# Patient Record
Sex: FEMALE | Race: WHITE | Employment: OTHER | URBAN - METROPOLITAN AREA
[De-identification: names, ages, dates, MRNs, and addresses within clinical notes are randomized per-mention and may not be internally consistent; named-entity substitution may affect disease eponyms.]

---

## 2017-02-13 ENCOUNTER — HOSPITAL ENCOUNTER (OUTPATIENT)
Dept: ENDOSCOPY | Age: 77
Discharge: HOME | End: 2017-02-13
Attending: INTERNAL MEDICINE | Admitting: INTERNAL MEDICINE

## 2017-02-13 VITALS
HEART RATE: 92 BPM | TEMPERATURE: 97.6 F | SYSTOLIC BLOOD PRESSURE: 137 MMHG | WEIGHT: 159 LBS | HEIGHT: 62 IN | RESPIRATION RATE: 18 BRPM | OXYGEN SATURATION: 94 % | DIASTOLIC BLOOD PRESSURE: 98 MMHG | BODY MASS INDEX: 29.26 KG/M2

## 2017-05-24 RX ORDER — LEVOTHYROXINE SODIUM 0.03 MG/1
TABLET ORAL
Qty: 90 TABLET | Refills: 3 | Status: SHIPPED | OUTPATIENT
Start: 2017-05-24 | End: 2018-06-06 | Stop reason: SDUPTHER

## 2017-06-05 ENCOUNTER — OFFICE VISIT (OUTPATIENT)
Dept: INTERNAL MEDICINE | Age: 77
End: 2017-06-05

## 2017-06-05 VITALS
HEIGHT: 62 IN | SYSTOLIC BLOOD PRESSURE: 124 MMHG | WEIGHT: 163.8 LBS | BODY MASS INDEX: 30.14 KG/M2 | DIASTOLIC BLOOD PRESSURE: 86 MMHG

## 2017-06-05 DIAGNOSIS — K57.30 DIVERTICULOSIS OF LARGE INTESTINE WITHOUT HEMORRHAGE: ICD-10-CM

## 2017-06-05 DIAGNOSIS — E78.2 MIXED HYPERLIPIDEMIA: ICD-10-CM

## 2017-06-05 DIAGNOSIS — M85.80 OSTEOPENIA: ICD-10-CM

## 2017-06-05 DIAGNOSIS — M89.9 DISORDER OF BONE AND CARTILAGE: ICD-10-CM

## 2017-06-05 DIAGNOSIS — M94.9 DISORDER OF BONE AND CARTILAGE: ICD-10-CM

## 2017-06-05 DIAGNOSIS — Z00.00 MEDICARE ANNUAL WELLNESS VISIT, SUBSEQUENT: ICD-10-CM

## 2017-06-05 DIAGNOSIS — E03.9 ACQUIRED HYPOTHYROIDISM: ICD-10-CM

## 2017-06-05 DIAGNOSIS — Z00.00 MEDICARE ANNUAL WELLNESS VISIT, SUBSEQUENT: Primary | ICD-10-CM

## 2017-06-05 LAB
BASOPHILS ABSOLUTE: 0 K/UL (ref 0–0.2)
BASOPHILS RELATIVE PERCENT: 0.6 %
EOSINOPHILS ABSOLUTE: 0.2 K/UL (ref 0–0.6)
EOSINOPHILS RELATIVE PERCENT: 3.6 %
HCT VFR BLD CALC: 44.4 % (ref 36–48)
HEMOGLOBIN: 14.2 G/DL (ref 12–16)
LYMPHOCYTES ABSOLUTE: 1.4 K/UL (ref 1–5.1)
LYMPHOCYTES RELATIVE PERCENT: 20.8 %
MCH RBC QN AUTO: 30.7 PG (ref 26–34)
MCHC RBC AUTO-ENTMCNC: 32.1 G/DL (ref 31–36)
MCV RBC AUTO: 95.7 FL (ref 80–100)
MONOCYTES ABSOLUTE: 0.7 K/UL (ref 0–1.3)
MONOCYTES RELATIVE PERCENT: 9.9 %
NEUTROPHILS ABSOLUTE: 4.3 K/UL (ref 1.7–7.7)
NEUTROPHILS RELATIVE PERCENT: 65.1 %
PDW BLD-RTO: 13.9 % (ref 12.4–15.4)
PLATELET # BLD: 236 K/UL (ref 135–450)
PMV BLD AUTO: 9 FL (ref 5–10.5)
RBC # BLD: 4.64 M/UL (ref 4–5.2)
WBC # BLD: 6.7 K/UL (ref 4–11)

## 2017-06-05 PROCEDURE — G8420 CALC BMI NORM PARAMETERS: HCPCS | Performed by: INTERNAL MEDICINE

## 2017-06-05 PROCEDURE — G0439 PPPS, SUBSEQ VISIT: HCPCS | Performed by: INTERNAL MEDICINE

## 2017-06-05 PROCEDURE — 1090F PRES/ABSN URINE INCON ASSESS: CPT | Performed by: INTERNAL MEDICINE

## 2017-06-05 PROCEDURE — G8427 DOCREV CUR MEDS BY ELIG CLIN: HCPCS | Performed by: INTERNAL MEDICINE

## 2017-06-05 PROCEDURE — 1123F ACP DISCUSS/DSCN MKR DOCD: CPT | Performed by: INTERNAL MEDICINE

## 2017-06-05 PROCEDURE — G8399 PT W/DXA RESULTS DOCUMENT: HCPCS | Performed by: INTERNAL MEDICINE

## 2017-06-05 PROCEDURE — 1036F TOBACCO NON-USER: CPT | Performed by: INTERNAL MEDICINE

## 2017-06-05 PROCEDURE — 4040F PNEUMOC VAC/ADMIN/RCVD: CPT | Performed by: INTERNAL MEDICINE

## 2017-06-05 ASSESSMENT — ENCOUNTER SYMPTOMS
ABDOMINAL PAIN: 0
CONSTIPATION: 0
SINUS PRESSURE: 0
COUGH: 0
SHORTNESS OF BREATH: 0

## 2017-06-06 LAB
A/G RATIO: 2.3 (ref 1.1–2.2)
ALBUMIN SERPL-MCNC: 4.2 G/DL (ref 3.4–5)
ALP BLD-CCNC: 93 U/L (ref 40–129)
ALT SERPL-CCNC: 24 U/L (ref 10–40)
ANION GAP SERPL CALCULATED.3IONS-SCNC: 15 MMOL/L (ref 3–16)
AST SERPL-CCNC: 22 U/L (ref 15–37)
BILIRUB SERPL-MCNC: 0.4 MG/DL (ref 0–1)
BUN BLDV-MCNC: 22 MG/DL (ref 7–20)
CALCIUM SERPL-MCNC: 8.9 MG/DL (ref 8.3–10.6)
CHLORIDE BLD-SCNC: 98 MMOL/L (ref 99–110)
CHOLESTEROL, TOTAL: 220 MG/DL (ref 0–199)
CO2: 24 MMOL/L (ref 21–32)
CREAT SERPL-MCNC: 0.8 MG/DL (ref 0.6–1.2)
GFR AFRICAN AMERICAN: >60
GFR NON-AFRICAN AMERICAN: >60
GLOBULIN: 1.8 G/DL
GLUCOSE BLD-MCNC: 104 MG/DL (ref 70–99)
HDLC SERPL-MCNC: 83 MG/DL (ref 40–60)
LDL CHOLESTEROL CALCULATED: 121 MG/DL
POTASSIUM SERPL-SCNC: 4.6 MMOL/L (ref 3.5–5.1)
SODIUM BLD-SCNC: 137 MMOL/L (ref 136–145)
T4 FREE: 1 NG/DL (ref 0.9–1.8)
TOTAL PROTEIN: 6 G/DL (ref 6.4–8.2)
TRIGL SERPL-MCNC: 80 MG/DL (ref 0–150)
TSH REFLEX: 4.55 UIU/ML (ref 0.27–4.2)
VITAMIN D 25-HYDROXY: 45.7 NG/ML
VLDLC SERPL CALC-MCNC: 16 MG/DL

## 2017-11-09 ENCOUNTER — TELEPHONE (OUTPATIENT)
Dept: INTERNAL MEDICINE | Age: 77
End: 2017-11-09

## 2017-11-17 ENCOUNTER — OFFICE VISIT (OUTPATIENT)
Dept: INTERNAL MEDICINE | Age: 77
End: 2017-11-17

## 2017-11-17 VITALS
WEIGHT: 162 LBS | SYSTOLIC BLOOD PRESSURE: 150 MMHG | BODY MASS INDEX: 29.81 KG/M2 | DIASTOLIC BLOOD PRESSURE: 90 MMHG | HEIGHT: 62 IN

## 2017-11-17 DIAGNOSIS — M85.88 OSTEOPENIA OF SPINE: ICD-10-CM

## 2017-11-17 DIAGNOSIS — E78.2 MIXED HYPERLIPIDEMIA: ICD-10-CM

## 2017-11-17 DIAGNOSIS — E03.9 ACQUIRED HYPOTHYROIDISM: ICD-10-CM

## 2017-11-17 DIAGNOSIS — M54.5 LOW BACK PAIN, UNSPECIFIED BACK PAIN LATERALITY, UNSPECIFIED CHRONICITY, WITH SCIATICA PRESENCE UNSPECIFIED: Primary | ICD-10-CM

## 2017-11-17 LAB
BILIRUBIN, POC: ABNORMAL
BLOOD URINE, POC: ABNORMAL
CLARITY, POC: CLEAR
COLOR, POC: YELLOW
GLUCOSE URINE, POC: ABNORMAL
KETONES, POC: ABNORMAL
LEUKOCYTE EST, POC: ABNORMAL
NITRITE, POC: ABNORMAL
PH, POC: 5
PROTEIN, POC: ABNORMAL
SPECIFIC GRAVITY, POC: 1015
UROBILINOGEN, POC: 0.2

## 2017-11-17 PROCEDURE — 1123F ACP DISCUSS/DSCN MKR DOCD: CPT | Performed by: INTERNAL MEDICINE

## 2017-11-17 PROCEDURE — G8427 DOCREV CUR MEDS BY ELIG CLIN: HCPCS | Performed by: INTERNAL MEDICINE

## 2017-11-17 PROCEDURE — 99214 OFFICE O/P EST MOD 30 MIN: CPT | Performed by: INTERNAL MEDICINE

## 2017-11-17 PROCEDURE — 1036F TOBACCO NON-USER: CPT | Performed by: INTERNAL MEDICINE

## 2017-11-17 PROCEDURE — 1090F PRES/ABSN URINE INCON ASSESS: CPT | Performed by: INTERNAL MEDICINE

## 2017-11-17 PROCEDURE — 4040F PNEUMOC VAC/ADMIN/RCVD: CPT | Performed by: INTERNAL MEDICINE

## 2017-11-17 PROCEDURE — 81002 URINALYSIS NONAUTO W/O SCOPE: CPT | Performed by: INTERNAL MEDICINE

## 2017-11-17 PROCEDURE — G8399 PT W/DXA RESULTS DOCUMENT: HCPCS | Performed by: INTERNAL MEDICINE

## 2017-11-17 PROCEDURE — G8484 FLU IMMUNIZE NO ADMIN: HCPCS | Performed by: INTERNAL MEDICINE

## 2017-11-17 PROCEDURE — G8419 CALC BMI OUT NRM PARAM NOF/U: HCPCS | Performed by: INTERNAL MEDICINE

## 2017-11-17 RX ORDER — SULFAMETHOXAZOLE AND TRIMETHOPRIM 800; 160 MG/1; MG/1
1 TABLET ORAL 2 TIMES DAILY
Qty: 20 TABLET | Refills: 0 | Status: SHIPPED | OUTPATIENT
Start: 2017-11-17 | End: 2017-11-27

## 2017-11-17 ASSESSMENT — PATIENT HEALTH QUESTIONNAIRE - PHQ9
SUM OF ALL RESPONSES TO PHQ QUESTIONS 1-9: 0
1. LITTLE INTEREST OR PLEASURE IN DOING THINGS: 0
SUM OF ALL RESPONSES TO PHQ9 QUESTIONS 1 & 2: 0
2. FEELING DOWN, DEPRESSED OR HOPELESS: 0

## 2017-11-17 NOTE — PROGRESS NOTES
Subjective:      Patient ID: Jeffory Cowden is a 69 y.o. female.     HPI    Review of Systems    Objective:   Physical Exam    Assessment:      ***      Plan:      ***

## 2017-11-20 LAB
ORGANISM: ABNORMAL
URINE CULTURE, ROUTINE: ABNORMAL
URINE CULTURE, ROUTINE: ABNORMAL

## 2017-12-18 ENCOUNTER — HOSPITAL ENCOUNTER (OUTPATIENT)
Dept: MAMMOGRAPHY | Age: 77
Discharge: HOME | End: 2017-12-18
Admitting: INTERNAL MEDICINE

## 2017-12-18 DIAGNOSIS — Z12.31 ENCOUNTER FOR SCREENING MAMMOGRAM FOR BREAST CANCER: ICD-10-CM

## 2018-06-06 ENCOUNTER — OFFICE VISIT (OUTPATIENT)
Dept: INTERNAL MEDICINE | Age: 78
End: 2018-06-06

## 2018-06-06 VITALS
SYSTOLIC BLOOD PRESSURE: 150 MMHG | BODY MASS INDEX: 29.81 KG/M2 | DIASTOLIC BLOOD PRESSURE: 78 MMHG | HEIGHT: 62 IN | WEIGHT: 162 LBS

## 2018-06-06 DIAGNOSIS — E03.9 ACQUIRED HYPOTHYROIDISM: ICD-10-CM

## 2018-06-06 DIAGNOSIS — M85.88 OSTEOPENIA OF SPINE: ICD-10-CM

## 2018-06-06 DIAGNOSIS — Z00.00 MEDICARE ANNUAL WELLNESS VISIT, SUBSEQUENT: ICD-10-CM

## 2018-06-06 DIAGNOSIS — K57.30 DIVERTICULOSIS OF LARGE INTESTINE WITHOUT HEMORRHAGE: ICD-10-CM

## 2018-06-06 DIAGNOSIS — Z00.00 MEDICARE ANNUAL WELLNESS VISIT, SUBSEQUENT: Primary | ICD-10-CM

## 2018-06-06 DIAGNOSIS — E78.2 MIXED HYPERLIPIDEMIA: ICD-10-CM

## 2018-06-06 LAB
A/G RATIO: 2 (ref 1.1–2.2)
ALBUMIN SERPL-MCNC: 4.4 G/DL (ref 3.4–5)
ALP BLD-CCNC: 109 U/L (ref 40–129)
ALT SERPL-CCNC: 33 U/L (ref 10–40)
ANION GAP SERPL CALCULATED.3IONS-SCNC: 16 MMOL/L (ref 3–16)
AST SERPL-CCNC: 28 U/L (ref 15–37)
BASOPHILS ABSOLUTE: 0.1 K/UL (ref 0–0.2)
BASOPHILS RELATIVE PERCENT: 0.7 %
BILIRUB SERPL-MCNC: <0.2 MG/DL (ref 0–1)
BUN BLDV-MCNC: 17 MG/DL (ref 7–20)
CALCIUM SERPL-MCNC: 9.4 MG/DL (ref 8.3–10.6)
CHLORIDE BLD-SCNC: 99 MMOL/L (ref 99–110)
CHOLESTEROL, TOTAL: 240 MG/DL (ref 0–199)
CO2: 26 MMOL/L (ref 21–32)
CREAT SERPL-MCNC: 0.8 MG/DL (ref 0.6–1.2)
EOSINOPHILS ABSOLUTE: 0.3 K/UL (ref 0–0.6)
EOSINOPHILS RELATIVE PERCENT: 4.7 %
GFR AFRICAN AMERICAN: >60
GFR NON-AFRICAN AMERICAN: >60
GLOBULIN: 2.2 G/DL
GLUCOSE BLD-MCNC: 121 MG/DL (ref 70–99)
HCT VFR BLD CALC: 47.1 % (ref 36–48)
HDLC SERPL-MCNC: 91 MG/DL (ref 40–60)
HEMOGLOBIN: 15.8 G/DL (ref 12–16)
LDL CHOLESTEROL CALCULATED: 133 MG/DL
LYMPHOCYTES ABSOLUTE: 1.6 K/UL (ref 1–5.1)
LYMPHOCYTES RELATIVE PERCENT: 21.8 %
MCH RBC QN AUTO: 31.5 PG (ref 26–34)
MCHC RBC AUTO-ENTMCNC: 33.6 G/DL (ref 31–36)
MCV RBC AUTO: 93.6 FL (ref 80–100)
MONOCYTES ABSOLUTE: 0.8 K/UL (ref 0–1.3)
MONOCYTES RELATIVE PERCENT: 11.2 %
NEUTROPHILS ABSOLUTE: 4.5 K/UL (ref 1.7–7.7)
NEUTROPHILS RELATIVE PERCENT: 61.6 %
PDW BLD-RTO: 13.5 % (ref 12.4–15.4)
PLATELET # BLD: 256 K/UL (ref 135–450)
PMV BLD AUTO: 8.9 FL (ref 5–10.5)
POTASSIUM SERPL-SCNC: 5 MMOL/L (ref 3.5–5.1)
RBC # BLD: 5.03 M/UL (ref 4–5.2)
SODIUM BLD-SCNC: 141 MMOL/L (ref 136–145)
T4 FREE: 1 NG/DL (ref 0.9–1.8)
TOTAL PROTEIN: 6.6 G/DL (ref 6.4–8.2)
TRIGL SERPL-MCNC: 78 MG/DL (ref 0–150)
TSH REFLEX: 5.2 UIU/ML (ref 0.27–4.2)
VLDLC SERPL CALC-MCNC: 16 MG/DL
WBC # BLD: 7.4 K/UL (ref 4–11)

## 2018-06-06 PROCEDURE — G0439 PPPS, SUBSEQ VISIT: HCPCS | Performed by: INTERNAL MEDICINE

## 2018-06-06 PROCEDURE — 4040F PNEUMOC VAC/ADMIN/RCVD: CPT | Performed by: INTERNAL MEDICINE

## 2018-06-06 RX ORDER — LEVOTHYROXINE SODIUM 0.03 MG/1
TABLET ORAL
Qty: 90 TABLET | Refills: 3 | Status: SHIPPED | OUTPATIENT
Start: 2018-06-06 | End: 2018-06-06 | Stop reason: CLARIF

## 2018-06-06 ASSESSMENT — ENCOUNTER SYMPTOMS
SINUS PRESSURE: 0
COUGH: 0
CONSTIPATION: 0
ABDOMINAL PAIN: 0
SHORTNESS OF BREATH: 0

## 2018-06-07 RX ORDER — LEVOTHYROXINE SODIUM 0.03 MG/1
TABLET ORAL
Qty: 90 TABLET | Refills: 0 | Status: SHIPPED | OUTPATIENT
Start: 2018-06-07 | End: 2018-09-02 | Stop reason: SDUPTHER

## 2018-09-04 RX ORDER — LEVOTHYROXINE SODIUM 0.03 MG/1
TABLET ORAL
Qty: 90 TABLET | Refills: 3 | Status: SHIPPED | OUTPATIENT
Start: 2018-09-04 | End: 2019-06-26 | Stop reason: SDUPTHER

## 2018-09-24 ENCOUNTER — OFFICE VISIT (OUTPATIENT)
Dept: INTERNAL MEDICINE CLINIC | Age: 78
End: 2018-09-24
Payer: MEDICARE

## 2018-09-24 VITALS
BODY MASS INDEX: 30.25 KG/M2 | SYSTOLIC BLOOD PRESSURE: 118 MMHG | DIASTOLIC BLOOD PRESSURE: 66 MMHG | HEIGHT: 62 IN | WEIGHT: 164.4 LBS

## 2018-09-24 DIAGNOSIS — N30.00 ACUTE CYSTITIS WITHOUT HEMATURIA: ICD-10-CM

## 2018-09-24 DIAGNOSIS — Z23 NEED FOR INFLUENZA VACCINATION: Primary | ICD-10-CM

## 2018-09-24 LAB
BILIRUBIN, POC: ABNORMAL
BLOOD URINE, POC: ABNORMAL
CLARITY, POC: ABNORMAL
COLOR, POC: ABNORMAL
GLUCOSE URINE, POC: ABNORMAL
KETONES, POC: ABNORMAL
LEUKOCYTE EST, POC: ABNORMAL
NITRITE, POC: ABNORMAL
PH, POC: 6
PROTEIN, POC: ABNORMAL
SPECIFIC GRAVITY, POC: 1015
UROBILINOGEN, POC: 0.2

## 2018-09-24 PROCEDURE — G0008 ADMIN INFLUENZA VIRUS VAC: HCPCS | Performed by: INTERNAL MEDICINE

## 2018-09-24 PROCEDURE — 4040F PNEUMOC VAC/ADMIN/RCVD: CPT | Performed by: INTERNAL MEDICINE

## 2018-09-24 PROCEDURE — 90662 IIV NO PRSV INCREASED AG IM: CPT | Performed by: INTERNAL MEDICINE

## 2018-09-24 PROCEDURE — 1123F ACP DISCUSS/DSCN MKR DOCD: CPT | Performed by: INTERNAL MEDICINE

## 2018-09-24 PROCEDURE — G8417 CALC BMI ABV UP PARAM F/U: HCPCS | Performed by: INTERNAL MEDICINE

## 2018-09-24 PROCEDURE — G8427 DOCREV CUR MEDS BY ELIG CLIN: HCPCS | Performed by: INTERNAL MEDICINE

## 2018-09-24 PROCEDURE — 99213 OFFICE O/P EST LOW 20 MIN: CPT | Performed by: INTERNAL MEDICINE

## 2018-09-24 PROCEDURE — 1036F TOBACCO NON-USER: CPT | Performed by: INTERNAL MEDICINE

## 2018-09-24 PROCEDURE — G8399 PT W/DXA RESULTS DOCUMENT: HCPCS | Performed by: INTERNAL MEDICINE

## 2018-09-24 PROCEDURE — 81002 URINALYSIS NONAUTO W/O SCOPE: CPT | Performed by: INTERNAL MEDICINE

## 2018-09-24 PROCEDURE — 1101F PT FALLS ASSESS-DOCD LE1/YR: CPT | Performed by: INTERNAL MEDICINE

## 2018-09-24 PROCEDURE — 1090F PRES/ABSN URINE INCON ASSESS: CPT | Performed by: INTERNAL MEDICINE

## 2018-09-24 RX ORDER — SULFAMETHOXAZOLE AND TRIMETHOPRIM 800; 160 MG/1; MG/1
1 TABLET ORAL 2 TIMES DAILY
Qty: 20 TABLET | Refills: 0 | Status: SHIPPED | OUTPATIENT
Start: 2018-09-24 | End: 2018-10-02 | Stop reason: SDUPTHER

## 2018-09-24 ASSESSMENT — PATIENT HEALTH QUESTIONNAIRE - PHQ9
SUM OF ALL RESPONSES TO PHQ QUESTIONS 1-9: 0
SUM OF ALL RESPONSES TO PHQ QUESTIONS 1-9: 0
SUM OF ALL RESPONSES TO PHQ9 QUESTIONS 1 & 2: 0
1. LITTLE INTEREST OR PLEASURE IN DOING THINGS: 0
2. FEELING DOWN, DEPRESSED OR HOPELESS: 0

## 2018-09-28 LAB
ORGANISM: ABNORMAL
URINE CULTURE, ROUTINE: ABNORMAL
URINE CULTURE, ROUTINE: ABNORMAL

## 2018-10-02 RX ORDER — SULFAMETHOXAZOLE AND TRIMETHOPRIM 800; 160 MG/1; MG/1
1 TABLET ORAL 2 TIMES DAILY
Qty: 20 TABLET | Refills: 0 | Status: SHIPPED | OUTPATIENT
Start: 2018-10-02 | End: 2018-10-12

## 2018-11-16 ENCOUNTER — TELEPHONE (OUTPATIENT)
Dept: INTERNAL MEDICINE CLINIC | Age: 78
End: 2018-11-16

## 2018-11-16 DIAGNOSIS — N30.00 ACUTE CYSTITIS WITHOUT HEMATURIA: Primary | ICD-10-CM

## 2018-11-16 RX ORDER — NITROFURANTOIN 25; 75 MG/1; MG/1
100 CAPSULE ORAL 2 TIMES DAILY
Qty: 14 CAPSULE | Refills: 0 | Status: SHIPPED | OUTPATIENT
Start: 2018-11-16 | End: 2018-11-23

## 2018-11-16 NOTE — TELEPHONE ENCOUNTER
Pt called in stating that the Bactrim that she was prescribed for her Kidney infection is giving her Hives and she cant take it.  Please cb at [phone]

## 2018-12-18 ENCOUNTER — HOSPITAL ENCOUNTER (OUTPATIENT)
Dept: MAMMOGRAPHY | Age: 78
Discharge: HOME | End: 2018-12-18
Payer: MEDICARE

## 2018-12-18 DIAGNOSIS — Z12.39 BREAST CANCER SCREENING: ICD-10-CM

## 2018-12-18 PROCEDURE — 77063 BREAST TOMOSYNTHESIS BI: CPT

## 2019-01-31 ENCOUNTER — TELEPHONE (OUTPATIENT)
Dept: INTERNAL MEDICINE CLINIC | Age: 79
End: 2019-01-31

## 2019-02-01 RX ORDER — METRONIDAZOLE 500 MG/1
500 TABLET ORAL 3 TIMES DAILY
Qty: 21 TABLET | Refills: 0 | Status: SHIPPED | OUTPATIENT
Start: 2019-02-01 | End: 2021-02-23 | Stop reason: SDUPTHER

## 2019-02-01 RX ORDER — CIPROFLOXACIN 500 MG/1
500 TABLET, FILM COATED ORAL 2 TIMES DAILY
Qty: 14 TABLET | Refills: 0 | Status: SHIPPED | OUTPATIENT
Start: 2019-02-01 | End: 2021-02-23 | Stop reason: SDUPTHER

## 2019-04-30 ENCOUNTER — OFFICE VISIT (OUTPATIENT)
Dept: INTERNAL MEDICINE CLINIC | Age: 79
End: 2019-04-30
Payer: MEDICARE

## 2019-04-30 VITALS
OXYGEN SATURATION: 98 % | BODY MASS INDEX: 30.73 KG/M2 | HEART RATE: 70 BPM | SYSTOLIC BLOOD PRESSURE: 132 MMHG | WEIGHT: 167 LBS | HEIGHT: 62 IN | DIASTOLIC BLOOD PRESSURE: 86 MMHG

## 2019-04-30 DIAGNOSIS — Z85.820 HX OF MELANOMA EXCISION: ICD-10-CM

## 2019-04-30 DIAGNOSIS — E03.9 ACQUIRED HYPOTHYROIDISM: ICD-10-CM

## 2019-04-30 DIAGNOSIS — E78.2 MIXED HYPERLIPIDEMIA: Primary | ICD-10-CM

## 2019-04-30 DIAGNOSIS — Z98.890 HX OF MELANOMA EXCISION: ICD-10-CM

## 2019-04-30 PROCEDURE — 1123F ACP DISCUSS/DSCN MKR DOCD: CPT | Performed by: INTERNAL MEDICINE

## 2019-04-30 PROCEDURE — G8399 PT W/DXA RESULTS DOCUMENT: HCPCS | Performed by: INTERNAL MEDICINE

## 2019-04-30 PROCEDURE — G8427 DOCREV CUR MEDS BY ELIG CLIN: HCPCS | Performed by: INTERNAL MEDICINE

## 2019-04-30 PROCEDURE — 99213 OFFICE O/P EST LOW 20 MIN: CPT | Performed by: INTERNAL MEDICINE

## 2019-04-30 PROCEDURE — 1036F TOBACCO NON-USER: CPT | Performed by: INTERNAL MEDICINE

## 2019-04-30 PROCEDURE — 1090F PRES/ABSN URINE INCON ASSESS: CPT | Performed by: INTERNAL MEDICINE

## 2019-04-30 PROCEDURE — 4040F PNEUMOC VAC/ADMIN/RCVD: CPT | Performed by: INTERNAL MEDICINE

## 2019-04-30 PROCEDURE — G8417 CALC BMI ABV UP PARAM F/U: HCPCS | Performed by: INTERNAL MEDICINE

## 2019-04-30 ASSESSMENT — PATIENT HEALTH QUESTIONNAIRE - PHQ9
SUM OF ALL RESPONSES TO PHQ QUESTIONS 1-9: 0
SUM OF ALL RESPONSES TO PHQ9 QUESTIONS 1 & 2: 0
SUM OF ALL RESPONSES TO PHQ QUESTIONS 1-9: 0
1. LITTLE INTEREST OR PLEASURE IN DOING THINGS: 0
2. FEELING DOWN, DEPRESSED OR HOPELESS: 0

## 2019-04-30 ASSESSMENT — ENCOUNTER SYMPTOMS
CHEST TIGHTNESS: 0
SHORTNESS OF BREATH: 0
NAUSEA: 0
VOMITING: 0
ABDOMINAL PAIN: 0

## 2019-04-30 NOTE — PROGRESS NOTES
Outpatient Note for established Patient - regular Visit    History Obtained From:  patient, electronic medical record  Is the patient new to me ? - No    HISTORY OF PRESENT ILLNESS:   The patient is a 67 y.o. female who is here today for :  Huber Sheth was seen today for established new doctor. Diagnoses and all orders for this visit:    Mixed hyperlipidemia  -     CBC Auto Differential; Future  -     Comprehensive Metabolic Panel; Future  -     Lipid Panel; Future  -     TSH with Reflex; Future    Acquired hypothyroidism  -     Lipid Panel; Future  -     TSH with Reflex; Future    Hx of melanoma excision      Lab Results   Component Value Date    CHOL 240 (H) 06/06/2018    CHOL 220 (H) 06/05/2017    CHOL 239 (H) 05/31/2016     Lab Results   Component Value Date    TRIG 78 06/06/2018    TRIG 80 06/05/2017    TRIG 87 05/31/2016     Lab Results   Component Value Date    HDL 91 (H) 06/06/2018    HDL 83 (H) 06/05/2017    HDL 92 (H) 05/31/2016     Lab Results   Component Value Date    LDLCALC 133 (H) 06/06/2018    LDLCALC 121 (H) 06/05/2017    LDLCALC 130 (H) 05/31/2016     Lab Results   Component Value Date    LABVLDL 16 06/06/2018    LABVLDL 16 06/05/2017    LABVLDL 17 05/31/2016     No results found for: CHOLHDLRATIO    Pt denies CP/SOB/palpitations/abdominal pain/N/V. She preferred not be on statins. Pt denies CP/SOB/palpitations/abdominal pain/N/V. She had back melanoma excision 2 months ago (by the dermatology group)  She is not followed every 2 months and uses protection outside. Her last TSH was 5.2    Preventive:  1) colon cancer screening completed? yes, by Dr Magda Grover - she was not required to repeat   3) Breast cancer screeningcompleted (or not needed) ?  12/2018- birads-2     Past Medical History:        Diagnosis Date    Alveolitis of jaw     Disorder of bone and cartilage, unspecified     Diverticulosis of colon (without mention of hemorrhage)     Myalgia and myositis, unspecified     Other and well-developed and well-nourished. No distress. HENT:   Head: Normocephalic and atraumatic. Mouth/Throat: Oropharynx is clear and moist. No oropharyngeal exudate. Eyes: Conjunctivae and EOM are normal. Right eye exhibits no discharge. Left eye exhibits no discharge. No scleral icterus. Neck: Normal range of motion. Neck supple. Cardiovascular: Normal rate and normal heart sounds. No murmur heard. Pulmonary/Chest: Effort normal and breath sounds normal. No respiratory distress. She has no wheezes. She has no rales. Abdominal: Soft. She exhibits no distension. There is no tenderness. There is no guarding. Musculoskeletal: She exhibits no edema or deformity. Lymphadenopathy:        Head (right side): No submental and no submandibular adenopathy present. Head (left side): No submental and no submandibular adenopathy present. She has no cervical adenopathy. Right cervical: No superficial cervical and no deep cervical adenopathy present. Left cervical: No superficial cervical and no deep cervical adenopathy present. Neurological: She is alert and oriented to person, place, and time. She has normal reflexes. She exhibits normal muscle tone. GCS eye subscore is 4. GCS verbal subscore is 5. GCS motor subscore is 6. Skin: No rash noted. She is not diaphoretic. Psychiatric: She has a normal mood and affect. Her behavior is normal. Thought content normal.       Assessment/Plan:   Michael Low was seen today for established new doctor.     Diagnoses and all orders for this visit:    Mixed hyperlipidemia  LDL elevated  Pt is not interested in statin  Keep monitoring     Acquired hypothyroidism  Last TSH was 5.2   She is on Synthroid     Hx of melanoma excision  Keep derm f/u    - Patient was encouraged to callthe office (and ask to see me) or be seen by other provider for any worsening or lack    of improvement in his symptoms.       - Pt was asked toschedule an appointment in 3 months, and to let me know of any scheduling difficulties. Additional patients instructions (if given): There are no Patient Instructions on file for this visit.     Marshall Walker M.D.   5/8/2019, 2:42 AM    Addnedum: I will ask pt to repeat labs (message sent to the MA)

## 2019-05-09 ENCOUNTER — TELEPHONE (OUTPATIENT)
Dept: INTERNAL MEDICINE CLINIC | Age: 79
End: 2019-05-09

## 2019-05-09 NOTE — TELEPHONE ENCOUNTER
Pt spouse came in to the office stating that I called her yesterday and left a message stating that Dallas Mulligan needed to have pre op testing, not to stop at the office for lab order that it will be in the system. I did not place a call to her or her  yesterday. I informed her of that and that Dr. Jose Lind would not be ordering any testing as he has not seen her  yet. I called Dr. Larissa Henderson (ordered biopsy scheduled for tomorrow) and spoke with Shirin Jeter MA. She stated that she told Mrs Jennings in the office yesterday that any testing needed prior to surgery will be done at the hospital the day of the procedure. I reminded Mrs Jennings of this and she agreed to that happening yesterday. I apologized to her and apologized for the inconvenience. She turned and walked away and said \"me too you piece of shit\" and walked out the door. There were several patients in the lobby that witnessed this as well as the  staff. I spoke with Dr. Jose Lind about this and he would like to have the patient dismissed. This message has been sent to [employer], practice manager.

## 2019-06-26 ENCOUNTER — OFFICE VISIT (OUTPATIENT)
Dept: FAMILY MEDICINE CLINIC | Age: 79
End: 2019-06-26
Payer: MEDICARE

## 2019-06-26 VITALS
HEART RATE: 76 BPM | OXYGEN SATURATION: 93 % | TEMPERATURE: 96.7 F | SYSTOLIC BLOOD PRESSURE: 136 MMHG | HEIGHT: 62 IN | WEIGHT: 162 LBS | BODY MASS INDEX: 29.81 KG/M2 | DIASTOLIC BLOOD PRESSURE: 68 MMHG | RESPIRATION RATE: 18 BRPM

## 2019-06-26 DIAGNOSIS — E78.2 MIXED HYPERLIPIDEMIA: ICD-10-CM

## 2019-06-26 DIAGNOSIS — E03.9 ACQUIRED HYPOTHYROIDISM: Primary | ICD-10-CM

## 2019-06-26 DIAGNOSIS — E55.9 VITAMIN D DEFICIENCY: ICD-10-CM

## 2019-06-26 DIAGNOSIS — M85.88 OSTEOPENIA OF SPINE: ICD-10-CM

## 2019-06-26 DIAGNOSIS — Z12.39 BREAST CANCER SCREENING: ICD-10-CM

## 2019-06-26 PROCEDURE — G8427 DOCREV CUR MEDS BY ELIG CLIN: HCPCS | Performed by: FAMILY MEDICINE

## 2019-06-26 PROCEDURE — G8417 CALC BMI ABV UP PARAM F/U: HCPCS | Performed by: FAMILY MEDICINE

## 2019-06-26 PROCEDURE — 4040F PNEUMOC VAC/ADMIN/RCVD: CPT | Performed by: FAMILY MEDICINE

## 2019-06-26 PROCEDURE — 99214 OFFICE O/P EST MOD 30 MIN: CPT | Performed by: FAMILY MEDICINE

## 2019-06-26 PROCEDURE — 1090F PRES/ABSN URINE INCON ASSESS: CPT | Performed by: FAMILY MEDICINE

## 2019-06-26 PROCEDURE — G8399 PT W/DXA RESULTS DOCUMENT: HCPCS | Performed by: FAMILY MEDICINE

## 2019-06-26 PROCEDURE — 1123F ACP DISCUSS/DSCN MKR DOCD: CPT | Performed by: FAMILY MEDICINE

## 2019-06-26 PROCEDURE — 1036F TOBACCO NON-USER: CPT | Performed by: FAMILY MEDICINE

## 2019-06-26 RX ORDER — LEVOTHYROXINE SODIUM 0.03 MG/1
25 TABLET ORAL DAILY
Qty: 90 TABLET | Refills: 3 | Status: SHIPPED | OUTPATIENT
Start: 2019-06-26 | End: 2020-08-21

## 2019-06-26 NOTE — PROGRESS NOTES
[facility] [address] [facility]  Progress Note  Chintan Watson DO          Stephen Jennings  1940    06/26/19    Chief Complaint:   Stephen Jennings is a 67 y.o. female who is here to establish and discuss hypothyroidism and screening    HPI:   The patient has been in otherwise good general health in the past.  Calls for antibiotics cipro/flagyl when she has a diverticulitis attack. 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 Takes levothyroxine 25 mcg daily for hypothyroidism. Takes Vitamin D 2000 international units. Does not want to take any more ostoeporosis prescriptions as had a problem with left jaw. ROS negative for headache, visionchanges, chest pain, shortness of breath, abdominal pain, urinary sx, bowel changes. Past medical, surgical, and social history reviewed. and allergies reviewed. Allergies   Allergen Reactions    Bactrim [Sulfamethoxazole-Trimethoprim] Hives     hives    Seasonal Other (See Comments)     Runny nose, sneezing     Prior to Visit Medications    Medication Sig Taking? Authorizing Provider   levothyroxine (SYNTHROID) 25 MCG tablet Take 1 tablet by mouth Daily Yes Adalberto Kolb DO   Cholecalciferol (VITAMIN D) 2000 UNITS CAPS capsule Take 1 capsule by mouth daily.  Yes Xavier Neely MD          Vitals:    06/26/19 0745 06/26/19 0758 06/26/19 0845   BP: (!) 165/95 (!) 132/92 136/68   Site: Left Upper Arm Right Upper Arm    Position: Sitting Sitting    Cuff Size: Medium Adult Medium Adult    Pulse: 76     Resp: 18     Temp: 96.7 °F (35.9 °C)     TempSrc: Oral     SpO2: 93%     Weight: 162 lb (73.5 kg)     Height: 5' 1.6\" (1.565 m)        Wt Readings from Last 3 Encounters:   06/26/19 162 lb (73.5 kg)   04/30/19 167 lb (75.8 kg)   09/24/18 164 lb 6.4 oz (74.6 kg)     BP Readings from Last 3 Encounters:   06/26/19 136/68   04/30/19 132/86   09/24/18 118/66       Patient Active Problem List   Diagnosis    Tinnitus    Disorder of bone and cartilage    Diverticulosis of large intestine    Mixed hyperlipidemia    Alveolitis of jaw    Hypothyroidism    Osteopenia    Hx of melanoma excision       Immunization History   Administered Date(s) Administered    Influenza Virus Vaccine 10/15/2012    Influenza, High Dose (Fluzone 65 yrs and older) 10/07/2013, 10/09/2014, 10/07/2015, 10/28/2016, 10/09/2017, 09/24/2018    Pneumococcal Conjugate 13-valent ([id number]) 05/31/2016    Pneumococcal Polysaccharide ([id number]) 01/27/2012    Tdap (Boostrix, Adacel) 05/22/2014    Zoster Live (Zostavax) 02/01/2012       Past Medical History:   Diagnosis Date    Alveolitis of jaw     Disorder of bone and cartilage, unspecified     Diverticulosis of colon (without mention of hemorrhage)     Myalgia and myositis, unspecified     Other and unspecified hyperlipidemia     Screening mammogram 11/5/2008    Normal    Unspecified gastritis and gastroduodenitis without mention of hemorrhage     Unspecified hypothyroidism     Unspecified tinnitus      Past Surgical History:   Procedure Laterality Date    BLADDER SUSPENSION  1990s    CHOLECYSTECTOMY  1990s    COLONOSCOPY  1/16/2007    Normal    COLONOSCOPY  02/27/2012    Dr. Shine Chatman    Right Repair    HYSTERECTOMY  1975    WISDOM TOOTH EXTRACTION       Family History   Problem Relation Age of Onset    Cancer Brother      Social History     Socioeconomic History    Marital status:      Spouse name: Not on file    Number of children: Not on file    Years of education: Not on file    Highest education level: Not on file   Occupational History    Not on file   Social Needs    Financial resource strain: Not on file    Food insecurity:     Worry: Not on file     Inability: Not on file    Transportation needs:     Medical: Not on file     Non-medical: Not on file   Tobacco Use    Smoking status: Former Smoker     Packs/day: 0.00     Years: 10.00     Pack years: 0.00     Last attempt to quit: 2005     Years since quittin.0    Smokeless tobacco: Never Used   Substance and Sexual Activity    Alcohol use: Yes     Comment: rare    Drug use: No    Sexual activity: Yes   Lifestyle    Physical activity:     Days per week: Not on file     Minutes per session: Not on file    Stress: Not on file   Relationships    Social connections:     Talks on phone: Not on file     Gets together: Not on file     Attends Confucianism service: Not on file     Active member of club or organization: Not on file     Attends meetings of clubs or organizations: Not on file     Relationship status: Not on file    Intimate partner violence:     Fear of current or ex partner: Not on file     Emotionally abused: Not on file     Physically abused: Not on file     Forced sexual activity: Not on file   Other Topics Concern    Not on file   Social History Narrative    Not on file       O: /68   Pulse 76   Temp 96.7 °F (35.9 °C) (Oral)   Resp 18   Ht 5' 1.6\" (1.565 m)   Wt 162 lb (73.5 kg)   SpO2 93%   Breastfeeding? No   BMI 30.02 kg/m²   Physical Exam  GEN: No acute distress,cooperative, well nourished, alert. HEENT: PEERLA, EOMI , normocephalic/atraumatic, nares and oropharynx clear. Mucus membranes normal, Tympanic membranes clear bilaterally. Neck: soft, supple, no thyromegaly,mass, no Lymphadenopathy  CV: Regular rate and rhythm, no murmur, rubs, gallops. No edema. Resp: Clear to auscultation bilaterally good air entry bilaterally  No crackles, wheeze. Breathing comfortably. Psych:normal affect. Neuro: AOx3      ASSESSMENT   Diagnosis Orders   1. Acquired hypothyroidism  levothyroxine (SYNTHROID) 25 MCG tablet    TSH with Reflex   2. Breast cancer screening  PATTIE DIGITAL SCREEN W OR WO CAD BILATERAL   3. Mixed hyperlipidemia  LIPID PANEL   4. Osteopenia of spine  COMPREHENSIVE METABOLIC PANEL    CBC   5.  Vitamin D deficiency  VITAMIN D 25 HYDROXY     #1: The current medical regimen is effective;  continue present plan and medications. The current medical regimen is effective;  continue present plan and medications. #4: discussed strategies to decrease chance of osteoporosis. #5: has hx of skin cancer. Continue Vitamin D. PLAN          If applicable, see additional patient information and instructions under \"Patient Instructions. \"    No follow-ups on file. Patient Instructions   1) Kindly remind your dermatology clinic team to forward reports to your PCP. 2) This clinic carries the flu vaccination starting October through February. Call [phone] to verify the vaccine is in stock; or go to a retail pharmacy. 3) Continue with mammogram at same location at end of each year. 4) To help with bone health; continue the walking and other fitness. Keep up with Vitamin D 6487-1187 international units. Let your PCP know if you wish to proceed with a DEXA scan at anytime. 5) Plan a Medicare Wellness Visit in 2019. 6) Thyroid medication is renewed. 7) Complete your fasting labwork this summer. You may be contacted by mail or e-mail to participate in a patient satisfaction survey regarding your office visit today. We value your opinion and depend on your feedback to make improvements and provide you with the best possible experience while receiving high quality medical treatment. Your time in completing this survey is greatly appreciated    Patient Education        Osteoporosis: Care Instructions  Your Care Instructions    Osteoporosis causes bones to become thin and weak. It is much more common in women than in men. Osteoporosis may be very advanced before you know you have it. Sometimes the first sign is a broken bone in the hip, spine, or wrist or sudden pain in your middle or lower back. Follow-up care is a key part of your treatment and safety. Be sure to make and go to all appointments, and call your doctor if you are having problems.  It's also a good idea to know your test programs and medicines. These can increase your chances of quitting for good. · Get regular bone-building exercise. Weight-bearing and resistance exercises keep bones healthy by working the muscles and bones against gravity. Start out at an exercise level that feels right for you. Add a little at a time until you can do the following:  ? Do 30 minutes of weight-bearing exercise on most days of the week. Walking, jogging, stair climbing, and dancing are good choices. ? Do resistance exercises with weights or elastic bands 2 to 3 days a week. · Reduce your risk of falls:  ? Wear supportive shoes with low heels and nonslip soles. ? Use a cane or walker, if you need it. Use shower chairs and bath benches. Put in handrails on stairways, around your shower or tub area, and near the toilet. ? Keep stairs, porches, and walkways well lit. Use night-lights. ? Remove throw rugs and other objects that are in the way. ? Avoid icy, wet, or slippery surfaces. ? Keep a cordless phone and a flashlight with new batteries by your bed. When should you call for help? Watch closely for changes in your health, and be sure to contact your doctor if you have any problems. Where can you learn more? Go to https://[employer].Vertishear. org and sign in to your [employer] account. Enter K100 in the [facility] box to learn more about \"Osteoporosis: Care Instructions. \"     If you do not have an account, please click on the \"Sign Up Now\" link. Current as of: November 7, 2018  Content Version: 12.0  © 4061-3081 Healthwise, Incorporated. Care instructions adapted under license by [facility] ([facility]). If you have questions about a medical condition or this instruction, always ask your healthcare professional. [address] any warranty or liability for your use of this information. Please note a portion of this chart was generated using dragon dictation software.  Although every effort was made to ensure the accuracy of this automated transcription,some errors in transcription may have occurred.

## 2019-06-26 NOTE — PATIENT INSTRUCTIONS
1) Kindly remind your dermatology clinic team to forward reports to your PCP. 2) This clinic carries the flu vaccination starting October through February. Call [phone] to verify the vaccine is in stock; or go to a retail pharmacy. 3) Continue with mammogram at same location at end of each year. 4) To help with bone health; continue the walking and other fitness. Keep up with Vitamin D 4481-1759 international units. Let your PCP know if you wish to proceed with a DEXA scan at anytime. 5) Plan a Medicare Wellness Visit in 2019. 6) Thyroid medication is renewed. 7) Complete your fasting labwork this summer. You may be contacted by mail or e-mail to participate in a patient satisfaction survey regarding your office visit today. We value your opinion and depend on your feedback to make improvements and provide you with the best possible experience while receiving high quality medical treatment. Your time in completing this survey is greatly appreciated    Patient Education        Osteoporosis: Care Instructions  Your Care Instructions    Osteoporosis causes bones to become thin and weak. It is much more common in women than in men. Osteoporosis may be very advanced before you know you have it. Sometimes the first sign is a broken bone in the hip, spine, or wrist or sudden pain in your middle or lower back. Follow-up care is a key part of your treatment and safety. Be sure to make and go to all appointments, and call your doctor if you are having problems. It's also a good idea to know your test results and keep a list of the medicines you take. How can you care for yourself at home? · Your doctor may prescribe a bisphosphonate, such as risedronate (Actonel) or alendronate (Fosamax), for osteoporosis. If you are taking one of these medicines by mouth:  ? Take your medicine with a full glass of water when you first get up in the morning.   ? Do not lie down, eat, drink a beverage, or take any other medicine for at least 30 minutes after taking the drug. This helps prevent stomach problems. ? Do not take your medicine late in the day if you forgot to take it in the morning. Skip it, and take the usual dose the next morning. ? If you have side effects, tell your doctor. He or she may prescribe another medicine. · Get enough calcium and vitamin D. The [place] of [employer] recommends adults younger than age 46 need 1,000 mg of calcium and 600 IU of vitamin D each day. Women ages 46 to 79 need 1,200 mg of calcium and 600 IU of vitamin D each day. Men ages 46 to 79 need 1,000 mg of calcium and 600 IU of vitamin D each day. Adults 71 and older need 1,200 mg of calcium and 800 IU of vitamin D each day. ? Eat foods rich in calcium, like yogurt, cheese, milk, and dark green vegetables. This is a good way to get the calcium you need. You can get vitamin D from eggs, fatty fish, cereal, and milk. ? Talk to your doctor about taking a calcium plus vitamin D supplement. Be careful, though. Adults ages 23 to 48 should not get more than 2,500 mg of calcium and 4,000 IU of vitamin D each day, whether it is from supplements and/or food. Adults ages 46 and older should not get more than 2,000 mg of calcium and 4,000 IU of vitamin D each day from supplements and/or food. · Limit alcohol to 2 drinks a day for men and 1 drink a day for women. Too much alcohol can cause health problems. · Do not smoke. Smoking puts you at a much higher risk for osteoporosis. If you need help quitting, talk to your doctor about stop-smoking programs and medicines. These can increase your chances of quitting for good. · Get regular bone-building exercise. Weight-bearing and resistance exercises keep bones healthy by working the muscles and bones against gravity. Start out at an exercise level that feels right for you. Add a little at a time until you can do the following:  ? Do 30 minutes of weight-bearing exercise on most days of the week. Walking, jogging, stair climbing, and dancing are good choices. ? Do resistance exercises with weights or elastic bands 2 to 3 days a week. · Reduce your risk of falls:  ? Wear supportive shoes with low heels and nonslip soles. ? Use a cane or walker, if you need it. Use shower chairs and bath benches. Put in handrails on stairways, around your shower or tub area, and near the toilet. ? Keep stairs, porches, and walkways well lit. Use night-lights. ? Remove throw rugs and other objects that are in the way. ? Avoid icy, wet, or slippery surfaces. ? Keep a cordless phone and a flashlight with new batteries by your bed. When should you call for help? Watch closely for changes in your health, and be sure to contact your doctor if you have any problems. Where can you learn more? Go to https://[employer]s.iPipeline. org and sign in to your [employer] account. Enter K100 in the [employer] box to learn more about \"Osteoporosis: Care Instructions. \"     If you do not have an account, please click on the \"Sign Up Now\" link. Current as of: November 7, 2018  Content Version: 12.0  © 5637-7610 Healthwise, Incorporated. Care instructions adapted under license by [facility] ([facility]). If you have questions about a medical condition or this instruction, always ask your healthcare professional. [place] [address] any warranty or liability for your use of this information.

## 2019-06-27 DIAGNOSIS — E78.2 MIXED HYPERLIPIDEMIA: ICD-10-CM

## 2019-06-27 DIAGNOSIS — E03.9 ACQUIRED HYPOTHYROIDISM: ICD-10-CM

## 2019-06-27 DIAGNOSIS — M85.88 OSTEOPENIA OF SPINE: ICD-10-CM

## 2019-06-27 DIAGNOSIS — E55.9 VITAMIN D DEFICIENCY: ICD-10-CM

## 2019-06-27 LAB
A/G RATIO: 2.3 (ref 1.1–2.2)
ALBUMIN SERPL-MCNC: 4.5 G/DL (ref 3.4–5)
ALP BLD-CCNC: 90 U/L (ref 40–129)
ALT SERPL-CCNC: 32 U/L (ref 10–40)
ANION GAP SERPL CALCULATED.3IONS-SCNC: 14 MMOL/L (ref 3–16)
AST SERPL-CCNC: 27 U/L (ref 15–37)
BILIRUB SERPL-MCNC: 0.6 MG/DL (ref 0–1)
BUN BLDV-MCNC: 17 MG/DL (ref 7–20)
CALCIUM SERPL-MCNC: 9.4 MG/DL (ref 8.3–10.6)
CHLORIDE BLD-SCNC: 101 MMOL/L (ref 99–110)
CHOLESTEROL, TOTAL: 214 MG/DL (ref 0–199)
CO2: 23 MMOL/L (ref 21–32)
CREAT SERPL-MCNC: 0.9 MG/DL (ref 0.6–1.2)
GFR AFRICAN AMERICAN: >60
GFR NON-AFRICAN AMERICAN: >60
GLOBULIN: 2 G/DL
GLUCOSE BLD-MCNC: 110 MG/DL (ref 70–99)
HCT VFR BLD CALC: 46 % (ref 36–48)
HDLC SERPL-MCNC: 85 MG/DL (ref 40–60)
HEMOGLOBIN: 15.2 G/DL (ref 12–16)
LDL CHOLESTEROL CALCULATED: 112 MG/DL
MCH RBC QN AUTO: 31.4 PG (ref 26–34)
MCHC RBC AUTO-ENTMCNC: 33 G/DL (ref 31–36)
MCV RBC AUTO: 95.3 FL (ref 80–100)
PDW BLD-RTO: 13.8 % (ref 12.4–15.4)
PLATELET # BLD: 253 K/UL (ref 135–450)
PMV BLD AUTO: 8.8 FL (ref 5–10.5)
POTASSIUM SERPL-SCNC: 4.7 MMOL/L (ref 3.5–5.1)
RBC # BLD: 4.83 M/UL (ref 4–5.2)
SODIUM BLD-SCNC: 138 MMOL/L (ref 136–145)
TOTAL PROTEIN: 6.5 G/DL (ref 6.4–8.2)
TRIGL SERPL-MCNC: 83 MG/DL (ref 0–150)
TSH REFLEX: 3.7 UIU/ML (ref 0.27–4.2)
VLDLC SERPL CALC-MCNC: 17 MG/DL
WBC # BLD: 6 K/UL (ref 4–11)

## 2019-06-28 LAB — VITAMIN D 25-HYDROXY: 54.2 NG/ML

## 2019-12-19 ENCOUNTER — HOSPITAL ENCOUNTER (OUTPATIENT)
Dept: MAMMOGRAPHY | Age: 79
Discharge: HOME | End: 2019-12-19
Payer: MEDICARE

## 2019-12-19 DIAGNOSIS — Z12.39 BREAST CANCER SCREENING: ICD-10-CM

## 2019-12-19 PROCEDURE — 77063 BREAST TOMOSYNTHESIS BI: CPT

## 2020-01-29 ENCOUNTER — OFFICE VISIT (OUTPATIENT)
Dept: FAMILY MEDICINE CLINIC | Age: 80
End: 2020-01-29
Payer: MEDICARE

## 2020-01-29 VITALS
OXYGEN SATURATION: 96 % | SYSTOLIC BLOOD PRESSURE: 136 MMHG | TEMPERATURE: 95.1 F | WEIGHT: 166.5 LBS | BODY MASS INDEX: 30.85 KG/M2 | HEART RATE: 70 BPM | RESPIRATION RATE: 16 BRPM | DIASTOLIC BLOOD PRESSURE: 84 MMHG

## 2020-01-29 LAB
BILIRUBIN URINE: NEGATIVE
BLOOD, URINE: NEGATIVE
CLARITY: CLEAR
COLOR: YELLOW
GLUCOSE URINE: NEGATIVE MG/DL
KETONES, URINE: NEGATIVE MG/DL
LEUKOCYTE ESTERASE, URINE: NEGATIVE
MICROSCOPIC EXAMINATION: NORMAL
NITRITE, URINE: NEGATIVE
PH UA: 6.5 (ref 5–8)
PROTEIN UA: NEGATIVE MG/DL
SPECIFIC GRAVITY UA: 1.01 (ref 1–1.03)
URINE TYPE: NORMAL
UROBILINOGEN, URINE: 0.2 E.U./DL

## 2020-01-29 PROCEDURE — 4040F PNEUMOC VAC/ADMIN/RCVD: CPT | Performed by: FAMILY MEDICINE

## 2020-01-29 PROCEDURE — 99213 OFFICE O/P EST LOW 20 MIN: CPT | Performed by: FAMILY MEDICINE

## 2020-01-29 PROCEDURE — G8484 FLU IMMUNIZE NO ADMIN: HCPCS | Performed by: FAMILY MEDICINE

## 2020-01-29 PROCEDURE — G8427 DOCREV CUR MEDS BY ELIG CLIN: HCPCS | Performed by: FAMILY MEDICINE

## 2020-01-29 PROCEDURE — 1090F PRES/ABSN URINE INCON ASSESS: CPT | Performed by: FAMILY MEDICINE

## 2020-01-29 PROCEDURE — G8417 CALC BMI ABV UP PARAM F/U: HCPCS | Performed by: FAMILY MEDICINE

## 2020-01-29 PROCEDURE — 1123F ACP DISCUSS/DSCN MKR DOCD: CPT | Performed by: FAMILY MEDICINE

## 2020-01-29 PROCEDURE — 1036F TOBACCO NON-USER: CPT | Performed by: FAMILY MEDICINE

## 2020-01-29 PROCEDURE — G8399 PT W/DXA RESULTS DOCUMENT: HCPCS | Performed by: FAMILY MEDICINE

## 2020-01-29 RX ORDER — CIPROFLOXACIN 250 MG/1
250 TABLET, FILM COATED ORAL 2 TIMES DAILY
Qty: 6 TABLET | Refills: 0 | Status: SHIPPED | OUTPATIENT
Start: 2020-01-29 | End: 2020-02-01

## 2020-01-29 NOTE — PROGRESS NOTES
since quittin.6    Smokeless tobacco: Never Used   Substance and Sexual Activity    Alcohol use: Yes     Comment: rare    Drug use: No    Sexual activity: Yes   Lifestyle    Physical activity:     Days per week: Not on file     Minutes per session: Not on file    Stress: Not on file   Relationships    Social connections:     Talks on phone: Not on file     Gets together: Not on file     Attends Jainism service: Not on file     Active member of club or organization: Not on file     Attends meetings of clubs or organizations: Not on file     Relationship status: Not on file    Intimate partner violence:     Fear of current or ex partner: Not on file     Emotionally abused: Not on file     Physically abused: Not on file     Forced sexual activity: Not on file   Other Topics Concern    Not on file   Social History Narrative    Not on file       O: /84   Pulse 70   Temp 95.1 °F (35.1 °C) (Oral)   Resp 16   Wt 166 lb 8 oz (75.5 kg)   SpO2 96%   Breastfeeding No   BMI 30.85 kg/m²   Physical Exam  GEN: No acute distress,cooperative, well nourished, alert. HEENT: PEERLA, EOMI , normocephalic/atraumatic, nares and oropharynx clear. Mucus membranes normal, Tympanic membranes clear bilaterally. Neck: soft, supple, no thyromegaly,mass, no Lymphadenopathy  CV: Regular rate and rhythm, no murmur, rubs, gallops. No edema. Resp: Clear to auscultation bilaterally good air entry bilaterally  No crackles, wheeze. Breathing comfortably. Psych:normal affect. Neuro: AOx3  Abd: Mild suprapubic tenderness. No CVA tender        ASSESSMENT   Diagnosis Orders   1. Pelvic pressure in female     2. Dysuria  ciprofloxacin (CIPRO) 250 MG tablet    URINALYSIS    URINE CULTURE     Treat empirically.   She is agreeable to collect a urine specimen at home and drop off today or tomorrow at the latest.        PLAN          If applicable, see additional patient information and instructions under \"Patient

## 2020-01-31 LAB — URINE CULTURE, ROUTINE: NORMAL

## 2020-08-21 RX ORDER — LEVOTHYROXINE SODIUM 0.03 MG/1
TABLET ORAL
Qty: 90 TABLET | Refills: 3 | Status: SHIPPED | OUTPATIENT
Start: 2020-08-21 | End: 2021-08-12

## 2020-08-21 NOTE — TELEPHONE ENCOUNTER
Requested Prescriptions     Pending Prescriptions Disp Refills    levothyroxine (SYNTHROID) 25 MCG tablet [Pharmacy Med Name: LEVOTHYROXINE 25 MCG TABLET] 90 tablet 3     Sig: TAKE 1 TABLET BY MOUTH EVERY DAY     GKY:5/51/4081  EQK:ENOC

## 2020-08-31 ENCOUNTER — TELEPHONE (OUTPATIENT)
Dept: FAMILY MEDICINE CLINIC | Age: 80
End: 2020-08-31

## 2020-08-31 DIAGNOSIS — E03.9 ACQUIRED HYPOTHYROIDISM: Primary | ICD-10-CM

## 2020-08-31 DIAGNOSIS — E78.2 MIXED HYPERLIPIDEMIA: ICD-10-CM

## 2020-08-31 DIAGNOSIS — E55.9 VITAMIN D DEFICIENCY: ICD-10-CM

## 2020-09-01 NOTE — TELEPHONE ENCOUNTER
Labs are ordered. Let her know. Diagnosis Orders   1. Acquired hypothyroidism  TSH with Reflex    CBC    COMPREHENSIVE METABOLIC PANEL   2. Vitamin D deficiency  VITAMIN D 25 HYDROXY   3.  Mixed hyperlipidemia  LIPID PANEL

## 2020-09-10 DIAGNOSIS — E78.2 MIXED HYPERLIPIDEMIA: ICD-10-CM

## 2020-09-10 DIAGNOSIS — E03.9 ACQUIRED HYPOTHYROIDISM: ICD-10-CM

## 2020-09-10 DIAGNOSIS — E55.9 VITAMIN D DEFICIENCY: ICD-10-CM

## 2020-09-10 LAB
A/G RATIO: 2.3 (ref 1.1–2.2)
ALBUMIN SERPL-MCNC: 4.4 G/DL (ref 3.4–5)
ALP BLD-CCNC: 98 U/L (ref 40–129)
ALT SERPL-CCNC: 27 U/L (ref 10–40)
ANION GAP SERPL CALCULATED.3IONS-SCNC: 13 MMOL/L (ref 3–16)
AST SERPL-CCNC: 24 U/L (ref 15–37)
BILIRUB SERPL-MCNC: 0.6 MG/DL (ref 0–1)
BUN BLDV-MCNC: 17 MG/DL (ref 7–20)
CALCIUM SERPL-MCNC: 10 MG/DL (ref 8.3–10.6)
CHLORIDE BLD-SCNC: 102 MMOL/L (ref 99–110)
CHOLESTEROL, TOTAL: 217 MG/DL (ref 0–199)
CO2: 22 MMOL/L (ref 21–32)
CREAT SERPL-MCNC: 0.9 MG/DL (ref 0.6–1.2)
GFR AFRICAN AMERICAN: >60
GFR NON-AFRICAN AMERICAN: >60
GLOBULIN: 1.9 G/DL
GLUCOSE BLD-MCNC: 102 MG/DL (ref 70–99)
HCT VFR BLD CALC: 47.3 % (ref 36–48)
HDLC SERPL-MCNC: 78 MG/DL (ref 40–60)
HEMOGLOBIN: 15.6 G/DL (ref 12–16)
LDL CHOLESTEROL CALCULATED: 121 MG/DL
MCH RBC QN AUTO: 31.2 PG (ref 26–34)
MCHC RBC AUTO-ENTMCNC: 33.1 G/DL (ref 31–36)
MCV RBC AUTO: 94.4 FL (ref 80–100)
PDW BLD-RTO: 13.4 % (ref 12.4–15.4)
PLATELET # BLD: 264 K/UL (ref 135–450)
PMV BLD AUTO: 9 FL (ref 5–10.5)
POTASSIUM SERPL-SCNC: 4.5 MMOL/L (ref 3.5–5.1)
RBC # BLD: 5.01 M/UL (ref 4–5.2)
SODIUM BLD-SCNC: 137 MMOL/L (ref 136–145)
T4 FREE: 1.2 NG/DL (ref 0.9–1.8)
TOTAL PROTEIN: 6.3 G/DL (ref 6.4–8.2)
TRIGL SERPL-MCNC: 90 MG/DL (ref 0–150)
TSH REFLEX: 4.8 UIU/ML (ref 0.27–4.2)
VITAMIN D 25-HYDROXY: 80.9 NG/ML
VLDLC SERPL CALC-MCNC: 18 MG/DL
WBC # BLD: 8.8 K/UL (ref 4–11)

## 2020-09-12 ENCOUNTER — PATIENT MESSAGE (OUTPATIENT)
Dept: FAMILY MEDICINE CLINIC | Age: 80
End: 2020-09-12

## 2020-09-14 NOTE — TELEPHONE ENCOUNTER
Spoke to patient and she said that she has some pain in her right ear. She states she had taken her  to see Dr. Da Silva Faster and while she was there she spoke to him regarding her ear pain.

## 2020-09-14 NOTE — TELEPHONE ENCOUNTER
From: Luke Jennings  To: Dwaine Cheney DO  Sent: 9/12/2020 9:47 AM EDT  Subject: Non-Urgent Medical Question    I need a referral to see Dr. Barbara White ENT about my ear problem.    Thank you, Deon Cazares   1940

## 2020-09-18 NOTE — PROGRESS NOTES
to profound sensorineural hearing loss. Speech Recognition Threshold: 15 dB HL  Word Recognition: Excellent (96%), based on NU-6 25-word list at 50 dBHL, masked, using recorded speech stimuli. This finding is consistent with hearing sensitivity. Tympanometry: Borderline-negative peak pressure with normal compliance, Type C tympanogram, consistent with ETD/history of otitis media. LEFT EAR:  Hearing Sensitivity: Within normal limits through 2000 Hz precipitously sloping to profound sensorineural hearing loss. Speech Recognition Threshold: 15 dB HL  Word Recognition: Excellent (92%), based on NU-6 25-word list at 50 dBHL, masked, using recorded speech stimuli. This finding is consistent with hearing sensitivity. Tympanometry: Normal peak pressure and compliance, Type A tympanogram, consistent with normal middle ear function. Reliability: Good  Transducer: Phones    See scanned audiogram dated 9/21/2020 for results. PATIENT EDUCATION:       The following items were discussed with the patient:    - Good Communication Strategies   - Hearing Loss and Hearing Aids    - Tinnitus Management Strategies    Educational information was shared in the After Visit Summary. RECOMMENDATIONS:                                                                                                                                                                                                                                                                      The following items are recommended based on patient report and results from today's appointment:   - Continue medical follow-up with Reynaldo Cat MD, PhD.   - Paul Leather hearing as medically indicated and/or sooner if a change in hearing is noted. - If desired, schedule a Hearing Aid Evaluation (HAE) appointment to discuss hearing aid options.   Excellent word recognition for soft conversational speech; she may consider devices if hearing and/or tinnitus is bothersome to her.   - Utilize \"Good Communication Strategies\" as discussed to assist in speech understanding with communication partners. - Maintain a sound enriched environment to assist in the management of tinnitus symptoms.            [address] [place]  Audiologist      Chart CC'd to: Rikki Payton MD, PhD      Degree of   Hearing Sensitivity dB Range   Within Normal Limits (WNL) 0 - 20   Mild 20 - 40   Moderate 40 - 55   Moderately-Severe 55 - 70   Severe 70 - 90   Profound 90 +

## 2020-09-21 ENCOUNTER — PROCEDURE VISIT (OUTPATIENT)
Dept: AUDIOLOGY | Age: 80
End: 2020-09-21
Payer: MEDICARE

## 2020-09-21 ENCOUNTER — OFFICE VISIT (OUTPATIENT)
Dept: ENT CLINIC | Age: 80
End: 2020-09-21
Payer: MEDICARE

## 2020-09-21 VITALS — TEMPERATURE: 97.3 F | BODY MASS INDEX: 31.28 KG/M2 | WEIGHT: 170 LBS | HEIGHT: 62 IN

## 2020-09-21 PROBLEM — H93.13 TINNITUS, BILATERAL: Status: ACTIVE | Noted: 2020-09-21

## 2020-09-21 PROBLEM — H90.3 SENSORINEURAL HEARING LOSS, BILATERAL: Status: ACTIVE | Noted: 2020-09-21

## 2020-09-21 PROCEDURE — 92557 COMPREHENSIVE HEARING TEST: CPT | Performed by: AUDIOLOGIST

## 2020-09-21 PROCEDURE — 92567 TYMPANOMETRY: CPT | Performed by: AUDIOLOGIST

## 2020-09-21 PROCEDURE — 69210 REMOVE IMPACTED EAR WAX UNI: CPT | Performed by: OTOLARYNGOLOGY

## 2020-09-21 PROCEDURE — 1123F ACP DISCUSS/DSCN MKR DOCD: CPT | Performed by: OTOLARYNGOLOGY

## 2020-09-21 PROCEDURE — 99203 OFFICE O/P NEW LOW 30 MIN: CPT | Performed by: OTOLARYNGOLOGY

## 2020-09-21 PROCEDURE — 4040F PNEUMOC VAC/ADMIN/RCVD: CPT | Performed by: OTOLARYNGOLOGY

## 2020-09-21 PROCEDURE — 1036F TOBACCO NON-USER: CPT | Performed by: OTOLARYNGOLOGY

## 2020-09-21 PROCEDURE — 1090F PRES/ABSN URINE INCON ASSESS: CPT | Performed by: OTOLARYNGOLOGY

## 2020-09-21 PROCEDURE — G8417 CALC BMI ABV UP PARAM F/U: HCPCS | Performed by: OTOLARYNGOLOGY

## 2020-09-21 PROCEDURE — G8399 PT W/DXA RESULTS DOCUMENT: HCPCS | Performed by: OTOLARYNGOLOGY

## 2020-09-21 PROCEDURE — G8427 DOCREV CUR MEDS BY ELIG CLIN: HCPCS | Performed by: OTOLARYNGOLOGY

## 2020-09-21 ASSESSMENT — ENCOUNTER SYMPTOMS
VOMITING: 0
SINUS PAIN: 0
EYE DISCHARGE: 0
SINUS PRESSURE: 0
RHINORRHEA: 0
CHOKING: 0
WHEEZING: 0
VOICE CHANGE: 0
COUGH: 0
FACIAL SWELLING: 0
STRIDOR: 0
APNEA: 0
COLOR CHANGE: 0
EYE PAIN: 0
TROUBLE SWALLOWING: 0
BACK PAIN: 0
SHORTNESS OF BREATH: 0
SORE THROAT: 0
CONSTIPATION: 0
BLOOD IN STOOL: 0
NAUSEA: 0
DIARRHEA: 0
CHEST TIGHTNESS: 0

## 2020-09-21 NOTE — PATIENT INSTRUCTIONS
Good Communication Strategies    Communication can be challenging for anyone, but can be especially difficult for those with some degree of hearing loss. While we may not be able to control every factor that may lead to difficulty with communication, there are Good Communication Strategies that we can all use in our day-to-day lives. Communication takes both parties working together for it to be successful. Tips as a Listener:   1. Control your environment. It is important to limit the amount of background noise in the room when possible. You should also consider having a good light source in the room to best see the other person. 2. Ask for clarification. Instead of saying \"What?\", you can use parts of what you heard to make a new question. For example, if you heard the word \"Thursday\" but not the rest of the week, you may ask \"What was that about Thursday? \" or \"What did you want to do Thursday? \". This shows the person talking that you are listening and will help them better explain what they are saying. 3. Be an advocate for yourself. If you are hearing but not understanding, tell the other person \"I can hear you, but I need you to slow down when you speak. \"  Or if someone is facing the other direction, say \"I cannot hear you when you are not looking at me when we talk. \"       Tips as a Talker:   - Sit or stand 3 to 6 feet away to maximize audibility         -- It is unrealistic to believe someone else will fully hear your message if you are speaking from across the room or in a different room in the house   - Stay at eye level to help with visual cues   - Make sure you have the persons attention before speaking   - Use facial expressions and gestures to accentuate your message   - Raise your voice slightly (do not scream)   - Speak slowly and distinctly   - Use short, simple sentences   - Rephrase your words if the person is having a hard time understanding you    - To avoid distortion, dont speak If it goes on all the time, you may have tinnitus. Tinnitus is usually caused by long-term exposure to loud noise. This damages the nerves in the inner ear. It can occur with all types of hearing loss. It may be a symptom of almost any ear problem. Tinnitus may be caused by a buildup of earwax. Or, it may be caused by ear infections or certain medicines (especially antibiotics or large amounts of aspirin). You can also hear noises in your ears because of an injury to the ears, drinking too much alcohol or caffeine, or a medical condition. Other conditions may also contribute to tinnitus, including: head and neck trauma, temporomandibular joint disorder (TMJ), sinus pressure and barometric trauma, traumatic brain injury, metabolic disorders, autoimmune disorders, stress, and high blood pressure. You may need tests to evaluate your hearing and to find causes of long-lasting tinnitus. Your doctor may suggest one or more treatments to help you cope with the tinnitus. You can also do things at home to help reduce symptoms. Follow-up care is a key part of your treatment and safety. Be sure to make and go to all appointments, and call your doctor if you are having problems. It's also a good idea to know your test results and keep a list of the medicines you take. How can you care for yourself at home? · Limit or cut out alcohol, caffeine, and sodium. They can make your symptoms worse. · Do not smoke or use other tobacco products. Nicotine reduces blood flow to the ear and makes tinnitus worse. If you need help quitting, talk to your doctor about stop-smoking programs and medicines. These can increase your chances of quitting for good. · Talk to your doctor about whether to stop taking aspirin and similar products such as ibuprofen or naproxen. · Get exercise often. It can help improve blood flow to the ear. Ways to manage/cope with tinnitus  Some tinnitus may last a long time.  To manage your tinnitus, try to:  · Avoid noises that you think caused your tinnitus. If you can't avoid loud noises, wear earplugs or earmuffs. · Ignore the sound by paying attention to other things. Keeping your brain busy with other tasks or background noise can help your brain not focus on the tinnitus. · Try to not give the tinnitus an emotional reaction. Do your best to ignore the sound and not let it bother you. Relax using biofeedback, meditation, or yoga. Feeling stressed and being tired can make tinnitus worse. · Play music or white noise to help you sleep. Background noise may cover up the noise that you hear in your ears. You can buy a tabletop machine or a device that sits under your pillow to play soothing sounds, like ocean waves. · Smart phones have free apps, such as Whist, Relax Melodies, ReSound Relief, and White Noise Lite. These apps have different types of sounds/noise, some of which you can blend together to find sounds that are most soothing to you. · Hearing aid technology, especially when there is some hearing loss, may help reduce tinnitus symptoms by giving your brain better access to the sounds it is missing. There are some hearing aids with built-in noise generator programs, which may help when amplification alone is not enough. Additional resources may be found through the [employer] at www.jackie.org    When should you call for help? Call 911 anytime you think you may need emergency care. For example, call if:    · You have symptoms of a stroke. These may include:  ? Sudden numbness, tingling, weakness, or loss of movement in your face, arm, or leg, especially on only one side of your body. ? Sudden vision changes. ? Sudden trouble speaking. ? Sudden confusion or trouble understanding simple statements. ? Sudden problems with walking or balance. ? A sudden, severe headache that is different from past headaches.     Call your doctor now or seek immediate medical care if:    · You develop other symptoms. These may include hearing loss (or worse hearing loss), balance problems, dizziness, nausea, or vomiting. Watch closely for changes in your health, and be sure to contact your doctor if:    · Your tinnitus moves from both ears to one ear. · Your hearing loss gets worse within 1 day after an ear injury. · Your tinnitus or hearing loss does not get better within 1 week after an ear injury. · Your tinnitus bothers you enough that you want to take medicines to help you cope with it. If you notice changes in your tinnitus and/or your hearing, it is recommended that you have your hearing tested by your audiologist and to follow-up with your physician that manages your hearing loss (such as your ENT or Primary Care doctor). Hearing Loss: Care Instructions  Your Care Instructions      Hearing loss is a sudden or slow decrease in how well you hear. It can range from mild to profound. Permanent hearing loss can occur with aging, and it can happen when you are exposed long-term to loud noise. Examples include listening to loud music, riding motorcycles, or being around other loud machines. Hearing loss can affect your work and home life. It can make you feel lonely or depressed. You may feel that you have lost your independence. But hearing aids and other devices can help you hear better and feel connected to others. Follow-up care is a key part of your treatment and safety. Be sure to make and go to all appointments, and call your doctor if you are having problems. It's also a good idea to know your test results and keep a list of the medicines you take. How can you care for yourself at home? · Avoid loud noises whenever possible. This helps keep your hearing from getting worse. Always wear hearing protection around loud noises. · If appropriate, wear hearing aid(s) as directed.   It is recommended that hearing aids are worn during all waking hours to keep getting worse. · You have new symptoms, such as dizziness or nausea.

## 2020-09-21 NOTE — PROGRESS NOTES
Subjective:      Patient ID: Reyes Mcgill is a 79 y.o. female. HPI  Hearing Loss HPI  CC: otalgia    General: Sylwia Johnson is a(n) 79 y.o. female who presents with tinnitus, feeling of water in ear and otalgia. Bilateral tinnitus. Water feels trapped after shower. Some tinntius for several years. Occasional imbalance. Light headed but no vertigo.    How long: 3 months  Side:right  Prior audiogram:No  Previous episodes: no  Tinnitus:Yes  Otorrhea:No  Vertigo:No  Prior ear surgery: No  Ear trauma: No  Ear problems as child: No  Denies other medical problems including: birth anoxia, kidney disease, ototoxic medication exposure, poor vision or progressive vision loss, syncope, thyroid problems  Family Hx of hearing loss:No  Denies family history of: thyroid disease, renal disease, syncope, autoimmune disease, hearing loss at a young age    Patient Active Problem List   Diagnosis    Tinnitus    Disorder of bone and cartilage    Diverticulosis of large intestine    Mixed hyperlipidemia    Alveolitis of jaw    Hypothyroidism    Osteopenia    Hx of melanoma excision    Sensorineural hearing loss, bilateral    Tinnitus, bilateral     Past Surgical History:   Procedure Laterality Date    BLADDER SUSPENSION  1990s    CHOLECYSTECTOMY  1990s    COLONOSCOPY  1/16/2007    Normal    COLONOSCOPY  02/27/2012    Dr. Colmenares Blade    Right Repair    HYSTERECTOMY  1975    WISDOM TOOTH EXTRACTION       Family History   Problem Relation Age of Onset    Cancer Brother      Social History     Socioeconomic History    Marital status:      Spouse name: Not on file    Number of children: Not on file    Years of education: Not on file    Highest education level: Not on file   Occupational History    Not on file   Social Needs    Financial resource strain: Not on file    Food insecurity     Worry: Not on file     Inability: Not on file    Transportation needs     Medical: Not on file Non-medical: Not on file   Tobacco Use    Smoking status: Former Smoker     Packs/day: 0.00     Years: 10.00     Pack years: 0.00     Last attempt to quit: 6/1/2005     Years since quitting: 15.3    Smokeless tobacco: Never Used   Substance and Sexual Activity    Alcohol use: Yes     Comment: rare    Drug use: No    Sexual activity: Yes   Lifestyle    Physical activity     Days per week: Not on file     Minutes per session: Not on file    Stress: Not on file   Relationships    Social connections     Talks on phone: Not on file     Gets together: Not on file     Attends Hindu service: Not on file     Active member of club or organization: Not on file     Attends meetings of clubs or organizations: Not on file     Relationship status: Not on file    Intimate partner violence     Fear of current or ex partner: Not on file     Emotionally abused: Not on file     Physically abused: Not on file     Forced sexual activity: Not on file   Other Topics Concern    Not on file   Social History Narrative    Not on file       DRUG/FOOD ALLERGIES: Bactrim [sulfamethoxazole-trimethoprim] and Seasonal    CURRENT MEDICATIONS  Prior to Admission medications    Medication Sig Start Date End Date Taking? Authorizing Provider   levothyroxine (SYNTHROID) 25 MCG tablet TAKE 1 TABLET BY MOUTH EVERY DAY 8/21/20  Yes Wash Begin Bingcang, DO   Cholecalciferol (VITAMIN D) 2000 UNITS CAPS capsule Take 1 capsule by mouth daily. 5/28/14  Yes Terry Blanchard MD       Review of Systems   Constitutional: Negative for activity change, appetite change, chills, fatigue and fever. HENT: Negative for congestion, dental problem, drooling, ear discharge, ear pain, facial swelling, hearing loss, mouth sores, nosebleeds, postnasal drip, rhinorrhea, sinus pressure, sinus pain, sneezing, sore throat, tinnitus, trouble swallowing and voice change. Eyes: Negative for pain, discharge and visual disturbance.    Respiratory: Negative for apnea, cough, choking, chest tightness, shortness of breath, wheezing and stridor. Cardiovascular: Negative for palpitations. Gastrointestinal: Negative for blood in stool, constipation, diarrhea, nausea and vomiting. Endocrine: Negative for cold intolerance, heat intolerance, polydipsia, polyphagia and polyuria. Musculoskeletal: Negative for back pain, gait problem, neck pain and neck stiffness. Skin: Negative for color change, pallor, rash and wound. Allergic/Immunologic: Negative for environmental allergies, food allergies and immunocompromised state. Neurological: Negative for dizziness, facial asymmetry, speech difficulty, light-headedness, numbness and headaches. Hematological: Negative for adenopathy. Does not bruise/bleed easily. Psychiatric/Behavioral: Negative for agitation, confusion, self-injury and sleep disturbance. The patient is not nervous/anxious. Objective:   Physical Exam  Constitutional:       Appearance: She is well-developed. She is not ill-appearing. HENT:      Head: Normocephalic and atraumatic. Not macrocephalic and not microcephalic. No raccoon eyes, Zhong's sign, abrasion, contusion, right periorbital erythema, left periorbital erythema or laceration. Hair is normal.      Jaw: No trismus. Salivary Glands: Right salivary gland is not diffusely enlarged. Left salivary gland is not diffusely enlarged. Right Ear: Hearing, tympanic membrane and external ear normal. No decreased hearing noted. No drainage, swelling or tenderness. No middle ear effusion. No mastoid tenderness. Tympanic membrane is not perforated, retracted or bulging. Tympanic membrane has normal mobility. Left Ear: Hearing, tympanic membrane and external ear normal. No decreased hearing noted. No drainage, swelling or tenderness. No middle ear effusion. No mastoid tenderness. Tympanic membrane is not perforated, retracted or bulging. Tympanic membrane has normal mobility.       Ears: Harmon exam findings: does not lateralize. Right Rinne: AC > BC. Left Rinne: AC > BC. Nose: No nasal deformity, septal deviation, laceration, mucosal edema or rhinorrhea. Right Nostril: No epistaxis. Left Nostril: No epistaxis. Right Turbinates: Not enlarged. Left Turbinates: Not enlarged. Right Sinus: No maxillary sinus tenderness or frontal sinus tenderness. Left Sinus: No maxillary sinus tenderness or frontal sinus tenderness. Mouth/Throat:      Lips: No lesions. Mouth: Mucous membranes are not pale, not dry and not cyanotic. No lacerations or oral lesions. Dentition: Normal dentition. No dental caries or dental abscesses. Tongue: No lesions. Palate: No mass. Pharynx: Uvula midline. No oropharyngeal exudate, posterior oropharyngeal erythema or uvula swelling. Tonsils: No tonsillar abscesses. Eyes:      General: Lids are normal. Lids are everted, no foreign bodies appreciated. Right eye: No discharge. Left eye: No discharge. Extraocular Movements:      Right eye: Normal extraocular motion and no nystagmus. Left eye: Normal extraocular motion and no nystagmus. Conjunctiva/sclera:      Right eye: No chemosis or exudate. Left eye: No chemosis or exudate. Neck:      Musculoskeletal: Neck supple. Thyroid: No thyroid mass or thyromegaly. Vascular: Normal carotid pulses. Trachea: Trachea normal. No tracheal deviation. Cardiovascular:      Rate and Rhythm: Normal rate and regular rhythm. Pulmonary:      Effort: No tachypnea, bradypnea or respiratory distress. Breath sounds: No stridor. Musculoskeletal:      Right shoulder: She exhibits normal range of motion. Left shoulder: She exhibits normal range of motion. Lymphadenopathy:      Head:      Right side of head: No submental, submandibular, tonsillar, preauricular, posterior auricular or occipital adenopathy.       Left side of head: No submental, submandibular, tonsillar, preauricular, posterior auricular or occipital adenopathy. Cervical:      Right cervical: No superficial, deep or posterior cervical adenopathy. Left cervical: No superficial, deep or posterior cervical adenopathy. Skin:     Findings: No bruising, erythema, laceration or lesion. Neurological:      Mental Status: She is alert and oriented to person, place, and time. Psychiatric:         Speech: Speech normal.         Behavior: Behavior normal.       Audio: Bilateral symmetric SNHL, Normal tympd and exccellent discrimination    Cerumen  Pre operative diagnosis: Cerumen impaction on the right  Post operative diagnosis: Same  Procedure: on the right cerumenectomy    After consent an operating microscope was utilized to visualize the external auditory canals bilaterally. Cerumen was removed with curettes and xiong suctions on the the right side. The tympanic membrane is intact. No fluid visualized in the middle ear. No complications. I attest I performed the entire procedure myself. Assessment:       Diagnosis Orders   1. Tinnitus of both ears     2. Sensorineural hearing loss (SNHL) of both ears     3. Right ear pain     4. Impacted cerumen of right ear             Plan:      Mineral oil drops BID for 7 days. 3-4 drops, right ear. Repeat audiogram in 2 years.            Jeff Alfaro MD

## 2020-09-21 NOTE — Clinical Note
Dr. Maame Bolton,    Please see note from this patient's audiogram from today. Please let me know if there is anything further you need.       Karon Land [id number] [place]  Audiologist

## 2020-09-29 ENCOUNTER — OFFICE VISIT (OUTPATIENT)
Dept: FAMILY MEDICINE CLINIC | Age: 80
End: 2020-09-29
Payer: MEDICARE

## 2020-09-29 VITALS
HEART RATE: 54 BPM | RESPIRATION RATE: 16 BRPM | BODY MASS INDEX: 30.12 KG/M2 | SYSTOLIC BLOOD PRESSURE: 132 MMHG | TEMPERATURE: 96 F | WEIGHT: 170 LBS | HEIGHT: 63 IN | DIASTOLIC BLOOD PRESSURE: 80 MMHG | OXYGEN SATURATION: 95 %

## 2020-09-29 PROCEDURE — 4040F PNEUMOC VAC/ADMIN/RCVD: CPT | Performed by: FAMILY MEDICINE

## 2020-09-29 PROCEDURE — 1123F ACP DISCUSS/DSCN MKR DOCD: CPT | Performed by: FAMILY MEDICINE

## 2020-09-29 PROCEDURE — G0439 PPPS, SUBSEQ VISIT: HCPCS | Performed by: FAMILY MEDICINE

## 2020-09-29 RX ORDER — ALBUTEROL SULFATE 90 UG/1
2 AEROSOL, METERED RESPIRATORY (INHALATION) EVERY 6 HOURS PRN
Qty: 1 INHALER | Refills: 3 | Status: SHIPPED | OUTPATIENT
Start: 2020-09-29 | End: 2021-01-13

## 2020-09-29 ASSESSMENT — LIFESTYLE VARIABLES
HOW OFTEN DO YOU HAVE A DRINK CONTAINING ALCOHOL: 3
HOW OFTEN DURING THE LAST YEAR HAVE YOU NEEDED AN ALCOHOLIC DRINK FIRST THING IN THE MORNING TO GET YOURSELF GOING AFTER A NIGHT OF HEAVY DRINKING: 0
HOW MANY STANDARD DRINKS CONTAINING ALCOHOL DO YOU HAVE ON A TYPICAL DAY: 0
HOW OFTEN DURING THE LAST YEAR HAVE YOU FOUND THAT YOU WERE NOT ABLE TO STOP DRINKING ONCE YOU HAD STARTED: 0
HOW OFTEN DO YOU HAVE SIX OR MORE DRINKS ON ONE OCCASION: 0
HOW OFTEN DURING THE LAST YEAR HAVE YOU BEEN UNABLE TO REMEMBER WHAT HAPPENED THE NIGHT BEFORE BECAUSE YOU HAD BEEN DRINKING: 0
HAVE YOU OR SOMEONE ELSE BEEN INJURED AS A RESULT OF YOUR DRINKING: 0
AUDIT TOTAL SCORE: 3
HOW OFTEN DURING THE LAST YEAR HAVE YOU FAILED TO DO WHAT WAS NORMALLY EXPECTED FROM YOU BECAUSE OF DRINKING: 0
HOW OFTEN DURING THE LAST YEAR HAVE YOU HAD A FEELING OF GUILT OR REMORSE AFTER DRINKING: 0
AUDIT-C TOTAL SCORE: 3
HAS A RELATIVE, FRIEND, DOCTOR, OR ANOTHER HEALTH PROFESSIONAL EXPRESSED CONCERN ABOUT YOUR DRINKING OR SUGGESTED YOU CUT DOWN: 0

## 2020-09-29 ASSESSMENT — PATIENT HEALTH QUESTIONNAIRE - PHQ9
1. LITTLE INTEREST OR PLEASURE IN DOING THINGS: 0
2. FEELING DOWN, DEPRESSED OR HOPELESS: 0
SUM OF ALL RESPONSES TO PHQ QUESTIONS 1-9: 0
SUM OF ALL RESPONSES TO PHQ QUESTIONS 1-9: 0
SUM OF ALL RESPONSES TO PHQ9 QUESTIONS 1 & 2: 0

## 2020-09-29 NOTE — PROGRESS NOTES
Medicare Annual Wellness Visit  Name: Annamae Severance NLMILA Date: 2020   MRN: [id number] Sex: Female   Age: 79 y.o. Ethnicity: Non-/Non    : 1940 Race: Dea Jennings is here for Medicare AWV    Screenings for behavioral, psychosocial and functional/safety risks, and cognitive dysfunction are all negative except as indicated below. These results, as well as other patient data from the [address] form, are documented in Flowsheets linked to this Encounter. She has had a moment where she felt short of breath with some increased chest pressure which resolved that this was earlier this month. Conducted her lab work and all within normal limits with exception of mildly elevated lipids. She is benefited from an inhaler in the past due to allergen related bronchospasm. She requests inhaler. Allergies   Allergen Reactions    Bactrim [Sulfamethoxazole-Trimethoprim] Hives     hives    Seasonal Other (See Comments)     Runny nose, sneezing         Prior to Visit Medications    Medication Sig Taking? Authorizing Provider   albuterol sulfate HFA (PROAIR HFA) 108 (90 Base) MCG/ACT inhaler Inhale 2 puffs into the lungs every 6 hours as needed for Wheezing Yes Ronni Gowers, DO   levothyroxine (SYNTHROID) 25 MCG tablet TAKE 1 TABLET BY MOUTH EVERY DAY Yes Evelio Kolb, DO   Cholecalciferol (VITAMIN D) 2000 UNITS CAPS capsule Take 1 capsule by mouth daily.  Yes Madisyn Cameron MD         Past Medical History:   Diagnosis Date    Allergic rhinitis     Alveolitis of jaw     Cancer ([facility] [address]     Dental disease     Disorder of bone and cartilage, unspecified     Diverticulosis of colon (without mention of hemorrhage)     Dizziness     GERD (gastroesophageal reflux disease)     Hearing loss     Myalgia and myositis, unspecified     Nosebleed     Other and unspecified hyperlipidemia     Recurrent upper respiratory infection (URI)     Screening mammogram 11/5/2008    Normal    Sinusitis     Tinnitus     Unspecified gastritis and gastroduodenitis without mention of hemorrhage     Unspecified hypothyroidism     Unspecified tinnitus        Past Surgical History:   Procedure Laterality Date    BLADDER SUSPENSION  1990s    CHOLECYSTECTOMY  1990s    COLONOSCOPY  1/16/2007    Normal    COLONOSCOPY  02/27/2012    Dr. Go Stapleton    Right Repair    HYSTERECTOMY  1975    WISDOM TOOTH EXTRACTION           Family History   Problem Relation Age of Onset    Cancer Brother        CareTeam (Including outside providers/suppliers regularly involved in providing care):   Patient Care Team:  Chela Martinez DO as PCP - General (Family Medicine)  Chela Martinez DO as PCP - [facility] Empaneled Provider    Wt Readings from Last 3 Encounters:   09/29/20 170 lb (77.1 kg)   09/21/20 170 lb (77.1 kg)   01/29/20 166 lb 8 oz (75.5 kg)     Vitals:    09/29/20 0818 09/29/20 0843   BP: (!) 138/94 132/80   Pulse: 54    Resp: 16    Temp: 96 °F (35.6 °C)    TempSrc: Tympanic    SpO2: 93% 95%   Weight: 170 lb (77.1 kg)    Height: 5' 2.5\" (1.588 m)      Body mass index is 30.6 kg/m². Based upon direct observation of the patient, evaluation of cognition reveals recent and remote memory intact.     General Appearance: alert and oriented to person, place and time, well developed and well- nourished, in no acute distress  Skin: warm and dry, no rash or erythema  Head: normocephalic and atraumatic  Eyes: pupils equal, round, and reactive to light, extraocular eye movements intact, conjunctivae normal  ENT: tympanic membrane, external ear and ear canal normal bilaterally, nose without deformity, nasal mucosa and turbinates normal without polyps  Neck: supple and non-tender without mass, no thyromegaly or thyroid nodules, no cervical lymphadenopathy  Pulmonary/Chest: clear to auscultation bilaterally- no wheezes, rales or rhonchi, normal air movement, no respiratory distress  Cardiovascular: normal rate, regular rhythm, normal S1 and S2, no murmurs, rubs, clicks, or gallops, distal pulses intact, no carotid bruits  Abdomen: soft, non-tender, non-distended, normal bowel sounds, no masses or organomegaly  Extremities: no cyanosis, clubbing or edema  Musculoskeletal: normal range of motion, no joint swelling, deformity or tenderness  Neurologic: reflexes normal and symmetric, no cranial nerve deficit, gait, coordination and speech normal    Patient's complete Health Risk Assessment and screening values have been reviewed and are found in Flowsheets. The following problems were reviewed today and where indicated follow up appointments were made and/or referrals ordered. Positive Risk Factor Screenings with Interventions:     Health Habits/Nutrition:  Health Habits/Nutrition  Do you exercise for at least 20 minutes 2-3 times per week?: Yes  Have you lost any weight without trying in the past 3 months?: No  Do you eat fewer than 2 meals per day?: No  Have you seen a dentist within the past year?: Yes  Body mass index is 30.6 kg/m². Health Habits/Nutrition Interventions:  · Inadequate physical activity:  patient agrees to increase physical activity as follows: 2  Encouraged to walk more.   Personalized Preventive Plan   Current Health Maintenance Status  Immunization History   Administered Date(s) Administered    Influenza Virus Vaccine 10/15/2012    Influenza, High Dose (Fluzone 65 yrs and older) 10/07/2013, 10/09/2014, 10/07/2015, 10/28/2016, 10/09/2017, 09/24/2018, 09/01/2020    Pneumococcal Conjugate 13-valent ([id number]) 05/31/2016    Pneumococcal Polysaccharide ([id number]) 01/27/2012    Tdap (Boostrix, Adacel) 05/22/2014    Zoster Live (Zostavax) 02/01/2012        Health Maintenance   Topic Date Due    Shingles Vaccine (2 of 3) 03/28/2012    Annual Wellness Visit (AWV)  02/14/2020    TSH testing  09/10/2021    DTaP/Tdap/Td vaccine (2 - Td) 05/22/2024  DEXA (modify frequency per FRAX score)  Completed    Flu vaccine  Completed    Pneumococcal 65+ years Vaccine  Completed    Hepatitis A vaccine  Aged Out    Hepatitis B vaccine  Aged Out    Hib vaccine  Aged Out    Meningococcal (ACWY) vaccine  Aged Out     Recommendations for [employer] Due: see orders and patient instructions/AVS.  . Recommended screening schedule for the next 5-10 years is provided to the patient in written form: see Patient Quoc Reardon was seen today for medicare aw. Diagnoses and all orders for this visit:    Routine general medical examination at a health care facility    Shortness of breath  -     albuterol sulfate HFA (PROAIR HFA) 108 (90 Base) MCG/ACT inhaler; Inhale 2 puffs into the lungs every 6 hours as needed for Wheezing           Diagnosis Orders   1. Routine general medical examination at a health care facility     2. Shortness of breath  albuterol sulfate HFA (PROAIR HFA) 108 (90 Base) MCG/ACT inhaler     Call office if #2 not resolved this month.

## 2020-11-15 ENCOUNTER — PATIENT MESSAGE (OUTPATIENT)
Dept: FAMILY MEDICINE CLINIC | Age: 80
End: 2020-11-15

## 2020-11-16 NOTE — TELEPHONE ENCOUNTER
Patient is asking for a chest x-ray    Phoned patient to check on symptoms. She states she is still SOB using her inhaler. She states she has no fever. She has no pain. She states she is able to take a deep breath but it makes her cough.      Please advise

## 2020-11-16 NOTE — TELEPHONE ENCOUNTER
From: Alfred Jennings  To: Edvin Kauffman DO  Sent: 11/15/2020  9:56 AM EST  Subject: Visit Follow-Up Question    I need Dr. Jori Rapp to order a chest xray as a follow up to my last appointment. I am still having trouble breathing and now have developed a deep cough.  The in haler only works at the moment I use it   Maria Alejandra Peterson  [phone]

## 2020-12-02 ENCOUNTER — HOSPITAL ENCOUNTER (OUTPATIENT)
Dept: GENERAL RADIOLOGY | Age: 80
Discharge: HOME | End: 2020-12-02
Payer: MEDICARE

## 2020-12-02 PROCEDURE — 71046 X-RAY EXAM CHEST 2 VIEWS: CPT

## 2020-12-03 ENCOUNTER — PATIENT MESSAGE (OUTPATIENT)
Dept: FAMILY MEDICINE CLINIC | Age: 80
End: 2020-12-03

## 2020-12-03 NOTE — TELEPHONE ENCOUNTER
From: Erika Jennings  To: Satinder Mahmood DO  Sent: 12/3/2020 10:56 AM EST  Subject: Visit Follow-Up Question    I'm still short of breath and have a cough. I know the xray was ok, just wondering about the shortness of breath. The inhaler works short term. Thoughts?

## 2020-12-05 ENCOUNTER — PATIENT MESSAGE (OUTPATIENT)
Dept: FAMILY MEDICINE CLINIC | Age: 80
End: 2020-12-05

## 2020-12-08 NOTE — TELEPHONE ENCOUNTER
With prescriptions I am referring patient use either appointments or E-visit. Please direct her how to use E-visit. Dear Amado Chun,   Thank you for contacting me. I invite you to take advantage of the [employer] Evisit platform. Compared to a simple [employer] message, the Evisit collects and documents home monitoring readings, current symptoms and possible medication side effects such that it can take the place of an office visit in many cases. This additional information will help me to better address your condition or concern. The maximum cost is similar to the office visit copay for most patients, and many insurers, including Medicare, now cover these visits completely. Also, it can be done at your convenience and does not require any type of scheduling. Below are the steps for beginning and completing an Evisit:       Log into your [employer] account.  Under the appointment option, choose \"Begin an Evisit\". If you are on the david, it will have a stethoscope icon you can tap on.  From the menu of Evisits, choose the reason for the visit that best fits your concern or condition. For example, you could select \"Sinus/cold/cough\", \"Urinary symptoms\", \"ADHD follow up\", etc.    Please answer all prompted questions and add comments where permitted.  After submitting the Evisit, I will follow up with you on [employer] with a message that will contain a plan for you and follow up instructions.  If you try to submit an Evisit and get a message that you must contact your primary care provider, please contact our office. We can assist you in getting the appropriate care for your condition or concern.     Sincerely,  Elisha Newell, DO

## 2020-12-21 ENCOUNTER — HOSPITAL ENCOUNTER (OUTPATIENT)
Dept: MAMMOGRAPHY | Age: 80
Discharge: HOME | End: 2020-12-21
Payer: MEDICARE

## 2020-12-21 PROCEDURE — 77063 BREAST TOMOSYNTHESIS BI: CPT

## 2021-01-02 ENCOUNTER — APPOINTMENT (OUTPATIENT)
Dept: GENERAL RADIOLOGY | Age: 81
DRG: 310 | End: 2021-01-02
Payer: MEDICARE

## 2021-01-02 ENCOUNTER — HOSPITAL ENCOUNTER (INPATIENT)
Age: 81
LOS: 3 days | Discharge: HOME | DRG: 310 | End: 2021-01-05
Attending: STUDENT IN AN ORGANIZED HEALTH CARE EDUCATION/TRAINING PROGRAM
Payer: MEDICARE

## 2021-01-02 DIAGNOSIS — I48.91 ATRIAL FIBRILLATION WITH RVR (HCC): ICD-10-CM

## 2021-01-02 DIAGNOSIS — E87.70 HYPERVOLEMIA, UNSPECIFIED HYPERVOLEMIA TYPE: ICD-10-CM

## 2021-01-02 DIAGNOSIS — R06.02 SHORTNESS OF BREATH: Primary | ICD-10-CM

## 2021-01-02 LAB
ANION GAP SERPL CALCULATED.3IONS-SCNC: 15 MMOL/L (ref 3–16)
APTT: 32.8 SEC (ref 24.2–36.2)
BASOPHILS ABSOLUTE: 0.2 K/UL (ref 0–0.2)
BASOPHILS RELATIVE PERCENT: 2.5 %
BUN BLDV-MCNC: 14 MG/DL (ref 7–20)
CALCIUM SERPL-MCNC: 9.9 MG/DL (ref 8.3–10.6)
CHLORIDE BLD-SCNC: 101 MMOL/L (ref 99–110)
CO2: 22 MMOL/L (ref 21–32)
CREAT SERPL-MCNC: 1.1 MG/DL (ref 0.6–1.2)
D DIMER: 293 NG/ML DDU (ref 0–229)
EKG ATRIAL RATE: 147 BPM
EKG DIAGNOSIS: NORMAL
EKG Q-T INTERVAL: 312 MS
EKG QRS DURATION: 74 MS
EKG QTC CALCULATION (BAZETT): 479 MS
EKG R AXIS: 25 DEGREES
EKG T AXIS: 3 DEGREES
EKG VENTRICULAR RATE: 142 BPM
EOSINOPHILS ABSOLUTE: 0.2 K/UL (ref 0–0.6)
EOSINOPHILS RELATIVE PERCENT: 2.3 %
GFR AFRICAN AMERICAN: 58
GFR NON-AFRICAN AMERICAN: 48
GLUCOSE BLD-MCNC: 112 MG/DL (ref 70–99)
HCT VFR BLD CALC: 47.7 % (ref 36–48)
HEMOGLOBIN: 15.6 G/DL (ref 12–16)
INR BLD: 1.06 (ref 0.86–1.14)
LYMPHOCYTES ABSOLUTE: 0.9 K/UL (ref 1–5.1)
LYMPHOCYTES RELATIVE PERCENT: 11.1 %
MAGNESIUM: 2.1 MG/DL (ref 1.8–2.4)
MCH RBC QN AUTO: 31.2 PG (ref 26–34)
MCHC RBC AUTO-ENTMCNC: 32.7 G/DL (ref 31–36)
MCV RBC AUTO: 95.6 FL (ref 80–100)
MONOCYTES ABSOLUTE: 0.9 K/UL (ref 0–1.3)
MONOCYTES RELATIVE PERCENT: 11.3 %
NEUTROPHILS ABSOLUTE: 6.1 K/UL (ref 1.7–7.7)
NEUTROPHILS RELATIVE PERCENT: 72.8 %
PDW BLD-RTO: 13.6 % (ref 12.4–15.4)
PLATELET # BLD: 234 K/UL (ref 135–450)
PMV BLD AUTO: 9.2 FL (ref 5–10.5)
POTASSIUM SERPL-SCNC: 4.4 MMOL/L (ref 3.5–5.1)
PRO-BNP: 3348 PG/ML (ref 0–449)
PROTHROMBIN TIME: 12.3 SEC (ref 10–13.2)
RBC # BLD: 4.99 M/UL (ref 4–5.2)
SODIUM BLD-SCNC: 138 MMOL/L (ref 136–145)
T4 FREE: 1.5 NG/DL (ref 0.9–1.8)
TROPONIN: <0.01 NG/ML
TSH REFLEX: 5.06 UIU/ML (ref 0.27–4.2)
WBC # BLD: 8.3 K/UL (ref 4–11)

## 2021-01-02 PROCEDURE — 2500000003 HC RX 250 WO HCPCS: Performed by: STUDENT IN AN ORGANIZED HEALTH CARE EDUCATION/TRAINING PROGRAM

## 2021-01-02 PROCEDURE — 96374 THER/PROPH/DIAG INJ IV PUSH: CPT

## 2021-01-02 PROCEDURE — 84484 ASSAY OF TROPONIN QUANT: CPT

## 2021-01-02 PROCEDURE — 6360000002 HC RX W HCPCS: Performed by: STUDENT IN AN ORGANIZED HEALTH CARE EDUCATION/TRAINING PROGRAM

## 2021-01-02 PROCEDURE — 36415 COLL VENOUS BLD VENIPUNCTURE: CPT

## 2021-01-02 PROCEDURE — 84443 ASSAY THYROID STIM HORMONE: CPT

## 2021-01-02 PROCEDURE — 2580000003 HC RX 258: Performed by: STUDENT IN AN ORGANIZED HEALTH CARE EDUCATION/TRAINING PROGRAM

## 2021-01-02 PROCEDURE — 85730 THROMBOPLASTIN TIME PARTIAL: CPT

## 2021-01-02 PROCEDURE — 93005 ELECTROCARDIOGRAM TRACING: CPT | Performed by: STUDENT IN AN ORGANIZED HEALTH CARE EDUCATION/TRAINING PROGRAM

## 2021-01-02 PROCEDURE — 99283 EMERGENCY DEPT VISIT LOW MDM: CPT

## 2021-01-02 PROCEDURE — 2500000003 HC RX 250 WO HCPCS

## 2021-01-02 PROCEDURE — 83880 ASSAY OF NATRIURETIC PEPTIDE: CPT

## 2021-01-02 PROCEDURE — 85379 FIBRIN DEGRADATION QUANT: CPT

## 2021-01-02 PROCEDURE — 85025 COMPLETE CBC W/AUTO DIFF WBC: CPT

## 2021-01-02 PROCEDURE — 71045 X-RAY EXAM CHEST 1 VIEW: CPT

## 2021-01-02 PROCEDURE — 83735 ASSAY OF MAGNESIUM: CPT

## 2021-01-02 PROCEDURE — 2060000000 HC ICU INTERMEDIATE R&B

## 2021-01-02 PROCEDURE — 84439 ASSAY OF FREE THYROXINE: CPT

## 2021-01-02 PROCEDURE — U0003 INFECTIOUS AGENT DETECTION BY NUCLEIC ACID (DNA OR RNA); SEVERE ACUTE RESPIRATORY SYNDROME CORONAVIRUS 2 (SARS-COV-2) (CORONAVIRUS DISEASE [COVID-19]), AMPLIFIED PROBE TECHNIQUE, MAKING USE OF HIGH THROUGHPUT TECHNOLOGIES AS DESCRIBED BY CMS-2020-01-R: HCPCS

## 2021-01-02 PROCEDURE — 85610 PROTHROMBIN TIME: CPT

## 2021-01-02 PROCEDURE — 80048 BASIC METABOLIC PNL TOTAL CA: CPT

## 2021-01-02 RX ORDER — LEVOTHYROXINE SODIUM 0.03 MG/1
25 TABLET ORAL DAILY
Status: DISCONTINUED | OUTPATIENT
Start: 2021-01-03 | End: 2021-01-03

## 2021-01-02 RX ORDER — DILTIAZEM HYDROCHLORIDE 5 MG/ML
10 INJECTION INTRAVENOUS ONCE
Status: COMPLETED | OUTPATIENT
Start: 2021-01-02 | End: 2021-01-02

## 2021-01-02 RX ORDER — HEPARIN SODIUM 10000 [USP'U]/100ML
13 INJECTION, SOLUTION INTRAVENOUS CONTINUOUS
Status: DISCONTINUED | OUTPATIENT
Start: 2021-01-02 | End: 2021-01-04

## 2021-01-02 RX ORDER — PROMETHAZINE HYDROCHLORIDE 25 MG/1
12.5 TABLET ORAL EVERY 6 HOURS PRN
Status: DISCONTINUED | OUTPATIENT
Start: 2021-01-02 | End: 2021-01-05 | Stop reason: HOSPADM

## 2021-01-02 RX ORDER — HEPARIN SODIUM 1000 [USP'U]/ML
80 INJECTION, SOLUTION INTRAVENOUS; SUBCUTANEOUS ONCE
Status: COMPLETED | OUTPATIENT
Start: 2021-01-02 | End: 2021-01-02

## 2021-01-02 RX ORDER — HEPARIN SODIUM 1000 [USP'U]/ML
80 INJECTION, SOLUTION INTRAVENOUS; SUBCUTANEOUS PRN
Status: DISCONTINUED | OUTPATIENT
Start: 2021-01-02 | End: 2021-01-04

## 2021-01-02 RX ORDER — ACETAMINOPHEN 325 MG/1
650 TABLET ORAL EVERY 6 HOURS PRN
Status: DISCONTINUED | OUTPATIENT
Start: 2021-01-02 | End: 2021-01-05 | Stop reason: HOSPADM

## 2021-01-02 RX ORDER — LEVALBUTEROL TARTRATE 45 UG/1
1 AEROSOL, METERED ORAL EVERY 6 HOURS PRN
Status: DISCONTINUED | OUTPATIENT
Start: 2021-01-02 | End: 2021-01-02 | Stop reason: RX

## 2021-01-02 RX ORDER — DILTIAZEM HYDROCHLORIDE 5 MG/ML
INJECTION INTRAVENOUS
Status: COMPLETED
Start: 2021-01-02 | End: 2021-01-02

## 2021-01-02 RX ORDER — SODIUM CHLORIDE 0.9 % (FLUSH) 0.9 %
10 SYRINGE (ML) INJECTION PRN
Status: DISCONTINUED | OUTPATIENT
Start: 2021-01-02 | End: 2021-01-05 | Stop reason: HOSPADM

## 2021-01-02 RX ORDER — FUROSEMIDE 10 MG/ML
20 INJECTION INTRAMUSCULAR; INTRAVENOUS ONCE
Status: COMPLETED | OUTPATIENT
Start: 2021-01-02 | End: 2021-01-02

## 2021-01-02 RX ORDER — ACETAMINOPHEN 650 MG/1
650 SUPPOSITORY RECTAL EVERY 6 HOURS PRN
Status: DISCONTINUED | OUTPATIENT
Start: 2021-01-02 | End: 2021-01-05 | Stop reason: HOSPADM

## 2021-01-02 RX ORDER — SODIUM CHLORIDE 0.9 % (FLUSH) 0.9 %
10 SYRINGE (ML) INJECTION EVERY 12 HOURS SCHEDULED
Status: DISCONTINUED | OUTPATIENT
Start: 2021-01-02 | End: 2021-01-05 | Stop reason: HOSPADM

## 2021-01-02 RX ORDER — HEPARIN SODIUM 1000 [USP'U]/ML
40 INJECTION, SOLUTION INTRAVENOUS; SUBCUTANEOUS PRN
Status: DISCONTINUED | OUTPATIENT
Start: 2021-01-02 | End: 2021-01-04

## 2021-01-02 RX ORDER — LEVALBUTEROL 1.25 MG/.5ML
1.25 SOLUTION, CONCENTRATE RESPIRATORY (INHALATION) EVERY 6 HOURS PRN
Status: DISCONTINUED | OUTPATIENT
Start: 2021-01-02 | End: 2021-01-05 | Stop reason: HOSPADM

## 2021-01-02 RX ORDER — VITAMIN B COMPLEX
2000 TABLET ORAL DAILY
Status: DISCONTINUED | OUTPATIENT
Start: 2021-01-03 | End: 2021-01-05 | Stop reason: HOSPADM

## 2021-01-02 RX ORDER — ALBUTEROL SULFATE 90 UG/1
2 AEROSOL, METERED RESPIRATORY (INHALATION) EVERY 6 HOURS PRN
Status: CANCELLED | OUTPATIENT
Start: 2021-01-02

## 2021-01-02 RX ORDER — ONDANSETRON 2 MG/ML
4 INJECTION INTRAMUSCULAR; INTRAVENOUS EVERY 6 HOURS PRN
Status: DISCONTINUED | OUTPATIENT
Start: 2021-01-02 | End: 2021-01-05 | Stop reason: HOSPADM

## 2021-01-02 RX ORDER — POLYETHYLENE GLYCOL 3350 17 G/17G
17 POWDER, FOR SOLUTION ORAL DAILY PRN
Status: DISCONTINUED | OUTPATIENT
Start: 2021-01-02 | End: 2021-01-05 | Stop reason: HOSPADM

## 2021-01-02 RX ADMIN — DILTIAZEM HYDROCHLORIDE 5 MG/HR: 5 INJECTION INTRAVENOUS at 13:00

## 2021-01-02 RX ADMIN — HEPARIN SODIUM 18 UNITS/KG/HR: 10000 INJECTION, SOLUTION INTRAVENOUS at 18:56

## 2021-01-02 RX ADMIN — DILTIAZEM HYDROCHLORIDE 10 MG: 5 INJECTION INTRAVENOUS at 13:00

## 2021-01-02 RX ADMIN — HEPARIN SODIUM 5980 UNITS: 1000 INJECTION INTRAVENOUS; SUBCUTANEOUS at 18:56

## 2021-01-02 RX ADMIN — FUROSEMIDE 20 MG: 10 INJECTION, SOLUTION INTRAMUSCULAR; INTRAVENOUS at 14:51

## 2021-01-02 ASSESSMENT — ENCOUNTER SYMPTOMS
CHEST TIGHTNESS: 0
EYE REDNESS: 0
SHORTNESS OF BREATH: 1
DIARRHEA: 0
CHOKING: 0
ABDOMINAL PAIN: 0
COUGH: 0
NAUSEA: 0
BACK PAIN: 0
CONSTIPATION: 0
VOICE CHANGE: 0
VOMITING: 0
SORE THROAT: 0
TROUBLE SWALLOWING: 0
WHEEZING: 0
ABDOMINAL DISTENTION: 1
EYE PAIN: 0

## 2021-01-02 NOTE — H&P
Internal Medicine  PGY 1  History & Physical      CC   Chief Complaint   Patient presents with    Shortness of Breath         History Obtained From:  patient    HISTORY OF PRESENT ILLNESS:   Ms. Emry Schlatter is a 79 y.o. woman with PMHx of diverticula, GERD, hysterectomy, CCY, who presents to [facility] ED for progressive SOA which began about 3 months ago. She used to walk four miles a day but now says she gets exhausted just by walking to the fridge. No cough, no swelling, although she says that she cannot breathe well while laying flat. Also reports decrease in UOP; dribbling stream. Recently, she went to her PCP and was prescribed an albuterol inhaler as asthma was suspected. She used inhaler 3 times a day; it didn't help. Asked about snoring; she says  says maybe she snores a little, but no pauses in breathing or choking; she wears a mouth guard at night. Patient does say that she takes tumeric, which is associated with GI effects of which she reports none. She denies recent sick contacts; she and  been only leaving house to go to grocery and wears mask. Denies chest pain, fevers, swelling, night sweats. Since being placed on diltiazem gtt in ED, she says she feels she is breathing easier and feels that her urine stream improved.     Past Medical History:        Diagnosis Date    Allergic rhinitis     Alveolitis of jaw     Cancer ([facility] [address]     Dental disease     Disorder of bone and cartilage, unspecified     Diverticulosis of colon (without mention of hemorrhage)     Dizziness     GERD (gastroesophageal reflux disease)     Hearing loss     Myalgia and myositis, unspecified     Nosebleed     Other and unspecified hyperlipidemia     Recurrent upper respiratory infection (URI)     Screening mammogram 11/5/2008    Normal    Sinusitis     Tinnitus     Unspecified gastritis and gastroduodenitis without mention of hemorrhage     Unspecified hypothyroidism     Unspecified tinnitus ·     Past Surgical History:        Procedure Laterality Date    BLADDER SUSPENSION  1990s    CHOLECYSTECTOMY  1990s    COLONOSCOPY  1/16/2007    Normal    COLONOSCOPY  02/27/2012    Dr. Adames Loose    Right Repair   [address] EXTRACTION     ·     Medications Priorto Admission:    · Not in a hospital admission. Allergies:  Bactrim [sulfamethoxazole-trimethoprim] and Seasonal    Social History:   · TOBACCO:   reports that she quit smoking about 15 years ago. She smoked 0.00 packs per day for 10.00 years. She has never used smokeless tobacco.  · ETOH:   reports current alcohol use. 1 glass of wine per week  · DRUGS : denies ever used  · Patient [facility] at [facility]  ·   Family History:       Problem Relation Age of Onset    Cancer Brother    ·     Review of Systems   Constitutional: Negative for chills and fever. HENT: Negative for trouble swallowing. Eyes: Negative for visual disturbance. Respiratory: Positive for shortness of breath. Negative for cough, choking and wheezing. Cardiovascular: Positive for palpitations. Negative for chest pain. Gastrointestinal: Negative for diarrhea, nausea and vomiting. Endocrine: Negative for polyuria. Genitourinary: Positive for decreased urine volume. Skin: Negative for rash and wound. Allergic/Immunologic: Negative for immunocompromised state. Neurological: Negative for tremors, speech difficulty, light-headedness and headaches. Psychiatric/Behavioral: Negative for confusion. ROS: A 10 point review of systems was conducted, significant findings as noted in HPI. Physical Exam  Constitutional:       Comments: No acute distress, laying in bed   HENT:      Head: Normocephalic and atraumatic.    Cardiovascular:      Comments: Rapid rate, irregular rhythm  Pulmonary:      Comments: Lungs are clear to auscultation; normal work of breathing  Abdominal:      Comments: Abdomen soft, nontender, nondistended; I could not appreciate any masses on palpation   Musculoskeletal:      Right lower leg: No edema. Left lower leg: No edema. Skin:     General: Skin is warm and dry. Physical exam:       Vitals:    01/02/21 1607   BP:    Pulse: 125   Resp: 18   SpO2: 94%       DATA:    Labs:  CBC:   Recent Labs     01/02/21  1232   WBC 8.3   HGB 15.6   HCT 47.7          BMP:   Recent Labs     01/02/21  1232      K 4.4      CO2 22   BUN 14   CREATININE 1.1   GLUCOSE 112*     LFT's: No results for input(s): AST, ALT, ALB, BILITOT, ALKPHOS in the last 72 hours. Troponin:   Recent Labs     01/02/21  1232   TROPONINI <0.01     BNP:No results for input(s): BNP in the last 72 hours. ABGs: No results for input(s): PHART, GBN8YRF, PO2ART in the last 72 hours. INR: No results for input(s): INR in the last 72 hours. U/A:No results for input(s): NITRITE, COLORU, PHUR, LABCAST, WBCUA, RBCUA, MUCUS, TRICHOMONAS, YEAST, BACTERIA, CLARITYU, SPECGRAV, LEUKOCYTESUR, UROBILINOGEN, BILIRUBINUR, BLOODU, GLUCOSEU, AMORPHOUS in the last 72 hours. Invalid input(s): KETONESU    XR CHEST PORTABLE   Final Result      Stable cardiac enlargement                          ASSESSMENT AND PLAN:  Ms. Darrius Chino is a 79 y.o. woman with PMHx of diverticula, GERD who presents with progressive SOA for 3 months. She is currently rate-controlled on floor. Shortness of breath 2/2 to new-onset atrial fibrillation with rapid ventricular response  -CXR showing cardiomegaly; maybe dilated cardiomyopathy causing afib, or vice versa. Could be infiltrative disease; Calcium noted to be normal.  Free T4 normal.  Albuterol may have exacerbated. Doesn't sound like she has NAKIA, however may benefit from sleep study OP. EKG noted to be without ischemic change and trop negative. ProBNP elevated to 3348 and D-dimer at 293.  -diltiazem 5 gtt- once rate-controlled will need beta blocker.   -heparin gtt  -ECHO  -cardiology consulted; appreciate recs    Hypothyroidism- continue home levothyroxine     UA noted to be negative for infection.     Will discuss with attending physician Dr. Jonny Arnold MD    Code Status:Full code  FEN: general diet  PPX:  Heparin gtt  DISPO: Dwaine Hardy MD  1/2/2021,  4:28 PM

## 2021-01-02 NOTE — ED PROVIDER NOTES
[address] [facility] [address] ENCOUNTER          ATTENDING PHYSICIAN NOTE       Date of evaluation: 1/2/2021    Chief Complaint     Shortness of Breath    History of Present Illness     Jenn Blanchard is a 79 y.o. female who presents with shortness of breath. Patient has a history of diverticula, recurrent URIs, hysterectomy cholecystectomy, states that she has had gradual onset, where the worsening shortness of breath for several months, was evaluated approximately 3 months ago for same complaint by her primary care physician, at that time it was noted that she had cardiomegaly on chest x-ray but she states that otherwise laboratory work was negative and she was prescribed an inhaler approximately 2 weeks ago for newly diagnosed asthma, states that since being prescribed inhaler she has used it multiple times daily, has gone through approximately 200 puffs in 2 weeks without improvement prompting ED presentation today. Patient also states that she has had weight gain over the last several months but denies swelling in her extremities, states that majority of swelling has been in her abdomen, denies chest pain but does complain of mild, intermittent palpitations over the last several weeks, denies cough, fever, vomiting, diarrhea, or urinary complaints. Patient states that even taking milk out of the refrigerator makes her short of breath. On arrival patient is saturating in the low to mid 90s on room air and is mildly hypertensive but heart rate is within normal limits. Review of Systems     Review of Systems   Constitutional: Positive for fatigue and unexpected weight change (increased). Negative for appetite change and fever. HENT: Negative for congestion, sore throat, trouble swallowing and voice change. Eyes: Negative for pain, redness and visual disturbance. Respiratory: Positive for shortness of breath. Negative for cough and chest tightness.     Cardiovascular: Positive for palpitations. Negative for chest pain and leg swelling. Gastrointestinal: Positive for abdominal distention (intermitant). Negative for abdominal pain, constipation, diarrhea, nausea and vomiting. Genitourinary: Negative for dysuria and hematuria. Musculoskeletal: Negative for back pain, neck pain and neck stiffness. Skin: Negative for rash and wound. Neurological: Negative for tremors, syncope, speech difficulty, light-headedness, numbness and headaches. Hematological: Does not bruise/bleed easily. Psychiatric/Behavioral: Negative for confusion. Past Medical, Surgical, Family, and Social History     She has a past medical history of Allergic rhinitis, Alveolitis of jaw, Cancer ([facility] [address] Dental disease, Disorder of bone and cartilage, unspecified, Diverticulosis of colon (without mention of hemorrhage), Dizziness, GERD (gastroesophageal reflux disease), Hearing loss, Myalgia and myositis, unspecified, Nosebleed, Other and unspecified hyperlipidemia, Recurrent upper respiratory infection (URI), Screening mammogram, Sinusitis, Tinnitus, Unspecified gastritis and gastroduodenitis without mention of hemorrhage, Unspecified hypothyroidism, and Unspecified tinnitus. She has a past surgical history that includes Cholecystectomy (1990s); hernia repair (1990s); Hysterectomy (1975); bladder suspension (1990s); Colonoscopy (1/16/2007); Colonoscopy (02/27/2012); and [place] tooth extraction. Her family history includes Cancer in her brother. She reports that she quit smoking about 15 years ago. She smoked 0.00 packs per day for 10.00 years. She has never used smokeless tobacco. She reports current alcohol use. She reports that she does not use drugs.     Medications     Previous Medications    ALBUTEROL SULFATE HFA (PROAIR HFA) 108 (90 BASE) MCG/ACT INHALER    Inhale 2 puffs into the lungs every 6 hours as needed for Wheezing    CHOLECALCIFEROL (VITAMIN D) 2000 UNITS CAPS CAPSULE    Take 1 capsule by mouth daily.    LEVOTHYROXINE (SYNTHROID) 25 MCG TABLET    TAKE 1 TABLET BY MOUTH EVERY DAY       Allergies     She is allergic to bactrim [sulfamethoxazole-trimethoprim] and seasonal.    Physical Exam     INITIAL VITALS: BP: (!) 158/131,  , Pulse: 80, Resp: 20, SpO2: 93 %   Physical Exam  Constitutional:       General: She is not in acute distress. Appearance: She is well-developed. She is obese. She is not ill-appearing, toxic-appearing or diaphoretic. HENT:      Head: Normocephalic and atraumatic. Neck:      Musculoskeletal: Normal range of motion and neck supple. Cardiovascular:      Rate and Rhythm: Tachycardia present. Rhythm irregular. Pulmonary:      Effort: Pulmonary effort is normal. No tachypnea or respiratory distress. Chest:      Chest wall: No mass, tenderness or edema. There is no dullness to percussion. Abdominal:      Palpations: Abdomen is soft. Tenderness: There is no abdominal tenderness. There is no guarding or rebound. Musculoskeletal: Normal range of motion. Right lower leg: She exhibits no tenderness. No edema. Left lower leg: She exhibits no tenderness. No edema. Skin:     General: Skin is warm and dry. Capillary Refill: Capillary refill takes less than 2 seconds. Neurological:      General: No focal deficit present. Mental Status: She is alert and oriented to person, place, and time.    Psychiatric:         Mood and Affect: Mood normal.       Diagnostic Results     EKG   EKG Interpretation    Interpreted by emergency department physician    Rhythm: atrial fibrillation - rapid  Rate: 140-150  Axis: normal  Ectopy: none  Conduction: normal  ST Segments: no acute change  T Waves: no acute change    Clinical Impression: atrial fibrillation (new onset)    [facility]      RADIOLOGY:  XR CHEST PORTABLE   Final Result      Stable cardiac enlargement                      LABS:   Results for orders placed or performed during the hospital encounter of 01/02/21 CBC Auto Differential   Result Value Ref Range    WBC 8.3 4.0 - 11.0 K/uL    RBC 4.99 4.00 - 5.20 M/uL    Hemoglobin 15.6 12.0 - 16.0 g/dL    Hematocrit 47.7 36.0 - 48.0 %    MCV 95.6 80.0 - 100.0 fL    MCH 31.2 26.0 - 34.0 pg    MCHC 32.7 31.0 - 36.0 g/dL    RDW 13.6 12.4 - 15.4 %    Platelets 663 272 - 998 K/uL    MPV 9.2 5.0 - 10.5 fL    Neutrophils % 72.8 %    Lymphocytes % 11.1 %    Monocytes % 11.3 %    Eosinophils % 2.3 %    Basophils % 2.5 %    Neutrophils Absolute 6.1 1.7 - 7.7 K/uL    Lymphocytes Absolute 0.9 (L) 1.0 - 5.1 K/uL    Monocytes Absolute 0.9 0.0 - 1.3 K/uL    Eosinophils Absolute 0.2 0.0 - 0.6 K/uL    Basophils Absolute 0.2 0.0 - 0.2 K/uL   Troponin   Result Value Ref Range    Troponin <0.01 <0.01 ng/mL   Brain Natriuretic Peptide   Result Value Ref Range    Pro-BNP 3,348 (H) 0 - 449 pg/mL   Basic Metabolic Panel   Result Value Ref Range    Sodium 138 136 - 145 mmol/L    Potassium 4.4 3.5 - 5.1 mmol/L    Chloride 101 99 - 110 mmol/L    CO2 22 21 - 32 mmol/L    Anion Gap 15 3 - 16    Glucose 112 (H) 70 - 99 mg/dL    BUN 14 7 - 20 mg/dL    CREATININE 1.1 0.6 - 1.2 mg/dL    GFR Non- 48 (A) >60    GFR  58 (A) >60    Calcium 9.9 8.3 - 10.6 mg/dL   Magnesium   Result Value Ref Range    Magnesium 2.10 1.80 - 2.40 mg/dL   D-dimer, quantitative (Lab)   Result Value Ref Range    D-Dimer, Quant 293 (H) 0 - 229 ng/mL DDU   EKG 12 Lead   Result Value Ref Range    Ventricular Rate 142 BPM    Atrial Rate 147 BPM    QRS Duration 74 ms    Q-T Interval 312 ms    QTc Calculation (Bazett) 479 ms    R Axis 25 degrees    T Axis 3 degrees    Diagnosis       EKG performed in ER and to be interpreted by ER physician. Confirmed by MD, ER ([id number]),  Dyllan Thurston ([id number]) on 1/2/2021 12:08:37 PM     RECENT VITALS:  BP: (!) 158/131, , Pulse: 80, Resp: 20, SpO2: 93 %     Procedures     ED Course     Nursing Notes, Past Medical Hx, Past Surgical Hx, Social Hx,Allergies, and Family Hx were reviewed. patient was given the following medications:  Orders Placed This Encounter   Medications    FOLLOWED BY Linked Order Group     dilTIAZem injection 10 mg     dilTIAZem 125 mg in dextrose 5 % 125 mL infusion    dilTIAZem 25 MG/5ML injection     Natalia Souzaah: cabinet override    furosemide (LASIX) injection 20 mg       CONSULTS:  IP CONSULT TO HOSPITALIST    MEDICAL DECISIONMAKING / ASSESSMENT / Perez Nathan Jennings is a 79 y.o. female who presents with shortness of breath and palpitations for several months that has been worsening. Arrival patient in atrial fibrillation with RVR but is hemodynamically stable, she is also in the low 90s on room air and reports subjective shortness of breath that is worsened with any type of ambulation or exertion. We will plan to obtain ambulatory pulse oximetry and provide bolus/infusion of Cardizem for her new onset A. fib with RVR while awaiting cardiac labs and chest x-ray. Given patient reports weeks of sedentary lifestyle secondary to shortness of breath we will also add a D-dimer to assess for pulmonary embolism. Patient's laboratory work notable for BNP of 3348, remainder of laboratory work was reassuring including negative troponin, D-dimer which was less than patient's age-adjusted cut off and chest x-ray that redemonstrated cardiomegaly without focal consolidation or effusion to suggest infection requiring antibiotics or atypical/Covid infection. Covid swab was obtained however and is pending. Diltiazem bolus and infusion was initiated with improvement of heart rates, we will also plan to provide Lasix at this time, attempted to ambulate patient however she became significantly short of breath subjectively, at this time we will plan on admission to hospital for continued evaluation/monitoring, I have personally spoken with the accepting medical provider. Clinical Impression     1. Shortness of breath    2.  [facility]    3. Hypervolemia, unspecified hypervolemia type        Disposition     PATIENT REFERRED TO:  No follow-up provider specified.     DISCHARGE MEDICATIONS:  New Prescriptions    No medications on file       DISPOSITION Decision To Admit 01/02/2021 02:29:12 PM       Corina Shea MD  01/02/21 1446

## 2021-01-02 NOTE — PROGRESS NOTES
4 Eyes Admission Assessment     I agree as the admission nurse that 2 RN's have performed a thorough Head to Toe Skin Assessment on the patient. ALL assessment sites listed below have been assessed on admission. Areas assessed by both nurses:   [x]   Head, Face, and Ears   [x]   Shoulders, Back, and Chest  [x]   Arms, Elbows, and Hands   [x]   Coccyx, Sacrum, and Ischium  [x]   Legs, Feet, and Heels        Does the Patient have Skin Breakdown?   No         Kun Prevention initiated:  No   Wound Care Orders initiated:  No      [facility] nurse consulted for Pressure Injury (Stage 3,4, Unstageable, DTI, NWPT, and Complex wounds) or Kun score 18 or lower:  No      Nurse 1 eSignature: Electronically signed by Andrey Gonzalez RN on 1/2/21 at 5:17 PM EST    **SHARE this note so that the co-signing nurse is able to place an eSignature**    Nurse 2 eSignature: Electronically signed by Linwood Prather RN on 1/2/21 at 7:14 PM EST

## 2021-01-02 NOTE — PROGRESS NOTES
RESPIRATORY THERAPY ASSESSMENT    Name:  Janice Moncada  Medical Record Number:  [id number]  Age: 79 y.o. Gender: female  : 1940  Today's Date:  2021  Room:  [facility]/5078-91    Assessment     Is the patient being admitted for a COPD or Asthma exacerbation? No   (If yes the patient will be seen every 4 hours for the first 24 hours and then reassessed)    Patient Admission Diagnosis      Allergies  Allergies   Allergen Reactions    Bactrim [Sulfamethoxazole-Trimethoprim] Hives     hives    Seasonal Other (See Comments)     Runny nose, sneezing           Pulmonary History:Asthma  Home Oxygen Therapy:  room air  Home Respiratory Therapy:Albuterol   Current Respiratory Therapy:  Xopenex HHN PRN          Respiratory Severity Index(RSI)   Patients with orders for inhalation medications, oxygen, or any therapeutic treatment modality will be placed on Respiratory Protocol. They will be assessed with the first treatment and at least every 72 hours thereafter. The following severity scale will be used to determine frequency of treatment intervention.     Smoking History: Smoking History Less than 1ppd or less than 15 pack year = 1    Social History  Social History     Tobacco Use    Smoking status: Former Smoker     Packs/day: 0.00     Years: 10.00     Pack years: 0.00     Quit date: 2005     Years since quitting: 15.6    Smokeless tobacco: Never Used   Substance Use Topics    Alcohol use: Yes     Comment: rare    Drug use: No       Recent Surgical History: None = 0  Past Surgical History  Past Surgical History:   Procedure Laterality Date    BLADDER SUSPENSION      CHOLECYSTECTOMY      COLONOSCOPY  2007    Normal    COLONOSCOPY  2012    Dr. Bharath Marcelo    Right Repair   [facility] [address]    WISDOM TOOTH EXTRACTION         Level of Consciousness: Alert, Oriented, and Cooperative = 0    Level of Activity: Walking unassisted = 0    Respiratory Pattern: Regular Pattern; RR 8-20 = 0    Breath Sounds: Clear = 0    Sputum   ,  ,    Cough: Strong, spontaneous, non-productive = 0    Vital Signs   /81   Pulse 116   Temp 97.8 °F (36.6 °C) (Oral)   Resp 18   Ht 5' 2\" (1.575 m)   Wt 165 lb (74.8 kg)   SpO2 92%   BMI 30.18 kg/m²   SPO2 (COPD values may differ): Greater than or equal to 92% on room air = 0    Peak Flow (asthma only): not applicable = 0    RSI: 0-4 = See once and convert to home regimen or discontinue        Plan       Goals: medication delivery    Patient/caregiver was educated on the proper method of use for Respiratory Care Devices:  Yes      Level of patient/caregiver understanding able to:   ? Verbalize understanding   ? Demonstrate understanding       ? Teach back        ? Needs reinforcement       ? No available caregiver               ? Other:     Response to education:  Good     Is patient being placed on Home Treatment Regimen? No     Does the patient have everything they need prior to discharge? NA     Comments: Pt refused tx     Plan of Care: Xopenex HHN PRN    Electronically signed by Angelita Bourne RCP on 1/2/2021 at 5:55 PM    Respiratory Protocol Guidelines     1. Assessment and treatment by Respiratory Therapy will be initiated for medication and therapeutic interventions upon initiation of aerosolized medication. 2. Physician will be contacted for respiratory rate (RR) greater than 35 breaths per minute. Therapy will be held for heart rate (HR) greater than 140 beats per minute, pending direction from physician. 3. Bronchodilators will be administered via Metered Dose Inhaler (MDI) with spacer when the following criteria are met:  a. Alert and cooperative     b. HR < 140 bpm  c. RR < 30 bpm                d. Can demonstrate a 2-3 second inspiratory hold  4. Bronchodilators will be administered via Hand Held Nebulizer [facility]) to patients when ANY of the following criteria are met  a.  Incognizant or uncooperative b. Patients treated with HHN at Home        c. Unable to demonstrate proper use of MDI with spacer     d. RR > 30 bpm   5. Bronchodilators will be delivered via Metered Dose Inhaler (MDI), HHN, Aerogen to intubated patients on mechanical ventilation. 6. Inhalation medication orders will be delivered and/or substituted as outlined below. Aerosolized Medications Ordering and Administration Guidelines:    1. All Medications will be ordered by a physician, and their frequency and/or modality will be adjusted as defined by the patients Respiratory Severity Index (RSI) score. 2. If the patient does not have documented COPD, consider discontinuing anticholinergics when RSI is less than 9.  3. If the bronchospasm worsens (increased RSI), then the bronchodilator frequency can be increased to a maximum of every 4 hours. If greater than every 4 hours is required, the physician will be contacted. 4. If the bronchospasm improves, the frequency of the bronchodilator can be decreased, based on the patient's RSI, but not less than home treatment regimen frequency. 5. Bronchodilator(s) will be discontinued if patient has a RSI less than 9 and has received no scheduled or as needed treatment for 72  Hrs. Patients Ordered on a Mucolytic Agent:    1. Must always be administered with a bronchodilator. 2. Discontinue if patient experiences worsened bronchospasm, or secretions have lessened to the point that the patient is able to clear them with a cough. Anti-inflammatory and Combination Medications:    1. If the patient lacks prior history of lung disease, is not using inhaled anti-inflammatory medication at home, and lacks wheezing by examination or by history for at least 24 hours, contact physician for possible discontinuation.

## 2021-01-03 PROBLEM — R06.02 SOB (SHORTNESS OF BREATH): Status: ACTIVE | Noted: 2021-01-03

## 2021-01-03 LAB
ANION GAP SERPL CALCULATED.3IONS-SCNC: 14 MMOL/L (ref 3–16)
ANTI-XA UNFRAC HEPARIN: 0.39 IU/ML (ref 0.3–0.7)
ANTI-XA UNFRAC HEPARIN: 0.49 IU/ML (ref 0.3–0.7)
ANTI-XA UNFRAC HEPARIN: 0.74 IU/ML (ref 0.3–0.7)
ANTI-XA UNFRAC HEPARIN: 1.19 IU/ML (ref 0.3–0.7)
BASOPHILS ABSOLUTE: 0 K/UL (ref 0–0.2)
BASOPHILS RELATIVE PERCENT: 0.6 %
BUN BLDV-MCNC: 16 MG/DL (ref 7–20)
CALCIUM SERPL-MCNC: 9.1 MG/DL (ref 8.3–10.6)
CHLORIDE BLD-SCNC: 99 MMOL/L (ref 99–110)
CO2: 22 MMOL/L (ref 21–32)
CREAT SERPL-MCNC: 1 MG/DL (ref 0.6–1.2)
EOSINOPHILS ABSOLUTE: 0.2 K/UL (ref 0–0.6)
EOSINOPHILS RELATIVE PERCENT: 3.2 %
GFR AFRICAN AMERICAN: >60
GFR NON-AFRICAN AMERICAN: 53
GLUCOSE BLD-MCNC: 117 MG/DL (ref 70–99)
HCT VFR BLD CALC: 45.8 % (ref 36–48)
HEMOGLOBIN: 15 G/DL (ref 12–16)
LYMPHOCYTES ABSOLUTE: 1.3 K/UL (ref 1–5.1)
LYMPHOCYTES RELATIVE PERCENT: 16.7 %
MCH RBC QN AUTO: 31 PG (ref 26–34)
MCHC RBC AUTO-ENTMCNC: 32.8 G/DL (ref 31–36)
MCV RBC AUTO: 94.4 FL (ref 80–100)
MONOCYTES ABSOLUTE: 0.9 K/UL (ref 0–1.3)
MONOCYTES RELATIVE PERCENT: 12.1 %
NEUTROPHILS ABSOLUTE: 5.1 K/UL (ref 1.7–7.7)
NEUTROPHILS RELATIVE PERCENT: 67.4 %
PDW BLD-RTO: 13.8 % (ref 12.4–15.4)
PLATELET # BLD: 207 K/UL (ref 135–450)
PMV BLD AUTO: 9.7 FL (ref 5–10.5)
POTASSIUM REFLEX MAGNESIUM: 4.2 MMOL/L (ref 3.5–5.1)
RBC # BLD: 4.85 M/UL (ref 4–5.2)
SARS-COV-2: NOT DETECTED
SODIUM BLD-SCNC: 135 MMOL/L (ref 136–145)
WBC # BLD: 7.6 K/UL (ref 4–11)

## 2021-01-03 PROCEDURE — 2060000000 HC ICU INTERMEDIATE R&B

## 2021-01-03 PROCEDURE — 80048 BASIC METABOLIC PNL TOTAL CA: CPT

## 2021-01-03 PROCEDURE — 2500000003 HC RX 250 WO HCPCS: Performed by: STUDENT IN AN ORGANIZED HEALTH CARE EDUCATION/TRAINING PROGRAM

## 2021-01-03 PROCEDURE — 36415 COLL VENOUS BLD VENIPUNCTURE: CPT

## 2021-01-03 PROCEDURE — 2580000003 HC RX 258: Performed by: COUNSELOR

## 2021-01-03 PROCEDURE — 85025 COMPLETE CBC W/AUTO DIFF WBC: CPT

## 2021-01-03 PROCEDURE — 99223 1ST HOSP IP/OBS HIGH 75: CPT | Performed by: INTERNAL MEDICINE

## 2021-01-03 PROCEDURE — 6370000000 HC RX 637 (ALT 250 FOR IP): Performed by: COUNSELOR

## 2021-01-03 PROCEDURE — 6360000002 HC RX W HCPCS: Performed by: INTERNAL MEDICINE

## 2021-01-03 PROCEDURE — 2580000003 HC RX 258: Performed by: STUDENT IN AN ORGANIZED HEALTH CARE EDUCATION/TRAINING PROGRAM

## 2021-01-03 PROCEDURE — 2500000003 HC RX 250 WO HCPCS: Performed by: COUNSELOR

## 2021-01-03 PROCEDURE — 85520 HEPARIN ASSAY: CPT

## 2021-01-03 RX ORDER — LEVOTHYROXINE SODIUM 0.07 MG/1
37 TABLET ORAL DAILY
Status: DISCONTINUED | OUTPATIENT
Start: 2021-01-04 | End: 2021-01-04

## 2021-01-03 RX ADMIN — HEPARIN SODIUM 13 UNITS/KG/HR: 10000 INJECTION, SOLUTION INTRAVENOUS at 19:05

## 2021-01-03 RX ADMIN — LEVOTHYROXINE SODIUM 25 MCG: 0.03 TABLET ORAL at 07:13

## 2021-01-03 RX ADMIN — DILTIAZEM HYDROCHLORIDE 15 MG/HR: 5 INJECTION INTRAVENOUS at 19:08

## 2021-01-03 RX ADMIN — Medication 2000 UNITS: at 08:34

## 2021-01-03 RX ADMIN — DILTIAZEM HYDROCHLORIDE 2.5 MG/HR: 5 INJECTION INTRAVENOUS at 00:03

## 2021-01-03 ASSESSMENT — PAIN SCALES - GENERAL
PAINLEVEL_OUTOF10: 0
PAINLEVEL_OUTOF10: 0

## 2021-01-03 NOTE — PROGRESS NOTES
Progress Note  PGY-1    CC: SOB  Patient's PCP: Nancy Kolb, DO    Interval History     No acute events over night. Patient was seen at bed side. She denied SOB, nausea, palpitations, vomiting. MEDICATIONS:     No current facility-administered medications on file prior to encounter. Current Outpatient Medications on File Prior to Encounter   Medication Sig Dispense Refill    albuterol sulfate HFA (PROAIR HFA) 108 (90 Base) MCG/ACT inhaler Inhale 2 puffs into the lungs every 6 hours as needed for Wheezing 1 Inhaler 3    levothyroxine (SYNTHROID) 25 MCG tablet TAKE 1 TABLET BY MOUTH EVERY DAY 90 tablet 3    Cholecalciferol (VITAMIN D) 2000 UNITS CAPS capsule Take 1 capsule by mouth daily. Scheduled Meds:   [START ON 1/4/2021] levothyroxine  37.5 mcg Oral Daily    Vitamin D  2,000 Units Oral Daily    sodium chloride flush  10 mL Intravenous 2 times per day      Continuous Infusions:   dilTIAZem 2.5 mg/hr (01/03/21 0915)    heparin (PORCINE) Infusion 13 Units/kg/hr (01/03/21 0915)     PRN Meds:perflutren lipid microspheres, sodium chloride flush, promethazine **OR** ondansetron, polyethylene glycol, acetaminophen **OR** acetaminophen, heparin (porcine), heparin (porcine), levalbuterol    Allergies: Allergies   Allergen Reactions    Bactrim [Sulfamethoxazole-Trimethoprim] Hives     hives    Seasonal Other (See Comments)     Runny nose, sneezing       PHYSICAL EXAM:       Vitals: /86   Pulse 94   Temp 98.3 °F (36.8 °C) (Oral)   Resp 20   Ht 5' 2\" (1.575 m)   Wt 165 lb (74.8 kg)   SpO2 92%   BMI 30.18 kg/m²     I/O:      Intake/Output Summary (Last 24 hours) at 1/3/2021 1129  Last data filed at 1/3/2021 0915  Gross per 24 hour   Intake 840.9 ml   Output 2200 ml   Net -1359.1 ml     I/O this shift: In: 486.4 [P.O.:240; I.V.:246.4]  Out: -   I/O last 3 completed shifts: In: 354.5 [P.O.:120;  I.V.:234.5]  Out: 2200 [Urine:2200]    Physical Examination:   ? General appearance: alert and cooperative. ? Skin: Skin color, texture, turgor normal.   ? HEENT: Head: Normocephalic. ? Neck: no adenopathy, no tenderness/mass/nodules. ? Lungs: clear to auscultation bilaterally. ? Heart: no murmur, click, rub or gallop. ? Abdomen: soft, non-tender; bowel sounds normal.  ? Extremities: no cyanosis or edema. ? Neurologic: CN II-XII grossly intact. Mental status: Alert, oriented, thought content appropriate. DATA:       Labs:  CBC:   Recent Labs     01/02/21  1232 01/03/21  0805   WBC 8.3 7.6   HGB 15.6 15.0   HCT 47.7 45.8    207       BMP:   Recent Labs     01/02/21  1232 01/03/21  0805    135*   K 4.4 4.2    99   CO2 22 22   BUN 14 16   CREATININE 1.1 1.0   GLUCOSE 112* 117*     LFT's: No results for input(s): AST, ALT, ALB, BILITOT, ALKPHOS in the last 72 hours. Troponin:   Recent Labs     01/02/21  1232   TROPONINI <0.01     BNP: No results for input(s): BNP in the last 72 hours. ABGs: No results for input(s): PHART, AZU3TTQ, PO2ART in the last 72 hours. INR:   Recent Labs     01/02/21  1818   INR 1.06     Lipids: No results for input(s): CHOL, HDL in the last 72 hours. Invalid input(s): LDLCALCU    U/A:No results for input(s): NITRITE, COLORU, PHUR, LABCAST, WBCUA, RBCUA, MUCUS, TRICHOMONAS, YEAST, BACTERIA, CLARITYU, SPECGRAV, LEUKOCYTESUR, UROBILINOGEN, BILIRUBINUR, BLOODU, GLUCOSEU, AMORPHOUS in the last 72 hours. Invalid input(s): An Reynoso    Rad:  XR CHEST PORTABLE   Final Result      Stable cardiac enlargement                        ASSESSMENT AND PLAN:   Ms. Lázaro Tadeo a 79 y. o. woman with PMHx of diverticula, GERD who presents with progressive SOA for 3 months.       Shortness of breath 2/2 to new-onset atrial fibrillation with rapid ventricular response  Might be related to TSH 5.06. CXR showing cardiomegaly; maybe dilated cardiomyopathy causing afib, or vice versa. EKG noted to be without ischemic change and trop negative.  ProBNP elevated to 3348 and D-dimer at 293.  - Continue Diltiazem 5 gtt- once rate-controlled will need beta blocker  - [id number] score is 4. Continue Heparin gtt.  Will probably need lifelong AC therapy  - Echo  - Cardiology consulted; appreciate recs     Hypothyroidism  At home on 25 mcg Levothyroxine.  - Increases the dose to 37.5 Levothyroxine since TSH was 5.06  - Pt should be on brand Levothyroxine rather than generic due to bioavailability   - Should measure TSH outpatinet in 6 weeks      Code Status:Full code  FEN: General diet  PPX: Heparin gtt  DISPO: IP     This patient has been staffed and discussed with Jan Liang MD.   -----------------------------  Zita Okeefe MD, PGY-1  Internal Medicine

## 2021-01-03 NOTE — PLAN OF CARE
Problem: Cardiac Output - Decreased:  Intervention: Assess cardiogenic shock signs and symptoms  Note: Patient's diltiazem drip titrated to 15 mg/hr to maintain HR<100 bpm. Patient has had one therapeutic Anti-XA on heparin drip at 13 units/kg/hr. Next Anti-XA due tonight at 2215. Synthroid dose increased. Echo ordered.

## 2021-01-03 NOTE — CONSULTS
9/29/20  Yes Jeremy Kolb DO   levothyroxine (SYNTHROID) 25 MCG tablet TAKE 1 TABLET BY MOUTH EVERY DAY 8/21/20  Yes Jeremy Kolb DO   Cholecalciferol (VITAMIN D) 2000 UNITS CAPS capsule Take 1 capsule by mouth daily. 5/28/14  Yes Jhonny Tsai MD        Allergies:  Bactrim [sulfamethoxazole-trimethoprim] and Seasonal           Vitals:    01/03/21 0323   BP: (!) 143/101   Pulse: 108   Resp: 22   Temp: 97.8 °F (36.6 °C)   SpO2: 92%    Weight: 165 lb (74.8 kg)           Labs  CBC:   Lab Results   Component Value Date    WBC 8.3 01/02/2021    RBC 4.99 01/02/2021    HGB 15.6 01/02/2021    HCT 47.7 01/02/2021    MCV 95.6 01/02/2021    RDW 13.6 01/02/2021     01/02/2021     CMP:    Lab Results   Component Value Date     01/02/2021    K 4.4 01/02/2021     01/02/2021    CO2 22 01/02/2021    BUN 14 01/02/2021    CREATININE 1.1 01/02/2021    GFRAA 58 01/02/2021    GFRAA >60 04/15/2013    AGRATIO 2.3 09/10/2020    LABGLOM 48 01/02/2021    GLUCOSE 112 01/02/2021    GLUCOSE 104 06/30/2011    PROT 6.3 09/10/2020    PROT 6.3 11/13/2012    CALCIUM 9.9 01/02/2021    BILITOT 0.6 09/10/2020    ALKPHOS 98 09/10/2020    AST 24 09/10/2020    ALT 27 09/10/2020     PT/INR:  No results found for: PTINR  Lab Results   Component Value Date    TROPONINI <0.01 01/02/2021       EKG:  I have reviewed EKG with the following interpretation:  Impression:  Afib with RVR    Due to the current efforts to prevent transmission of COVID-19 and also the need to preserve PPE for other caregivers, a face-to-face encounter with the patient was not performed. That being said, all relevant records and diagnostic tests were reviewed, including laboratory results and imaging. Please reference any relevant documentation elsewhere. Care will be coordinated with the primary service.       Assessment  Patient Active Problem List   Diagnosis    Tinnitus    Disorder of bone and cartilage    Diverticulosis of large intestine    Mixed hyperlipidemia    Alveolitis of jaw    Hypothyroidism    Osteopenia    Hx of melanoma excision    Sensorineural hearing loss, bilateral    Tinnitus, bilateral    Afib (HCC)    Atrial fibrillation with rapid ventricular response ([address]         Plan:    COVID pending. Chart/data reviewed. Afib with RVR. Rate control with Dilt. Agree with AC with heparin gtt. Echo when COVID issues settled. Monitor.

## 2021-01-04 LAB
ANION GAP SERPL CALCULATED.3IONS-SCNC: 11 MMOL/L (ref 3–16)
ANTI-XA UNFRAC HEPARIN: 0.65 IU/ML (ref 0.3–0.7)
BASOPHILS ABSOLUTE: 0 K/UL (ref 0–0.2)
BASOPHILS RELATIVE PERCENT: 0.5 %
BUN BLDV-MCNC: 18 MG/DL (ref 7–20)
CALCIUM SERPL-MCNC: 9.5 MG/DL (ref 8.3–10.6)
CHLORIDE BLD-SCNC: 103 MMOL/L (ref 99–110)
CO2: 23 MMOL/L (ref 21–32)
CREAT SERPL-MCNC: 0.9 MG/DL (ref 0.6–1.2)
EOSINOPHILS ABSOLUTE: 0.3 K/UL (ref 0–0.6)
EOSINOPHILS RELATIVE PERCENT: 3.6 %
GFR AFRICAN AMERICAN: >60
GFR NON-AFRICAN AMERICAN: >60
GLUCOSE BLD-MCNC: 113 MG/DL (ref 70–99)
HCT VFR BLD CALC: 44.1 % (ref 36–48)
HEMOGLOBIN: 14.7 G/DL (ref 12–16)
LYMPHOCYTES ABSOLUTE: 1.7 K/UL (ref 1–5.1)
LYMPHOCYTES RELATIVE PERCENT: 20.1 %
MCH RBC QN AUTO: 31.3 PG (ref 26–34)
MCHC RBC AUTO-ENTMCNC: 33.4 G/DL (ref 31–36)
MCV RBC AUTO: 93.9 FL (ref 80–100)
MONOCYTES ABSOLUTE: 1 K/UL (ref 0–1.3)
MONOCYTES RELATIVE PERCENT: 12 %
NEUTROPHILS ABSOLUTE: 5.4 K/UL (ref 1.7–7.7)
NEUTROPHILS RELATIVE PERCENT: 63.8 %
PDW BLD-RTO: 13.5 % (ref 12.4–15.4)
PLATELET # BLD: 222 K/UL (ref 135–450)
PMV BLD AUTO: 8.8 FL (ref 5–10.5)
POTASSIUM REFLEX MAGNESIUM: 4.1 MMOL/L (ref 3.5–5.1)
RBC # BLD: 4.7 M/UL (ref 4–5.2)
SODIUM BLD-SCNC: 137 MMOL/L (ref 136–145)
WBC # BLD: 8.5 K/UL (ref 4–11)

## 2021-01-04 PROCEDURE — 2580000003 HC RX 258: Performed by: COUNSELOR

## 2021-01-04 PROCEDURE — 6370000000 HC RX 637 (ALT 250 FOR IP): Performed by: INTERNAL MEDICINE

## 2021-01-04 PROCEDURE — 6370000000 HC RX 637 (ALT 250 FOR IP): Performed by: COUNSELOR

## 2021-01-04 PROCEDURE — 36415 COLL VENOUS BLD VENIPUNCTURE: CPT

## 2021-01-04 PROCEDURE — 85520 HEPARIN ASSAY: CPT

## 2021-01-04 PROCEDURE — 2060000000 HC ICU INTERMEDIATE R&B

## 2021-01-04 PROCEDURE — 85025 COMPLETE CBC W/AUTO DIFF WBC: CPT

## 2021-01-04 PROCEDURE — 6370000000 HC RX 637 (ALT 250 FOR IP): Performed by: STUDENT IN AN ORGANIZED HEALTH CARE EDUCATION/TRAINING PROGRAM

## 2021-01-04 PROCEDURE — 99233 SBSQ HOSP IP/OBS HIGH 50: CPT | Performed by: INTERNAL MEDICINE

## 2021-01-04 PROCEDURE — 80048 BASIC METABOLIC PNL TOTAL CA: CPT

## 2021-01-04 PROCEDURE — 93306 TTE W/DOPPLER COMPLETE: CPT

## 2021-01-04 RX ORDER — FUROSEMIDE 20 MG/1
20 TABLET ORAL DAILY
Status: DISCONTINUED | OUTPATIENT
Start: 2021-01-05 | End: 2021-01-05 | Stop reason: HOSPADM

## 2021-01-04 RX ORDER — LEVOTHYROXINE SODIUM 0.05 MG/1
25 TABLET ORAL DAILY
Status: DISCONTINUED | OUTPATIENT
Start: 2021-01-05 | End: 2021-01-05 | Stop reason: HOSPADM

## 2021-01-04 RX ADMIN — Medication 10 ML: at 08:32

## 2021-01-04 RX ADMIN — APIXABAN 5 MG: 5 TABLET, FILM COATED ORAL at 20:04

## 2021-01-04 RX ADMIN — METOPROLOL TARTRATE 25 MG: 25 TABLET, FILM COATED ORAL at 20:05

## 2021-01-04 RX ADMIN — Medication 2000 UNITS: at 08:31

## 2021-01-04 RX ADMIN — Medication 10 ML: at 20:05

## 2021-01-04 RX ADMIN — METOPROLOL TARTRATE 12.5 MG: 25 TABLET, FILM COATED ORAL at 09:57

## 2021-01-04 RX ADMIN — LEVOTHYROXINE SODIUM 37.5 MCG: 0.07 TABLET ORAL at 06:40

## 2021-01-04 RX ADMIN — APIXABAN 5 MG: 5 TABLET, FILM COATED ORAL at 15:24

## 2021-01-04 ASSESSMENT — PAIN SCALES - GENERAL
PAINLEVEL_OUTOF10: 0
PAINLEVEL_OUTOF10: 0

## 2021-01-04 NOTE — CARE COORDINATION
the prescription on their way out of the hospital at discharge, or pharmacy can deliver to the bedside if staff is available. (payment due at time of pick-up or delivery - cash, check, or card accepted)     Able to afford home medications/ co-pay costs: Yes    ADLS  Support Systems:      PT AM-PAC:   /24  OT AM-PAC:   /24    [place]: two story home, but lives on the first floor  Steps: yes    Plans to RETURN to current housing: Yes  Barriers to RETURNING to current housing: medical clearance    Home Care Information  Currently ACTIVE with [address]: Yes  [address]: Not Applicable    Currently ACTIVE with [place] [facility]: No  Passport/ Waiver: No  Passport/ Waiver Services: Not Applicable     Durable Medical Equipment  DME Provider: none  Equipment: none    Home Oxygen and [address] prior to admission: No  [address]: Not Applicable  Other Respiratory Equipment: none    Informed of need to bring portable home O2 tank on day of DISCHARGE for nursing to connect prior to leaving: No  Verbalized agreement/Understanding: No  Person to bring portable tank at discharge: none    Dialysis  Active with HD/PD prior to admission: No  Nephrologist: none    HD Center:  Not Applicable    DISCHARGE PLAN:  Disposition: Home- No Services Needed    Transportation PLAN for discharge: family     Factors facilitating achievement of predicted outcomes: Family support, Motivated and Cooperative    Barriers to discharge: Medical complications    Additional Case Management Notes:  Patient is from home with her . He has dementia and she informs that her daughter and son have been taking turns staying with him until she gets home from the hospital. She requested information on private duty nursing to sit with the patient while she is at appointments. SW to follow-up tomorrow with resources.     The Plan for Transition of Care is related to the following treatment goals of Afib ([address] [I48.91]  Atrial fibrillation with rapid ventricular response ([address] [I48.91]    The Patient and/or patient representative Dayana Persons and her family were provided with a choice of provider and agrees with the discharge plan Yes    Freedom of choice list was provided with basic dialogue that supports the patient's individualized plan of care/goals and shares the quality data associated with the providers.  Yes    Care Transition patient: No    Sherrie Monroy   Case Management Department  Ph: [phone]   Fax: [phone]

## 2021-01-04 NOTE — PROGRESS NOTES
recorded. I/O last 3 completed shifts: In: 748.2 [P.O.:360; I.V.:388.2]  Out: 4170 [Urine:1750]    Physical Examination:   ? General appearance: alert and cooperative. ? Skin: Skin color, texture, turgor normal.   ? HEENT: Head: Normocephalic. ? Neck: no adenopathy, no tenderness/mass/nodules. ? Lungs: clear to auscultation bilaterally. ? Heart: irregular rate and rhythm. no murmur, click, rub or gallop. ? Abdomen: soft, non-tender; bowel sounds normal.  ? Extremities: no cyanosis or edema. ? Neurologic: CN II-XII grossly intact. Mental status: Alert, oriented, thought content appropriate. DATA:       Labs:  CBC:   Recent Labs     01/02/21  1232 01/03/21  0805 01/04/21  0443   WBC 8.3 7.6 8.5   HGB 15.6 15.0 14.7   HCT 47.7 45.8 44.1    207 222       BMP:   Recent Labs     01/02/21  1232 01/03/21  0805 01/04/21  0443    135* 137   K 4.4 4.2 4.1    99 103   CO2 22 22 23   BUN 14 16 18   CREATININE 1.1 1.0 0.9   GLUCOSE 112* 117* 113*     LFT's: No results for input(s): AST, ALT, ALB, BILITOT, ALKPHOS in the last 72 hours. Troponin:   Recent Labs     01/02/21  1232   TROPONINI <0.01     BNP: No results for input(s): BNP in the last 72 hours. ABGs: No results for input(s): PHART, TCO6TYY, PO2ART in the last 72 hours. INR:   Recent Labs     01/02/21  1818   INR 1.06     Lipids: No results for input(s): CHOL, HDL in the last 72 hours. Invalid input(s): LDLCALCU    U/A:No results for input(s): NITRITE, COLORU, PHUR, LABCAST, WBCUA, RBCUA, MUCUS, TRICHOMONAS, YEAST, BACTERIA, CLARITYU, SPECGRAV, LEUKOCYTESUR, UROBILINOGEN, BILIRUBINUR, BLOODU, GLUCOSEU, AMORPHOUS in the last 72 hours. Invalid input(s): P[place]co Edwards    Rad:  XR CHEST PORTABLE   Final Result      Stable cardiac enlargement                        ASSESSMENT AND PLAN:   Ms. Versa Eisenmenger a 79 y. o. woman with PMHx of diverticula, GERD who presents with progressive SOA for 3 months.       Dyspnea 2/2 to new-onset A.  Fib with

## 2021-01-04 NOTE — PLAN OF CARE
Problem: Cardiac Output - Decreased:  Goal: Hemodynamic stability will improve  Description: Hemodynamic stability will improve  Outcome: Ongoing     Problem: Falls - Risk of:  Goal: Will remain free from falls  Description: Will remain free from falls  Outcome: Met This Shift  Goal: Absence of physical injury  Description: Absence of physical injury  Outcome: Met This Shift

## 2021-01-04 NOTE — PROGRESS NOTES
Called report to [facility]. Patient will be transferred with belongings and Infusions. Covid Negative.

## 2021-01-04 NOTE — PROGRESS NOTES
[facility] Daily Progress Note      Admit Date:  1/2/2021    Subjective:  Ms. Butch Rausch was seen and examined. COVID neg. F/u afib/sob. Feeling much better. No further sob. No chest pain. Objective:   /84   Pulse 52   Temp 97.7 °F (36.5 °C) (Skin)   Resp 19   Ht 5' 2\" (1.575 m)   Wt 166 lb (75.3 kg)   SpO2 94%   BMI 30.36 kg/m²       Intake/Output Summary (Last 24 hours) at 1/4/2021 1148  Last data filed at 1/4/2021 1116  Gross per 24 hour   Intake 1008.49 ml   Output 2250 ml   Net -1241.51 ml       TELEMETRY: Atrial fibrillation     Physical Exam:  General:  Awake, alert, NAD  Skin:  Warm and dry  Neck:  JVP difficult  Chest:  Clear to auscultation, respiration normal  Cardiovascular:  Irreg/irreg S1S2  Abdomen:  Soft nontender  Extremities:  no edema    Medications:    metoprolol tartrate  12.5 mg Oral BID    levothyroxine  37.5 mcg Oral Daily    Vitamin D  2,000 Units Oral Daily    sodium chloride flush  10 mL Intravenous 2 times per day      dilTIAZem 5 mg/hr (01/04/21 0820)    heparin (PORCINE) Infusion 13 Units/kg/hr (01/03/21 1905)     perflutren lipid microspheres, sodium chloride flush, promethazine **OR** ondansetron, polyethylene glycol, acetaminophen **OR** acetaminophen, heparin (porcine), heparin (porcine), levalbuterol    Lab Data:  CBC:   Recent Labs     01/02/21  1232 01/03/21  0805 01/04/21  0443   WBC 8.3 7.6 8.5   HGB 15.6 15.0 14.7   HCT 47.7 45.8 44.1   MCV 95.6 94.4 93.9    207 222     BMP:   Recent Labs     01/02/21  1232 01/03/21  0805 01/04/21  0443    135* 137   K 4.4 4.2 4.1    99 103   CO2 22 22 23   BUN 14 16 18   CREATININE 1.1 1.0 0.9     LIVER PROFILE: No results for input(s): AST, ALT, LIPASE, BILIDIR, BILITOT, ALKPHOS in the last 72 hours. Invalid input(s):   AMYLASE,  ALB  PT/INR:   Recent Labs     01/02/21  1818   PROTIME 12.3   INR 1.06     APTT:   Recent Labs     01/02/21  1818   APTT 32.8     BNP:  No results for

## 2021-01-05 VITALS
HEIGHT: 62 IN | RESPIRATION RATE: 19 BRPM | HEART RATE: 91 BPM | TEMPERATURE: 97.9 F | DIASTOLIC BLOOD PRESSURE: 94 MMHG | BODY MASS INDEX: 30.25 KG/M2 | WEIGHT: 164.4 LBS | OXYGEN SATURATION: 94 % | SYSTOLIC BLOOD PRESSURE: 113 MMHG

## 2021-01-05 LAB
ANION GAP SERPL CALCULATED.3IONS-SCNC: 12 MMOL/L (ref 3–16)
BASOPHILS ABSOLUTE: 0.1 K/UL (ref 0–0.2)
BASOPHILS RELATIVE PERCENT: 0.8 %
BUN BLDV-MCNC: 19 MG/DL (ref 7–20)
CALCIUM SERPL-MCNC: 9.3 MG/DL (ref 8.3–10.6)
CHLORIDE BLD-SCNC: 105 MMOL/L (ref 99–110)
CO2: 22 MMOL/L (ref 21–32)
CREAT SERPL-MCNC: 1.1 MG/DL (ref 0.6–1.2)
EOSINOPHILS ABSOLUTE: 0.3 K/UL (ref 0–0.6)
EOSINOPHILS RELATIVE PERCENT: 3.3 %
GFR AFRICAN AMERICAN: 58
GFR NON-AFRICAN AMERICAN: 48
GLUCOSE BLD-MCNC: 94 MG/DL (ref 70–99)
HCT VFR BLD CALC: 46.7 % (ref 36–48)
HEMOGLOBIN: 15.4 G/DL (ref 12–16)
LYMPHOCYTES ABSOLUTE: 1.6 K/UL (ref 1–5.1)
LYMPHOCYTES RELATIVE PERCENT: 17.3 %
MAGNESIUM: 2.1 MG/DL (ref 1.8–2.4)
MCH RBC QN AUTO: 31.3 PG (ref 26–34)
MCHC RBC AUTO-ENTMCNC: 33 G/DL (ref 31–36)
MCV RBC AUTO: 94.8 FL (ref 80–100)
MONOCYTES ABSOLUTE: 1 K/UL (ref 0–1.3)
MONOCYTES RELATIVE PERCENT: 10.7 %
NEUTROPHILS ABSOLUTE: 6.2 K/UL (ref 1.7–7.7)
NEUTROPHILS RELATIVE PERCENT: 67.9 %
PDW BLD-RTO: 13.9 % (ref 12.4–15.4)
PLATELET # BLD: 237 K/UL (ref 135–450)
PMV BLD AUTO: 8.8 FL (ref 5–10.5)
POTASSIUM REFLEX MAGNESIUM: 4.5 MMOL/L (ref 3.5–5.1)
RBC # BLD: 4.92 M/UL (ref 4–5.2)
SODIUM BLD-SCNC: 139 MMOL/L (ref 136–145)
WBC # BLD: 9.2 K/UL (ref 4–11)

## 2021-01-05 PROCEDURE — 6370000000 HC RX 637 (ALT 250 FOR IP): Performed by: INTERNAL MEDICINE

## 2021-01-05 PROCEDURE — 83735 ASSAY OF MAGNESIUM: CPT

## 2021-01-05 PROCEDURE — 6370000000 HC RX 637 (ALT 250 FOR IP): Performed by: COUNSELOR

## 2021-01-05 PROCEDURE — 6370000000 HC RX 637 (ALT 250 FOR IP): Performed by: STUDENT IN AN ORGANIZED HEALTH CARE EDUCATION/TRAINING PROGRAM

## 2021-01-05 PROCEDURE — 36415 COLL VENOUS BLD VENIPUNCTURE: CPT

## 2021-01-05 PROCEDURE — 85025 COMPLETE CBC W/AUTO DIFF WBC: CPT

## 2021-01-05 PROCEDURE — 80048 BASIC METABOLIC PNL TOTAL CA: CPT

## 2021-01-05 PROCEDURE — 99233 SBSQ HOSP IP/OBS HIGH 50: CPT | Performed by: INTERNAL MEDICINE

## 2021-01-05 RX ORDER — METOPROLOL TARTRATE 50 MG/1
50 TABLET, FILM COATED ORAL 2 TIMES DAILY
Status: DISCONTINUED | OUTPATIENT
Start: 2021-01-05 | End: 2021-01-05

## 2021-01-05 RX ORDER — FUROSEMIDE 20 MG/1
20 TABLET ORAL DAILY
Qty: 60 TABLET | Refills: 3 | Status: SHIPPED | OUTPATIENT
Start: 2021-01-06 | End: 2021-01-05

## 2021-01-05 RX ORDER — METOPROLOL TARTRATE 75 MG/1
75 TABLET, FILM COATED ORAL 2 TIMES DAILY
Qty: 60 TABLET | Refills: 3 | Status: SHIPPED | OUTPATIENT
Start: 2021-01-05 | End: 2021-04-08 | Stop reason: SDUPTHER

## 2021-01-05 RX ORDER — FUROSEMIDE 20 MG/1
20 TABLET ORAL DAILY
Qty: 60 TABLET | Refills: 3 | Status: ON HOLD | OUTPATIENT
Start: 2021-01-06 | End: 2021-04-22 | Stop reason: HOSPADM

## 2021-01-05 RX ORDER — METOPROLOL TARTRATE 75 MG/1
75 TABLET, FILM COATED ORAL 2 TIMES DAILY
Qty: 60 TABLET | Refills: 3 | Status: SHIPPED | OUTPATIENT
Start: 2021-01-05 | End: 2021-01-05

## 2021-01-05 RX ADMIN — FUROSEMIDE 20 MG: 20 TABLET ORAL at 08:43

## 2021-01-05 RX ADMIN — METOPROLOL TARTRATE 50 MG: 50 TABLET, FILM COATED ORAL at 08:43

## 2021-01-05 RX ADMIN — APIXABAN 5 MG: 5 TABLET, FILM COATED ORAL at 08:43

## 2021-01-05 RX ADMIN — Medication 2000 UNITS: at 08:43

## 2021-01-05 RX ADMIN — METOPROLOL TARTRATE 25 MG: 25 TABLET, FILM COATED ORAL at 10:16

## 2021-01-05 RX ADMIN — LEVOTHYROXINE SODIUM 25 MCG: 50 TABLET ORAL at 05:45

## 2021-01-05 ASSESSMENT — PAIN DESCRIPTION - ORIENTATION
ORIENTATION: RIGHT
ORIENTATION: RIGHT

## 2021-01-05 ASSESSMENT — PAIN DESCRIPTION - DESCRIPTORS: DESCRIPTORS: SORE

## 2021-01-05 ASSESSMENT — PAIN - FUNCTIONAL ASSESSMENT
PAIN_FUNCTIONAL_ASSESSMENT: ACTIVITIES ARE NOT PREVENTED
PAIN_FUNCTIONAL_ASSESSMENT: ACTIVITIES ARE NOT PREVENTED

## 2021-01-05 ASSESSMENT — PAIN DESCRIPTION - ONSET: ONSET: PROGRESSIVE

## 2021-01-05 ASSESSMENT — PAIN DESCRIPTION - FREQUENCY: FREQUENCY: CONTINUOUS

## 2021-01-05 ASSESSMENT — PAIN SCALES - GENERAL: PAINLEVEL_OUTOF10: 8

## 2021-01-05 ASSESSMENT — PAIN DESCRIPTION - PAIN TYPE: TYPE: ACUTE PAIN

## 2021-01-05 ASSESSMENT — PAIN DESCRIPTION - PROGRESSION: CLINICAL_PROGRESSION: NOT CHANGED

## 2021-01-05 NOTE — DISCHARGE SUMMARY
INTERNAL MEDICINE DEPARTMENT AT [address] [place] [address]  DISCHARGE SUMMARY    Patient ID: Valdo Jennings                                             Discharge Date: 1/5/2021   Patient's PCP: Chema Granados, DO                                          Discharge Physician: Camilo Tatum MD  Admit Date: 1/2/2021   Admitting Physician: No admitting provider for patient encounter. PROBLEMS DURING HOSPITALIZATION:  Present on Admission:   Afib ([address]   Atrial fibrillation with rapid ventricular response (HCC)   SOB (shortness of breath)      HOSPITAL COURSE    Ms. Jennings is a 79 y. o. woman with PMHx of diverticula, [place] Lao presents with progressive SOB for 3 months. 2 weeks prior, her PCP diagnosed her with asthma and started her on albuterol, which did not help. On arrival to ER patient was noted to be in A. fib with RVR. She was started on cardizem drip and heparin gtt. She was started on lasix. Echo was performed, cardiology was consulted. Echo showed normal EF, normal LV function, dilated LA, and indeterminate diastolic function. Patient is OK for discharge today. Physical Exam:  BP (!) 113/94   Pulse 91   Temp 97.9 °F (36.6 °C) (Temporal)   Resp 19   Ht 5' 2\" (1.575 m)   Wt 164 lb 6.4 oz (74.6 kg)   SpO2 94%   BMI 30.07 kg/m²      Physical Examination:   · General appearance: alert and cooperative. · Skin: Skin color, texture, turgor normal.   · HEENT: Head: Normocephalic. · Neck: no adenopathy, no tenderness/mass/nodules. · Lungs: clear to auscultation bilaterally. · Heart: irregular rate and rhythm. no murmur, click, rub or gallop. · Abdomen: soft, non-tender; bowel sounds normal.  · Extremities: no cyanosis or edema. · Neurologic: CN II-XII grossly intact. Mental status: Alert, oriented, thought content appropriate.       Consults: cardiology  Significant Diagnostic Studies:  chest x-ray, echo, EKG  Treatments: dilt gtt, heparin, eliquis, lasix  Disposition: home  Discharged

## 2021-01-05 NOTE — PLAN OF CARE
Problem: Cardiac Output - Decreased:  Goal: Hemodynamic stability will improve  Description: Hemodynamic stability will improve  Outcome: Ongoing  Note: Pt continues to demonstrate a-fib on cardiac monitor. Increased heart rate managed with metoprolol. Blood pressures are near pt's baseline. Oxygen saturation in appropriate range. Pt shows no other signs of decreased cardiac output. Will continue to monitor. Problem: Falls - Risk of:  Goal: Will remain free from falls  Description: Will remain free from falls  Outcome: Ongoing  Note: Pt is a High fall risk. See Jose Miranda Fall Score and ABCDS Injury Risk assessments. Explained fall risk precautions to pt and family and rationale behind their use to keep the patient safe. Pt bed is in low position, side rails up, call light and belongings are in reach. Fall wristband applied and present on pts wrist.  Bed alarm on. Pt encouraged to call for assistance. Will continue with hourly rounds for PO intake, pain needs, toileting and repositioning as needed. Problem: Pain:  Goal: Pain level will decrease  Description: Pain level will decrease  Outcome: Ongoing  Note: Pt c/o pain in rt medial elbow near antecubital peripheral line access. Peripheral removed and cold applied. Pt satisfied with management of pain. Will continue to monitor.

## 2021-01-05 NOTE — CARE COORDINATION
Case Management Assessment            Discharge Note                    Date / Time of Note: 1/5/2021 2:46 PM                  Discharge Note Completed by: Roxanne Stoner    Patient Name: Randall Jennings   YOB: 1940  Diagnosis: Afib [facility]) [I48.91]  Atrial fibrillation with rapid ventricular response [facility]) [I48.91]   Date / Time: 1/2/2021 11:16 AM    Current PCP: Kaylah Pastrana DO  Clinic patient: No    Hospitalization in the last 30 days: Yes    Advance Directives:  Code Status: Full Code  [place] DNR form completed and on chart: No    Financial:  Payor: MEDICARE / Plan: MEDICARE PART A AND B / Product Type: *No Product type* /      Pharmacy:    [facility] [address] [phone]  [place] [address]  Phone: [phone] Fax: [phone]      Assistance purchasing medications?:    Assistance provided by Case Management: None at this time    Does patient want to participate in local refill/ meds to beds program?:      Meds To Beds General Rules:  1. Can ONLY be done Monday- Friday between 8:30am-5pm  2. Prescription(s) must be in pharmacy by 3pm to be filled same day  3. Copy of patient's insurance/ prescription drug card and patient face sheet must be sent along with the prescription(s)  4. Cost of Rx cannot be added to hospital bill. If financial assistance is needed, please contact unit  or ;  or  CANNOT provide pharmacy voucher for patients co-pays  5.  Patients can then  the prescription on their way out of the hospital at discharge, or pharmacy can deliver to the bedside if staff is available. (payment due at time of pick-up or delivery - cash, check, or card accepted)     Able to afford home medications/ co-pay costs: Yes    ADLS:  Current PT AM-PAC Score:   /24  Current OT AM-PAC Score:   /24      DISCHARGE Disposition: Home- No Services treatment goals of Afib [facility]) [I48.91]  Atrial fibrillation with rapid ventricular response ([facility] [I48.91]    The Patient and/or patient representative Jerome Aguilera and her family were provided with a choice of provider and agrees with the discharge plan YesYes  Freedom of choice list was provided with basic dialogue that supports the patient's individualized plan of care/goals and shares the quality data associated with the providers.  Yes    Care Transitions patient: No    Shea Freitas, [address]  Case Management Department  Ph: [phone]  Fax: [phone]

## 2021-01-05 NOTE — PROGRESS NOTES
Diagnosis Date Noted    SOB (shortness of breath) 01/03/2021    Afib ([facility] 01/02/2021    Atrial fibrillation with rapid ventricular response ([facility] 01/02/2021    Sensorineural hearing loss, bilateral 09/21/2020    Tinnitus, bilateral 09/21/2020    Hx of melanoma excision 04/30/2019    Osteopenia 05/28/2015    Tinnitus     Disorder of bone and cartilage     Diverticulosis of large intestine     Mixed hyperlipidemia     Alveolitis of jaw     Hypothyroidism        Plan:  1. COVID negative. HR reasonable on dilt gtt. Titrate up metoprolol for HR control. Watch HR. On AC with Eliquis. Echo shows normal LV function. Normal Lv function. LA dilated.        Core Measures:  · Discharge instructions:   · LVEF documented:   · ACEI for LV dysfunction:   · Smoking Cessation:    Amanda Velasquez MD 1/5/2021 11:29 AM

## 2021-01-06 ENCOUNTER — PATIENT MESSAGE (OUTPATIENT)
Dept: FAMILY MEDICINE CLINIC | Age: 81
End: 2021-01-06

## 2021-01-06 NOTE — TELEPHONE ENCOUNTER
From: Francisca Jennings  Sent: 1/6/2021 10:41 AM EST  To: [facility] [address] Staff  Subject: RE:checking in    I have a foow up with Dr. Susan Radford in a month and they want me to see you in a week.

## 2021-01-13 ENCOUNTER — OFFICE VISIT (OUTPATIENT)
Dept: FAMILY MEDICINE CLINIC | Age: 81
End: 2021-01-13
Payer: MEDICARE

## 2021-01-13 ENCOUNTER — TELEPHONE (OUTPATIENT)
Dept: FAMILY MEDICINE CLINIC | Age: 81
End: 2021-01-13

## 2021-01-13 VITALS
RESPIRATION RATE: 16 BRPM | OXYGEN SATURATION: 92 % | WEIGHT: 164.6 LBS | HEART RATE: 118 BPM | TEMPERATURE: 95.7 F | SYSTOLIC BLOOD PRESSURE: 132 MMHG | DIASTOLIC BLOOD PRESSURE: 82 MMHG | BODY MASS INDEX: 30.11 KG/M2

## 2021-01-13 DIAGNOSIS — I48.91 ATRIAL FIBRILLATION, UNSPECIFIED TYPE (HCC): ICD-10-CM

## 2021-01-13 DIAGNOSIS — E03.9 ACQUIRED HYPOTHYROIDISM: Primary | ICD-10-CM

## 2021-01-13 PROCEDURE — 4040F PNEUMOC VAC/ADMIN/RCVD: CPT | Performed by: FAMILY MEDICINE

## 2021-01-13 PROCEDURE — 99213 OFFICE O/P EST LOW 20 MIN: CPT | Performed by: FAMILY MEDICINE

## 2021-01-13 PROCEDURE — 1111F DSCHRG MED/CURRENT MED MERGE: CPT | Performed by: FAMILY MEDICINE

## 2021-01-13 PROCEDURE — G8417 CALC BMI ABV UP PARAM F/U: HCPCS | Performed by: FAMILY MEDICINE

## 2021-01-13 PROCEDURE — G8482 FLU IMMUNIZE ORDER/ADMIN: HCPCS | Performed by: FAMILY MEDICINE

## 2021-01-13 PROCEDURE — 1090F PRES/ABSN URINE INCON ASSESS: CPT | Performed by: FAMILY MEDICINE

## 2021-01-13 PROCEDURE — 1036F TOBACCO NON-USER: CPT | Performed by: FAMILY MEDICINE

## 2021-01-13 PROCEDURE — G8427 DOCREV CUR MEDS BY ELIG CLIN: HCPCS | Performed by: FAMILY MEDICINE

## 2021-01-13 PROCEDURE — 1123F ACP DISCUSS/DSCN MKR DOCD: CPT | Performed by: FAMILY MEDICINE

## 2021-01-13 PROCEDURE — G8399 PT W/DXA RESULTS DOCUMENT: HCPCS | Performed by: FAMILY MEDICINE

## 2021-01-13 ASSESSMENT — PATIENT HEALTH QUESTIONNAIRE - PHQ9
SUM OF ALL RESPONSES TO PHQ QUESTIONS 1-9: 0
1. LITTLE INTEREST OR PLEASURE IN DOING THINGS: 0
SUM OF ALL RESPONSES TO PHQ9 QUESTIONS 1 & 2: 0
SUM OF ALL RESPONSES TO PHQ QUESTIONS 1-9: 0
SUM OF ALL RESPONSES TO PHQ QUESTIONS 1-9: 0

## 2021-01-13 NOTE — PROGRESS NOTES
Post-Discharge Transitional Care Management Services or Hospital Follow Up      Nayla Jennings   YOB: 1940    Date of Office Visit:  1/13/2021  Date of Hospital Admission: 1/2/21  Date of Hospital Discharge: 1/5/21  Risk of hospital readmission (high >=14%. Medium >=10%) :Readmission Risk Score: 10      Care management risk score Rising risk (score 2-5) and Complex Care (Scores >=6): 0     Non face to face  following discharge, date last encounter closed (first attempt may have been earlier): *No documented post hospital discharge outreach found in the last 14 days    Call initiated 2 business days of discharge: *No response recorded in the last 14 days    Patient Active Problem List   Diagnosis    Tinnitus    Disorder of bone and cartilage    Diverticulosis of large intestine    Mixed hyperlipidemia    Alveolitis of jaw    Hypothyroidism    Osteopenia    Hx of melanoma excision    Sensorineural hearing loss, bilateral    Tinnitus, bilateral    Afib ([address]    Atrial fibrillation with rapid ventricular response (HCC)    SOB (shortness of breath)       Allergies   Allergen Reactions    Bactrim [Sulfamethoxazole-Trimethoprim] Hives     hives    Seasonal Other (See Comments)     Runny nose, sneezing       Medications listed as ordered at the time of discharge from [facility]   Nayla Jennings   Home Medication Instructions MAGDA:    Printed on:01/16/21 8852   Medication Information                      apixaban (ELIQUIS) 5 MG TABS tablet  Take 1 tablet by mouth 2 times daily             Cholecalciferol (VITAMIN D) 2000 UNITS CAPS capsule  Take 1 capsule by mouth daily. furosemide (LASIX) 20 MG tablet  Take 1 tablet by mouth daily             Handicap Placard MISC  by Does not apply route Dx of shortness of breath and A-fib. Effective Jan 13, 2021 until Jan 13, 2026.              levothyroxine (SYNTHROID) 25 MCG tablet  TAKE 1 TABLET BY MOUTH EVERY DAY Metoprolol Tartrate 75 MG TABS  Take 75 mg by mouth 2 times daily                   Medications marked \"taking\" at this time  Outpatient Medications Marked as Taking for the 1/13/21 encounter (Office Visit) with Apolonio Holstein DO [place]   Medication Sig Dispense Refill    Handicap Placard MISC by Does not apply route Dx of shortness of breath and A-fib. Effective Jan 13, 2021 until Jan 13, 2026. 1 each 0    apixaban (ELIQUIS) 5 MG TABS tablet Take 1 tablet by mouth 2 times daily 60 tablet 3    Metoprolol Tartrate 75 MG TABS Take 75 mg by mouth 2 times daily 60 tablet 3    furosemide (LASIX) 20 MG tablet Take 1 tablet by mouth daily 60 tablet 3    levothyroxine (SYNTHROID) 25 MCG tablet TAKE 1 TABLET BY MOUTH EVERY DAY 90 tablet 3    Cholecalciferol (VITAMIN D) 2000 UNITS CAPS capsule Take 1 capsule by mouth daily. Medications patient taking as of now reconciled against medications ordered at time of hospital discharge: Yes    Chief Complaint   Patient presents with    Follow-Up from Hospital     seen at [facility] due to SOB/ AFib       History of Present illness - Follow up of Hospital diagnosis(es): No longer feels symptomatic after hospitalization for uncontrolled A. fib. Inpatient course: Discharge summary reviewed- see chart. Interval history/Current status: Taking the Eliquis as directed. Through the insurance plan though the medication is expensive and more than $400 a month. A comprehensive review of systems was negative except for what was noted in the HPI. Vitals:    01/13/21 0940   BP: 132/82   Pulse: 118   Resp: 16   Temp: 95.7 °F (35.4 °C)   TempSrc: Tympanic   SpO2: 92%   Weight: 164 lb 9.6 oz (74.7 kg)     Body mass index is 30.11 kg/m².    Wt Readings from Last 3 Encounters:   01/13/21 164 lb 9.6 oz (74.7 kg)   01/05/21 164 lb 6.4 oz (74.6 kg)   09/29/20 170 lb (77.1 kg)     BP Readings from Last 3 Encounters:   01/13/21 132/82   01/05/21 (!) 113/94   09/29/20 132/80 NEXT TIME MAMMOGRAM IS DUE ANYTIME AFTER DEC 21, OF 2021, ASK IF YOU CAN QUALIFY FOR THE 3D MAMMOGRAM.    SCHEDULE TO SEE  [facility] STALLINGS IN January. THIS CLINIC IS CALLING TO OBTAIN ELIQUIS SAMPLES. CALL THIS CLINIC ON Friday ON STATUS OF FREE SAMPLES.

## 2021-01-13 NOTE — TELEPHONE ENCOUNTER
Eliquis is expensive approx $470. Wayne Roberts Please contact we are [facility] rep to see if we can get samples for the patient.   Ever Shi will be running out of the medication soon

## 2021-01-13 NOTE — PATIENT INSTRUCTIONS
NEXT TIME MAMMOGRAM IS DUE ANYTIME AFTER DEC 21, OF 2021, ASK IF YOU CAN QUALIFY FOR THE 3D MAMMOGRAM.    SCHEDULE TO SEE  [facility] STALLINGS IN January. THIS CLINIC IS CALLING TO OBTAIN ELIQUIS SAMPLES. CALL THIS CLINIC ON Friday ON STATUS OF FREE SAMPLES.

## 2021-01-29 ENCOUNTER — TELEPHONE (OUTPATIENT)
Dept: FAMILY MEDICINE CLINIC | Age: 81
End: 2021-01-29

## 2021-01-29 NOTE — TELEPHONE ENCOUNTER
THIS IS A SCAM - \"Pain Management\"    Denette Fill did not request any ankle orthosis, hip orthosis, or back brace. DO NOT SIGN OR FAX ANY ORDERS per Denette Fill.

## 2021-02-09 ENCOUNTER — OFFICE VISIT (OUTPATIENT)
Dept: CARDIOLOGY CLINIC | Age: 81
End: 2021-02-09
Payer: MEDICARE

## 2021-02-09 VITALS
HEART RATE: 68 BPM | WEIGHT: 166 LBS | DIASTOLIC BLOOD PRESSURE: 90 MMHG | BODY MASS INDEX: 30.36 KG/M2 | TEMPERATURE: 97.3 F | SYSTOLIC BLOOD PRESSURE: 114 MMHG

## 2021-02-09 DIAGNOSIS — R06.02 SOB (SHORTNESS OF BREATH): ICD-10-CM

## 2021-02-09 DIAGNOSIS — I48.91 NEW ONSET A-FIB (HCC): Primary | ICD-10-CM

## 2021-02-09 DIAGNOSIS — I10 ESSENTIAL HYPERTENSION: ICD-10-CM

## 2021-02-09 PROCEDURE — 99213 OFFICE O/P EST LOW 20 MIN: CPT | Performed by: INTERNAL MEDICINE

## 2021-02-09 PROCEDURE — G8482 FLU IMMUNIZE ORDER/ADMIN: HCPCS | Performed by: INTERNAL MEDICINE

## 2021-02-09 PROCEDURE — G8399 PT W/DXA RESULTS DOCUMENT: HCPCS | Performed by: INTERNAL MEDICINE

## 2021-02-09 PROCEDURE — 1090F PRES/ABSN URINE INCON ASSESS: CPT | Performed by: INTERNAL MEDICINE

## 2021-02-09 PROCEDURE — 1123F ACP DISCUSS/DSCN MKR DOCD: CPT | Performed by: INTERNAL MEDICINE

## 2021-02-09 PROCEDURE — 1036F TOBACCO NON-USER: CPT | Performed by: INTERNAL MEDICINE

## 2021-02-09 PROCEDURE — 4040F PNEUMOC VAC/ADMIN/RCVD: CPT | Performed by: INTERNAL MEDICINE

## 2021-02-09 PROCEDURE — G8427 DOCREV CUR MEDS BY ELIG CLIN: HCPCS | Performed by: INTERNAL MEDICINE

## 2021-02-09 PROCEDURE — G8417 CALC BMI ABV UP PARAM F/U: HCPCS | Performed by: INTERNAL MEDICINE

## 2021-02-09 ASSESSMENT — ENCOUNTER SYMPTOMS
COUGH: 0
CHEST TIGHTNESS: 0
CHOKING: 0
SHORTNESS OF BREATH: 0

## 2021-02-09 NOTE — PROGRESS NOTES
Subjective:      Patient ID: Deana Flanagan is a 79 y.o. female. HPI  Follow up afib/sob/HTN. Feeling better. Still DUFF. Still limits. No chest pain. No pnd or orthopnea. No tachy/syncope/Palp. No edema. BP good.        Past Medical History:   Diagnosis Date    Allergic rhinitis     Alveolitis of jaw     Cancer ([address]     Dental disease     Diverticulosis of colon (without mention of hemorrhage)     Dizziness     GERD (gastroesophageal reflux disease)     Hearing loss     Myalgia and myositis, unspecified     Other and unspecified hyperlipidemia     Recurrent upper respiratory infection (URI)     Screening mammogram 11/05/2008    Normal    Tinnitus     Unspecified gastritis and gastroduodenitis without mention of hemorrhage     Unspecified hypothyroidism      Past Surgical History:   Procedure Laterality Date    BLADDER SUSPENSION  1990s    CHOLECYSTECTOMY  1990s    COLONOSCOPY  1/16/2007    Normal    COLONOSCOPY  02/27/2012    Dr. Roxanne Mosquera    Right Repair    HYSTERECTOMY  1975    WISDOM TOOTH EXTRACTION       Social History     Socioeconomic History    Marital status:      Spouse name: Not on file    Number of children: Not on file    Years of education: Not on file    Highest education level: Not on file   Occupational History    Not on file   Social Needs    Financial resource strain: Not on file    Food insecurity     Worry: Not on file     Inability: Not on file    Transportation needs     Medical: Not on file     Non-medical: Not on file   Tobacco Use    Smoking status: Former Smoker     Packs/day: 0.00     Years: 10.00     Pack years: 0.00     Quit date: 6/1/2005     Years since quitting: 15.7    Smokeless tobacco: Never Used   Substance and Sexual Activity    Alcohol use: Yes     Comment: rare    Drug use: No    Sexual activity: Yes   Lifestyle    Physical activity     Days per week: Not on file     Minutes per session: Not on file  Stress: Not on file   Relationships    Social connections     Talks on phone: Not on file     Gets together: Not on file     Attends Mandaen service: Not on file     Active member of club or organization: Not on file     Attends meetings of clubs or organizations: Not on file     Relationship status: Not on file    Intimate partner violence     Fear of current or ex partner: Not on file     Emotionally abused: Not on file     Physically abused: Not on file     Forced sexual activity: Not on file   Other Topics Concern    Not on file   Social History Narrative    Not on file     FH reviewed    Vitals:    02/09/21 0913   BP: (!) 114/90   Pulse: 68   Temp: 97.3 °F (36.3 °C)     Wt 166    Review of Systems   Constitutional: Negative for activity change, appetite change and fatigue. Respiratory: Negative for cough, choking, chest tightness and shortness of breath. Cardiovascular: Negative for chest pain, palpitations and leg swelling. Denies PND or orthopnea. No tachycardia or syncope. Neurological: Negative for dizziness, syncope and light-headedness. Psychiatric/Behavioral: Negative for agitation, behavioral problems and confusion. All other systems reviewed and are negative. Objective:   Physical Exam  Constitutional:       General: She is not in acute distress. Appearance: Normal appearance. She is well-developed. HENT:      Head: Normocephalic and atraumatic. Right Ear: External ear normal.      Left Ear: External ear normal.   Neck:      Musculoskeletal: Normal range of motion. Vascular: No JVD. Cardiovascular:      Rate and Rhythm: Normal rate. Rhythm irregularly irregular. Heart sounds: Normal heart sounds. No murmur. No gallop. Pulmonary:      Effort: Pulmonary effort is normal. No respiratory distress. Breath sounds: Normal breath sounds. No wheezing or rales. Abdominal:      General: Bowel sounds are normal.      Palpations: Abdomen is soft. Tenderness: There is no abdominal tenderness. Musculoskeletal: Normal range of motion. Skin:     General: Skin is warm and dry. Neurological:      General: No focal deficit present. Mental Status: She is alert and oriented to person, place, and time. Psychiatric:         Mood and Affect: Mood normal.         Behavior: Behavior normal.         Assessment:       Diagnosis Orders   1. New onset a-fib ([address]     2. SOB (shortness of breath)     3. Essential hypertension             Plan:      Feels better. Still DUFF. Limits activities. On AC.  no bleeding issues. Increase lopressor to 100 mg bid (from 75 mg bid.)  Remains afib by exam.   Reviewed previous records and testing including echo 1/21. Also hosp stay. Follow up 3-4 wks. EP if still afib at that time.          Emilio Field MD

## 2021-02-22 ENCOUNTER — PATIENT MESSAGE (OUTPATIENT)
Dept: FAMILY MEDICINE CLINIC | Age: 81
End: 2021-02-22

## 2021-02-22 ENCOUNTER — TELEPHONE (OUTPATIENT)
Dept: FAMILY MEDICINE CLINIC | Age: 81
End: 2021-02-22

## 2021-02-22 DIAGNOSIS — K57.92 DIVERTICULITIS: Primary | ICD-10-CM

## 2021-02-22 NOTE — TELEPHONE ENCOUNTER
Rachel called stating she received a call from our office to confirm a request.  She said the caller did not leave their name. She states she has not requested any braces, testing supplies, or topical medication. This is all a scam.  Please do not sign any orders. Thanks.

## 2021-02-23 RX ORDER — METRONIDAZOLE 500 MG/1
500 TABLET ORAL 3 TIMES DAILY
Qty: 21 TABLET | Refills: 0 | Status: SHIPPED | OUTPATIENT
Start: 2021-02-23 | End: 2021-03-02

## 2021-02-23 RX ORDER — CIPROFLOXACIN 500 MG/1
500 TABLET, FILM COATED ORAL 2 TIMES DAILY
Qty: 14 TABLET | Refills: 0 | Status: SHIPPED | OUTPATIENT
Start: 2021-02-23 | End: 2021-06-15 | Stop reason: SDUPTHER

## 2021-03-04 ENCOUNTER — OFFICE VISIT (OUTPATIENT)
Dept: CARDIOLOGY CLINIC | Age: 81
End: 2021-03-04
Payer: MEDICARE

## 2021-03-04 VITALS
BODY MASS INDEX: 30.18 KG/M2 | SYSTOLIC BLOOD PRESSURE: 110 MMHG | WEIGHT: 165 LBS | HEART RATE: 72 BPM | DIASTOLIC BLOOD PRESSURE: 70 MMHG | TEMPERATURE: 97.1 F

## 2021-03-04 DIAGNOSIS — I48.91 NEW ONSET A-FIB (HCC): Primary | ICD-10-CM

## 2021-03-04 DIAGNOSIS — R06.02 SOB (SHORTNESS OF BREATH): ICD-10-CM

## 2021-03-04 DIAGNOSIS — I10 ESSENTIAL HYPERTENSION: ICD-10-CM

## 2021-03-04 PROCEDURE — 4040F PNEUMOC VAC/ADMIN/RCVD: CPT | Performed by: INTERNAL MEDICINE

## 2021-03-04 PROCEDURE — 1123F ACP DISCUSS/DSCN MKR DOCD: CPT | Performed by: INTERNAL MEDICINE

## 2021-03-04 PROCEDURE — G8482 FLU IMMUNIZE ORDER/ADMIN: HCPCS | Performed by: INTERNAL MEDICINE

## 2021-03-04 PROCEDURE — 1036F TOBACCO NON-USER: CPT | Performed by: INTERNAL MEDICINE

## 2021-03-04 PROCEDURE — G8417 CALC BMI ABV UP PARAM F/U: HCPCS | Performed by: INTERNAL MEDICINE

## 2021-03-04 PROCEDURE — 99214 OFFICE O/P EST MOD 30 MIN: CPT | Performed by: INTERNAL MEDICINE

## 2021-03-04 PROCEDURE — G8427 DOCREV CUR MEDS BY ELIG CLIN: HCPCS | Performed by: INTERNAL MEDICINE

## 2021-03-04 PROCEDURE — 1090F PRES/ABSN URINE INCON ASSESS: CPT | Performed by: INTERNAL MEDICINE

## 2021-03-04 PROCEDURE — G8399 PT W/DXA RESULTS DOCUMENT: HCPCS | Performed by: INTERNAL MEDICINE

## 2021-03-04 ASSESSMENT — ENCOUNTER SYMPTOMS
CHEST TIGHTNESS: 0
SHORTNESS OF BREATH: 0
COUGH: 0
CHOKING: 0

## 2021-03-04 NOTE — PROGRESS NOTES
Subjective:      Patient ID: Dinesh Jeter is a [de-identified] y.o. female. HPI  Follow up afib/sob/HTN. Feeling much better. Not limited by sob any more. No chest pain. No pnd or orthopnea. No tachy/syncope/Palp. No edema. BP good. No syncope.        Past Medical History:   Diagnosis Date    Allergic rhinitis     Alveolitis of jaw     Cancer ([address]     Dental disease     Diverticulosis of colon (without mention of hemorrhage)     Dizziness     GERD (gastroesophageal reflux disease)     Hearing loss     Myalgia and myositis, unspecified     Other and unspecified hyperlipidemia     Recurrent upper respiratory infection (URI)     Screening mammogram 11/05/2008    Normal    Tinnitus     Unspecified gastritis and gastroduodenitis without mention of hemorrhage     Unspecified hypothyroidism      Past Surgical History:   Procedure Laterality Date    BLADDER SUSPENSION  1990s    CHOLECYSTECTOMY  1990s    COLONOSCOPY  1/16/2007    Normal    COLONOSCOPY  02/27/2012    Dr. Kyleigh Sadler    Right Repair    HYSTERECTOMY  1975    WISDOM TOOTH EXTRACTION       Social History     Socioeconomic History    Marital status:      Spouse name: Not on file    Number of children: Not on file    Years of education: Not on file    Highest education level: Not on file   Occupational History    Not on file   Social Needs    Financial resource strain: Not on file    Food insecurity     Worry: Not on file     Inability: Not on file    Transportation needs     Medical: Not on file     Non-medical: Not on file   Tobacco Use    Smoking status: Former Smoker     Packs/day: 0.00     Years: 10.00     Pack years: 0.00     Quit date: 6/1/2005     Years since quitting: 15.7    Smokeless tobacco: Never Used   Substance and Sexual Activity    Alcohol use: Yes     Comment: rare    Drug use: No    Sexual activity: Yes   Lifestyle    Physical activity     Days per week: Not on file     Minutes per session: Not on file    Stress: Not on file   Relationships    Social connections     Talks on phone: Not on file     Gets together: Not on file     Attends Synagogue service: Not on file     Active member of club or organization: Not on file     Attends meetings of clubs or organizations: Not on file     Relationship status: Not on file    Intimate partner violence     Fear of current or ex partner: Not on file     Emotionally abused: Not on file     Physically abused: Not on file     Forced sexual activity: Not on file   Other Topics Concern    Not on file   Social History Narrative    Not on file     FH reviewed    Vitals:    03/04/21 0958   BP: 110/70   Pulse: 72   Temp: 97.1 °F (36.2 °C)       Wt 165    Review of Systems   Constitutional: Negative for activity change, appetite change and fatigue. Respiratory: Negative for cough, choking, chest tightness and shortness of breath. Cardiovascular: Negative for chest pain, palpitations and leg swelling. Denies PND or orthopnea. No tachycardia or syncope. Neurological: Negative for dizziness, syncope and light-headedness. Psychiatric/Behavioral: Negative for agitation, behavioral problems and confusion. All other systems reviewed and are negative. Objective:   Physical Exam  Constitutional:       General: She is not in acute distress. Appearance: Normal appearance. She is well-developed. HENT:      Head: Normocephalic and atraumatic. Right Ear: External ear normal.      Left Ear: External ear normal.   Neck:      Musculoskeletal: Normal range of motion. Vascular: No JVD. Cardiovascular:      Rate and Rhythm: Normal rate. Rhythm irregularly irregular. Heart sounds: Normal heart sounds. No murmur. No gallop. Pulmonary:      Effort: Pulmonary effort is normal. No respiratory distress. Breath sounds: Normal breath sounds. No wheezing or rales.    Abdominal:      General: Bowel sounds are normal.      Palpations: Abdomen is soft. Tenderness: There is no abdominal tenderness. Musculoskeletal: Normal range of motion. Skin:     General: Skin is warm and dry. Neurological:      General: No focal deficit present. Mental Status: She is alert and oriented to person, place, and time. Psychiatric:         Mood and Affect: Mood normal.         Behavior: Behavior normal.         Assessment:       Diagnosis Orders   1. New onset a-fib ([address]     2. SOB (shortness of breath)     3. Essential hypertension             Plan:      Feels much better. No longer limited by sob. On AC.  no bleeding issues. Continue  lopressor to 100 mg bid. Remains afib by exam.   Reviewed previous records and testing including echo 1/21. Also hosp stay. Follow up 6-8 wks. EP if still afib at that time.          Maritza Zarate MD

## 2021-03-11 ENCOUNTER — OFFICE VISIT (OUTPATIENT)
Dept: FAMILY MEDICINE CLINIC | Age: 81
End: 2021-03-11
Payer: MEDICARE

## 2021-03-11 VITALS
WEIGHT: 166.5 LBS | OXYGEN SATURATION: 92 % | BODY MASS INDEX: 30.45 KG/M2 | DIASTOLIC BLOOD PRESSURE: 70 MMHG | RESPIRATION RATE: 16 BRPM | HEART RATE: 86 BPM | TEMPERATURE: 97.8 F | SYSTOLIC BLOOD PRESSURE: 112 MMHG

## 2021-03-11 DIAGNOSIS — I48.91 ATRIAL FIBRILLATION, UNSPECIFIED TYPE (HCC): ICD-10-CM

## 2021-03-11 DIAGNOSIS — R39.9 ABNORMAL RENAL FINDING: ICD-10-CM

## 2021-03-11 DIAGNOSIS — F41.9 ANXIETY: Primary | ICD-10-CM

## 2021-03-11 PROCEDURE — G8399 PT W/DXA RESULTS DOCUMENT: HCPCS | Performed by: FAMILY MEDICINE

## 2021-03-11 PROCEDURE — 99213 OFFICE O/P EST LOW 20 MIN: CPT | Performed by: FAMILY MEDICINE

## 2021-03-11 PROCEDURE — 1123F ACP DISCUSS/DSCN MKR DOCD: CPT | Performed by: FAMILY MEDICINE

## 2021-03-11 PROCEDURE — G8482 FLU IMMUNIZE ORDER/ADMIN: HCPCS | Performed by: FAMILY MEDICINE

## 2021-03-11 PROCEDURE — G8427 DOCREV CUR MEDS BY ELIG CLIN: HCPCS | Performed by: FAMILY MEDICINE

## 2021-03-11 PROCEDURE — G8417 CALC BMI ABV UP PARAM F/U: HCPCS | Performed by: FAMILY MEDICINE

## 2021-03-11 PROCEDURE — 1036F TOBACCO NON-USER: CPT | Performed by: FAMILY MEDICINE

## 2021-03-11 PROCEDURE — 4040F PNEUMOC VAC/ADMIN/RCVD: CPT | Performed by: FAMILY MEDICINE

## 2021-03-11 PROCEDURE — 1090F PRES/ABSN URINE INCON ASSESS: CPT | Performed by: FAMILY MEDICINE

## 2021-03-11 RX ORDER — DIAZEPAM 2 MG/1
2-4 TABLET ORAL EVERY 8 HOURS PRN
Qty: 24 TABLET | Refills: 0 | Status: SHIPPED | OUTPATIENT
Start: 2021-03-11 | End: 2021-03-15

## 2021-03-11 SDOH — ECONOMIC STABILITY: FOOD INSECURITY: WITHIN THE PAST 12 MONTHS, THE FOOD YOU BOUGHT JUST DIDN'T LAST AND YOU DIDN'T HAVE MONEY TO GET MORE.: NEVER TRUE

## 2021-03-11 SDOH — ECONOMIC STABILITY: TRANSPORTATION INSECURITY
IN THE PAST 12 MONTHS, HAS THE LACK OF TRANSPORTATION KEPT YOU FROM MEDICAL APPOINTMENTS OR FROM GETTING MEDICATIONS?: NO

## 2021-03-11 NOTE — PROGRESS NOTES
[facility]  Progress Note  Ashwin Mandel DO          Nayla Jennings  1940    03/11/21    Chief Complaint:   Nayla Jennings is a [de-identified] y.o. female who is here for blood pressure, stress and follow-up blood work from January hospitalization        HPI:   My patient is the sole caregiver for her  who relies on her as he has lost his voice due to required radiation treatment of neck cancer. She is the one responsible for coordinating repairs and maintaining the home including mowing the lawn. The stress ways on her and she has difficulty with insomnia at times. Caffeine intake is approximately 1 cup a day. Hospitalization in January showing GFR below 60 on her final day of hospitalization. Both patient and I think this was likely due to the Lasix given that day. Has close follow-up with her cardiology team including consultation is new patient for electrophysiologist coming up in less than 1 month. Currently is taking the beta-blocker for his A. fib. When she feels that the A. fib might be acting up she feels some shortness of breath and that there is a band like fullness just underneath her rib cage. She wonders if anxiety is also contributing to this. ROS negative for headache, visionchanges, chest pain, shortness of breath, abdominal pain, urinary sx, bowel changes. Past medical, surgical, and social history reviewed. and allergies reviewed. Allergies   Allergen Reactions    Bactrim [Sulfamethoxazole-Trimethoprim] Hives     hives    Seasonal Other (See Comments)     Runny nose, sneezing     Prior to Visit Medications    Medication Sig Taking? Authorizing Provider   diazePAM (VALIUM) 2 MG tablet Take 1-2 tablets by mouth every 8 hours as needed for Anxiety for up to 4 days. Yes Nancy Kolb,    Handicap Placard MISC by Does not apply route Dx of shortness of breath and A-fib. Effective Jan 13, 2021 until Jan 13, 2026.  Yes Salvatore Reese, DO apixaban (ELIQUIS) 5 MG TABS tablet Take 1 tablet by mouth 2 times daily Yes Homero Real MD   Metoprolol Tartrate 75 MG TABS Take 75 mg by mouth 2 times daily Yes Joey Palmer MD   furosemide (LASIX) 20 MG tablet Take 1 tablet by mouth daily Yes Joey Palmer MD   levothyroxine (SYNTHROID) 25 MCG tablet TAKE 1 TABLET BY MOUTH EVERY DAY Yes Getachew Kolb DO   Cholecalciferol (VITAMIN D) 2000 UNITS CAPS capsule Take 1 capsule by mouth daily.  Yes Altaf Snyder MD          Vitals:    03/11/21 1002 03/11/21 1106   BP: (!) 119/91 112/70   Pulse: 86    Resp: 16    Temp: 97.8 °F (36.6 °C)    TempSrc: Tympanic    SpO2: 92%    Weight: 166 lb 8 oz (75.5 kg)       Wt Readings from Last 3 Encounters:   03/11/21 166 lb 8 oz (75.5 kg)   03/04/21 165 lb (74.8 kg)   02/09/21 166 lb (75.3 kg)     BP Readings from Last 3 Encounters:   03/11/21 112/70   03/04/21 110/70   02/09/21 (!) 114/90       Patient Active Problem List   Diagnosis    Tinnitus    Disorder of bone and cartilage    Diverticulosis of large intestine    Mixed hyperlipidemia    Alveolitis of jaw    Hypothyroidism    Osteopenia    Hx of melanoma excision    Sensorineural hearing loss, bilateral    Tinnitus, bilateral    Afib (HCC)    Atrial fibrillation with rapid ventricular response (HCC)    SOB (shortness of breath)       Immunization History   Administered Date(s) Administered    Influenza Virus Vaccine 10/15/2012    Influenza, High Dose (Fluzone 65 yrs and older) 10/07/2013, 10/09/2014, 10/07/2015, 10/28/2016, 10/09/2017, 09/24/2018, 10/29/2019, 09/01/2020    Influenza, High-dose, Quadv, 65 yrs +, IM (Fluzone) 09/14/2020    Pneumococcal Conjugate 13-valent ([id number]) 05/31/2016    Pneumococcal Polysaccharide ([id number]) 01/27/2012    Tdap (Boostrix, Adacel) 05/22/2014    Zoster Live (Zostavax) 02/01/2012       Past Medical History:   Diagnosis Date    Allergic rhinitis     Alveolitis of jaw     Cancer ([facility] [address]    [address] [facility] disease     Diverticulosis of colon (without mention of hemorrhage)     Dizziness     GERD (gastroesophageal reflux disease)     Hearing loss     Myalgia and myositis, unspecified     Other and unspecified hyperlipidemia     Recurrent upper respiratory infection (URI)     Screening mammogram 11/05/2008    Normal    Tinnitus     Unspecified gastritis and gastroduodenitis without mention of hemorrhage     Unspecified hypothyroidism      Past Surgical History:   Procedure Laterality Date    BLADDER SUSPENSION  1990s    CHOLECYSTECTOMY  1990s    COLONOSCOPY  1/16/2007    Normal    COLONOSCOPY  02/27/2012    Dr. Curtis Ser    Right Repair    HYSTERECTOMY  1975    WISDOM TOOTH EXTRACTION       Family History   Problem Relation Age of Onset    Cancer Brother      Social History     Socioeconomic History    Marital status:      Spouse name: Not on file    Number of children: Not on file    Years of education: Not on file    Highest education level: Not on file   Occupational History    Not on file   Social Needs    Financial resource strain: Not hard at all   [employer] insecurity     Worry: Never true     Inability: Never true   [employer] needs     Medical: No     Non-medical: No   Tobacco Use    Smoking status: Former Smoker     Packs/day: 0.00     Years: 10.00     Pack years: 0.00     Quit date: 6/1/2005     Years since quitting: 15.7    Smokeless tobacco: Never Used   Substance and Sexual Activity    Alcohol use: Yes     Comment: rare    Drug use: No    Sexual activity: Yes   Lifestyle    Physical activity     Days per week: Not on file     Minutes per session: Not on file    Stress: Not on file   Relationships    Social connections     Talks on phone: Not on file     Gets together: Not on file     Attends Yazdanism service: Not on file     Active member of club or organization: Not on file     Attends meetings of clubs or organizations: Not on file Relationship status: Not on file    Intimate partner violence     Fear of current or ex partner: Not on file     Emotionally abused: Not on file     Physically abused: Not on file     Forced sexual activity: Not on file   Other Topics Concern    Not on file   Social History Narrative    Not on file       O: /70   Pulse 86   Temp 97.8 °F (36.6 °C) (Tympanic)   Resp 16   Wt 166 lb 8 oz (75.5 kg)   SpO2 92%   Breastfeeding No   BMI 30.45 kg/m²   Physical Exam  GEN: No acute distress,cooperative, well nourished, alert. HEENT: PEERLA, EOMI , normocephalic/atraumatic, external nose appears normal.  External ear is normal.    Neck: soft, supple, no appreciable thyromegaly,mass  CV: No upper extremity edema. Resp:  Breathing comfortably. Psych:normal affect. Neuro: AOx3  Other Pertinent Physical Exam findings:   Heart: Irregularly irregular S1 and S2 and not tachycardic today. Lungs: Clear to auscultation bilaterally. Abd: No tenderness to palpation. ASSESSMENT   Diagnosis Orders   1. Anxiety  diazePAM (VALIUM) 2 MG tablet   2. Abnormal renal finding  CBC    COMPREHENSIVE METABOLIC PANEL   3. Atrial fibrillation, unspecified type ([facility] [address]         We talked about contributors to A. fib including excess caffeine, excess alcohol, excess stress, and poor quality sleep. It seems it is mainly the stress as her 's caregiver along with poor quality sleep that could be contributing to the A. fib    We will see if anxiety and insomnia could be improved with low-dose diazepam.  She tells me that sleep is poor approximately once to twice a week. She will try the medication mainly for bedtime use and I recommended follow-up later in April or early May.     PLAN          Time spent on encounter (to include any same day medical record review): 20 minutes  Established E/M: 10-19 (04921), 20-29 (48084), 30-39 (32627), 40-54 (90977)   New E/M: 15-29 (25881), 30-44 (94493), 45-59 (01539), 60-74 (07894) Telephone E/M: 5-10 ([id number]), 11-20 (71357), 21-30 (93515)    If applicable, see additional patient information and instructions under \"Patient Instructions. \"    Return in about 6 weeks (around 4/22/2021). Patient Instructions   Prioritize the Covid vaccination. Plan the shingles vaccine either later this year or next year. Your pharmacy can let you know your exact co-pay cost for the shingles vaccine. Getting a shingles vaccine called Shingrix at the doctor's office could actually be more expensive sometimes approaching $200 a shot. Since that you are on Eliquis to minimize or avoid bruising with a blood draw hold off your morning Eliquis, go to the lab then take your Eliquis later in the morning. Please note a portion of this chart was generated using dragon dictation software. Although every effort was made to ensure the accuracy of this automated transcription,some errors in transcription may have occurred.

## 2021-03-24 ENCOUNTER — TELEPHONE (OUTPATIENT)
Dept: FAMILY MEDICINE CLINIC | Age: 81
End: 2021-03-24

## 2021-03-24 NOTE — TELEPHONE ENCOUNTER
Received a HIPAA Compliant form to be filled out by Dr. Toan Jimenez. Wanting to verify the pt is under our care.     Scanned into chart and plkaced on Dr. David Elliott for approval.

## 2021-03-25 ENCOUNTER — TELEPHONE (OUTPATIENT)
Dept: FAMILY MEDICINE CLINIC | Age: 81
End: 2021-03-25

## 2021-03-25 NOTE — TELEPHONE ENCOUNTER
HIPAA Compliant Physician Authorization form received on 3/25. Form was scanned and placed in Laura's basket for 's signature.

## 2021-04-05 NOTE — PROGRESS NOTES
[facility]   Cardiac Electrophysiology Consultation   Date: 4/8/2021  Reason for Consultation: AF  Consult Requesting Physician: No att. providers found     Chief Complaint:   Chief Complaint   Patient presents with    New Patient     New onset Afib       HPI: Calixto Rivera is a [de-identified] y.o. female referred by Dr. Lorraine Garibay for AF. PMH significant for GERD and hypothyroidism. In 1/2021, she presented to the ED with 3 mo h/o progressively worsening SOB, noted to be in AF RVR, rate controlled with diltiazem gtt. She was started on Eliquis, Lopressor, and Lasix. Echo showed preserved LVEF with LAE. At f/u OV with Dr. Lorraine Garibay, her HR was irregular upon exam with controlled rate. Interval History: Today, she is here for management of AF, presenting in AF, rate 100. Her main complaint is SOB. She used to walk 4 miles everyday, but now she can't even walk to the end of her driveway without becoming SOB. She states the SOB started this past summer when she came out of quarantine and started wearing a mask (during the pandemic). However, at home, when she is not wearing the mask, she is still SOB with activities. She also complains of significant fatigue and decline in functional capacity. She is typically very active with walking and yard work, but she has been limited due to the SOB and fatigue. Atrial Fibrillation:    BMI    :   Body mass index is 30.22 kg/m².     Duration   :   1/2021    Symptoms   :   Shortness of breath, easy fatigability, dyspnea on exertion, decline in his functional capacity    Previous DCCV :   none    Previous AAD           :    none    Beta blocker  :   Lopressor    ACE / ARB  :   none    OAC   :   Eliquis    LVEF    :   55-60%    Left atrial size :   4.4 cm    NAKIA   :   Not tested    Past Medical History:   Diagnosis Date    Allergic rhinitis     Alveolitis of jaw     [address] ([facility] [address]     Dental disease     Diverticulosis of colon (without mention of hemorrhage)     Dizziness     GERD (gastroesophageal reflux disease)     Hearing loss     Myalgia and myositis, unspecified     Other and unspecified hyperlipidemia     Recurrent upper respiratory infection (URI)     Screening mammogram 11/05/2008    Normal    Tinnitus     Unspecified gastritis and gastroduodenitis without mention of hemorrhage     Unspecified hypothyroidism         Past Surgical History:   Procedure Laterality Date    BLADDER SUSPENSION  1990s    CHOLECYSTECTOMY  1990s    COLONOSCOPY  1/16/2007    Normal    COLONOSCOPY  02/27/2012    Dr. Janki Pedro    Right Repair    HYSTERECTOMY  1975    WISDOM TOOTH EXTRACTION         Allergies: Allergies   Allergen Reactions    Bactrim [Sulfamethoxazole-Trimethoprim] Hives     hives    Seasonal Other (See Comments)     Runny nose, sneezing       Medication:   Prior to Admission medications    Medication Sig Start Date End Date Taking? Authorizing Provider   [facility]icap [facility] Does not apply route Dx of shortness of breath and A-fib. Effective Jan 13, 2021 until Jan 13, 2026. 1/13/21  Yes Rodger Kolb, DO   apixaban (ELIQUIS) 5 MG TABS tablet Take 1 tablet by mouth 2 times daily 1/5/21  Yes Omar Nesbitt MD   Metoprolol Tartrate 75 MG TABS Take 75 mg by mouth 2 times daily 1/5/21  Yes Omar Nesbitt MD   furosemide (LASIX) 20 MG tablet Take 1 tablet by mouth daily 1/6/21  Yes Omar Nesbitt MD   levothyroxine (SYNTHROID) 25 MCG tablet TAKE 1 TABLET BY MOUTH EVERY DAY 8/21/20  Yes Rodger Kolb, DO   Cholecalciferol (VITAMIN D) 2000 UNITS CAPS capsule Take 1 capsule by mouth daily. 5/28/14  Yes Sierra Alexander MD       Social History:   reports that she quit smoking about 15 years ago. She smoked 0.00 packs per day for 10.00 years. She has never used smokeless tobacco. She reports current alcohol use. She reports that she does not use drugs. Family History:  family history includes Cancer in her brother. Reviewed. Denies family history of sudden cardiac death, arrhythmia, premature CAD    Review of System:    · General ROS: negative for - chills, fever   · Psychological ROS: negative for - anxiety or depression  · Ophthalmic ROS: negative for - eye pain or loss of vision  · ENT ROS: negative for - epistaxis, headaches, nasal discharge, sore throat   · Allergy and Immunology ROS: negative for - hives, nasal congestion   · Hematological and Lymphatic ROS: negative for - bleeding problems, blood clots, bruising or jaundice  · Endocrine ROS: negative for - skin changes, temperature intolerance or unexpected weight changes  · Respiratory ROS: negative for - cough, hemoptysis, pleuritic pain, SOB, sputum changes or wheezing  · Cardiovascular ROS: Per HPI. · Gastrointestinal ROS: negative for - abdominal pain, blood in stools, diarrhea, hematemesis, melena, nausea/vomiting or swallowing difficulty/pain  · Genito-Urinary ROS: negative for - dysuria or incontinence  · Musculoskeletal ROS: negative for - joint swelling or muscle pain  · Neurological ROS: negative for - confusion, dizziness, gait disturbance, headaches, numbness/tingling, seizures, speech problems, tremors, visual changes or weakness  · Dermatological ROS: negative for - rash    Physical Examination:  Vitals:    04/08/21 1531   BP: 125/85   Pulse: 100       · Constitutional: Oriented. No distress. · Head: Normocephalic and atraumatic. · Mouth/Throat: Oropharynx is clear and moist.   · Eyes: Conjunctivae normal. EOM are normal.   · Neck: Normal range of motion. Neck supple. No rigidity. No JVD present. · Cardiovascular: Normal rate, regular rhythm, S1&S2 and intact distal pulses. · Pulmonary/Chest: Bilateral respiratory sounds. No wheezes. No rhonchi. · Abdominal: Soft. Bowel sounds present. No distension, No tenderness. · Musculoskeletal: No tenderness. No edema    · Lymphadenopathy: Has no cervical adenopathy.    · Neurological: Alert and oriented. Cranial nerve appears intact, No Gross deficit   · Skin: Skin is warm and dry. No rash noted. · Psychiatric: Has a normal mood, affect and behavior     Labs:  Reviewed. ECG: reviewed, . No pathologic Q waves, ventricular pre-excitation, or QT prolongation. Studies:   1. Event monitor:  na    2. Echo 1/4/21:   Summary   Left ventricular cavity size is normal. There is mild concentric left   ventricular hypertrophy (more prominent basal septal hypertrophy). Overall   left ventricular systolic function appears normal with an estimated ejection   fraction of 55-60%. No regional wall motion abnormalities are noted. Diastolic function is indeterminate due to abnormal heart rhythm. Moderate mitral regurgitation is present. The left atrium is severely dilated. Mild aortic regurgitation is present. Moderate tricuspid regurgitation. 3. Stress Test:  na    4. Cath: na    The MCOT, echocardiogram, stress test, and coronary angiography/PCI were reviewed by myself and used for my plan of care. Procedures:  1. Lexiscan, then DCCV    Assessment/Plan:  1. New onset AF RVR, on Eliquis  2. HTN, stable on Lopressor  3. Hypothyroidism, on levothyroxine    In AF today with complaints of SOB, easy fatigability, and decline in functional capacity. Preserved LVEF with LAE    Discussed in detail about the risk factors, pathophysiology, and treatment options including life stye modifications for atrial fibrillation. 1. Keep your blood pressure under control. 2. Keep your blood sugar under control. 3. Eat heart healthy diet  4. Minimize alcohol intake  5. Stop smoking  6. Be active, keep your body weight optimal  7. Exercise on a regular basis  8. Manage your stress   9. If you snore, get evaluated for sleep apnea  10.  Be aware of the symptoms of stroke    Treatment options including cardioversion, anti-arrhythmic medication therapy, rate control strategy, oral anticoagulation, and atrial fibrillation ablation were discussed with patient. Risks, benefits and alternative of each treatment options were explained. All questions answered. Will start with Lexiscan to rule out ischemia  If stress test is negative, will start flecainide 50 mg BID  After 7-10 days of flecainide, will plan for DCCV    Continue metoprolol 100 mg BID, lasix, and Eliquis 5 mg BID    Follow up 1 month after DCCV with me    Thank you for allowing me to participate in the care of Luiz Xavier. All questions and concerns were addressed to the patient/family. Alternatives to my treatment were discussed. Ryan Wilson RN, am scribing for and in the presence of Dr. Ellery Nissen. 04/08/21 3:43 PM  Flavia Souza RN    I, Emi Mccrary MD, personally performed the services prescribed in this documentation as scribed by Ms. Flavia Souza RN in my presence and it is both accurate and complete.        Emi Mccrary MD  Cardiac Electrophysiology  [facility]

## 2021-04-08 ENCOUNTER — OFFICE VISIT (OUTPATIENT)
Dept: CARDIOLOGY CLINIC | Age: 81
End: 2021-04-08
Payer: MEDICARE

## 2021-04-08 VITALS
SYSTOLIC BLOOD PRESSURE: 125 MMHG | BODY MASS INDEX: 30.22 KG/M2 | WEIGHT: 165.2 LBS | HEART RATE: 100 BPM | DIASTOLIC BLOOD PRESSURE: 85 MMHG

## 2021-04-08 DIAGNOSIS — R94.31 ECG ABNORMALITY: ICD-10-CM

## 2021-04-08 DIAGNOSIS — I10 ESSENTIAL HYPERTENSION: ICD-10-CM

## 2021-04-08 DIAGNOSIS — I48.91 NEW ONSET A-FIB (HCC): Primary | ICD-10-CM

## 2021-04-08 DIAGNOSIS — E03.9 ACQUIRED HYPOTHYROIDISM: ICD-10-CM

## 2021-04-08 PROCEDURE — 1036F TOBACCO NON-USER: CPT | Performed by: INTERNAL MEDICINE

## 2021-04-08 PROCEDURE — 93000 ELECTROCARDIOGRAM COMPLETE: CPT | Performed by: INTERNAL MEDICINE

## 2021-04-08 PROCEDURE — 4040F PNEUMOC VAC/ADMIN/RCVD: CPT | Performed by: INTERNAL MEDICINE

## 2021-04-08 PROCEDURE — 1090F PRES/ABSN URINE INCON ASSESS: CPT | Performed by: INTERNAL MEDICINE

## 2021-04-08 PROCEDURE — 1123F ACP DISCUSS/DSCN MKR DOCD: CPT | Performed by: INTERNAL MEDICINE

## 2021-04-08 PROCEDURE — G8399 PT W/DXA RESULTS DOCUMENT: HCPCS | Performed by: INTERNAL MEDICINE

## 2021-04-08 PROCEDURE — G8417 CALC BMI ABV UP PARAM F/U: HCPCS | Performed by: INTERNAL MEDICINE

## 2021-04-08 PROCEDURE — 99204 OFFICE O/P NEW MOD 45 MIN: CPT | Performed by: INTERNAL MEDICINE

## 2021-04-08 PROCEDURE — G8427 DOCREV CUR MEDS BY ELIG CLIN: HCPCS | Performed by: INTERNAL MEDICINE

## 2021-04-08 RX ORDER — METOPROLOL TARTRATE 75 MG/1
75 TABLET, FILM COATED ORAL 2 TIMES DAILY
Qty: 60 TABLET | Refills: 3 | Status: SHIPPED | OUTPATIENT
Start: 2021-04-08 | End: 2021-04-08

## 2021-04-08 RX ORDER — METOPROLOL TARTRATE 100 MG/1
100 TABLET ORAL 2 TIMES DAILY
Qty: 60 TABLET | Refills: 5 | Status: ON HOLD | OUTPATIENT
Start: 2021-04-08 | End: 2021-04-22 | Stop reason: HOSPADM

## 2021-04-08 NOTE — PATIENT INSTRUCTIONS
External Cardioversion    Date of Procedure:  TBD    Time of Arrival:  D    Cardiologist performing procedure:  Dr. Makayla Vieira    · Arrive at [facility] through the main entrance. Check in at the Outpatient Diagnostic desk on the 1st floor. · Do not eat or drink anything after midnight the night before the procedure. · You may brush your teeth and rinse the morning of the procedure. · Take ALL of your routine medications the morning of the procedure, especially your blood thinner. However, if you are taking diabetic medications, please HOLD on the day of the procedure (including insulin). If you take Lantus insulin, take HALF of your usual dose the night before. · Do NOT stop taking Eliquis (blood thinners). It is very important to not miss any doses of Eliquis leading up to the cardioversion. Please call our office if you accidentally miss a dose of your blood thinner. · Do not put on any lotion, powder, or deodorant the morning of the procedure. · Please bring a list of your medications to the hospital with you. · You must have someone available to drive you home. It is recommended that you do not drive for 24 hours after the procedure. You will also need someone with you at home the night of the procedure. · If you are unable to make this appointment, please call [facility] at [phone].

## 2021-04-14 ENCOUNTER — HOSPITAL ENCOUNTER (OUTPATIENT)
Dept: NON INVASIVE DIAGNOSTICS | Age: 81
Discharge: HOME | DRG: 242 | End: 2021-04-14
Payer: MEDICARE

## 2021-04-14 DIAGNOSIS — R94.31 ECG ABNORMALITY: ICD-10-CM

## 2021-04-14 LAB
LV EF: 73 %
LVEF MODALITY: NORMAL

## 2021-04-14 PROCEDURE — 6360000002 HC RX W HCPCS: Performed by: INTERNAL MEDICINE

## 2021-04-14 PROCEDURE — 78452 HT MUSCLE IMAGE SPECT MULT: CPT

## 2021-04-14 PROCEDURE — 2580000003 HC RX 258: Performed by: INTERNAL MEDICINE

## 2021-04-14 PROCEDURE — 3430000000 HC RX DIAGNOSTIC RADIOPHARMACEUTICAL: Performed by: INTERNAL MEDICINE

## 2021-04-14 PROCEDURE — 93017 CV STRESS TEST TRACING ONLY: CPT

## 2021-04-14 PROCEDURE — A9502 TC99M TETROFOSMIN: HCPCS | Performed by: INTERNAL MEDICINE

## 2021-04-14 RX ORDER — SODIUM CHLORIDE 0.9 % (FLUSH) 0.9 %
10 SYRINGE (ML) INJECTION PRN
Status: DISCONTINUED | OUTPATIENT
Start: 2021-04-14 | End: 2021-04-15 | Stop reason: HOSPADM

## 2021-04-14 RX ADMIN — Medication 10 ML: at 14:31

## 2021-04-14 RX ADMIN — REGADENOSON 0.4 MG: 0.08 INJECTION, SOLUTION INTRAVENOUS at 14:28

## 2021-04-14 RX ADMIN — Medication 10 ML: at 13:27

## 2021-04-14 RX ADMIN — TETROFOSMIN 34.6 MILLICURIE: 1.38 INJECTION, POWDER, LYOPHILIZED, FOR SOLUTION INTRAVENOUS at 14:31

## 2021-04-14 RX ADMIN — TETROFOSMIN 10.5 MILLICURIE: 1.38 INJECTION, POWDER, LYOPHILIZED, FOR SOLUTION INTRAVENOUS at 13:27

## 2021-04-15 ENCOUNTER — TELEPHONE (OUTPATIENT)
Dept: CARDIOLOGY CLINIC | Age: 81
End: 2021-04-15

## 2021-04-15 DIAGNOSIS — I48.91 NEW ONSET A-FIB (HCC): Primary | ICD-10-CM

## 2021-04-15 RX ORDER — FLECAINIDE ACETATE 50 MG/1
100 TABLET ORAL 2 TIMES DAILY
Qty: 60 TABLET | Refills: 5 | Status: ON HOLD | OUTPATIENT
Start: 2021-04-15 | End: 2021-04-22 | Stop reason: HOSPADM

## 2021-04-15 NOTE — TELEPHONE ENCOUNTER
Reviewed results of stress test with pt. Per UL, start flecainide 50 mg BID. Will have pt return to the office on 4/30 for an ECG. If she remains in AF, will plan for DCCV that same day at [facility] at 1100. Pt verbalized understanding of the plan. External Cardioversion    Date of Procedure:  April 30, 2021    Time of Arrival:  9:00 am to the office first    Cardiologist performing procedure:  Dr. Destiney Tomas    · [facility]rive at [email or web address] through the main entrance. Check in at the Outpatient Diagnostic desk on the 1st floor. · Do not eat or drink anything after midnight the night before the procedure. · You may brush your teeth and rinse the morning of the procedure. · Take ALL of your routine medications the morning of the procedure, especially your blood thinner. However, if you are taking diabetic medications, please HOLD on the day of the procedure (including insulin). If you take Lantus insulin, take HALF of your usual dose the night before. · Do NOT stop taking Eliquis (blood thinners). It is very important to not miss any doses of Eliquis leading up to the cardioversion. Please call our office if you accidentally miss a dose of your blood thinner. · Do not put on any lotion, powder, or deodorant the morning of the procedure. · Please bring a list of your medications to the hospital with you. · You must have someone available to drive you home. It is recommended that you do not drive for 24 hours after the procedure. You will also need someone with you at home the night of the procedure. · If you are unable to make this appointment, please call [facility] at [phone].

## 2021-04-15 NOTE — LETTER
[facility]  EP Procedure Sheet  4/15/21    Vivi Jennings  1940    Physician: Dr. Celestina Holstein    EP Procedures   Pacemaker implant  EP Study    ICD implant  Atrial flutter ablation     Biv implant  Atrial fibrillation ablation    Generator Change  SVT ablation    Lead revision  VT ablation    Lead extraction +/- upgrade  AVN ablation    Loop implant x Cardioversion     Other:   JONEL     Equipment   [employer]   FABRIZIO Mapping System    [place]  [address] [address]  Cryo[employer]blation    [employer]ron[employer]  Laser Lead Extraction    Special Equipment       EP Procedures Scheduling Request  Time Requested  1100   Specific Day 4/30/21   Anesthesia    CT surgery backup    Location [facility]     Pre-Procedure Labs / Imaging   PT/INR  Type & cross    CBC  Units PRBC    BMP/Mg  Units FFP    Venogram  CXR    Echo  Pulmonary CTA for Pulmonary vein mapping     Patient Instructions

## 2021-04-16 ENCOUNTER — TELEPHONE (OUTPATIENT)
Dept: FAMILY MEDICINE CLINIC | Age: 81
End: 2021-04-16

## 2021-04-16 NOTE — TELEPHONE ENCOUNTER
----- Message from Samantha Arguello sent at 4/16/2021 12:51 PM EDT -----  Subject: Message to Provider    QUESTIONS  Information for Provider? She would like to know if office received fax to   confirm that the pt is active. Ashley call at [phone]  ---------------------------------------------------------------------------  --------------  CALL BACK INFO  What is the best way for the office to contact you? OK to leave message on   voicemail  Preferred Call Back Phone Number? [phone]  ---------------------------------------------------------------------------  --------------  SCRIPT ANSWERS  Relationship to Patient? Third Party  Representative Name?  [id number]

## 2021-04-16 NOTE — TELEPHONE ENCOUNTER
Pt did not request any forms to be faxed to us by [facility] pharmacy to confirm if she is an active pt. Disregard any forms.

## 2021-04-17 ENCOUNTER — APPOINTMENT (OUTPATIENT)
Dept: GENERAL RADIOLOGY | Age: 81
DRG: 242 | End: 2021-04-17
Payer: MEDICARE

## 2021-04-17 ENCOUNTER — HOSPITAL ENCOUNTER (INPATIENT)
Age: 81
LOS: 5 days | Discharge: HOME | DRG: 242 | End: 2021-04-22
Attending: STUDENT IN AN ORGANIZED HEALTH CARE EDUCATION/TRAINING PROGRAM | Admitting: INTERNAL MEDICINE
Payer: MEDICARE

## 2021-04-17 DIAGNOSIS — R42 LIGHTHEADEDNESS: ICD-10-CM

## 2021-04-17 DIAGNOSIS — R00.1 BRADYCARDIA: ICD-10-CM

## 2021-04-17 DIAGNOSIS — N17.9 AKI (ACUTE KIDNEY INJURY) (HCC): ICD-10-CM

## 2021-04-17 DIAGNOSIS — I95.9 HYPOTENSION, UNSPECIFIED HYPOTENSION TYPE: Primary | ICD-10-CM

## 2021-04-17 DIAGNOSIS — E87.70 HYPERVOLEMIA, UNSPECIFIED HYPERVOLEMIA TYPE: ICD-10-CM

## 2021-04-17 LAB
A/G RATIO: 1.5 (ref 1.1–2.2)
ALBUMIN SERPL-MCNC: 4.1 G/DL (ref 3.4–5)
ALP BLD-CCNC: 123 U/L (ref 40–129)
ALT SERPL-CCNC: 42 U/L (ref 10–40)
ANION GAP SERPL CALCULATED.3IONS-SCNC: 16 MMOL/L (ref 3–16)
APTT: 33.8 SEC (ref 24.2–36.2)
AST SERPL-CCNC: 47 U/L (ref 15–37)
BACTERIA: ABNORMAL /HPF
BASE EXCESS ARTERIAL: -4 (ref -3–3)
BASE EXCESS ARTERIAL: -4 (ref -3–3)
BASE EXCESS ARTERIAL: -4.3 MMOL/L (ref -3–3)
BASE EXCESS VENOUS: -1.8 MMOL/L (ref -2–3)
BASOPHILS ABSOLUTE: 0.1 K/UL (ref 0–0.2)
BASOPHILS RELATIVE PERCENT: 0.6 %
BILIRUB SERPL-MCNC: 0.7 MG/DL (ref 0–1)
BILIRUBIN URINE: NEGATIVE
BLOOD, URINE: ABNORMAL
BUN BLDV-MCNC: 24 MG/DL (ref 7–20)
CALCIUM IONIZED: 1.15 MMOL/L (ref 1.12–1.32)
CALCIUM SERPL-MCNC: 9.9 MG/DL (ref 8.3–10.6)
CARBOXYHEMOGLOBIN ARTERIAL: 0.6 % (ref 0–1.5)
CARBOXYHEMOGLOBIN: 1 % (ref 0–1.5)
CHLORIDE BLD-SCNC: 101 MMOL/L (ref 99–110)
CLARITY: ABNORMAL
CO2: 19 MMOL/L (ref 21–32)
COLOR: YELLOW
COMMENT UA: ABNORMAL
CREAT SERPL-MCNC: 1.3 MG/DL (ref 0.6–1.2)
EOSINOPHILS ABSOLUTE: 0.3 K/UL (ref 0–0.6)
EOSINOPHILS RELATIVE PERCENT: 2.7 %
EPITHELIAL CELLS, UA: ABNORMAL /HPF (ref 0–5)
GFR AFRICAN AMERICAN: 48
GFR NON-AFRICAN AMERICAN: 39
GLOBULIN: 2.8 G/DL
GLUCOSE BLD-MCNC: 180 MG/DL (ref 70–99)
GLUCOSE BLD-MCNC: 199 MG/DL (ref 70–99)
GLUCOSE BLD-MCNC: 222 MG/DL (ref 70–99)
GLUCOSE URINE: 100 MG/DL
HCO3 ARTERIAL: 22 MMOL/L (ref 21–29)
HCO3 ARTERIAL: 22.4 MMOL/L (ref 21–29)
HCO3 ARTERIAL: 22.6 MMOL/L (ref 21–29)
HCO3 VENOUS: 22.3 MMOL/L (ref 24–28)
HCT VFR BLD CALC: 48.4 % (ref 36–48)
HEMOGLOBIN, ART, EXTENDED: 16.4 G/DL
HEMOGLOBIN, VEN, REDUCED: 26.2 %
HEMOGLOBIN: 16 G/DL (ref 12–16)
INR BLD: 1.55 (ref 0.86–1.14)
KETONES, URINE: NEGATIVE MG/DL
LACTATE: 1.54 MMOL/L (ref 0.4–2)
LACTATE: 2.02 MMOL/L (ref 0.4–2)
LACTIC ACID: 4.4 MMOL/L (ref 0.4–2)
LEUKOCYTE ESTERASE, URINE: NEGATIVE
LIPASE: 27 U/L (ref 13–60)
LYMPHOCYTES ABSOLUTE: 2.6 K/UL (ref 1–5.1)
LYMPHOCYTES RELATIVE PERCENT: 23.5 %
MAGNESIUM: 2.3 MG/DL (ref 1.8–2.4)
MCH RBC QN AUTO: 32.1 PG (ref 26–34)
MCHC RBC AUTO-ENTMCNC: 33.2 G/DL (ref 31–36)
MCV RBC AUTO: 96.9 FL (ref 80–100)
METHEMOGLOBIN ARTERIAL: 0.1 % (ref 0–1.4)
METHEMOGLOBIN VENOUS: 0.2 % (ref 0–1.5)
MICROSCOPIC EXAMINATION: YES
MONOCYTES ABSOLUTE: 1 K/UL (ref 0–1.3)
MONOCYTES RELATIVE PERCENT: 9.4 %
NEUTROPHILS ABSOLUTE: 7.1 K/UL (ref 1.7–7.7)
NEUTROPHILS RELATIVE PERCENT: 63.8 %
NITRITE, URINE: NEGATIVE
O2 SAT, ARTERIAL: 88 % (ref 93–100)
O2 SAT, ARTERIAL: 97 % (ref 93–100)
O2 SAT, ARTERIAL: 98 % (ref 93–100)
O2 SAT, VEN: 74 %
PCO2 ARTERIAL: 43.6 MMHG (ref 35–45)
PCO2 ARTERIAL: 45.8 MM HG (ref 35–45)
PCO2 ARTERIAL: 46 MM HG (ref 35–45)
PCO2, VEN: 35.6 MMHG (ref 41–51)
PDW BLD-RTO: 15.3 % (ref 12.4–15.4)
PERFORMED ON: ABNORMAL
PERFORMED ON: ABNORMAL
PH ARTERIAL: 7.29 (ref 7.35–7.45)
PH ARTERIAL: 7.3 (ref 7.35–7.45)
PH ARTERIAL: 7.31 (ref 7.35–7.45)
PH UA: 6.5 (ref 5–8)
PH VENOUS: 7.41 (ref 7.35–7.45)
PLATELET # BLD: 260 K/UL (ref 135–450)
PMV BLD AUTO: 8.8 FL (ref 5–10.5)
PO2 ARTERIAL: 116.8 MM HG (ref 75–108)
PO2 ARTERIAL: 61.2 MM HG (ref 75–108)
PO2 ARTERIAL: 95.8 MMHG (ref 75–108)
PO2, VEN: 40.1 MMHG (ref 25–40)
POC POTASSIUM: 4.2 MMOL/L (ref 3.5–5.1)
POC SAMPLE TYPE: ABNORMAL
POC SAMPLE TYPE: ABNORMAL
POC SODIUM: 136 MMOL/L (ref 136–145)
POTASSIUM REFLEX MAGNESIUM: 4.8 MMOL/L (ref 3.5–5.1)
PRO-BNP: 3555 PG/ML (ref 0–449)
PROCALCITONIN: 0.1 NG/ML (ref 0–0.15)
PROTEIN UA: 100 MG/DL
PROTHROMBIN TIME: 18.1 SEC (ref 10–13.2)
RBC # BLD: 4.99 M/UL (ref 4–5.2)
RBC UA: ABNORMAL /HPF (ref 0–4)
SODIUM BLD-SCNC: 136 MMOL/L (ref 136–145)
SPECIFIC GRAVITY UA: 1.02 (ref 1–1.03)
T4 TOTAL: 8.2 UG/DL (ref 4.5–10.9)
TCO2 ARTERIAL: 23 MMOL/L
TCO2 ARTERIAL: 24 MMOL/L
TCO2 ARTERIAL: 24 MMOL/L
TCO2 CALC VENOUS: 23 MMOL/L
TOTAL PROTEIN: 6.9 G/DL (ref 6.4–8.2)
TROPONIN: <0.01 NG/ML
TROPONIN: <0.01 NG/ML
TSH SERPL DL<=0.05 MIU/L-ACNC: 14.44 UIU/ML (ref 0.27–4.2)
URINE REFLEX TO CULTURE: ABNORMAL
URINE TYPE: ABNORMAL
UROBILINOGEN, URINE: 0.2 E.U./DL
WBC # BLD: 11.1 K/UL (ref 4–11)
WBC UA: ABNORMAL /HPF (ref 0–5)

## 2021-04-17 PROCEDURE — 82330 ASSAY OF CALCIUM: CPT

## 2021-04-17 PROCEDURE — 36415 COLL VENOUS BLD VENIPUNCTURE: CPT

## 2021-04-17 PROCEDURE — 94761 N-INVAS EAR/PLS OXIMETRY MLT: CPT

## 2021-04-17 PROCEDURE — 2000000000 HC ICU R&B

## 2021-04-17 PROCEDURE — 93005 ELECTROCARDIOGRAM TRACING: CPT | Performed by: INTERNAL MEDICINE

## 2021-04-17 PROCEDURE — 83735 ASSAY OF MAGNESIUM: CPT

## 2021-04-17 PROCEDURE — 96374 THER/PROPH/DIAG INJ IV PUSH: CPT

## 2021-04-17 PROCEDURE — 81001 URINALYSIS AUTO W/SCOPE: CPT

## 2021-04-17 PROCEDURE — 94660 CPAP INITIATION&MGMT: CPT

## 2021-04-17 PROCEDURE — 84145 PROCALCITONIN (PCT): CPT

## 2021-04-17 PROCEDURE — 84484 ASSAY OF TROPONIN QUANT: CPT

## 2021-04-17 PROCEDURE — 96375 TX/PRO/DX INJ NEW DRUG ADDON: CPT

## 2021-04-17 PROCEDURE — 6360000002 HC RX W HCPCS: Performed by: STUDENT IN AN ORGANIZED HEALTH CARE EDUCATION/TRAINING PROGRAM

## 2021-04-17 PROCEDURE — 6360000002 HC RX W HCPCS

## 2021-04-17 PROCEDURE — 87040 BLOOD CULTURE FOR BACTERIA: CPT

## 2021-04-17 PROCEDURE — 99285 EMERGENCY DEPT VISIT HI MDM: CPT

## 2021-04-17 PROCEDURE — 84436 ASSAY OF TOTAL THYROXINE: CPT

## 2021-04-17 PROCEDURE — 93005 ELECTROCARDIOGRAM TRACING: CPT | Performed by: STUDENT IN AN ORGANIZED HEALTH CARE EDUCATION/TRAINING PROGRAM

## 2021-04-17 PROCEDURE — 85610 PROTHROMBIN TIME: CPT

## 2021-04-17 PROCEDURE — 83605 ASSAY OF LACTIC ACID: CPT

## 2021-04-17 PROCEDURE — 80053 COMPREHEN METABOLIC PANEL: CPT

## 2021-04-17 PROCEDURE — 2580000003 HC RX 258: Performed by: STUDENT IN AN ORGANIZED HEALTH CARE EDUCATION/TRAINING PROGRAM

## 2021-04-17 PROCEDURE — 82803 BLOOD GASES ANY COMBINATION: CPT

## 2021-04-17 PROCEDURE — 85025 COMPLETE CBC W/AUTO DIFF WBC: CPT

## 2021-04-17 PROCEDURE — 82947 ASSAY GLUCOSE BLOOD QUANT: CPT

## 2021-04-17 PROCEDURE — 84295 ASSAY OF SERUM SODIUM: CPT

## 2021-04-17 PROCEDURE — 2700000000 HC OXYGEN THERAPY PER DAY

## 2021-04-17 PROCEDURE — 85730 THROMBOPLASTIN TIME PARTIAL: CPT

## 2021-04-17 PROCEDURE — 84132 ASSAY OF SERUM POTASSIUM: CPT

## 2021-04-17 PROCEDURE — 84443 ASSAY THYROID STIM HORMONE: CPT

## 2021-04-17 PROCEDURE — 96376 TX/PRO/DX INJ SAME DRUG ADON: CPT

## 2021-04-17 PROCEDURE — 87150 DNA/RNA AMPLIFIED PROBE: CPT

## 2021-04-17 PROCEDURE — 71045 X-RAY EXAM CHEST 1 VIEW: CPT

## 2021-04-17 PROCEDURE — 83690 ASSAY OF LIPASE: CPT

## 2021-04-17 PROCEDURE — 83880 ASSAY OF NATRIURETIC PEPTIDE: CPT

## 2021-04-17 PROCEDURE — 02HV33Z INSERTION OF INFUSION DEVICE INTO SUPERIOR VENA CAVA, PERCUTANEOUS APPROACH: ICD-10-PCS | Performed by: STUDENT IN AN ORGANIZED HEALTH CARE EDUCATION/TRAINING PROGRAM

## 2021-04-17 RX ORDER — ONDANSETRON 2 MG/ML
4 INJECTION INTRAMUSCULAR; INTRAVENOUS EVERY 6 HOURS PRN
Status: DISCONTINUED | OUTPATIENT
Start: 2021-04-17 | End: 2021-04-22 | Stop reason: HOSPADM

## 2021-04-17 RX ORDER — POLYETHYLENE GLYCOL 3350 17 G/17G
17 POWDER, FOR SOLUTION ORAL DAILY PRN
Status: DISCONTINUED | OUTPATIENT
Start: 2021-04-17 | End: 2021-04-22 | Stop reason: HOSPADM

## 2021-04-17 RX ORDER — HEPARIN SODIUM 1000 [USP'U]/ML
4000 INJECTION, SOLUTION INTRAVENOUS; SUBCUTANEOUS PRN
Status: DISCONTINUED | OUTPATIENT
Start: 2021-04-17 | End: 2021-04-21

## 2021-04-17 RX ORDER — ATROPINE SULFATE 0.1 MG/ML
1 INJECTION INTRAVENOUS ONCE
Status: COMPLETED | OUTPATIENT
Start: 2021-04-17 | End: 2021-04-17

## 2021-04-17 RX ORDER — ONDANSETRON 2 MG/ML
INJECTION INTRAMUSCULAR; INTRAVENOUS
Status: COMPLETED
Start: 2021-04-17 | End: 2021-04-17

## 2021-04-17 RX ORDER — ACETAMINOPHEN 650 MG/1
650 SUPPOSITORY RECTAL EVERY 6 HOURS PRN
Status: DISCONTINUED | OUTPATIENT
Start: 2021-04-17 | End: 2021-04-22 | Stop reason: HOSPADM

## 2021-04-17 RX ORDER — SODIUM CHLORIDE 0.9 % (FLUSH) 0.9 %
5-40 SYRINGE (ML) INJECTION EVERY 12 HOURS SCHEDULED
Status: DISCONTINUED | OUTPATIENT
Start: 2021-04-17 | End: 2021-04-22 | Stop reason: HOSPADM

## 2021-04-17 RX ORDER — SODIUM CHLORIDE 9 MG/ML
25 INJECTION, SOLUTION INTRAVENOUS PRN
Status: DISCONTINUED | OUTPATIENT
Start: 2021-04-17 | End: 2021-04-21

## 2021-04-17 RX ORDER — FUROSEMIDE 10 MG/ML
40 INJECTION INTRAMUSCULAR; INTRAVENOUS ONCE
Status: COMPLETED | OUTPATIENT
Start: 2021-04-17 | End: 2021-04-17

## 2021-04-17 RX ORDER — HEPARIN SODIUM 1000 [USP'U]/ML
2000 INJECTION, SOLUTION INTRAVENOUS; SUBCUTANEOUS PRN
Status: DISCONTINUED | OUTPATIENT
Start: 2021-04-17 | End: 2021-04-21

## 2021-04-17 RX ORDER — HEPARIN SODIUM 10000 [USP'U]/100ML
12 INJECTION, SOLUTION INTRAVENOUS CONTINUOUS
Status: DISCONTINUED | OUTPATIENT
Start: 2021-04-17 | End: 2021-04-21

## 2021-04-17 RX ORDER — DOPAMINE HYDROCHLORIDE 160 MG/100ML
2-20 INJECTION, SOLUTION INTRAVENOUS CONTINUOUS
Status: DISCONTINUED | OUTPATIENT
Start: 2021-04-17 | End: 2021-04-19

## 2021-04-17 RX ORDER — PROMETHAZINE HYDROCHLORIDE 25 MG/1
12.5 TABLET ORAL EVERY 6 HOURS PRN
Status: DISCONTINUED | OUTPATIENT
Start: 2021-04-17 | End: 2021-04-22 | Stop reason: HOSPADM

## 2021-04-17 RX ORDER — ACETAMINOPHEN 325 MG/1
650 TABLET ORAL EVERY 6 HOURS PRN
Status: DISCONTINUED | OUTPATIENT
Start: 2021-04-17 | End: 2021-04-22 | Stop reason: HOSPADM

## 2021-04-17 RX ORDER — SODIUM CHLORIDE 0.9 % (FLUSH) 0.9 %
5-40 SYRINGE (ML) INJECTION PRN
Status: DISCONTINUED | OUTPATIENT
Start: 2021-04-17 | End: 2021-04-22 | Stop reason: HOSPADM

## 2021-04-17 RX ORDER — FUROSEMIDE 10 MG/ML
20 INJECTION INTRAMUSCULAR; INTRAVENOUS ONCE
Status: DISCONTINUED | OUTPATIENT
Start: 2021-04-17 | End: 2021-04-17

## 2021-04-17 RX ORDER — DOPAMINE HYDROCHLORIDE 160 MG/100ML
INJECTION, SOLUTION INTRAVENOUS
Status: DISCONTINUED
Start: 2021-04-17 | End: 2021-04-17

## 2021-04-17 RX ORDER — 0.9 % SODIUM CHLORIDE 0.9 %
1000 INTRAVENOUS SOLUTION INTRAVENOUS ONCE
Status: COMPLETED | OUTPATIENT
Start: 2021-04-17 | End: 2021-04-17

## 2021-04-17 RX ORDER — LEVOTHYROXINE SODIUM 0.03 MG/1
25 TABLET ORAL
Status: DISCONTINUED | OUTPATIENT
Start: 2021-04-18 | End: 2021-04-22 | Stop reason: HOSPADM

## 2021-04-17 RX ADMIN — ONDANSETRON 8 MG: 2 INJECTION INTRAMUSCULAR; INTRAVENOUS at 16:53

## 2021-04-17 RX ADMIN — FUROSEMIDE 40 MG: 10 INJECTION, SOLUTION INTRAMUSCULAR; INTRAVENOUS at 18:59

## 2021-04-17 RX ADMIN — ATROPINE SULFATE 1 MG: 0.1 INJECTION, SOLUTION INTRAVENOUS at 15:30

## 2021-04-17 RX ADMIN — DOPAMINE HYDROCHLORIDE IN DEXTROSE 25 MCG/KG/MIN: 1.6 INJECTION, SOLUTION INTRAVENOUS at 19:41

## 2021-04-17 RX ADMIN — Medication 10 ML: at 20:42

## 2021-04-17 RX ADMIN — HEPARIN SODIUM 12 UNITS/KG/HR: 10000 INJECTION, SOLUTION INTRAVENOUS at 19:42

## 2021-04-17 RX ADMIN — SODIUM CHLORIDE 1000 ML: 0.9 INJECTION, SOLUTION INTRAVENOUS at 15:37

## 2021-04-17 RX ADMIN — DOPAMINE HYDROCHLORIDE IN DEXTROSE 10 MCG/KG/MIN: 1.6 INJECTION, SOLUTION INTRAVENOUS at 15:55

## 2021-04-17 RX ADMIN — ATROPINE SULFATE 1 MG: 0.1 INJECTION, SOLUTION INTRAVENOUS at 15:39

## 2021-04-17 ASSESSMENT — PAIN SCALES - GENERAL: PAINLEVEL_OUTOF10: 0

## 2021-04-17 NOTE — ED PROVIDER NOTES
ED Attending Attestation Note     Date of evaluation: 4/17/2021    This patient was seen by the resident. I have seen and examined the patient, agree with the workup, evaluation, management and diagnosis. The care plan has been discussed. I have reviewed the ECG and concur with the resident's interpretation. My assessment reveals 21-year-old female with a history of recently diagnosed atrial fibrillation currently on Eliquis who presents with bradycardia and hypotension. Patient states that this started acutely at approximately 2 PM and has continued without improvement or worsening prompting ED presentation. On arrival patient found to be hypotensive into the 75L systolic with a heart rate in the 40s to 50s, initially treated with atropine with transient improvement, transition to dopamine infusion after three doses of push dose epinephrine given significant hypotension and bradycardia. Laboratory work remarkable for elevated BNP but otherwise without obvious cause of bradycardia, EKG without concern for ischemia or infarction, and chest x-ray with pulmonary edema but no other acute abnormality. Discussed patient with intensivist who recommended continuation of current care and will evaluate patient at bedside, also discussed patient with ICU resident team and hospitalist.    Critical Care:  Due to the immediate potential for life-threatening deterioration due to hypotension and bradycardia, I spent 60 minutes providing critical care. This time excludes time spent performing procedures but includes time spent on direct patient care, history retrieval, review of the chart, and discussions with patient, family, and consultant(s). Arterial Line    Date/Time: 4/17/2021 6:43 PM  Performed by:  Zahra Hernandes MD  Authorized by: Tisha Rollins DO     Consent:     Consent obtained:  Verbal and emergent situation    Consent given by:  Patient    Risks discussed:  Pain and bleeding  Indications:     Indications: hemodynamic monitoring    Pre-procedure details:     Skin preparation:  2% Chlorhexidine    Preparation: Patient was prepped and draped in sterile fashion    Anesthesia (see MAR for exact dosages): Anesthesia method:  Local infiltration    Local anesthetic:  Lidocaine 2% w/o epi  Procedure details:     Location:  R radial    Chandler's test performed: yes      Chandler's test abnormal: yes      Needle gauge:  20 G    Placement technique:  Seldinger and ultrasound guided    Number of attempts:  1    Transducer: waveform confirmed    Post-procedure details:     Post-procedure:  Secured with tape, sterile dressing applied and sutured    CMS:  Unchanged    Patient tolerance of procedure:   Tolerated well, no immediate complications           Contreras Short MD  04/17/21 4911

## 2021-04-17 NOTE — ED NOTES
Bed: A09-09  Expected date:   Expected time:   Means of arrival:   Comments:  CHANTE Huntley  04/17/21 1507

## 2021-04-17 NOTE — H&P
Internal Medicine PGY- 1 Resident History & Physical ICU      PCP: Venkat Orlando DO    Date of Admission: 4/17/2021    Chief Complaint: Lightheadedness    HPI:     73-year-old female with PMH of newly diagnosed A. fib, hypothyroidism, and GERD who presents with lightheadedness. Patient states suddenly around 2 PM today, she began feeling lightheaded with associated abdominal pain. Patient also with an episode of emesis and foul-smelling stools today. She says that she got her second dose of the Covid vaccine last Friday. Of note, patiently was recently diagnosed with atrial fibrillation about a week ago with Dr. Silvana Olivarez. She also states that she had a normal stress test on 4/14/2021. Patient denies taking any beta-blockers, however on chart review it appears she was prescribed metoprolol. Pt also endorses decreased PO intake for the past few days. ED course: On presentation, patient was bradycardic into the 40s and hypotensive into the 60s. She was hypoxic requiring 15 L nonrebreather. She was given 2 doses of 1 mg of atropine before transitioning to a dopamine drip. EKG showed first-degree block without ST changes. Patient thought to have an ALEJA with creatinine 1.3, given 1 L bolus, resultant CXR showed pulmonary edema, and fluids were stopped. Left femoral CVC and right radial arterial line were placed in ED. Patient was admitted to ICU for dopamine infusion for symptomatic bradycardia.     Past Medical History:          Diagnosis Date    Allergic rhinitis     Alveolitis of jaw     Cancer ([facility] [address]     Dental disease     Diverticulosis of colon (without mention of hemorrhage)     Dizziness     GERD (gastroesophageal reflux disease)     Hearing loss     Myalgia and myositis, unspecified     Other and unspecified hyperlipidemia     Recurrent upper respiratory infection (URI)     Screening mammogram 11/05/2008    Normal    Tinnitus     Unspecified gastritis and gastroduodenitis without mention of hemorrhage     Unspecified hypothyroidism        Past Surgical History:          Procedure Laterality Date    BLADDER SUSPENSION  1990s    CHOLECYSTECTOMY  1990s    COLONOSCOPY  1/16/2007    Normal    COLONOSCOPY  02/27/2012    Dr. Lianna Gross    Right Repair    HYSTERECTOMY  1975    WISDOM TOOTH EXTRACTION         Medications Prior to Admission:      Prior to Admission medications    Medication Sig Start Date End Date Taking? Authorizing Provider   flecainide (TAMBOCOR) 50 MG tablet Take 2 tablets by mouth 2 times daily 4/15/21   Pranav Sanders MD   metoprolol tartrate (LOPRESSOR) 100 MG tablet Take 1 tablet by mouth 2 times daily 4/8/21   Pranav Sanders MD   Handicap Placard MISC by Does not apply route Dx of shortness of breath and A-fib. Effective Jan 13, 2021 until Jan 13, 2026. 1/13/21   Aylin Kolb DO   apixaban (ELIQUIS) 5 MG TABS tablet Take 1 tablet by mouth 2 times daily 1/5/21   Homero Harris MD   furosemide (LASIX) 20 MG tablet Take 1 tablet by mouth daily 1/6/21   Juan Pablo Wood MD   levothyroxine (SYNTHROID) 25 MCG tablet TAKE 1 TABLET BY MOUTH EVERY DAY 8/21/20   Aylin Kolb DO   Cholecalciferol (VITAMIN D) 2000 UNITS CAPS capsule Take 1 capsule by mouth daily. 5/28/14   Napoleon Stephen MD       Allergies: Bactrim [sulfamethoxazole-trimethoprim] and Seasonal    Social History:      TOBACCO:   reports that she quit smoking about 15 years ago. She smoked 0.00 packs per day for 10.00 years. She has never used smokeless tobacco.  ETOH:  reports current alcohol use.       Family History:         Problem Relation Age of Onset    Cancer Brother          PHYSICAL EXAM PERFORMED:    BP (!) 86/52   Pulse 69   Resp 26   Wt 164 lb (74.4 kg)   SpO2 96%   BMI 30.00 kg/m²     Physical Exam  Const: Well nourished, well developed, in moderate distress, not diaphoretic, appears stated age  Eyes: PERRL, EOMI, no conjunctival injection, no discharge  HENT: Normocephalic, atraumatic, oropharynx not erythematous, no postnasal drip  Neck: Supple, no JVD, no thyromegaly, trachea midline  CV: Regular rate, regular rhythm, heart sounds normal, no murmur, no gallop, no rub, capillary refill <2s, 2+ pulses in bilateral distal upper and lower extremities  RESP: Clear to auscultation bilaterally, normal breath sounds, Unlabored respiratory effort, no wheezes, no rhonchi, no stridor, no chest wall tenderness   GI: soft, non-tender, non-distended, no masses, no rebound, bowel sounds normal  MSK/Extremities: No gross deformities appreciated, no edema noted, no tenderness to palpation  Skin: Warm, dry. No rashes, no erythema  Neuro: Alert, CNs II-XII grossly intact. Sensation and motor function of extremities grossly intact. No abnormal tone. Psych: Appropriate mood and affect. Labs:     Recent Labs     04/17/21  1525   WBC 11.1*   HGB 16.0   HCT 48.4*        Recent Labs     04/17/21  1525      K 4.8      CO2 19*   BUN 24*   CREATININE 1.3*   CALCIUM 9.9     Recent Labs     04/17/21  1525   AST 47*   ALT 42*   BILITOT 0.7   ALKPHOS 123     No results for input(s): INR in the last 72 hours. Recent Labs     04/17/21  1525   TROPONINI <0.01       Urinalysis:      Lab Results   Component Value Date    NITRU Negative 01/29/2020    BLOODU Negative 01/29/2020    SPECGRAV 1.009 01/29/2020    GLUCOSEU Negative 01/29/2020       Radiology:     CXR: I have reviewed the CXR with the following interpretation:     XR CHEST PORTABLE   Final Result      Cardiomegaly and mild pulmonary edema. ASSESSMENT & PLAN:    Symptomatic bradycardia with hypotension  -In ED, bradycardic to 40s and hypotensive to 60s with lightheadedness. S/p atropine 1 mg x2  -EKG in ED showed first-degree heart block  -Normal lexiscan on 4/14/2021  -On metoprolol and flecainide at home for newly diagnosed A. Fib.  Plan per cards note was DCCV after 7-10 days of flecainide  -pt of Dr. Urban Mg and Dr. Anali Garcia  -Echo 1/2021 showed LVEF 55-60% with severe left atrial dilation  Plan  -dopamine infusion  -Low-dose heparin drip without bolus for atrial fibrillation  -Gentle with fluids given elevated proBNP and pulmonary edema on CXR  -Trend lactate and troponin  -TSH and free T4 pending  -Echo Limited pending  -daily Mg and BMP. Keep K>4 and Mg>2  -Hold home flecainide and metoprolol  -Infectious work-up as below  -Cardiology consulted, appreciate recs    Possible sepsis  -Does not meet SIRS criteria  -qSOFA 2 with RR 24, SBP 60s on admission. WBC 11.1  -CXR showed cardiomegaly and mild pulmonary edema  -Episode of emesis and foul-smelling stool in ED  Plan  -Blood cultures and urine culture pending  -GI bacterial pathogens by PCR pending  -Lipase pending  -Trend lactate. Procal pending. If both elevated, low threshold for starting broad-spectrum ABX    Hypoxia  -On 15 L NRB in ED  -CXR in ED with cardiomegaly and mild pulmonary edema  -2nd dose of COVID last Friday. No cough, endorses SOB. -this could likely be explained by new onset HF exacerbation  Plan  -Wean as tolerated  -ABG 7.30/46/61  -lasix as below. May need scheduled lasix or lasix gtt    ALEJA  -likely prerenal 2/2 decreased PO intake  -consider cardiorenal  -Baseline 0.9-1.1. Cr 1.3 on admission  Plan  -continue to monitor Cr  -gentle with fluids given pulmonary edema on imaging and elevated pro-BNP    Elevated proBNP  -No history of heart failure  -CXR in ED with cardiomegaly and mild pulmonary edema  -home lasix 20 mg PO daily  -Echo 1/2021 showed LVEF 55-60% with severe left atrial dilation  -this could likely be explained by new onset HF exacerbation  Plan  -Gentle with fluids  -Echo Limited pending  -40 mg IV lasix now.  May need scheduled lasix or lasix gtt    Newly diagnosed atrial fibrillation  -Hold home Eliquis  -Low-dose heparin drip without bolus  -Cards consulted, appreciated

## 2021-04-17 NOTE — ED PROVIDER NOTES
[facility] [address] [facility]          EM RESIDENT NOTE       Date of evaluation: 4/17/2021    Chief Complaint     Shortness of Breath (x months) and Dizziness (x 2 hours; on new meds for a -fib)      History of Present Illness     Dana Escamilla is a [de-identified] y.o. female who presents presents to the emergency department for shortness of breath and new onset dizziness. 2 hours prior to presentation, patient reported that she felt dizzy and unwell and was brought to the emergency department for evaluation. Patient brought in by EMS, altered and ill-appearing. Review of Systems     Review of Systems   Unable to perform ROS: Acuity of condition       Past Medical, Surgical, Family, and Social History     She has a past medical history of Allergic rhinitis, Alveolitis of jaw, Cancer ([facility] [address] Dental disease, Diverticulosis of colon (without mention of hemorrhage), Dizziness, GERD (gastroesophageal reflux disease), Hearing loss, Myalgia and myositis, unspecified, Other and unspecified hyperlipidemia, Recurrent upper respiratory infection (URI), Screening mammogram, Tinnitus, Unspecified gastritis and gastroduodenitis without mention of hemorrhage, and Unspecified hypothyroidism. She has a past surgical history that includes Cholecystectomy (1990s); hernia repair (1990s); Hysterectomy (1975); bladder suspension (1990s); Colonoscopy (1/16/2007); Colonoscopy (02/27/2012); and Boyd tooth extraction. Her family history includes Cancer in her brother. She reports that she quit smoking about 15 years ago. She smoked 0.00 packs per day for 10.00 years. She has never used smokeless tobacco. She reports current alcohol use. She reports that she does not use drugs.     Medications     Current Discharge Medication List      CONTINUE these medications which have NOT CHANGED    Details   flecainide (TAMBOCOR) 50 MG tablet Take 2 tablets by mouth 2 times daily  Qty: 60 tablet, Refills: 5      metoprolol tartrate (LOPRESSOR) 100 MG tablet Take 1 tablet by mouth 2 times daily  Qty: 60 tablet, Refills: 5      Handicap Placard MISC by Does not apply route Dx of shortness of breath and A-fib. Effective Jan 13, 2021 until Jan 13, 2026. Qty: 1 each, Refills: 0    Associated Diagnoses: Atrial fibrillation, unspecified type ([address] Acquired hypothyroidism      apixaban (ELIQUIS) 5 MG TABS tablet Take 1 tablet by mouth 2 times daily  Qty: 60 tablet, Refills: 3      furosemide (LASIX) 20 MG tablet Take 1 tablet by mouth daily  Qty: 60 tablet, Refills: 3      levothyroxine (SYNTHROID) 25 MCG tablet TAKE 1 TABLET BY MOUTH EVERY DAY  Qty: 90 tablet, Refills: 3    Associated Diagnoses: Acquired hypothyroidism      Cholecalciferol (VITAMIN D) 2000 UNITS CAPS capsule Take 1 capsule by mouth daily. Allergies     She is allergic to bactrim [sulfamethoxazole-trimethoprim] and seasonal.    Physical Exam     INITIAL VITALS: BP: (!) 87/53, Temp: 97.4 °F (36.3 °C), Pulse: 55, Resp: 24, SpO2: 95 %   Physical Exam  Vitals signs and nursing note reviewed. Exam conducted with a chaperone present. Constitutional:       General: She is in acute distress. Appearance: Normal appearance. She is ill-appearing, toxic-appearing and diaphoretic. HENT:      Head: Normocephalic and atraumatic. Nose: Nose normal.      Mouth/Throat:      Mouth: Mucous membranes are moist.      Pharynx: Oropharynx is clear. Eyes:      Extraocular Movements: Extraocular movements intact. Conjunctiva/sclera: Conjunctivae normal.      Pupils: Pupils are equal, round, and reactive to light. Neck:      Musculoskeletal: Normal range of motion and neck supple. No muscular tenderness. Cardiovascular:      Rate and Rhythm: Regular rhythm. Bradycardia present. Pulses: Normal pulses. Heart sounds: Normal heart sounds. No murmur. No friction rub. No gallop. Pulmonary:      Effort: Tachypnea and accessory muscle usage present.  No respiratory distress. Breath sounds: Normal breath sounds. No decreased breath sounds, wheezing, rhonchi or rales. Abdominal:      General: Abdomen is flat. Bowel sounds are normal.      Palpations: Abdomen is soft. Tenderness: There is no abdominal tenderness. There is no guarding or rebound. Musculoskeletal: Normal range of motion. General: No swelling, tenderness, deformity or signs of injury. Right lower leg: No edema. Left lower leg: No edema. Skin:     General: Skin is warm. Capillary Refill: Capillary refill takes less than 2 seconds. Findings: No rash. Neurological:      General: No focal deficit present. Mental Status: Mental status is at baseline. She is lethargic, disoriented and confused. Cranial Nerves: Cranial nerves are intact. No cranial nerve deficit. Sensory: Sensation is intact. No sensory deficit. Motor: No weakness. Comments: All 4 extremities with motor function intact. Psychiatric:         Mood and Affect: Mood normal.         Behavior: Behavior normal.         Thought Content: Thought content normal.         DiagnosticResults     EKG   Interpreted in conjunction with emergencydepartment physician Teena Hinton DO  Rhythm: sinus bradycardia with 1st degree AV block  Rate: 50-60  Axis: normal  Ectopy: none  Conduction: normal  ST Segments: no acute change  T Waves:no acute change  Q Waves: none  Clinical Impression: sinus bradycardia with 1st degree AV block  Comparison:  Not available. RADIOLOGY:  XR CHEST PORTABLE   Final Result      Cardiomegaly and mild pulmonary edema.              LABS:   Results for orders placed or performed during the hospital encounter of 04/17/21   CBC auto differential   Result Value Ref Range    WBC 11.1 (H) 4.0 - 11.0 K/uL    RBC 4.99 4.00 - 5.20 M/uL    Hemoglobin 16.0 12.0 - 16.0 g/dL    Hematocrit 48.4 (H) 36.0 - 48.0 %    MCV 96.9 80.0 - 100.0 fL    MCH 32.1 26.0 - 34.0 pg    MCHC 33.2 31.0 - 36.0 g/dL    RDW 15.3 12.4 - 15.4 %    Platelets 065 011 - 063 K/uL    MPV 8.8 5.0 - 10.5 fL    Neutrophils % 63.8 %    Lymphocytes % 23.5 %    Monocytes % 9.4 %    Eosinophils % 2.7 %    Basophils % 0.6 %    Neutrophils Absolute 7.1 1.7 - 7.7 K/uL    Lymphocytes Absolute 2.6 1.0 - 5.1 K/uL    Monocytes Absolute 1.0 0.0 - 1.3 K/uL    Eosinophils Absolute 0.3 0.0 - 0.6 K/uL    Basophils Absolute 0.1 0.0 - 0.2 K/uL   Comprehensive Metabolic Panel w/ Reflex to MG   Result Value Ref Range    Sodium 136 136 - 145 mmol/L    Potassium reflex Magnesium 4.8 3.5 - 5.1 mmol/L    Chloride 101 99 - 110 mmol/L    CO2 19 (L) 21 - 32 mmol/L    Anion Gap 16 3 - 16    Glucose 222 (H) 70 - 99 mg/dL    BUN 24 (H) 7 - 20 mg/dL    CREATININE 1.3 (H) 0.6 - 1.2 mg/dL    GFR Non-African American 39 (A) >60    GFR  48 (A) >60    Calcium 9.9 8.3 - 10.6 mg/dL    Total Protein 6.9 6.4 - 8.2 g/dL    Albumin 4.1 3.4 - 5.0 g/dL    Albumin/Globulin Ratio 1.5 1.1 - 2.2    Total Bilirubin 0.7 0.0 - 1.0 mg/dL    Alkaline Phosphatase 123 40 - 129 U/L    ALT 42 (H) 10 - 40 U/L    AST 47 (H) 15 - 37 U/L    Globulin 2.8 g/dL   Lactate, plasma   Result Value Ref Range    Lactic Acid 4.4 (HH) 0.4 - 2.0 mmol/L   Blood gas, venous (Lab)   Result Value Ref Range    pH, Jim 7.406 7.350 - 7.450    pCO2, Jim 35.6 (L) 41.0 - 51.0 mmHg    pO2, Jim 40.1 (H) 25 - 40 mmHg    HCO3, Venous 22.3 (L) 24.0 - 28.0 mmol/L    Base Excess, Jim -1.8 -2.0 - 3.0 mmol/L    O2 Sat, Jim 74 Not established %    Carboxyhemoglobin 1.0 0.0 - 1.5 %    MetHgb, Jim 0.2 0.0 - 1.5 %    TC02 (Calc), Jim 23 mmol/L    Hemoglobin, Jim, Reduced 26.20 %   Troponin   Result Value Ref Range    Troponin <0.01 <0.01 ng/mL   Brain Natriuretic Peptide   Result Value Ref Range    Pro-BNP 3,555 (H) 0 - 449 pg/mL   Magnesium   Result Value Ref Range    Magnesium 2.30 1.80 - 2.40 mg/dL   POCT Arterial   Result Value Ref Range    POC Sodium 136 136 - 145 mmol/L    POC Potassium 4.2 3.5 - 5.1 mmol/L    POC Glucose 199 (H) 70 - 99 mg/dl    Calcium, Ion 1.15 1.12 - 1.32 mmol/L    pH, Arterial 7.295 (L) 7.350 - 7.450    pCO2, Arterial 46.0 (H) 35.0 - 45.0 mm Hg    pO2, Arterial 61.2 (L) 75.0 - 108.0 mm Hg    HCO3, Arterial 22.4 21.0 - 29.0 mmol/L    Base Excess, Arterial -4 (L) -3 - 3    O2 Sat, Arterial 88 (L) 93 - 100 %    TCO2, Arterial 24 Not Established mmol/L    Lactate 1.54 0.40 - 2.00 mmol/L    Sample Type ART     Performed on SEE BELOW        RECENT VITALS:  BP: (!) 61/50, Temp: 97.4 °F (36.3 °C), Pulse: 74,Resp: 24, SpO2: 93 %     Procedures     Central line placement. See accompanying note. A line     ED Course     Nursing Notes, Past Medical Hx, Past Surgical Hx, Social Hx, Allergies, and Family Hx were reviewed.     The patient was given the followingmedications:  Orders Placed This Encounter   Medications    0.9 % sodium chloride bolus    atropine injection 1 mg    atropine injection 1 mg    DOPamine (INTROPIN) 400 mg in dextrose 5 % 250 mL infusion    DISCONTD: DOPamine (INTROPIN) 1.6-5 MG/ML-% infusion     Pippa Sabillon: cabinet override    ondansetron (ZOFRAN) 4 MG/2ML injection     Fatou Johnson: cabinet override    sodium chloride flush 0.9 % injection 5-40 mL    sodium chloride flush 0.9 % injection 5-40 mL    0.9 % sodium chloride infusion    OR Linked Order Group     promethazine (PHENERGAN) tablet 12.5 mg     ondansetron (ZOFRAN) injection 4 mg    polyethylene glycol (GLYCOLAX) packet 17 g    OR Linked Order Group     acetaminophen (TYLENOL) tablet 650 mg     acetaminophen (TYLENOL) suppository 650 mg    perflutren lipid microspheres (DEFINITY) injection 1.65 mg    heparin 25,000 units in dextrose 5% 250 mL (premix) infusion    furosemide (LASIX) injection 20 mg    heparin (porcine) injection 4,000 Units    heparin (porcine) injection 2,000 Units       CONSULTS:  IP CONSULT TO HOSPITALIST  IP CONSULT TO CARDIOLOGY  IP CONSULT TO [place] [address] DECISION MAKING / ASSESSMENT / Cheri Nadine Jennings is a [de-identified] y.o. female with a history listed above. Patient was brought to the emergency department with bradycardia and hypotension. On arrival to the emergency department, the patient was ill-appearing and diaphoretic. She was then brought to the trauma to room for further treatment. EKG was sinus bradycardia. Pads were placed on the patient. Patient was also placed on a nonrebreather due to hypoxia. She continued to be hypotensive and bradycardic. Chest x-ray with some pulmonary edema. Patient was given fluids in the emergency department and placed on dopamine with some improvement in her bradycardia but she continued to be hypotensive. Central line was done in the left thumb with an art line attempted in the left femoral but unsuccessful and then on second attempt, 1 was placed in the left arm. Patient became increasingly altered while undergoing these procedures. Had one episode of emesis and given Zofran. Patient was also given 1 dose of atropine. Patient had new diagnosed atrial fibrillation and a recent stress test from 4/14/2021 which showed no signs of ischemia or wall motion abnormalities. Labs with white blood cell count of 11.1 and creatinine of 1.3 with a BUN of 24. VBG with pH of 7.4. Troponin was normal.  TSH pending. Holding off on IV antibiotics at this time as patient does not have a nidus for infection. ICU was consulted for admission given her hypotension and bradycardia requiring pressors. They excepted the patient to their service for further management. This patient was also evaluated by the attending physician. All care plans werediscussed and agreed upon. Clinical Impression     1. Hypotension, unspecified hypotension type    2. Bradycardia    3. ALEJA (acute kidney injury) ([address]    4. Lightheadedness    5.  Hypervolemia, unspecified hypervolemia type        Disposition     PATIENT REFERRED TO:  No follow-up provider specified.     DISCHARGE MEDICATIONS:  Current Discharge Medication List          DISPOSITION Admitted 04/17/2021 04:55:12 PM       Anne-Marie Perez MD  04/17/21 [id number]

## 2021-04-17 NOTE — PROCEDURES
Patient Name: Rae Moore   Medical Record Number: [id number]  Date: 4/17/2021   Time: 6:53 PM   Room[facility]Bed: [facility]8027-73  Central Line Placement Procedure Note  Indication: vascular access, hypovolemia and centrally administered medications    Consent: Unable to be obtained due to the emergent nature of this procedure. Procedure: The patient was positioned appropriately and the skin over the right femoral vein was prepped with chlorhexidine. Local anesthesia was obtained by infiltration using 1% Lidocaine without epinephrine. A large bore needle was used to identify the vein. A guide wire was then inserted into the vein through the needle. A triple lumen catheter was then inserted into the vessel over the guide wire using the Seldinger technique. All ports showed good, free flowing blood return and were flushed with saline solution. The catheter was then securely fastened to the skin with sutures and with an adhesive dressing and covered with a sterile dressing. A post procedure X-ray was not indicated. Estimated blood loss 2 ml    The patient tolerated the procedure well.     Complications: None    Electronically Signed by:     Kathleen Baker MD  PGY 1  Emergency Medicine

## 2021-04-18 PROBLEM — I95.9 HYPOTENSION: Status: ACTIVE | Noted: 2021-04-18

## 2021-04-18 PROBLEM — J96.01 ACUTE RESPIRATORY FAILURE WITH HYPOXIA AND HYPERCAPNIA (HCC): Status: ACTIVE | Noted: 2021-04-18

## 2021-04-18 PROBLEM — J96.02 ACUTE RESPIRATORY FAILURE WITH HYPOXIA AND HYPERCAPNIA (HCC): Status: ACTIVE | Noted: 2021-04-18

## 2021-04-18 LAB
ANION GAP SERPL CALCULATED.3IONS-SCNC: 12 MMOL/L (ref 3–16)
APTT: 78.5 SEC (ref 24.2–36.2)
APTT: 81.2 SEC (ref 24.2–36.2)
BASE EXCESS ARTERIAL: -0.4 MMOL/L (ref -3–3)
BASOPHILS ABSOLUTE: 0 K/UL (ref 0–0.2)
BASOPHILS RELATIVE PERCENT: 0 %
BUN BLDV-MCNC: 26 MG/DL (ref 7–20)
CALCIUM SERPL-MCNC: 8.9 MG/DL (ref 8.3–10.6)
CARBOXYHEMOGLOBIN ARTERIAL: 0.6 % (ref 0–1.5)
CHLORIDE BLD-SCNC: 104 MMOL/L (ref 99–110)
CO2: 24 MMOL/L (ref 21–32)
CREAT SERPL-MCNC: 1.4 MG/DL (ref 0.6–1.2)
EKG ATRIAL RATE: 54 BPM
EKG DIAGNOSIS: NORMAL
EKG P AXIS: 76 DEGREES
EKG P-R INTERVAL: 224 MS
EKG Q-T INTERVAL: 506 MS
EKG QRS DURATION: 88 MS
EKG QTC CALCULATION (BAZETT): 479 MS
EKG R AXIS: 72 DEGREES
EKG T AXIS: 41 DEGREES
EKG VENTRICULAR RATE: 54 BPM
EOSINOPHILS ABSOLUTE: 0 K/UL (ref 0–0.6)
EOSINOPHILS RELATIVE PERCENT: 0 %
GFR AFRICAN AMERICAN: 44
GFR NON-AFRICAN AMERICAN: 36
GLUCOSE BLD-MCNC: 154 MG/DL (ref 70–99)
HCO3 ARTERIAL: 25 MMOL/L (ref 21–29)
HCT VFR BLD CALC: 42.1 % (ref 36–48)
HEMOGLOBIN, ART, EXTENDED: 14.7 G/DL
HEMOGLOBIN: 14.2 G/DL (ref 12–16)
LACTIC ACID: 1.4 MMOL/L (ref 0.4–2)
LACTIC ACID: 1.6 MMOL/L (ref 0.4–2)
LACTIC ACID: 1.6 MMOL/L (ref 0.4–2)
LYMPHOCYTES ABSOLUTE: 1 K/UL (ref 1–5.1)
LYMPHOCYTES RELATIVE PERCENT: 6 %
MAGNESIUM: 1.8 MG/DL (ref 1.8–2.4)
MCH RBC QN AUTO: 32.1 PG (ref 26–34)
MCHC RBC AUTO-ENTMCNC: 33.6 G/DL (ref 31–36)
MCV RBC AUTO: 95.5 FL (ref 80–100)
METAMYELOCYTES RELATIVE PERCENT: 5 %
METHEMOGLOBIN ARTERIAL: 0.4 % (ref 0–1.4)
MONOCYTES ABSOLUTE: 0.7 K/UL (ref 0–1.3)
MONOCYTES RELATIVE PERCENT: 4 %
NEUTROPHILS ABSOLUTE: 15.1 K/UL (ref 1.7–7.7)
NEUTROPHILS RELATIVE PERCENT: 85 %
O2 SAT, ARTERIAL: 99 % (ref 93–100)
PCO2 ARTERIAL: 41.8 MMHG (ref 35–45)
PDW BLD-RTO: 14.7 % (ref 12.4–15.4)
PH ARTERIAL: 7.38 (ref 7.35–7.45)
PLATELET # BLD: 256 K/UL (ref 135–450)
PMV BLD AUTO: 8 FL (ref 5–10.5)
PO2 ARTERIAL: 129 MMHG (ref 75–108)
POTASSIUM REFLEX MAGNESIUM: 4.6 MMOL/L (ref 3.5–5.1)
RBC # BLD: 4.41 M/UL (ref 4–5.2)
REPORT: NORMAL
SODIUM BLD-SCNC: 140 MMOL/L (ref 136–145)
TCO2 ARTERIAL: 26 MMOL/L
WBC # BLD: 16.8 K/UL (ref 4–11)

## 2021-04-18 PROCEDURE — 6360000002 HC RX W HCPCS: Performed by: STUDENT IN AN ORGANIZED HEALTH CARE EDUCATION/TRAINING PROGRAM

## 2021-04-18 PROCEDURE — 37799 UNLISTED PX VASCULAR SURGERY: CPT

## 2021-04-18 PROCEDURE — 99223 1ST HOSP IP/OBS HIGH 75: CPT | Performed by: INTERNAL MEDICINE

## 2021-04-18 PROCEDURE — 6370000000 HC RX 637 (ALT 250 FOR IP): Performed by: STUDENT IN AN ORGANIZED HEALTH CARE EDUCATION/TRAINING PROGRAM

## 2021-04-18 PROCEDURE — 85730 THROMBOPLASTIN TIME PARTIAL: CPT

## 2021-04-18 PROCEDURE — 83605 ASSAY OF LACTIC ACID: CPT

## 2021-04-18 PROCEDURE — 85025 COMPLETE CBC W/AUTO DIFF WBC: CPT

## 2021-04-18 PROCEDURE — 83735 ASSAY OF MAGNESIUM: CPT

## 2021-04-18 PROCEDURE — 2000000000 HC ICU R&B

## 2021-04-18 PROCEDURE — 99233 SBSQ HOSP IP/OBS HIGH 50: CPT | Performed by: INTERNAL MEDICINE

## 2021-04-18 PROCEDURE — 93005 ELECTROCARDIOGRAM TRACING: CPT | Performed by: STUDENT IN AN ORGANIZED HEALTH CARE EDUCATION/TRAINING PROGRAM

## 2021-04-18 PROCEDURE — 80048 BASIC METABOLIC PNL TOTAL CA: CPT

## 2021-04-18 PROCEDURE — 2700000000 HC OXYGEN THERAPY PER DAY

## 2021-04-18 PROCEDURE — 2580000003 HC RX 258: Performed by: STUDENT IN AN ORGANIZED HEALTH CARE EDUCATION/TRAINING PROGRAM

## 2021-04-18 PROCEDURE — 94761 N-INVAS EAR/PLS OXIMETRY MLT: CPT

## 2021-04-18 PROCEDURE — 36415 COLL VENOUS BLD VENIPUNCTURE: CPT

## 2021-04-18 PROCEDURE — 82803 BLOOD GASES ANY COMBINATION: CPT

## 2021-04-18 RX ORDER — FUROSEMIDE 20 MG/1
20 TABLET ORAL DAILY
Status: DISCONTINUED | OUTPATIENT
Start: 2021-04-18 | End: 2021-04-19

## 2021-04-18 RX ORDER — MAGNESIUM SULFATE IN WATER 40 MG/ML
2000 INJECTION, SOLUTION INTRAVENOUS ONCE
Status: COMPLETED | OUTPATIENT
Start: 2021-04-18 | End: 2021-04-18

## 2021-04-18 RX ADMIN — Medication 10 ML: at 21:58

## 2021-04-18 RX ADMIN — FUROSEMIDE 20 MG: 20 TABLET ORAL at 10:35

## 2021-04-18 RX ADMIN — DOPAMINE HYDROCHLORIDE IN DEXTROSE 10 MCG/KG/MIN: 1.6 INJECTION, SOLUTION INTRAVENOUS at 02:51

## 2021-04-18 RX ADMIN — MAGNESIUM SULFATE HEPTAHYDRATE 2000 MG: 40 INJECTION, SOLUTION INTRAVENOUS at 11:30

## 2021-04-18 ASSESSMENT — ENCOUNTER SYMPTOMS
VOMITING: 0
BACK PAIN: 0
SINUS PAIN: 0
SINUS PRESSURE: 0
NAUSEA: 0
CHEST TIGHTNESS: 0
SHORTNESS OF BREATH: 1
TROUBLE SWALLOWING: 0
ABDOMINAL PAIN: 0
SORE THROAT: 0
WHEEZING: 0
ABDOMINAL DISTENTION: 0
COUGH: 0
PHOTOPHOBIA: 0

## 2021-04-18 NOTE — CONSULTS
PULMONOLOGY-CC CONSULT NOTE    PCP: Arely Perez DO    Date of Admission: 4/17/2021    Date of Service: Pt seen/examined on 4/18/2021    Chief Complaint:  Symptomatic Bradycardia    Active Hospital Problems    Diagnosis Date Noted    Bradycardia [R00.1] 04/17/2021     History Obtained From:  patient, electronic medical record    History Of Present Illness:     [de-identified] y.o. female who presented to [facility] with symptomatic bradycardia. Of note pt w/ recently diagnosed afib in last 3-4 mo being followed by Dr. Rah Albright and was recently started on flecainide in addition to metoprolol. Pt stated that early in the afternoon pt developed lightheadedness, nausea, 1 ep of emesis and abdominal pain. As well as minimal PO intake last 2-3d    Pt did undergo stress test on 4/14/21 which was normal w/ EF of 73% after which she was started on the flecainide. Pt also states that she had been having foul-smelling stools as well. Pt states that she has received both COVID vaccines last dose on 4/16/21. In ED pt was noted to be bradycardic 30-40s and was treated w/ atropine w/ some effect, but was noted to be hypotensive to the 06M systolic after which she was started on dopamine gtt which was quickly titrated up to 25. She also developed respiratory distress requiring NRB. EKG was sinus julia w/ 1st degree block. While admitted pt was continuing to have worsening respiratory distress and AMS. She was given 40mg IV lasix and then was put on BiPAP w/ great response and was taken off this AM.     Overnight she flipped back into afib RVR after a conversion pause of 4s and then 2s. Pressures remained stable and cuff pressure were correlating to the lisbeth which was tenuous so lisbeth was removed and dopamine was tirated off d/t afib rvr. Overall pt feels much improved and is asking about her previously scheduled cardioversion.       Past Medical History:          Diagnosis Date    Allergic rhinitis     Alveolitis of jaw     Cancer ([facility] [address]     Dental disease     Diverticulosis of colon (without mention of hemorrhage)     Dizziness     GERD (gastroesophageal reflux disease)     Hearing loss     Myalgia and myositis, unspecified     Other and unspecified hyperlipidemia     Recurrent upper respiratory infection (URI)     Screening mammogram 11/05/2008    Normal    Tinnitus     Unspecified gastritis and gastroduodenitis without mention of hemorrhage     Unspecified hypothyroidism        Past SurgicalHistory:          Procedure Laterality Date    BLADDER SUSPENSION  1990s    CHOLECYSTECTOMY  1990s    COLONOSCOPY  1/16/2007    Normal    COLONOSCOPY  02/27/2012    Dr. Gearl Harada    Right Repair    HYSTERECTOMY  1975    WISDOM TOOTH EXTRACTION         Medications Prior to Admission:      Medications Prior to Admission: metoprolol tartrate (LOPRESSOR) 100 MG tablet, Take 1 tablet by mouth 2 times daily  Handicap Placard MISC, by Does not apply route Dx of shortness of breath and A-fib. Effective Jan 13, 2021 until Jan 13, 2026. apixaban (ELIQUIS) 5 MG TABS tablet, Take 1 tablet by mouth 2 times daily  furosemide (LASIX) 20 MG tablet, Take 1 tablet by mouth daily  levothyroxine (SYNTHROID) 25 MCG tablet, TAKE 1 TABLET BY MOUTH EVERY DAY  Cholecalciferol (VITAMIN D) 2000 UNITS CAPS capsule, Take 1 capsule by mouth daily. flecainide (TAMBOCOR) 50 MG tablet, Take 2 tablets by mouth 2 times daily    Allergies:  Bactrim [sulfamethoxazole-trimethoprim] and Seasonal    Social History:      The patient currently lives with family    TOBACCO:   reports that she quit smoking about 15 years ago. She smoked 0.00 packs per day for 10.00 years. She has never used smokeless tobacco.  ETOH:   reports current alcohol use. Family History:        Problem Relation Age of Onset    Cancer Brother      REVIEW OF SYSTEMS: A 10 point review of systems was conducted, pertinent positives as noted in the HPI.  All other Head: Normocephalic and atraumatic. Mouth/Throat:      Mouth: Mucous membranes are moist.      Pharynx: Oropharynx is clear. No oropharyngeal exudate. Eyes:      General: No scleral icterus. Extraocular Movements: Extraocular movements intact. Pupils: Pupils are equal, round, and reactive to light. Neck:      Musculoskeletal: Normal range of motion. Trachea: No tracheal deviation. Cardiovascular:      Rate and Rhythm: Tachycardia present. Rhythm irregular. Pulses: Normal pulses. Heart sounds: Normal heart sounds. Pulmonary:      Effort: Pulmonary effort is normal. No respiratory distress. Breath sounds: Rales (MIld basilar, improved) present. No wheezing. Abdominal:      General: Bowel sounds are normal. There is no distension. Palpations: Abdomen is soft. Tenderness: There is no abdominal tenderness. Musculoskeletal: Normal range of motion. General: No swelling or tenderness. Right lower leg: No edema. Left lower leg: No edema. Skin:     General: Skin is warm and dry. Findings: No erythema or rash. Comments: R wrist bruising at prev lisbeth site   Neurological:      Mental Status: She is alert and oriented to person, place, and time. Mental status is at baseline. Cranial Nerves: No cranial nerve deficit. Sensory: No sensory deficit. Psychiatric:         Mood and Affect: Mood normal.         Behavior: Behavior normal.         Thought Content:  Thought content normal.         Judgment: Judgment normal.       Labs:     Recent Labs     04/17/21  1525 04/18/21  0532   WBC 11.1* 16.8*   HGB 16.0 14.2   HCT 48.4* 42.1    256     Recent Labs     04/17/21  1525 04/18/21  0532    140   K 4.8 4.6    104   CO2 19* 24   BUN 24* 26*   CREATININE 1.3* 1.4*   CALCIUM 9.9 8.9     Recent Labs     04/17/21  1525   AST 47*   ALT 42*   BILITOT 0.7   ALKPHOS 123     Recent Labs     04/17/21  1800   INR 1.55*     Recent Labs 04/17/21  1525 04/17/21  1800   TROPONINI <0.01 <0.01       Urinalysis:   Lab Results   Component Value Date    NITRU Negative 04/17/2021    WBCUA 0-2 04/17/2021    BACTERIA Rare 04/17/2021    RBCUA 0-2 04/17/2021    BLOODU TRACE-LYSED 04/17/2021    SPECGRAV 1.020 04/17/2021    GLUCOSEU 100 04/17/2021     Recent Labs     04/17/21  1855   COLORU Yellow   PHUR 6.5   WBCUA 0-2   RBCUA 0-2   BACTERIA Rare*   CLARITYU SL CLOUDY*   SPECGRAV 1.020   LEUKOCYTESUR Negative   UROBILINOGEN 0.2   BILIRUBINUR Negative   BLOODU TRACE-LYSED*   GLUCOSEU 100*     Radiology:     EKG:  I have reviewed the EKG with the following interpretation: Sinus tachy, wenkebach    XR CHEST PORTABLE   Final Result      Cardiomegaly and mild pulmonary edema.            ASSESSMENT & PLAN:    Active Hospital Problems    Diagnosis Date Noted    Bradycardia [R00.1] 04/17/2021     Symptomatic bradycardia with hypotension HR in 30-40s and SBP<90 s/p atropine w/ acute cardiogenic shock  Thought more likely 2/2 recently started flecainide and metoprolol   HOLDING rate control meds  Rcnt dx afib  EKG in ED w/ first degree block w/ sinus julia, today w/ wenkeback sinus tach  Cardiology following, EP to follow tomorrow  Weaned off dopamine today as pt tachycardic w/ stable Bps  Hep gtt for possible procedures  Monitor lytes  Elevated BNP, TTE w/ severe L atrial dilatation    EKGs PRN, variable rhythms    Acute hypoxic hypercapnic respiratory failure 2/2 pulmonary edema in setting cardiogenic shock as noted above  Ventilation and gas exchange supported with BiPAP, now maintained on supplemental O2  Hypercapnia resolved on BiPAP  Supp o2 as needed wean as tolerated  Pulm edema on CXR  Lactate resolved w/ dopamine gtt, stop trend  Repeat limited ECHO pending  S/p IV 40 lasix, restart home 20 PO lasix     Leukocytosis, likely reactive to cardiogenic shock  Afebrile, infectious w/u so far unremarkable, will monitor  Possible GI src w/ report diarrhea, no BMs here  Lactate normalized, lipase WNL, procal 0.10  No  abx for now    ALEJA likely prerenal in setting of cardiogenic shock or hypotension  Stably elevated, UO adequate  No fluids for now     Hypothyroidism on synthroid  TSH elevated by T4 WNL    GERD  PRN tums    Prophylaxis: AC'd on hep gtt  Diet: DIET CARDIAC;  Code Status: Full Code    Dispo - ICU today, transfer tomorrow if remains stable off dopamine gtt    Edmond Zhong MD  Internal Medicine Resident, PGY-3    I will discuss the patient with Dr. Maurilio Marin    Patient examined, findings as discussed with Dr. Tee Duque. Agree with assessment and plan as edited above. Still has mild heart failure, blood pressure borderline but may tolerate gentle diuresis.

## 2021-04-18 NOTE — PROGRESS NOTES
Pt A line site is bleeding. Pressure dressings applied and changed twice. Appropriate wave form noted. Dr. Dave Juarez notified. Verbal order to pause heparin gtt until 0145. Will applied new pressure dressing at 2345. Will continue to monitor.

## 2021-04-18 NOTE — PROGRESS NOTES
VSS~ Assessment as documented. Heparin gtt started at 12 units/kg/hr. Dopamine infusing at 20 mcg/kg/min. SBP >65. A line in place with bloody drainage and appropriate waveform. HR 50-60s, a fib on monitor. Pt has L fem CVC with old bloody drainage. Bruising at site as well, no hematoma. Pt is alert and oriented x4, with intermittent confusion and short term memory loss. Pt constantly tries to pull at lines. Sitter at bedside for safety. BIPAP on, ABGs drawn from A line and arterial stick. Will continue to monitor.

## 2021-04-18 NOTE — PROGRESS NOTES
A line saturated another dressing and wave form is flat. Per residents, order to remove a line and go off cuff pressure. Pressure dressing applied, no residual bleeding noted. Heparin gtt restarted per residents.

## 2021-04-18 NOTE — CONSULTS
[facility]  Cardiology Inpatient Consult Service                                                                                          Pt Name: Solis Kasper  Age: [de-identified] y.o. Sex: female  : 1940  Location: [address] [place] [address]    Referring Physician: Teena Hinton DO      Reason for Consult:       Reason for Consultation/Chief Complaint:   Symptomatic bradycardia/A. fib    HPI:      Florin Jennings is a [de-identified] y.o. female with a past medical history of A. fib on Eliquis, flecainide, metoprolol who presented to the hospital with lightheadedness and diarrhea and vomiting. On arrival she was found to be in sinus bradycardia with a heart rate of the 58Y and systolic blood pressure in the 60s to 70s. Fluids and dopamine were started. This morning she flipped into A. fib/sinus tachycardia, dopamine was discontinued. We will continue to monitor for now. Histories     Past Medical History:   has a past medical history of Allergic rhinitis, Alveolitis of jaw, Cancer ([address] Dental disease, Diverticulosis of colon (without mention of hemorrhage), Dizziness, GERD (gastroesophageal reflux disease), Hearing loss, Myalgia and myositis, unspecified, Other and unspecified hyperlipidemia, Recurrent upper respiratory infection (URI), Screening mammogram, Tinnitus, Unspecified gastritis and gastroduodenitis without mention of hemorrhage, and Unspecified hypothyroidism. Surgical History:   has a past surgical history that includes Cholecystectomy (); hernia repair (); Hysterectomy (); bladder suspension (); Colonoscopy (2007); Colonoscopy (2012); and Wa[place] tooth extraction. Social History:   reports that she quit smoking about 15 years ago. She smoked 0.00 packs per day for 10.00 years. She has never used smokeless tobacco. She reports current alcohol use. She reports that she does not use drugs.      Family History:  No evidence for sudden cardiac death or premature CAD      Medications:       Home Medications  Were reviewed and are listed in nursing record. and/or listed below  Prior to Admission medications    Medication Sig Start Date End Date Taking? Authorizing Provider   metoprolol tartrate (LOPRESSOR) 100 MG tablet Take 1 tablet by mouth 2 times daily 4/8/21  Yes Red Baig MD   Handicap Placard MISC by Does not apply route Dx of shortness of breath and A-fib. Effective Jan 13, 2021 until Jan 13, 2026. 1/13/21  Yes Gerber Kolb DO   apixaban (ELIQUIS) 5 MG TABS tablet Take 1 tablet by mouth 2 times daily 1/5/21  Yes Shyla Leiva MD   furosemide (LASIX) 20 MG tablet Take 1 tablet by mouth daily 1/6/21  Yes Shyla Leiva MD   levothyroxine (SYNTHROID) 25 MCG tablet TAKE 1 TABLET BY MOUTH EVERY DAY 8/21/20  Yes Gerber Kolb DO   Cholecalciferol (VITAMIN D) 2000 UNITS CAPS capsule Take 1 capsule by mouth daily. 5/28/14  Yes Bronwyn Maxwell MD   flecainide (TAMBOCOR) 50 MG tablet Take 2 tablets by mouth 2 times daily 4/15/21   Red Baig MD          Inpatient Medications:   furosemide  20 mg Oral Daily    levothyroxine  25 mcg Oral QAM AC    sodium chloride flush  5-40 mL Intravenous 2 times per day       IV drips:   DOPamine 10 mcg/kg/min (04/18/21 0251)    sodium chloride      heparin (PORCINE) Infusion 12 Units/kg/hr (04/18/21 0600)       PRN:  sodium chloride flush, sodium chloride, promethazine **OR** ondansetron, polyethylene glycol, acetaminophen **OR** acetaminophen, perflutren lipid microspheres, heparin (porcine), heparin (porcine)    Allergy:     Bactrim [sulfamethoxazole-trimethoprim] and Seasonal       Review of Systems:     All 12 point review of symptoms completed. Pertinent positives identified in the HPI, all other review of symptoms negative as below. CONSTITUTIONAL: Lightheadedness  SKIN: No rash or pruritis. EYES: No visual changes or diplopia. No scleral icterus.   ENT: No Headaches, hearing loss or vertigo. No mouth sores or sore throat. CARDIOVASCULAR: No chest pain/chest pressure/chest discomfort. No palpitations. RESPIRATORY: No cough or wheezing, no sputum production. No hematemesis. GASTROINTESTINAL: Diarrhea/vomiting  GENITOURINARY: No dysuria, trouble voiding, or hematuria. MUSCULOSKELETAL:  No gait disturbance, weakness or joint complaints. NEUROLOGICAL: No headache, diplopia, change in muscle strength, numbness or tingling. No change in gait, balance, coordination, mood, affect, memory, mentation, behavior. PSHYCH: No anxiety, loss of interest, change in sexual behavior, feelings of self-harm, or confusion. ENDOCRINE: No malaise, fatigue or temperature intolerance. No excessive thirst, fluid intake, or urination. No tremor. HEMATOLOGIC: No abnormal bruising or bleeding. ALLERGY: No nasal congestion or hives.       Physical Examination:     Vitals:    04/18/21 0800 04/18/21 0830 04/18/21 0900 04/18/21 0930   BP: 112/68 102/80 106/79    Pulse: 62 116 108 125   Resp: 15 (!) 34 30 17   Temp:       TempSrc:       SpO2: 90% 93% 95% 95%   Weight:           Wt Readings from Last 3 Encounters:   04/18/21 163 lb 9.3 oz (74.2 kg)   04/08/21 165 lb 3.2 oz (74.9 kg)   03/11/21 166 lb 8 oz (75.5 kg)       Objective      General Appearance:  Alert, cooperative, no distress, appears stated age Appropriate weight   Head:  Normocephalic, without obvious abnormality, atraumatic   Eyes:  PERRL, conjunctiva/corneas clear EOM intact  Ears normal   Throat no lesions       Nose: Nares normal, no drainage or sinus tenderness   Throat: Lips, mucosa, and tongue normal   Neck: Supple, symmetrical, trachea midline, no adenopathy, thyroid: not enlarged, symmetric, no tenderness/mass/nodules, no carotid bruit or JVD       Lungs:   Clear to auscultation bilaterally, respirations unlabored   Chest Wall:  No tenderness or deformity   Heart:  Regular rate and rhythm, S1, S2 normal, no murmur, rub or gallop PMI intact   Abdomen:   Soft, non-tender, bowel sounds active all four quadrants,  no masses, no organomegaly       Extremities: Extremities normal, atraumatic, no cyanosis or edema   Pulses: 2+ and symmetric   Skin: Skin color, texture, turgor normal, no rashes or lesions   Pysch: Normal mood and affect   Neurologic: Normal gross motor and sensory exam.  Cranial nerves intact        Labs:     Recent Labs     04/17/21  1525 04/18/21  0532    140   K 4.8 4.6   BUN 24* 26*   CREATININE 1.3* 1.4*    104   CO2 19* 24   GLUCOSE 222* 154*   CALCIUM 9.9 8.9   MG 2.30 1.80     Recent Labs     04/17/21  1525 04/18/21  0532   WBC 11.1* 16.8*   HGB 16.0 14.2   HCT 48.4* 42.1    256   MCV 96.9 95.5     No results for input(s): CHOLTOT, TRIG, HDL in the last 72 hours. Invalid input(s): LIPIDCOMM, CHOLHDL, VLDCHOL, LDL  Recent Labs     04/17/21  1800   INR 1.55*     Recent Labs     04/17/21  1525 04/17/21  1800   TROPONINI <0.01 <0.01     No results for input(s): BNP in the last 72 hours. Recent Labs     04/17/21  1525   TSH 14.44*     No results for input(s): CHOL, HDL, LDLCALC, TRIG in the last 72 hours.]    Lab Results   Component Value Date    TROPONINI <0.01 04/17/2021         Imaging:     I personally reviewed imaging studies including CXR, Stress test, TTE/JONEL. Last ECG (if available) - EKG:  I have reviewed EKG with the following interpretation  Sinus bradycardia    Telemetry:  Sinus bradycardia. Sinus tach    Last Stress (if available): There is normal isotope uptake at stress and rest. There is no evidence of    myocardial ischemia or scar.    Normal LV size and systolic function.    Left ventricular ejection fraction of 73 %.    There are no regional wall motion abnormalities.    Overall findings represent a low risk scan.        Last TTE/JONEL(if available):    Last Cath (if available):    Last CMR  (if available):      Assessment / Plan:     Symptomatic bradycardia/A. fib  -Resolved after holding beta-blocker and flecainide.  -Stop dopamine drip this morning since she flipped into A. fib with RVR  -Continue to monitor during the day, add low-dose beta-blocker later in the day if she remains in A. fib with RVR. -We will have EP see the patient tomorrow. Tobacco use was discussed with the patient and educated on the negative effects. I have asked the patient to not utilize these agents. Thank you for allowing to us to participate in the care or Jorge Vogel. Further evaluation will be based upon the patient's clinical course and testing results. I have spent 40 minutes of face to face time with the patient with more than 50% spent counseling and coordinating care. All questions and concerns were addressed to the patient/family. Alternatives to my treatment were discussed. The note was completed using EMR. Every effort was made to ensure accuracy; however, inadvertent computerized transcription errors may be present. I have personally reviewed the reports and images of labs, radiological studies, cardiac studies including ECG's and telemetry, current and old medical records. Mik Weir MD, [facility], [facility]

## 2021-04-18 NOTE — PROGRESS NOTES
Hospitalist Progress Note      PCP: Adelaida Maya DO    Date of Admission: 4/17/2021    Chief Complaint: [facility] Course: ***     Subjective: ***       Medications:  Reviewed    Infusion Medications    DOPamine 10 mcg/kg/min (04/18/21 0251)    sodium chloride      heparin (PORCINE) Infusion 12 Units/kg/hr (04/18/21 0600)     Scheduled Medications    levothyroxine  25 mcg Oral QAM AC    sodium chloride flush  5-40 mL Intravenous 2 times per day     PRN Meds: sodium chloride flush, sodium chloride, promethazine **OR** ondansetron, polyethylene glycol, acetaminophen **OR** acetaminophen, perflutren lipid microspheres, heparin (porcine), heparin (porcine)      Intake/Output Summary (Last 24 hours) at 4/18/2021 0912  Last data filed at 4/18/2021 0600  Gross per 24 hour   Intake 715 ml   Output 775 ml   Net -60 ml       Exam:    /80   Pulse 116   Temp 98 °F (36.7 °C) (Axillary)   Resp (!) 34   Wt 163 lb 9.3 oz (74.2 kg)   SpO2 93%   BMI 29.92 kg/m²     General appearance: No apparent distress, appears stated age and cooperative. HEENT: Pupils equal, round, and reactive to light. Conjunctivae/corneas clear. Neck: Supple, with full range of motion. No jugular venous distention. Trachea midline. Respiratory:  Normal respiratory effort. Clear to auscultation, bilaterally without Rales/Wheezes/Rhonchi. Cardiovascular: Regular rate and rhythm with normal S1/S2 without murmurs, rubs or gallops. Abdomen: Soft, non-tender, non-distended with normal bowel sounds. Musculoskelatal: No clubbing, cyanosis or edema bilaterally. Skin: Skin color, texture, turgor normal.  No rashes or lesions.   Neurologic:  Cranial nerves: II-XII intact, grossly non-focal.  Psychiatric: Alert and oriented, thought content appropriate, normal insight    Labs:   Recent Labs     04/17/21  1525 04/18/21  0532   WBC 11.1* 16.8*   HGB 16.0 14.2   HCT 48.4* 42.1    256     Recent Labs     04/17/21  1525 04/18/21  0532    140   K 4.8 4.6    104   CO2 19* 24   BUN 24* 26*   CREATININE 1.3* 1.4*   CALCIUM 9.9 8.9     Recent Labs     04/17/21  1525   AST 47*   ALT 42*   BILITOT 0.7   ALKPHOS 123     Recent Labs     04/17/21  1800   INR 1.55*     Recent Labs     04/17/21  1525 04/17/21  1800   TROPONINI <0.01 <0.01       Studies:  XR CHEST PORTABLE   Final Result      Cardiomegaly and mild pulmonary edema.              Assessment/Plan:    Active Hospital Problems    Diagnosis Date Noted    Bradycardia [R00.1] 04/17/2021         DVT Prophylaxis: ***  Diet: Diet NPO Effective Now  Code Status: Full Code    PT/OT Eval Status:     Dispo - Inpatient    Suzanne Moon DO

## 2021-04-18 NOTE — PROGRESS NOTES
A line dressing site saturated again. Residents at bedside. Topical hemostat placed on site with pressure dressing on top. Continue to hold heparin gtt for now. Will continue to monitor.

## 2021-04-18 NOTE — PLAN OF CARE
Problem: Falls - Risk of:  Goal: Will remain free from falls  Description: Will remain free from falls  Outcome: Met This Shift  Note: Patient at risk for falls. Patient resting quietly in bed. Side rails up x 2. Bed locked in lowest position. Bed alarm on. Sitter at bedside to prevent pt from pulling off bipap . Will continue to monitor. Problem: Skin Integrity:  Goal: Will show no infection signs and symptoms  Description: Will show no infection signs and symptoms  Outcome: Ongoing  Note: Dressing is dry, intact with old bloody drainage noted. Dressing changed. Brusiing noted around site, no hematoma. Will continue to assess. Problem: Skin Integrity:  Goal: Absence of new skin breakdown  Description: Absence of new skin breakdown  Outcome: Ongoing  Note: Pt at risk for skin breakdown. Skin assessment complete. No signs of skin breakdown. Pts skin cleansed with inc cleanser. CHG bath done overnight. Sacral heart in place. Pt repositioned in bed with pillow support. Will continue to monitor. Problem: Cardiac:  Goal: Ability to maintain vital signs within normal range will improve  Description: Ability to maintain vital signs within normal range will improve  Outcome: Ongoing  Note: VSS~ SBP over 65 on 10 mcg of dopamine. HR 60s. Pt converted to NSR overnight. Pt denies chest pain and SOB. Cardio following. Will continue to monitor. Problem: Urinary Elimination:  Goal: Signs and symptoms of infection will decrease  Description: Signs and symptoms of infection will decrease  Outcome: Ongoing  Note: Loving draining clear yellow urine. No urethral drainage noted. Olving care done with soap and water q shift. Will continue to assess need for loving and remove when possible.      Problem: Infection - Central Venous Catheter-Associated Bloodstream Infection:  Goal: Will show no infection signs and symptoms  Description: Will show no infection signs and symptoms  Outcome: Ongoing  Note: Dressing is dry, intact with old bloody drainage noted. Dressing changed. Brusiing noted around site, no hematoma. Will continue to assess. Problem: Fluid Volume - Imbalance:  Goal: Absence of imbalanced fluid volume signs and symptoms  Description: Absence of imbalanced fluid volume signs and symptoms  Outcome: Ongoing  Note: Pt sats have remained above 90% on BIPAP overnight. Denies SOB, and chest pain. Cap refill <3 seconds and slight SOB on exertion noted. Lungs sounds have crackles and are diminished throughout bilaterally. Pt sleeping with HOB above 30 degrees. Will continue to monitor.

## 2021-04-19 LAB
ANION GAP SERPL CALCULATED.3IONS-SCNC: 7 MMOL/L (ref 3–16)
APTT: 41.2 SEC (ref 24.2–36.2)
APTT: 50.6 SEC (ref 24.2–36.2)
APTT: 55.2 SEC (ref 24.2–36.2)
BASOPHILS ABSOLUTE: 0 K/UL (ref 0–0.2)
BASOPHILS RELATIVE PERCENT: 0.3 %
BUN BLDV-MCNC: 23 MG/DL (ref 7–20)
CALCIUM SERPL-MCNC: 8.7 MG/DL (ref 8.3–10.6)
CHLORIDE BLD-SCNC: 102 MMOL/L (ref 99–110)
CO2: 27 MMOL/L (ref 21–32)
CREAT SERPL-MCNC: 1 MG/DL (ref 0.6–1.2)
EKG ATRIAL RATE: 131 BPM
EKG DIAGNOSIS: NORMAL
EKG Q-T INTERVAL: 368 MS
EKG QRS DURATION: 88 MS
EKG QTC CALCULATION (BAZETT): 504 MS
EKG R AXIS: 59 DEGREES
EKG T AXIS: 250 DEGREES
EKG VENTRICULAR RATE: 113 BPM
EOSINOPHILS ABSOLUTE: 0.1 K/UL (ref 0–0.6)
EOSINOPHILS RELATIVE PERCENT: 1 %
GFR AFRICAN AMERICAN: >60
GFR NON-AFRICAN AMERICAN: 53
GLUCOSE BLD-MCNC: 125 MG/DL (ref 70–99)
HCT VFR BLD CALC: 34.5 % (ref 36–48)
HEMOGLOBIN: 11.9 G/DL (ref 12–16)
LYMPHOCYTES ABSOLUTE: 0.8 K/UL (ref 1–5.1)
LYMPHOCYTES RELATIVE PERCENT: 7.9 %
MAGNESIUM: 1.9 MG/DL (ref 1.8–2.4)
MCH RBC QN AUTO: 32.9 PG (ref 26–34)
MCHC RBC AUTO-ENTMCNC: 34.5 G/DL (ref 31–36)
MCV RBC AUTO: 95.3 FL (ref 80–100)
MONOCYTES ABSOLUTE: 1.2 K/UL (ref 0–1.3)
MONOCYTES RELATIVE PERCENT: 12.3 %
NEUTROPHILS ABSOLUTE: 7.7 K/UL (ref 1.7–7.7)
NEUTROPHILS RELATIVE PERCENT: 78.5 %
PDW BLD-RTO: 14.7 % (ref 12.4–15.4)
PLATELET # BLD: 175 K/UL (ref 135–450)
PMV BLD AUTO: 8.2 FL (ref 5–10.5)
POTASSIUM REFLEX MAGNESIUM: 3.8 MMOL/L (ref 3.5–5.1)
RBC # BLD: 3.62 M/UL (ref 4–5.2)
SODIUM BLD-SCNC: 136 MMOL/L (ref 136–145)
WBC # BLD: 9.8 K/UL (ref 4–11)

## 2021-04-19 PROCEDURE — 2500000003 HC RX 250 WO HCPCS: Performed by: STUDENT IN AN ORGANIZED HEALTH CARE EDUCATION/TRAINING PROGRAM

## 2021-04-19 PROCEDURE — 85730 THROMBOPLASTIN TIME PARTIAL: CPT

## 2021-04-19 PROCEDURE — 2700000000 HC OXYGEN THERAPY PER DAY

## 2021-04-19 PROCEDURE — 83735 ASSAY OF MAGNESIUM: CPT

## 2021-04-19 PROCEDURE — 94761 N-INVAS EAR/PLS OXIMETRY MLT: CPT

## 2021-04-19 PROCEDURE — 2000000000 HC ICU R&B

## 2021-04-19 PROCEDURE — 99233 SBSQ HOSP IP/OBS HIGH 50: CPT | Performed by: INTERNAL MEDICINE

## 2021-04-19 PROCEDURE — 93010 ELECTROCARDIOGRAM REPORT: CPT | Performed by: INTERNAL MEDICINE

## 2021-04-19 PROCEDURE — 36592 COLLECT BLOOD FROM PICC: CPT

## 2021-04-19 PROCEDURE — 93308 TTE F-UP OR LMTD: CPT

## 2021-04-19 PROCEDURE — 36415 COLL VENOUS BLD VENIPUNCTURE: CPT

## 2021-04-19 PROCEDURE — 6360000002 HC RX W HCPCS: Performed by: INTERNAL MEDICINE

## 2021-04-19 PROCEDURE — 99223 1ST HOSP IP/OBS HIGH 75: CPT | Performed by: NURSE PRACTITIONER

## 2021-04-19 PROCEDURE — 6360000002 HC RX W HCPCS: Performed by: STUDENT IN AN ORGANIZED HEALTH CARE EDUCATION/TRAINING PROGRAM

## 2021-04-19 PROCEDURE — 80048 BASIC METABOLIC PNL TOTAL CA: CPT

## 2021-04-19 PROCEDURE — 6370000000 HC RX 637 (ALT 250 FOR IP): Performed by: STUDENT IN AN ORGANIZED HEALTH CARE EDUCATION/TRAINING PROGRAM

## 2021-04-19 PROCEDURE — 2580000003 HC RX 258: Performed by: STUDENT IN AN ORGANIZED HEALTH CARE EDUCATION/TRAINING PROGRAM

## 2021-04-19 PROCEDURE — 2500000003 HC RX 250 WO HCPCS

## 2021-04-19 PROCEDURE — 85025 COMPLETE CBC W/AUTO DIFF WBC: CPT

## 2021-04-19 PROCEDURE — 6360000002 HC RX W HCPCS

## 2021-04-19 PROCEDURE — 2500000003 HC RX 250 WO HCPCS: Performed by: PHYSICIAN ASSISTANT

## 2021-04-19 RX ORDER — METOPROLOL TARTRATE 5 MG/5ML
5 INJECTION INTRAVENOUS
Status: COMPLETED | OUTPATIENT
Start: 2021-04-19 | End: 2021-04-19

## 2021-04-19 RX ORDER — FUROSEMIDE 10 MG/ML
20 INJECTION INTRAMUSCULAR; INTRAVENOUS 2 TIMES DAILY
Status: DISCONTINUED | OUTPATIENT
Start: 2021-04-19 | End: 2021-04-21

## 2021-04-19 RX ORDER — METOPROLOL TARTRATE 5 MG/5ML
5 INJECTION INTRAVENOUS ONCE
Status: COMPLETED | OUTPATIENT
Start: 2021-04-19 | End: 2021-04-19

## 2021-04-19 RX ORDER — METOPROLOL TARTRATE 5 MG/5ML
5 INJECTION INTRAVENOUS EVERY 6 HOURS
Status: DISCONTINUED | OUTPATIENT
Start: 2021-04-19 | End: 2021-04-19

## 2021-04-19 RX ADMIN — Medication 10 ML: at 21:22

## 2021-04-19 RX ADMIN — HEPARIN SODIUM 8 UNITS/KG/HR: 10000 INJECTION, SOLUTION INTRAVENOUS at 04:58

## 2021-04-19 RX ADMIN — METOPROLOL TARTRATE 5 MG: 5 INJECTION INTRAVENOUS at 21:22

## 2021-04-19 RX ADMIN — METOPROLOL TARTRATE 5 MG: 5 INJECTION INTRAVENOUS at 09:41

## 2021-04-19 RX ADMIN — HEPARIN SODIUM 2000 UNITS: 1000 INJECTION INTRAVENOUS; SUBCUTANEOUS at 11:33

## 2021-04-19 RX ADMIN — LEVOTHYROXINE SODIUM 25 MCG: 0.03 TABLET ORAL at 06:10

## 2021-04-19 RX ADMIN — METOPROLOL TARTRATE 5 MG: 5 INJECTION INTRAVENOUS at 16:33

## 2021-04-19 RX ADMIN — FUROSEMIDE 20 MG: 10 INJECTION, SOLUTION INTRAMUSCULAR; INTRAVENOUS at 16:33

## 2021-04-19 RX ADMIN — FUROSEMIDE 20 MG: 20 TABLET ORAL at 08:30

## 2021-04-19 RX ADMIN — Medication 10 ML: at 08:30

## 2021-04-19 ASSESSMENT — ENCOUNTER SYMPTOMS
WHEEZING: 0
SHORTNESS OF BREATH: 1
NAUSEA: 0
COUGH: 0
PHOTOPHOBIA: 0
BACK PAIN: 0
SINUS PRESSURE: 0
ABDOMINAL DISTENTION: 0
VOMITING: 0
CHEST TIGHTNESS: 0
TROUBLE SWALLOWING: 0
SORE THROAT: 0
SINUS PAIN: 0
ABDOMINAL PAIN: 0

## 2021-04-19 ASSESSMENT — PAIN SCALES - GENERAL
PAINLEVEL_OUTOF10: 0
PAINLEVEL_OUTOF10: 0

## 2021-04-19 NOTE — CONSULTS
[facility]   Electrophysiology Nurse Practitioner  Consult Rounding    Date: 4/19/2021    Reason for Consultation/ Chief Complaint: AF/RVR, TBS    History Obtained From: Patient and medical record. Cardiac Hx: Cas Del Toro is a [de-identified] y.o. woman with a h/o GERD, hypothyroidism and pAF., originally dx'd 1/2021 with progressively worsening SOB, noted to be in AF/RVR, placed on dilt, placed on Elquis, BB and lasix, EF 55-60% per echo who p/w lightheadedness, N/V/D, found to be SB with HR's in the 40's, placed on dopamine, given IVF then developed AF/RVR. 6.8 sec conversion pause noted on tele. HPI: Patient states that she had been at home, had taken 2 of the flecainide 50 mg and then became dizzy, lightheaded and near syncopal.  She states she called her daughter and then she woke up surrounded by the EMS and her daughter. Review of the telemetry shows that she has had AF/RVR, intermittent NSR/SB and 1 conversion pause lasting 6.8 seconds. Patient is asymptomatic when she is out of rhythm. She does not feel any heart racing, palpitations or irregular heartbeats. She denies any chest pain, shortness of breath, PND, orthopnea or lower extremity edema. 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    Home medications:   No current facility-administered medications on file prior to encounter. Current Outpatient Medications on File Prior to Encounter   Medication Sig Dispense Refill    metoprolol tartrate (LOPRESSOR) 100 MG tablet Take 1 tablet by mouth 2 times daily 60 tablet 5    Handicap Placard MISC by Does not apply route Dx of shortness of breath and A-fib.   Effective Jan 13, 2021 until Jan 13, 2026. 1 each 0    apixaban (ELIQUIS) 5 MG TABS tablet Take 1 tablet by mouth 2 times daily 60 tablet 3    furosemide (LASIX) 20 MG tablet Take 1 tablet by mouth daily 60 tablet 3    levothyroxine (SYNTHROID) 25 MCG tablet TAKE 1 TABLET BY MOUTH EVERY DAY 90 tablet 3    Cholecalciferol (VITAMIN D) 2000 UNITS CAPS capsule Take 1 capsule by mouth daily.       flecainide (TAMBOCOR) 50 MG tablet Take 2 tablets by mouth 2 times daily 60 tablet 5       Scheduled Meds:   metoprolol  5 mg Intravenous Once    furosemide  20 mg Oral Daily    levothyroxine  25 mcg Oral QAM AC    sodium chloride flush  5-40 mL Intravenous 2 times per day     Continuous Infusions:   DOPamine 10 mcg/kg/min (04/18/21 0251)    sodium chloride      heparin (PORCINE) Infusion 8 Units/kg/hr (04/19/21 0458)     PRN Meds:sodium chloride, sodium chloride flush, sodium chloride, promethazine **OR** ondansetron, polyethylene glycol, acetaminophen **OR** acetaminophen, perflutren lipid microspheres, heparin (porcine), heparin (porcine)       Past Medical History:   Diagnosis Date    Allergic rhinitis     Alveolitis of jaw     Cancer ([facility] [address]     Dental disease     Diverticulosis of colon (without mention of hemorrhage)     Dizziness     GERD (gastroesophageal reflux disease)     Hearing loss     Myalgia and myositis, unspecified     Other and unspecified hyperlipidemia     Recurrent upper respiratory infection (URI)     Screening mammogram 11/05/2008    Normal    Tinnitus     Unspecified gastritis and gastroduodenitis without mention of hemorrhage     Unspecified hypothyroidism         Past Surgical History:   Procedure Laterality Date    BLADDER SUSPENSION  1990s    CHOLECYSTECTOMY  1990s    COLONOSCOPY  1/16/2007    Normal    COLONOSCOPY  02/27/2012    Dr. Israel Parent    Right Repair    HYSTERECTOMY  1975    WISDOM TOOTH EXTRACTION         Allergies   Allergen Reactions    Bactrim [Sulfamethoxazole-Trimethoprim] Hives     hives    Seasonal Other (See Comments)     Runny nose, sneezing       Social History:  Reviewed. reports that she quit smoking about 15 years ago. She smoked 0.00 packs per day for 10.00 years. She has never used smokeless tobacco. She reports current alcohol use. She reports that she does not use drugs. Family History:  Reviewed. family history includes Cancer in her brother. Review of System:    · Constitutional: No fevers, chills. · Eyes: No visual changes or diplopia. No scleral icterus. · ENT: No Headaches. No mouth sores or sore throat. · Cardiovascular: No for chest pain, No for dyspnea on exertion, No for palpitations or No for loss of consciousness. No cough, hemoptysis, No for pleuritic pain, or phlebitis. · Respiratory: No for cough or wheezing. No hematemesis. · Gastrointestinal: No abdominal pain, blood in stools. · Musculoskeletal: No gait disturbance,    · Integumentary: No rash or pruritis. · Neurological: No headache, change in muscle strength, numbness or tingling. · Psychiatric: No anxiety, or depression. Physical Examination:  Vitals:    21 0907   BP:    Pulse:    Resp: 24   Temp:    SpO2: 96%      In: 218 [I.V.:218]  Out: 960    Wt Readings from Last 3 Encounters:   21 163 lb 9.3 oz (74.2 kg)   21 165 lb 3.2 oz (74.9 kg)   21 166 lb 8 oz (75.5 kg)     Temp  Av.2 °F (36.8 °C)  Min: 97.6 °F (36.4 °C)  Max: 98.6 °F (37 °C)  Pulse  Av.2  Min: 61  Max: 125  BP  Min: 80/62  Max: 134/90  SpO2  Av.7 %  Min: 87 %  Max: 100 %    Intake/Output Summary (Last 24 hours) at 2021 0915  Last data filed at 2021 0833  Gross per 24 hour   Intake 218 ml   Output 1060 ml   Net -842 ml       · Constitutional: Oriented. No distress. · Head: Normocephalic and atraumatic. · Mouth/Throat: Oropharynx is clear and moist.   · Eyes: Conjunctivae clear without jaunduice. PERRL. · Neck: Neck supple. No rigidity. No JVD present.     · Cardiovascular: Normal rate, regular rhythm, S1&S2.    · Pulmonary/Chest: Bilateral respiratory sounds. No wheezes, No rhonchi. · Abdominal: Soft. Bowel sounds present. No distension, No tenderness. · Musculoskeletal: No tenderness. No edema    · Lymphadenopathy: Has no cervical adenopathy. · Neurological: Alert and oriented. Cranial nerve appears intact, No Gross deficit   · Skin: Skin is warm and dry. No rash noted. · Psychiatric: Has a normal mood, affect and behavior     Labs:  Reviewed. Recent Labs     04/17/21  1525 04/18/21  0532 04/19/21  0600    140 136   K 4.8 4.6 3.8    104 102   CO2 19* 24 27   BUN 24* 26* 23*   CREATININE 1.3* 1.4* 1.0     Recent Labs     04/17/21  1525 04/18/21  0532 04/19/21  0600   WBC 11.1* 16.8* 9.8   HGB 16.0 14.2 11.9*   HCT 48.4* 42.1 34.5*   MCV 96.9 95.5 95.3    256 175     Lab Results   Component Value Date    TROPONINI <0.01 04/17/2021     No results found for: BNP  Lab Results   Component Value Date    PROTIME 18.1 04/17/2021    PROTIME 12.3 01/02/2021    INR 1.55 04/17/2021    INR 1.06 01/02/2021     Lab Results   Component Value Date    CHOL 217 09/10/2020    HDL 78 09/10/2020    HDL 87 01/27/2012    TRIG 90 09/10/2020       Telemetry: Personally reviewed: NSR, SB, AF/RVR with 6.8 sec conversion pause    Radiography: Personally reviewed: CXR showed mild pulm edema    ECG: Personally reviewed: AF/AFL/AT, , QRS88, QTc 504    ECHO:  1/4/21  Summary   Left ventricular cavity size is normal. There is mild concentric left   ventricular hypertrophy (more prominent basal septal hypertrophy). Overall   left ventricular systolic function appears normal with an estimated ejection   fraction of 55-60%. No regional wall motion abnormalities are noted. Diastolic function is indeterminate due to abnormal heart rhythm. Moderate mitral regurgitation is present. The left atrium is severely dilated. Mild aortic regurgitation is present. Moderate tricuspid regurgitation.      Stress Test: Cardiac Electrophysiology  [address] [facility] [place] [phone]

## 2021-04-19 NOTE — PROGRESS NOTES
ICU Progress Note    PCP: Adelaida Maya DO    Date of Admission: 4/17/2021    Date of Service: Pt seen/examined on 4/18/2021    Chief Complaint:  Symptomatic Bradycardia    Active Hospital Problems    Diagnosis Date Noted    Acute respiratory failure with hypoxia and hypercapnia (HCC) [J96.01, J96.02] 04/18/2021    Hypotension [I95.9] 04/18/2021    ALEJA (acute kidney injury) ([address] [N17.9]     Bradycardia [R00.1] 04/17/2021     History Obtained From:  patient, electronic medical record    History Of Present Illness:     [de-identified] y.o. female who presented to [facility], [facility]. with symptomatic bradycardia. Of note pt w/ recently diagnosed afib in last 3-4 mo being followed by Dr. Funmilayo Dee and was recently started on flecainide in addition to metoprolol. Pt stated that early in the afternoon pt developed lightheadedness, nausea, 1 ep of emesis and abdominal pain. As well as minimal PO intake last 2-3d. Pt did undergo stress test on 4/14/21 which was normal w/ EF of 73% after which she was started on the flecainide. Pt also states that she had been having foul-smelling stools as well. Pt states that she has received both COVID vaccines last dose on 4/16/21. In ED pt was noted to be bradycardic 30-40s and was treated w/ atropine w/ some effect, but was noted to be hypotensive to the 34V systolic after which she was started on dopamine gtt which was quickly titrated up to 25. She also developed respiratory distress requiring NRB. EKG was sinus julia w/ 1st degree block. While admitted pt was continuing to have worsening respiratory distress and AMS. She was given 40mg IV lasix and then was put on BiPAP w/ great response and was taken off this AM.     Overnight she flipped back into afib RVR after a conversion pause of 4s and then 2s. Pressures remained stable and cuff pressure were correlating to the lisbeth which was tenuous so lisbeth was removed and dopamine was tirated off d/t afib rvr.      Interval History  No overnight events. Patient reports her breathing is much improved. HR controlled. Patient states she wants a PPM. Denies chest pain, sob, nausea, vomiting, diarrhea, constipation. Past Medical History:          Diagnosis Date    Allergic rhinitis     Alveolitis of jaw     Cancer ([address]     Dental disease     Diverticulosis of colon (without mention of hemorrhage)     Dizziness     GERD (gastroesophageal reflux disease)     Hearing loss     Myalgia and myositis, unspecified     Other and unspecified hyperlipidemia     Recurrent upper respiratory infection (URI)     Screening mammogram 11/05/2008    Normal    Tinnitus     Unspecified gastritis and gastroduodenitis without mention of hemorrhage     Unspecified hypothyroidism        Past SurgicalHistory:          Procedure Laterality Date    BLADDER SUSPENSION  1990s    CHOLECYSTECTOMY  1990s    COLONOSCOPY  1/16/2007    Normal    COLONOSCOPY  02/27/2012    Dr. Susan Quiros    Right Repair    Joanne [place] EXTRACTION         Medications Prior to Admission:      Medications Prior to Admission: metoprolol tartrate (LOPRESSOR) 100 MG tablet, Take 1 tablet by mouth 2 times daily  Handicap Placard MISC, by Does not apply route Dx of shortness of breath and A-fib. Effective Jan 13, 2021 until Jan 13, 2026. apixaban (ELIQUIS) 5 MG TABS tablet, Take 1 tablet by mouth 2 times daily  furosemide (LASIX) 20 MG tablet, Take 1 tablet by mouth daily  levothyroxine (SYNTHROID) 25 MCG tablet, TAKE 1 TABLET BY MOUTH EVERY DAY  Cholecalciferol (VITAMIN D) 2000 UNITS CAPS capsule, Take 1 capsule by mouth daily. flecainide (TAMBOCOR) 50 MG tablet, Take 2 tablets by mouth 2 times daily    Allergies:  Bactrim [sulfamethoxazole-trimethoprim] and Seasonal    Social History:      The patient currently lives with family    TOBACCO:   reports that she quit smoking about 15 years ago. She smoked 0.00 packs per day for 10.00 years. She has never used smokeless tobacco.  ETOH:   reports current alcohol use. Family History:        Problem Relation Age of Onset    Cancer Brother      REVIEW OF SYSTEMS: A 10 point review of systems was conducted, pertinent positives as noted in the HPI. All other systems reviewed and negative. ROS: Review of Systems   Constitutional: Positive for fatigue (Much improved). Negative for chills and fever. HENT: Positive for congestion and postnasal drip. Negative for sinus pressure, sinus pain, sore throat and trouble swallowing. Eyes: Negative for photophobia and visual disturbance. Respiratory: Positive for shortness of breath (Improved after bipap). Negative for cough, chest tightness and wheezing. Cardiovascular: Negative for chest pain, palpitations and leg swelling. Gastrointestinal: Negative for abdominal distention, abdominal pain, nausea and vomiting. Genitourinary: Negative for difficulty urinating, dysuria, frequency, hematuria and urgency. Musculoskeletal: Negative for arthralgias, back pain, neck pain and neck stiffness. Skin: Negative. Neurological: Negative for dizziness, seizures, weakness and numbness. Psychiatric/Behavioral: Negative for confusion, decreased concentration and dysphoric mood.      PHYSICAL EXAM PERFORMED:    Temp  Av.2 °F (36.8 °C)  Min: 97.6 °F (36.4 °C)  Max: 98.6 °F (37 °C)  Pulse  Av.1  Min: 61  Max: 125  BP  Min: 80/62  Max: 134/90  SpO2  Av.7 %  Min: 87 %  Max: 100 %  Patient Vitals for the past 4 hrs:   BP Temp Temp src Pulse Resp SpO2   21 0800 105/70 97.6 °F (36.4 °C) Oral 85 17 99 %   21 0728 -- -- -- 80 26 95 %   21 0700 80/62 -- -- 78 25 97 %   21 0600 107/61 -- -- 77 27 95 %   21 0500 106/63 98.6 °F (37 °C) Oral 75 12 94 %       Intake/Output Summary (Last 24 hours) at 2021 0830  Last data filed at 2021 0600  Gross per 24 hour   Intake 40 ml   Output 1060 ml   Net -1020 ml     Physical Exam  Vitals signs reviewed. Constitutional:       General: She is not in acute distress. Appearance: Normal appearance. She is well-developed. She is not diaphoretic. Interventions: Nasal cannula in place. HENT:      Head: Normocephalic and atraumatic. Mouth/Throat:      Mouth: Mucous membranes are moist.      Pharynx: Oropharynx is clear. No oropharyngeal exudate. Eyes:      General: No scleral icterus. Extraocular Movements: Extraocular movements intact. Pupils: Pupils are equal, round, and reactive to light. Neck:      Musculoskeletal: Normal range of motion. Trachea: No tracheal deviation. Cardiovascular:      Rate and Rhythm: Normal rate and regular rhythm. Pulses: Normal pulses. Heart sounds: Normal heart sounds. Pulmonary:      Effort: Pulmonary effort is normal. No respiratory distress. Breath sounds: No wheezing. Rales: MIld basilar, improved. Abdominal:      General: Bowel sounds are normal. There is no distension. Palpations: Abdomen is soft. Tenderness: There is no abdominal tenderness. Musculoskeletal: Normal range of motion. General: No swelling or tenderness. Right lower leg: No edema. Left lower leg: No edema. Skin:     General: Skin is warm and dry. Findings: No erythema or rash. Comments: R wrist bruising at prev lisbeth site  Left femoral CVC site with minimal saturation to bandage   Neurological:      Mental Status: She is alert and oriented to person, place, and time. Mental status is at baseline. Cranial Nerves: No cranial nerve deficit. Sensory: No sensory deficit. Psychiatric:         Mood and Affect: Mood normal.         Behavior: Behavior normal.         Thought Content:  Thought content normal.         Judgment: Judgment normal.       Labs:     Recent Labs     04/17/21  1525 04/18/21  0532 04/19/21  0600   WBC 11.1* 16.8* 9.8   HGB 16.0 14.2 11.9*   HCT 48.4* 42.1 34.5*    256 175     Recent Labs     04/17/21  1525 04/18/21  0532 04/19/21  0600    140 136   K 4.8 4.6 3.8    104 102   CO2 19* 24 27   BUN 24* 26* 23*   CREATININE 1.3* 1.4* 1.0   CALCIUM 9.9 8.9 8.7     Recent Labs     04/17/21  1525   AST 47*   ALT 42*   BILITOT 0.7   ALKPHOS 123     Recent Labs     04/17/21  1800   INR 1.55*     Recent Labs     04/17/21  1525 04/17/21  1800   TROPONINI <0.01 <0.01       Urinalysis:   Lab Results   Component Value Date    NITRU Negative 04/17/2021    WBCUA 0-2 04/17/2021    BACTERIA Rare 04/17/2021    RBCUA 0-2 04/17/2021    BLOODU TRACE-LYSED 04/17/2021    SPECGRAV 1.020 04/17/2021    GLUCOSEU 100 04/17/2021     Recent Labs     04/17/21  1855   COLORU Yellow   PHUR 6.5   WBCUA 0-2   RBCUA 0-2   BACTERIA Rare*   CLARITYU SL CLOUDY*   SPECGRAV 1.020   LEUKOCYTESUR Negative   UROBILINOGEN 0.2   BILIRUBINUR Negative   BLOODU TRACE-LYSED*   GLUCOSEU 100*     Radiology:     EKG:  I have reviewed the EKG with the following interpretation: Sinus tachy, wenkebach    XR CHEST PORTABLE   Final Result      Cardiomegaly and mild pulmonary edema. ASSESSMENT & PLAN:    Active Hospital Problems    Diagnosis Date Noted    Acute respiratory failure with hypoxia and hypercapnia (HCC) [J96.01, J96.02] 04/18/2021    Hypotension [I95.9] 04/18/2021    ALEJA (acute kidney injury) [address] [N17.9]     Bradycardia [R00.1] 04/17/2021     INFECTIOUS     Leukocytosis, resolved suspect reactive   - Afebrile, infectious w/u so far unremarkable, will monitor  - Lactate normalized, lipase WNL, procal 0.10  - No  abx for now    PULMONARY     Acute hypoxic respiratory failure 2/2 pulmonary edema   - Ventilation and gas exchange supported with BiPAP  - supp O2 down trending. Currently on 6L NC  - Hypercapnia resolved on BiPAP  - Pulm edema on CXR 4/17/21.   - s/p 40 IV Lasix x1.  Resume home 20 PO Lasix  - Lactate resolved w/ dopamine gtt, stop trend  - ECHO pending      CARDIOVASCULAR    Symptomatic bradycardia with hypotension HR in 30-40s and SBP<90 s/p atropine   - likely 2/2 recently started flecainide and metoprolol   - HOLDING rate control meds  - Was intially placed on Dopamine gtt but now off.   - Cards consulted and recs appreciated. EP to see patient today  - will continue heparin at this time until recs from Cards    Atrial Fibrillation on Eliquis  - recently diagnosed in last 3 months. - Was on Flecanide and metoprolol  - Follows with Dr. Wilder GOTTI  -PRN tums    RENAL    ALEJA likely prerenal in setting of cardiogenic shock or hypotension  - Cre 1.0   - 24 hr  ml       Prophylaxis: AC'd on hep gtt  Diet: DIET CARDIAC;  Code Status: Full Code    Dispo - ICU today, transfer tomorrow if remains stable off dopamine gtt    Sparkle Doan MD  Internal Medicine Resident, PGY-1    I will discuss the patient with Dr. Jewels Lewis    Patient was seen, examined and discussed with Dr. Hiro Dawn and the ICU staff. I agree with the interval history. My physical exam confirms the findings listed below  Chart was reviewed including labs, CXR, CT scan and medical records confirm the findings noted    Symptomatic bradycardia - resolved now A.fib with RVR - improving with metoprolol IV. Keep in ICU. F/u on EP recommendations. Agree on the A/P noted above.

## 2021-04-19 NOTE — CARE COORDINATION
Case Management Assessment           Initial Evaluation                Date / Time of Evaluation: 4/19/2021 11:42 AM                 Assessment Completed by: Ramila Reese     Patient is from home and lives with her spouse. She is her 's caregiver since he has dementia but he is able to do his own ADL's so she does not do any lifting of any kind. They live in a two story home with 2 steps to enter and they stay on the main level. She did not have any services prior or DME needs. She was not on O2 either. She is planning on returning to home at d/c and does not have any needs for home. Patient Name: Jeana Jennings     YOB: 1940  Diagnosis: Bradycardia [R00.1]     Date / Time: 4/17/2021  3:06 PM    Patient Admission Status: Inpatient    If patient is discharged prior to next notation, then this note serves as note for discharge by case management. Current PCP: Arely Perez DO  Clinic Patient: No    Chart Reviewed: Yes  Patient/ Family Interviewed: Yes    Initial assessment completed at bedside with: patient    Hospitalization in the last 30 days: No    Emergency Contacts:  Extended Emergency Contact Information  Primary Emergency Contact: [address] [place], [place] [address] [address] Phone: [phone]  Relation: Spouse    Advance Directives:   Code Status: Full [address]: No  Agent: NA  Contact Number: NA      Financial  Payor: MEDICARE / Plan: MEDICARE PART A AND B / Product Type: *No Product type* /     Pre-cert required for SNF: No    Pharmacy    [facility]/pharmacy Aimee Knapp. - [place] [phone] [facility] [phone]  [address]  [facility] [address]  Phone: [phone] Fax: [phone]      Potential assistance Purchasing Medications: Potential Assistance Purchasing Medications: No  Does Patient want to participate in local refill/ meds to beds program?: Yes    Meds To Beds General Rules:  1.  Can ONLY be

## 2021-04-19 NOTE — PLAN OF CARE
Problem: Falls - Risk of:  Goal: Will remain free from falls  Description: Will remain free from falls  Outcome: Ongoing  Goal: Absence of physical injury  Description: Absence of physical injury  Outcome: Ongoing     Problem: Skin Integrity:  Goal: Will show no infection signs and symptoms  Description: Will show no infection signs and symptoms  Outcome: Ongoing  Goal: Absence of new skin breakdown  Description: Absence of new skin breakdown  Outcome: Ongoing     Problem: Cardiac:  Goal: Ability to maintain vital signs within normal range will improve  Description: Ability to maintain vital signs within normal range will improve  Outcome: Ongoing  Goal: Cardiovascular alteration will improve  Description: Cardiovascular alteration will improve  Outcome: Ongoing     Problem: Urinary Elimination:  Goal: Signs and symptoms of infection will decrease  Description: Signs and symptoms of infection will decrease  Outcome: Ongoing  Goal: Complications related to the disease process, condition or treatment will be avoided or minimized  Description: Complications related to the disease process, condition or treatment will be avoided or minimized  Outcome: Ongoing     Problem: Infection - Central Venous Catheter-Associated Bloodstream Infection:  Goal: Will show no infection signs and symptoms  Description: Will show no infection signs and symptoms  Outcome: Ongoing     Problem: Fluid Volume - Imbalance:  Goal: Absence of imbalanced fluid volume signs and symptoms  Description: Absence of imbalanced fluid volume signs and symptoms  Outcome: Ongoing

## 2021-04-19 NOTE — PROGRESS NOTES
Hospitalist Progress Note      PCP: Daina Rodriguez DO    Chief Complaint. Presented to hospital for dizziness    Date of Admission: 4/17/2021    Subjective:   denies chest pain, nausea, vomiting, shortness of breath, fever or chills. mention feels overall better    Medications:  Reviewed    Infusion Medications    sodium chloride      heparin (PORCINE) Infusion 10 Units/kg/hr (04/19/21 1136)     Scheduled Medications    furosemide  20 mg Intravenous BID    levothyroxine  25 mcg Oral QAM AC    sodium chloride flush  5-40 mL Intravenous 2 times per day     PRN Meds: sodium chloride, sodium chloride flush, sodium chloride, promethazine **OR** ondansetron, polyethylene glycol, acetaminophen **OR** acetaminophen, perflutren lipid microspheres, heparin (porcine), heparin (porcine)      Intake/Output Summary (Last 24 hours) at 4/19/2021 1814  Last data filed at 4/19/2021 1622  Gross per 24 hour   Intake 271 ml   Output 2735 ml   Net -2464 ml       Physical Exam Performed:    /85   Pulse 137   Temp 97.7 °F (36.5 °C) (Oral)   Resp 21   Wt 163 lb 9.3 oz (74.2 kg)   SpO2 96%   BMI 29.92 kg/m²     General appearance: No apparent distress,   HEENT:  Conjunctivae/corneas clear. Neck: Supple, with full range of motion. Respiratory:  Normal respiratory effort. Clear to auscultation, bilaterally without Rales/Wheezes/Rhonchi. Cardiovascular: Bradycardia  Abdomen: Soft, non-tender, non-distended, normal bowel sounds. Musculoskeletal: No cyanosis or edema bilaterally  Neurologic:  without any focal sensory/motor deficits.  grossly non-focal.  Psychiatric: Alert and oriented, Normal mood  Peripheral Pulses: +2 palpable, equal bilaterally       Labs:   Recent Labs     04/17/21  1525 04/18/21  0532 04/19/21  0600   WBC 11.1* 16.8* 9.8   HGB 16.0 14.2 11.9*   HCT 48.4* 42.1 34.5*    256 175     Recent Labs     04/17/21  1525 04/18/21  0532 04/19/21  0600    140 136   K 4.8 4.6 3.8    104 102   CO2 19* 24 27   BUN 24* 26* 23*   CREATININE 1.3* 1.4* 1.0   CALCIUM 9.9 8.9 8.7     Recent Labs     04/17/21  1525   AST 47*   ALT 42*   BILITOT 0.7   ALKPHOS 123     Recent Labs     04/17/21  1800   INR 1.55*     Recent Labs     04/17/21  1525 04/17/21  1800   TROPONINI <0.01 <0.01       Urinalysis:      Lab Results   Component Value Date    NITRU Negative 04/17/2021    WBCUA 0-2 04/17/2021    BACTERIA Rare 04/17/2021    RBCUA 0-2 04/17/2021    BLOODU TRACE-LYSED 04/17/2021    SPECGRAV 1.020 04/17/2021    GLUCOSEU 100 04/17/2021       Radiology:  XR CHEST PORTABLE   Final Result      Cardiomegaly and mild pulmonary edema.                Assessment/Plan:    Active Hospital Problems    Diagnosis    Acute respiratory failure with hypoxia and hypercapnia (HCC) [J96.01, J96.02]    Hypotension [I95.9]    ALEJA (acute kidney injury) ([facility] [address] [N17.9]    Bradycardia [R00.1]     Symptomatic Bradycardia  Acute CHF with pulmonary edema  ALEJA  New onset Afib  Hypothyroidism  GERD    Plan  Started on IV dopamine, EP consulted, cont IV hearin  Started on IV lasix, follow up on BMP, monitor and replace electrolytes  Monitor on tele  DVT Prophylaxis: on IV heparin  Diet: DIET CARDIAC;  Code Status: Full Code    PT/OT Eval Status: ordered    Dispo - transfer out of ICU when ok with ICU team    Gonzalez Schulz MD

## 2021-04-20 LAB
ANION GAP SERPL CALCULATED.3IONS-SCNC: 7 MMOL/L (ref 3–16)
APTT: 45.7 SEC (ref 24.2–36.2)
APTT: 60.6 SEC (ref 24.2–36.2)
APTT: 61.4 SEC (ref 24.2–36.2)
BASOPHILS ABSOLUTE: 0 K/UL (ref 0–0.2)
BASOPHILS RELATIVE PERCENT: 0.5 %
BUN BLDV-MCNC: 17 MG/DL (ref 7–20)
CALCIUM SERPL-MCNC: 8.7 MG/DL (ref 8.3–10.6)
CHLORIDE BLD-SCNC: 102 MMOL/L (ref 99–110)
CO2: 30 MMOL/L (ref 21–32)
CREAT SERPL-MCNC: 0.8 MG/DL (ref 0.6–1.2)
CULTURE, BLOOD 2: ABNORMAL
CULTURE, BLOOD 2: ABNORMAL
EOSINOPHILS ABSOLUTE: 0.3 K/UL (ref 0–0.6)
EOSINOPHILS RELATIVE PERCENT: 4.2 %
GFR AFRICAN AMERICAN: >60
GFR NON-AFRICAN AMERICAN: >60
GLUCOSE BLD-MCNC: 109 MG/DL (ref 70–99)
HCT VFR BLD CALC: 38.3 % (ref 36–48)
HEMOGLOBIN: 12.8 G/DL (ref 12–16)
LYMPHOCYTES ABSOLUTE: 1.3 K/UL (ref 1–5.1)
LYMPHOCYTES RELATIVE PERCENT: 16.3 %
MAGNESIUM: 1.8 MG/DL (ref 1.8–2.4)
MCH RBC QN AUTO: 32.3 PG (ref 26–34)
MCHC RBC AUTO-ENTMCNC: 33.4 G/DL (ref 31–36)
MCV RBC AUTO: 96.6 FL (ref 80–100)
MONOCYTES ABSOLUTE: 1.1 K/UL (ref 0–1.3)
MONOCYTES RELATIVE PERCENT: 13.7 %
NEUTROPHILS ABSOLUTE: 5.2 K/UL (ref 1.7–7.7)
NEUTROPHILS RELATIVE PERCENT: 65.3 %
ORGANISM: ABNORMAL
ORGANISM: ABNORMAL
PDW BLD-RTO: 14.6 % (ref 12.4–15.4)
PLATELET # BLD: 202 K/UL (ref 135–450)
PMV BLD AUTO: 8.2 FL (ref 5–10.5)
POTASSIUM REFLEX MAGNESIUM: 3.6 MMOL/L (ref 3.5–5.1)
RBC # BLD: 3.96 M/UL (ref 4–5.2)
SODIUM BLD-SCNC: 139 MMOL/L (ref 136–145)
WBC # BLD: 7.9 K/UL (ref 4–11)

## 2021-04-20 PROCEDURE — 2580000003 HC RX 258: Performed by: STUDENT IN AN ORGANIZED HEALTH CARE EDUCATION/TRAINING PROGRAM

## 2021-04-20 PROCEDURE — 6360000002 HC RX W HCPCS: Performed by: STUDENT IN AN ORGANIZED HEALTH CARE EDUCATION/TRAINING PROGRAM

## 2021-04-20 PROCEDURE — 6370000000 HC RX 637 (ALT 250 FOR IP): Performed by: NURSE PRACTITIONER

## 2021-04-20 PROCEDURE — 94761 N-INVAS EAR/PLS OXIMETRY MLT: CPT

## 2021-04-20 PROCEDURE — 99233 SBSQ HOSP IP/OBS HIGH 50: CPT | Performed by: INTERNAL MEDICINE

## 2021-04-20 PROCEDURE — 1200000000 HC SEMI PRIVATE

## 2021-04-20 PROCEDURE — 6370000000 HC RX 637 (ALT 250 FOR IP): Performed by: STUDENT IN AN ORGANIZED HEALTH CARE EDUCATION/TRAINING PROGRAM

## 2021-04-20 PROCEDURE — 99232 SBSQ HOSP IP/OBS MODERATE 35: CPT | Performed by: NURSE PRACTITIONER

## 2021-04-20 PROCEDURE — 83735 ASSAY OF MAGNESIUM: CPT

## 2021-04-20 PROCEDURE — 85025 COMPLETE CBC W/AUTO DIFF WBC: CPT

## 2021-04-20 PROCEDURE — 80048 BASIC METABOLIC PNL TOTAL CA: CPT

## 2021-04-20 PROCEDURE — 85730 THROMBOPLASTIN TIME PARTIAL: CPT

## 2021-04-20 PROCEDURE — 6360000002 HC RX W HCPCS: Performed by: NURSE PRACTITIONER

## 2021-04-20 PROCEDURE — 6370000000 HC RX 637 (ALT 250 FOR IP): Performed by: PHYSICIAN ASSISTANT

## 2021-04-20 PROCEDURE — 2700000000 HC OXYGEN THERAPY PER DAY

## 2021-04-20 PROCEDURE — 36415 COLL VENOUS BLD VENIPUNCTURE: CPT

## 2021-04-20 PROCEDURE — 6360000002 HC RX W HCPCS: Performed by: INTERNAL MEDICINE

## 2021-04-20 RX ORDER — POTASSIUM CHLORIDE 20 MEQ/1
20 TABLET, EXTENDED RELEASE ORAL 2 TIMES DAILY WITH MEALS
Status: DISCONTINUED | OUTPATIENT
Start: 2021-04-20 | End: 2021-04-22

## 2021-04-20 RX ORDER — MAGNESIUM SULFATE IN WATER 40 MG/ML
2000 INJECTION, SOLUTION INTRAVENOUS ONCE
Status: COMPLETED | OUTPATIENT
Start: 2021-04-20 | End: 2021-04-20

## 2021-04-20 RX ORDER — METOPROLOL TARTRATE 50 MG/1
50 TABLET, FILM COATED ORAL 2 TIMES DAILY
Status: DISCONTINUED | OUTPATIENT
Start: 2021-04-20 | End: 2021-04-22 | Stop reason: HOSPADM

## 2021-04-20 RX ADMIN — METOPROLOL TARTRATE 50 MG: 50 TABLET, FILM COATED ORAL at 20:42

## 2021-04-20 RX ADMIN — HEPARIN SODIUM 12 UNITS/KG/HR: 10000 INJECTION, SOLUTION INTRAVENOUS at 09:35

## 2021-04-20 RX ADMIN — METOPROLOL TARTRATE 50 MG: 50 TABLET, FILM COATED ORAL at 08:22

## 2021-04-20 RX ADMIN — POTASSIUM CHLORIDE 20 MEQ: 1500 TABLET, EXTENDED RELEASE ORAL at 17:17

## 2021-04-20 RX ADMIN — Medication 10 ML: at 20:42

## 2021-04-20 RX ADMIN — FUROSEMIDE 20 MG: 10 INJECTION, SOLUTION INTRAMUSCULAR; INTRAVENOUS at 07:29

## 2021-04-20 RX ADMIN — LEVOTHYROXINE SODIUM 25 MCG: 0.03 TABLET ORAL at 07:28

## 2021-04-20 RX ADMIN — Medication 10 ML: at 07:28

## 2021-04-20 RX ADMIN — FUROSEMIDE 20 MG: 10 INJECTION, SOLUTION INTRAMUSCULAR; INTRAVENOUS at 17:17

## 2021-04-20 RX ADMIN — MAGNESIUM SULFATE HEPTAHYDRATE 2000 MG: 2 INJECTION, SOLUTION INTRAVENOUS at 12:42

## 2021-04-20 ASSESSMENT — ENCOUNTER SYMPTOMS
BACK PAIN: 0
SORE THROAT: 0
TROUBLE SWALLOWING: 0
SINUS PRESSURE: 0
ABDOMINAL PAIN: 0
SHORTNESS OF BREATH: 1
SINUS PAIN: 0
NAUSEA: 0
WHEEZING: 0
ABDOMINAL DISTENTION: 0
COUGH: 0
PHOTOPHOBIA: 0
CHEST TIGHTNESS: 0
VOMITING: 0

## 2021-04-20 ASSESSMENT — PAIN SCALES - GENERAL: PAINLEVEL_OUTOF10: 0

## 2021-04-20 NOTE — FLOWSHEET NOTE
04/20/21 1115   Encounter Summary   Services provided to: Patient   Referral/Consult From: Rounding   Continue Visiting   (4/20/21, NIC. )   Complexity of Encounter Moderate   Length of Encounter 15 minutes   Routine   Type Initial   Assessment Calm; Approachable   Intervention Nurtured hope   Outcome Expressed gratitude

## 2021-04-20 NOTE — PROGRESS NOTES
Hospitalist Progress Note      PCP: Elaina Severance, DO    Chief Complaint. Presented to hospital for dizziness    Date of Admission: 4/17/2021    Subjective: Patient seen and evaluated at the bedside, denies chest pain, nausea, vomiting, shortness of breath, fever or chills. mention feels overall the same    Medications:  Reviewed    Infusion Medications    sodium chloride      heparin (PORCINE) Infusion 12 Units/kg/hr (04/20/21 1118)     Scheduled Medications    metoprolol tartrate  50 mg Oral BID    furosemide  20 mg Intravenous BID    levothyroxine  25 mcg Oral QAM AC    sodium chloride flush  5-40 mL Intravenous 2 times per day     PRN Meds: sodium chloride, sodium chloride flush, sodium chloride, promethazine **OR** ondansetron, polyethylene glycol, acetaminophen **OR** acetaminophen, perflutren lipid microspheres, heparin (porcine), heparin (porcine)      Intake/Output Summary (Last 24 hours) at 4/20/2021 1154  Last data filed at 4/20/2021 0942  Gross per 24 hour   Intake 333 ml   Output 3720 ml   Net -3387 ml       Physical Exam Performed:    BP 97/75   Pulse 101   Temp 97.5 °F (36.4 °C) (Oral)   Resp 20   Wt 163 lb 9.3 oz (74.2 kg)   SpO2 97%   BMI 29.92 kg/m²     General appearance: No apparent distress, lying in bed comfortably  HEENT:  Conjunctivae/corneas clear. Neck: Supple, with full range of motion. Respiratory:  Normal respiratory effort. Clear to auscultation, bilaterally without Rales/Wheezes/Rhonchi. Cardiovascular: Bradycardia  Abdomen: Soft, non-tender, non-distended, normal bowel sounds. Musculoskeletal: No cyanosis or edema bilaterally  Neurologic:  without any focal sensory/motor deficits.  grossly non-focal.  Psychiatric: Alert and oriented, Normal mood  Peripheral Pulses: +2 palpable, equal bilaterally       Labs:   Recent Labs     04/18/21  0532 04/19/21  0600 04/20/21  0625   WBC 16.8* 9.8 7.9   HGB 14.2 11.9* 12.8   HCT 42.1 34.5* 38.3    175 202 Recent Labs     04/18/21  0532 04/19/21  0600 04/20/21  0625    136 139   K 4.6 3.8 3.6    102 102   CO2 24 27 30   BUN 26* 23* 17   CREATININE 1.4* 1.0 0.8   CALCIUM 8.9 8.7 8.7     Recent Labs     04/17/21  1525   AST 47*   ALT 42*   BILITOT 0.7   ALKPHOS 123     Recent Labs     04/17/21  1800   INR 1.55*     Recent Labs     04/17/21  1525 04/17/21  1800   TROPONINI <0.01 <0.01       Urinalysis:      Lab Results   Component Value Date    NITRU Negative 04/17/2021    WBCUA 0-2 04/17/2021    BACTERIA Rare 04/17/2021    RBCUA 0-2 04/17/2021    BLOODU TRACE-LYSED 04/17/2021    SPECGRAV 1.020 04/17/2021    GLUCOSEU 100 04/17/2021       Radiology:  XR CHEST PORTABLE   Final Result      Cardiomegaly and mild pulmonary edema.                Assessment/Plan:    Active Hospital Problems    Diagnosis    Acute respiratory failure with hypoxia and hypercapnia (HCC) [J96.01, J96.02]    Hypotension [I95.9]    ALEJA (acute kidney injury) ([facility] [address] [N17.9]    Bradycardia [R00.1]     Symptomatic Bradycardia  Acute CHF with pulmonary edema  ALEJA  New onset Afib  Hypothyroidism  GERD    Plan  Cont IV dopamine, EP consulted-plan for PPM, cont IV hearin  Cont on IV lasix, follow up on BMP, monitor and replace electrolytes  Monitor on tele  DVT Prophylaxis: on IV heparin  Diet: DIET CARDIAC;  Code Status: Full Code    PT/OT Eval Status: ordered    Dispo - transfer out of ICU when ok with ICU team    Enrique Reddy MD

## 2021-04-20 NOTE — PROGRESS NOTES
Electrophysiology - PROGRESS NOTE    Admit Date: 4/17/2021     Chief Complaint: TBS, AF     Interval History:   Patient seen and examined and notes reviewed. Patient is being followed for TBS and pAF. Patient pAF with RVR and bradycardic when in NSR. P/w syncope, fatigue and found to be bradycardic. Planned for PPM when O2 requirements are back to baseline. Patient not normally on O2. Denies CP, SOB, PND or orthopnea. In: 333 [P.O.:240;  I.V.:93]  Out: 2570    Wt Readings from Last 2 Encounters:   04/18/21 163 lb 9.3 oz (74.2 kg)   04/08/21 165 lb 3.2 oz (74.9 kg)         Data:   Scheduled Meds:   Scheduled Meds:   metoprolol tartrate  50 mg Oral BID    furosemide  20 mg Intravenous BID    levothyroxine  25 mcg Oral QAM AC    sodium chloride flush  5-40 mL Intravenous 2 times per day     Continuous Infusions:   sodium chloride      heparin (PORCINE) Infusion 12 Units/kg/hr (04/20/21 0104)     PRN Meds:.sodium chloride, sodium chloride flush, sodium chloride, promethazine **OR** ondansetron, polyethylene glycol, acetaminophen **OR** acetaminophen, perflutren lipid microspheres, heparin (porcine), heparin (porcine)  Continuous Infusions:   sodium chloride      heparin (PORCINE) Infusion 12 Units/kg/hr (04/20/21 0104)       Intake/Output Summary (Last 24 hours) at 4/20/2021 0822  Last data filed at 4/20/2021 0500  Gross per 24 hour   Intake 511 ml   Output 3920 ml   Net -3409 ml       CBC:   Lab Results   Component Value Date    WBC 7.9 04/20/2021    HGB 12.8 04/20/2021     04/20/2021     BMP:  Lab Results   Component Value Date     04/20/2021    K 3.6 04/20/2021     04/20/2021    CO2 30 04/20/2021    BUN 17 04/20/2021    CREATININE 0.8 04/20/2021    GLUCOSE 109 04/20/2021    GLUCOSE 104 06/30/2011     INR:   Lab Results   Component Value Date    INR 1.55 04/17/2021    INR 1.06 01/02/2021        CARDIAC LABS  ENZYMES:  Recent Labs 04/17/21  1525 04/17/21  1800   TROPONINI <0.01 <0.01     FASTING LIPID PANEL:  Lab Results   Component Value Date    HDL 78 09/10/2020    HDL 87 01/27/2012    LDLCALC 121 09/10/2020    TRIG 90 09/10/2020    TSH 14.44 04/17/2021     LIVER PROFILE:  Lab Results   Component Value Date    AST 47 04/17/2021    AST 24 09/10/2020    ALT 42 04/17/2021    ALT 27 09/10/2020       -----------------------------------------------------------------  Telemetry: Personally reviewed  AF/RVR    Objective:   Vitals: /85   Pulse 108   Temp 97.5 °F (36.4 °C) (Oral)   Resp 13   Wt 163 lb 9.3 oz (74.2 kg)   SpO2 94%   BMI 29.92 kg/m²   General appearance: alert, appears stated age and cooperative, No acute distress   Eyes: Conjunctiva and pupils normal and reactive  Skin: Skin color, texture, turgor normal. No rashes or ecchymosis. Neck: no JVD, supple, symmetrical, trachea midline   Lungs: , no accessory muscle use, no respiratory distress  Heart: irreg, tachy  Abdomen: soft, non-tender; bowel sounds normal  Extremities: No edema, DP +  Psychiatric: normal insight and affect    Patient Active Problem List:     Tinnitus     Disorder of bone and cartilage     Diverticulosis of large intestine     Mixed hyperlipidemia     Alveolitis of jaw     Hypothyroidism     Osteopenia     Hx of melanoma excision     Sensorineural hearing loss, bilateral     Tinnitus, bilateral     Afib (HCC)     Atrial fibrillation with rapid ventricular response (HCC)     SOB (shortness of breath)     Bradycardia     Acute respiratory failure with hypoxia and hypercapnia (HCC)     Hypotension     ALEJA (acute kidney injury) ([facility] [address]        Assessment & Plan:        1. Hypotension  2. pAF  3. Conversion pause  4.  SB    Cardiac Hx: Quintin Atwood is a [de-identified] y.o. woman with a h/o GERD, hypothyroidism and pAF., originally dx'd 1/2021 with progressively worsening SOB, noted to be in AF/RVR, placed on dilt, placed on Elquis, BB and lasix, EF 55-60% per echo who p/w lightheadedness, N/V/D, found to be SB with HR's in the 40's, placed on dopamine, given IVF then developed AF/RVR. 6.8 sec conversion pause noted on tele. [id number] 3. TSH 14.44 (4/17/2021). AF  - In AF - heart rate in the low 100s  - On heparin - no s/s bleeding - continue  - Echo - LAE 4.4/96.6  - On metoprolol 50 mg BID  - There is evidence of tachy julia syndrome as well as a 6.8 second conversion pause  - Plan for PPM when O2 requirements at baseline   - Plan AAD after PPM - flecainide and BB  - Sleep study outpatient  - ECG ordered and results personally reviewed     Fluid overload  - On lasix 20 mg IV  - Neg 4.4L  - Keep K+ > 4.0 and Mg > 2.0 - replacement ordered    EF of 07-64%  No systolic HF  No known CAD  On heparin for AF   No tobacco use. All questions and concerns were addressed to the patient/family. Alternatives to my treatment were discussed. The note was completed using EMR. Every effort was made to ensure accuracy; however, inadvertent computerized transcription errors may be present.        Letty Guadalupe 275

## 2021-04-20 NOTE — PROGRESS NOTES
ICU Progress Note    PCP: Jigar Guidry DO    Date of Admission: 4/17/2021    Date of Service: Pt seen/examined on 4/18/2021    Chief Complaint:  Symptomatic Bradycardia    Active Hospital Problems    Diagnosis Date Noted    Acute respiratory failure with hypoxia and hypercapnia (HCC) [J96.01, J96.02] 04/18/2021    Hypotension [I95.9] 04/18/2021    ALEJA (acute kidney injury) ([address] [N17.9]     Bradycardia [R00.1] 04/17/2021     History Obtained From:  patient, electronic medical record    History Of Present Illness:     [de-identified] y.o. female who presented to [facility]. with symptomatic bradycardia. Of note pt w/ recently diagnosed afib in last 3-4 mo being followed by Dr. Josi Sanders and was recently started on flecainide in addition to metoprolol. Pt stated that early in the afternoon pt developed lightheadedness, nausea, 1 ep of emesis and abdominal pain. As well as minimal PO intake last 2-3d. Pt did undergo stress test on 4/14/21 which was normal w/ EF of 73% after which she was started on the flecainide. Pt also states that she had been having foul-smelling stools as well. Pt states that she has received both COVID vaccines last dose on 4/16/21. In ED pt was noted to be bradycardic 30-40s and was treated w/ atropine w/ some effect, but was noted to be hypotensive to the 65Q systolic after which she was started on dopamine gtt which was quickly titrated up to 25. She also developed respiratory distress requiring NRB. EKG was sinus julia w/ 1st degree block. While admitted pt was continuing to have worsening respiratory distress and AMS. She was given 40mg IV lasix and then was put on BiPAP w/ great response and was taken off this AM.     Overnight she flipped back into afib RVR after a conversion pause of 4s and then 2s. Pressures remained stable and cuff pressure were correlating to the lisbeth which was tenuous so lisbeth was removed and dopamine was tirated off d/t afib rvr.      Interval History  No overnight events. Patient reports breathing continues to improve. HR controlled on IV spot metoprolol. Plan for PPM tomorrow. Denies chest pain, sob, nausea, vomiting, diarrhea, constipation. Discussed case with RN. Past Medical History:          Diagnosis Date    Allergic rhinitis     Alveolitis of jaw     Cancer ([address]     Dental disease     Diverticulosis of colon (without mention of hemorrhage)     Dizziness     GERD (gastroesophageal reflux disease)     Hearing loss     Myalgia and myositis, unspecified     Other and unspecified hyperlipidemia     Recurrent upper respiratory infection (URI)     Screening mammogram 11/05/2008    Normal    Tinnitus     Unspecified gastritis and gastroduodenitis without mention of hemorrhage     Unspecified hypothyroidism        Past SurgicalHistory:          Procedure Laterality Date    BLADDER SUSPENSION  1990s    CHOLECYSTECTOMY  1990s    COLONOSCOPY  1/16/2007    Normal    COLONOSCOPY  02/27/2012    Dr. Leonora Macdonald    Right Repair    Dinesh Galea EXTRACTION         Medications Prior to Admission:      Medications Prior to Admission: metoprolol tartrate (LOPRESSOR) 100 MG tablet, Take 1 tablet by mouth 2 times daily  Handicap Placard MISC, by Does not apply route Dx of shortness of breath and A-fib. Effective Jan 13, 2021 until Jan 13, 2026. apixaban (ELIQUIS) 5 MG TABS tablet, Take 1 tablet by mouth 2 times daily  furosemide (LASIX) 20 MG tablet, Take 1 tablet by mouth daily  levothyroxine (SYNTHROID) 25 MCG tablet, TAKE 1 TABLET BY MOUTH EVERY DAY  Cholecalciferol (VITAMIN D) 2000 UNITS CAPS capsule, Take 1 capsule by mouth daily. flecainide (TAMBOCOR) 50 MG tablet, Take 2 tablets by mouth 2 times daily    Allergies:  Bactrim [sulfamethoxazole-trimethoprim] and Seasonal    Social History:      The patient currently lives with family    TOBACCO:   reports that she quit smoking about 15 years ago.  She smoked 0.00 packs per day for 10.00 years. She has never used smokeless tobacco.  ETOH:   reports current alcohol use. Family History:        Problem Relation Age of Onset    Cancer Brother      REVIEW OF SYSTEMS: A 10 point review of systems was conducted, pertinent positives as noted in the HPI. All other systems reviewed and negative. ROS: Review of Systems   Constitutional: Positive for fatigue (Much improved). Negative for chills and fever. HENT: Positive for congestion and postnasal drip. Negative for sinus pressure, sinus pain, sore throat and trouble swallowing. Eyes: Negative for photophobia and visual disturbance. Respiratory: Positive for shortness of breath (Improved after bipap). Negative for cough, chest tightness and wheezing. Cardiovascular: Negative for chest pain, palpitations and leg swelling. Gastrointestinal: Negative for abdominal distention, abdominal pain, nausea and vomiting. Genitourinary: Negative for difficulty urinating, dysuria, frequency, hematuria and urgency. Musculoskeletal: Negative for arthralgias, back pain, neck pain and neck stiffness. Skin: Negative. Neurological: Negative for dizziness, seizures, weakness and numbness. Psychiatric/Behavioral: Negative for confusion, decreased concentration and dysphoric mood.      PHYSICAL EXAM PERFORMED:    Temp  Av.8 °F (36.6 °C)  Min: 97.5 °F (36.4 °C)  Max: 98.1 °F (36.7 °C)  Pulse  Av.9  Min: 81  Max: 137  BP  Min: 100/62  Max: 122/82  SpO2  Av %  Min: 90 %  Max: 97 %  Patient Vitals for the past 4 hrs:   BP Temp Temp src Pulse Resp SpO2   21 0822 101/63 -- -- 111 -- --   21 0800 -- -- -- 122 21 90 %   21 0700 108/85 97.5 °F (36.4 °C) Oral 108 13 94 %   21 0600 107/70 -- -- 101 22 93 %   21 0500 112/65 -- -- 97 24 92 %       Intake/Output Summary (Last 24 hours) at 2021 0834  Last data filed at 2021 0500  Gross per 24 hour   Intake 333 ml   Output 3920 ml Net -3587 ml     Physical Exam  Vitals signs reviewed. Constitutional:       General: She is not in acute distress. Appearance: Normal appearance. She is well-developed. She is not diaphoretic. Interventions: Nasal cannula in place. HENT:      Head: Normocephalic and atraumatic. Mouth/Throat:      Mouth: Mucous membranes are moist.      Pharynx: Oropharynx is clear. No oropharyngeal exudate. Eyes:      General: No scleral icterus. Extraocular Movements: Extraocular movements intact. Pupils: Pupils are equal, round, and reactive to light. Neck:      Musculoskeletal: Normal range of motion. Trachea: No tracheal deviation. Cardiovascular:      Rate and Rhythm: Normal rate and regular rhythm. Pulses: Normal pulses. Heart sounds: Normal heart sounds. Pulmonary:      Effort: Pulmonary effort is normal. No respiratory distress. Breath sounds: No wheezing. Rales: MIld basilar, improved. Abdominal:      General: Bowel sounds are normal. There is no distension. Palpations: Abdomen is soft. Tenderness: There is no abdominal tenderness. Musculoskeletal: Normal range of motion. General: No swelling or tenderness. Right lower leg: No edema. Left lower leg: No edema. Skin:     General: Skin is warm and dry. Findings: No erythema or rash. Comments: R wrist bruising at prev lisbeth site  Left femoral CVC site with minimal saturation to bandage   Neurological:      Mental Status: She is alert and oriented to person, place, and time. Mental status is at baseline. Cranial Nerves: No cranial nerve deficit. Sensory: No sensory deficit. Psychiatric:         Mood and Affect: Mood normal.         Behavior: Behavior normal.         Thought Content:  Thought content normal.         Judgment: Judgment normal.       Labs:     Recent Labs     04/18/21  0532 04/19/21  0600 04/20/21  0625   WBC 16.8* 9.8 7.9   HGB 14.2 11.9* 12.8   HCT 42.1 34.5* 38.3    175 202     Recent Labs     04/18/21  0532 04/19/21  0600 04/20/21  0625    136 139   K 4.6 3.8 3.6    102 102   CO2 24 27 30   BUN 26* 23* 17   CREATININE 1.4* 1.0 0.8   CALCIUM 8.9 8.7 8.7     Recent Labs     04/17/21  1525   AST 47*   ALT 42*   BILITOT 0.7   ALKPHOS 123     Recent Labs     04/17/21  1800   INR 1.55*     Recent Labs     04/17/21  1525 04/17/21  1800   TROPONINI <0.01 <0.01       Urinalysis:   Lab Results   Component Value Date    NITRU Negative 04/17/2021    WBCUA 0-2 04/17/2021    BACTERIA Rare 04/17/2021    RBCUA 0-2 04/17/2021    BLOODU TRACE-LYSED 04/17/2021    SPECGRAV 1.020 04/17/2021    GLUCOSEU 100 04/17/2021     Recent Labs     04/17/21  1855   COLORU Yellow   PHUR 6.5   WBCUA 0-2   RBCUA 0-2   BACTERIA Rare*   CLARITYU SL CLOUDY*   SPECGRAV 1.020   LEUKOCYTESUR Negative   UROBILINOGEN 0.2   BILIRUBINUR Negative   BLOODU TRACE-LYSED*   GLUCOSEU 100*     Radiology:     EKG:  I have reviewed the EKG with the following interpretation: Sinus tachy, wenkebach    XR CHEST PORTABLE   Final Result      Cardiomegaly and mild pulmonary edema. ASSESSMENT & PLAN:    Active Hospital Problems    Diagnosis Date Noted    Acute respiratory failure with hypoxia and hypercapnia (HCC) [J96.01, J96.02] 04/18/2021    Hypotension [I95.9] 04/18/2021    ALEJA (acute kidney injury) ([facility] [address] [N17.9]     Bradycardia [R00.1] 04/17/2021     INFECTIOUS     Leukocytosis, resolved suspect reactive   - Afebrile, infectious w/u so far unremarkable, will monitor  - Lactate normalized, lipase WNL, procal 0.10  - No  abx for now    PULMONARY     Acute hypoxic respiratory failure 2/2 pulmonary edema   - Ventilation and gas exchange supported with BiPAP  - supp O2 down trending. Currently on 2L NC  - Hypercapnia resolved on BiPAP  - Pulm edema on CXR 4/17/21.   - s/p 40 IV Lasix x1.  Resume home 20 PO Lasix  - Lactate resolved w/ dopamine gtt, stop trend  - ECHO on 4/17/21 with EF

## 2021-04-20 NOTE — PLAN OF CARE
Problem: Falls - Risk of:  Goal: Will remain free from falls  Description: Will remain free from falls  4/20/2021 1546 by Alexis Durant RN  Outcome: Met This Shift  4/20/2021 0555 by Pipe Adhikari RN  Outcome: Ongoing     Problem: Falls - Risk of:  Goal: Will remain free from falls  Description: Will remain free from falls  4/20/2021 1546 by Alexis Durant RN  Outcome: Met This Shift  4/20/2021 0555 by Pipe Adhikari RN  Outcome: Ongoing  Goal: Absence of physical injury  Description: Absence of physical injury  4/20/2021 1546 by Alexis Durant RN  Outcome: Met This Shift  4/20/2021 0555 by Pipe Adhikari RN  Outcome: Ongoing     Problem: Skin Integrity:  Goal: Absence of new skin breakdown  Description: Absence of new skin breakdown  4/20/2021 1546 by Alexis Durant RN  Outcome: Met This Shift  4/20/2021 0555 by Pipe Adhikari RN  Outcome: Ongoing     Problem: Cardiac:  Goal: Ability to maintain vital signs within normal range will improve  Description: Ability to maintain vital signs within normal range will improve  4/20/2021 1546 by Alexis Durant RN  Outcome: Met This Shift  4/20/2021 0555 by Pipe Adhikari RN  Outcome: Ongoing     Problem: Urinary Elimination:  Goal: Signs and symptoms of infection will decrease  Description: Signs and symptoms of infection will decrease  4/20/2021 1546 by Alexis Durant RN  Outcome: Met This Shift  4/20/2021 0555 by Pipe Adhikari RN  Outcome: Ongoing  Goal: Complications related to the disease process, condition or treatment will be avoided or minimized  Description: Complications related to the disease process, condition or treatment will be avoided or minimized  4/20/2021 1546 by Alexis Durant RN  Outcome: Met This Shift  4/20/2021 0555 by Pipe Adhikari RN  Outcome: Ongoing     Problem: Fluid Volume - Imbalance:  Goal: Absence of imbalanced fluid volume signs and symptoms  Description: Absence of imbalanced fluid volume signs and symptoms  4/20/2021 1546 by Alexis Durant

## 2021-04-21 ENCOUNTER — NURSE ONLY (OUTPATIENT)
Dept: CARDIOLOGY CLINIC | Age: 81
End: 2021-04-21

## 2021-04-21 ENCOUNTER — ANESTHESIA EVENT (OUTPATIENT)
Dept: CARDIAC CATH/INVASIVE PROCEDURES | Age: 81
DRG: 242 | End: 2021-04-21
Payer: MEDICARE

## 2021-04-21 ENCOUNTER — ANESTHESIA (OUTPATIENT)
Dept: CARDIAC CATH/INVASIVE PROCEDURES | Age: 81
DRG: 242 | End: 2021-04-21
Payer: MEDICARE

## 2021-04-21 VITALS — OXYGEN SATURATION: 99 % | SYSTOLIC BLOOD PRESSURE: 121 MMHG | DIASTOLIC BLOOD PRESSURE: 87 MMHG

## 2021-04-21 DIAGNOSIS — Z95.0 PACEMAKER: Primary | ICD-10-CM

## 2021-04-21 LAB
ANION GAP SERPL CALCULATED.3IONS-SCNC: 9 MMOL/L (ref 3–16)
ANTI-XA UNFRAC HEPARIN: 0.26 IU/ML (ref 0.3–0.7)
APTT: 67.4 SEC (ref 24.2–36.2)
BASOPHILS ABSOLUTE: 0 K/UL (ref 0–0.2)
BASOPHILS RELATIVE PERCENT: 0.2 %
BLOOD CULTURE, ROUTINE: NORMAL
BUN BLDV-MCNC: 17 MG/DL (ref 7–20)
CALCIUM SERPL-MCNC: 9.1 MG/DL (ref 8.3–10.6)
CHLORIDE BLD-SCNC: 101 MMOL/L (ref 99–110)
CO2: 29 MMOL/L (ref 21–32)
CREAT SERPL-MCNC: 0.7 MG/DL (ref 0.6–1.2)
EOSINOPHILS ABSOLUTE: 0.4 K/UL (ref 0–0.6)
EOSINOPHILS RELATIVE PERCENT: 5.1 %
GFR AFRICAN AMERICAN: >60
GFR NON-AFRICAN AMERICAN: >60
GLUCOSE BLD-MCNC: 100 MG/DL (ref 70–99)
HCT VFR BLD CALC: 40.5 % (ref 36–48)
HEMOGLOBIN: 13.8 G/DL (ref 12–16)
LYMPHOCYTES ABSOLUTE: 1.7 K/UL (ref 1–5.1)
LYMPHOCYTES RELATIVE PERCENT: 19.3 %
MAGNESIUM: 1.9 MG/DL (ref 1.8–2.4)
MCH RBC QN AUTO: 32.7 PG (ref 26–34)
MCHC RBC AUTO-ENTMCNC: 34.1 G/DL (ref 31–36)
MCV RBC AUTO: 95.9 FL (ref 80–100)
MONOCYTES ABSOLUTE: 1.1 K/UL (ref 0–1.3)
MONOCYTES RELATIVE PERCENT: 12.7 %
NEUTROPHILS ABSOLUTE: 5.4 K/UL (ref 1.7–7.7)
NEUTROPHILS RELATIVE PERCENT: 62.7 %
PDW BLD-RTO: 14.5 % (ref 12.4–15.4)
PLATELET # BLD: 206 K/UL (ref 135–450)
PMV BLD AUTO: 8.3 FL (ref 5–10.5)
POTASSIUM REFLEX MAGNESIUM: 4 MMOL/L (ref 3.5–5.1)
RBC # BLD: 4.22 M/UL (ref 4–5.2)
SODIUM BLD-SCNC: 139 MMOL/L (ref 136–145)
WBC # BLD: 8.6 K/UL (ref 4–11)

## 2021-04-21 PROCEDURE — 2500000003 HC RX 250 WO HCPCS

## 2021-04-21 PROCEDURE — 80048 BASIC METABOLIC PNL TOTAL CA: CPT

## 2021-04-21 PROCEDURE — 6370000000 HC RX 637 (ALT 250 FOR IP): Performed by: INTERNAL MEDICINE

## 2021-04-21 PROCEDURE — 6370000000 HC RX 637 (ALT 250 FOR IP): Performed by: STUDENT IN AN ORGANIZED HEALTH CARE EDUCATION/TRAINING PROGRAM

## 2021-04-21 PROCEDURE — 85520 HEPARIN ASSAY: CPT

## 2021-04-21 PROCEDURE — 6360000004 HC RX CONTRAST MEDICATION: Performed by: INTERNAL MEDICINE

## 2021-04-21 PROCEDURE — 02H63JZ INSERTION OF PACEMAKER LEAD INTO RIGHT ATRIUM, PERCUTANEOUS APPROACH: ICD-10-PCS | Performed by: INTERNAL MEDICINE

## 2021-04-21 PROCEDURE — 6360000002 HC RX W HCPCS: Performed by: NURSE PRACTITIONER

## 2021-04-21 PROCEDURE — C1898 LEAD, PMKR, OTHER THAN TRANS: HCPCS

## 2021-04-21 PROCEDURE — 6360000002 HC RX W HCPCS: Performed by: STUDENT IN AN ORGANIZED HEALTH CARE EDUCATION/TRAINING PROGRAM

## 2021-04-21 PROCEDURE — 2580000003 HC RX 258: Performed by: STUDENT IN AN ORGANIZED HEALTH CARE EDUCATION/TRAINING PROGRAM

## 2021-04-21 PROCEDURE — 85025 COMPLETE CBC W/AUTO DIFF WBC: CPT

## 2021-04-21 PROCEDURE — 0JH606Z INSERTION OF PACEMAKER, DUAL CHAMBER INTO CHEST SUBCUTANEOUS TISSUE AND FASCIA, OPEN APPROACH: ICD-10-PCS | Performed by: INTERNAL MEDICINE

## 2021-04-21 PROCEDURE — 33208 INSRT HEART PM ATRIAL & VENT: CPT | Performed by: INTERNAL MEDICINE

## 2021-04-21 PROCEDURE — 02HK3JZ INSERTION OF PACEMAKER LEAD INTO RIGHT VENTRICLE, PERCUTANEOUS APPROACH: ICD-10-PCS | Performed by: INTERNAL MEDICINE

## 2021-04-21 PROCEDURE — C1894 INTRO/SHEATH, NON-LASER: HCPCS

## 2021-04-21 PROCEDURE — 99232 SBSQ HOSP IP/OBS MODERATE 35: CPT | Performed by: NURSE PRACTITIONER

## 2021-04-21 PROCEDURE — 6360000002 HC RX W HCPCS: Performed by: INTERNAL MEDICINE

## 2021-04-21 PROCEDURE — 6370000000 HC RX 637 (ALT 250 FOR IP): Performed by: NURSE PRACTITIONER

## 2021-04-21 PROCEDURE — 6360000002 HC RX W HCPCS

## 2021-04-21 PROCEDURE — B51N1ZZ FLUOROSCOPY OF LEFT UPPER EXTREMITY VEINS USING LOW OSMOLAR CONTRAST: ICD-10-PCS | Performed by: INTERNAL MEDICINE

## 2021-04-21 PROCEDURE — 85610 PROTHROMBIN TIME: CPT

## 2021-04-21 PROCEDURE — 6360000002 HC RX W HCPCS: Performed by: NURSE ANESTHETIST, CERTIFIED REGISTERED

## 2021-04-21 PROCEDURE — 85730 THROMBOPLASTIN TIME PARTIAL: CPT

## 2021-04-21 PROCEDURE — 2580000003 HC RX 258: Performed by: INTERNAL MEDICINE

## 2021-04-21 PROCEDURE — 2580000003 HC RX 258: Performed by: NURSE ANESTHETIST, CERTIFIED REGISTERED

## 2021-04-21 PROCEDURE — 83735 ASSAY OF MAGNESIUM: CPT

## 2021-04-21 PROCEDURE — 1200000000 HC SEMI PRIVATE

## 2021-04-21 PROCEDURE — 33208 INSRT HEART PM ATRIAL & VENT: CPT

## 2021-04-21 PROCEDURE — C1785 PMKR, DUAL, RATE-RESP: HCPCS

## 2021-04-21 PROCEDURE — 36415 COLL VENOUS BLD VENIPUNCTURE: CPT

## 2021-04-21 RX ORDER — SODIUM CHLORIDE 0.9 % (FLUSH) 0.9 %
5-40 SYRINGE (ML) INJECTION PRN
Status: DISCONTINUED | OUTPATIENT
Start: 2021-04-21 | End: 2021-04-22 | Stop reason: HOSPADM

## 2021-04-21 RX ORDER — FLECAINIDE ACETATE 100 MG/1
50 TABLET ORAL 2 TIMES DAILY
Status: DISCONTINUED | OUTPATIENT
Start: 2021-04-21 | End: 2021-04-22 | Stop reason: HOSPADM

## 2021-04-21 RX ORDER — SODIUM CHLORIDE 0.9 % (FLUSH) 0.9 %
5-40 SYRINGE (ML) INJECTION EVERY 12 HOURS SCHEDULED
Status: DISCONTINUED | OUTPATIENT
Start: 2021-04-21 | End: 2021-04-22 | Stop reason: HOSPADM

## 2021-04-21 RX ORDER — SODIUM CHLORIDE 9 MG/ML
25 INJECTION, SOLUTION INTRAVENOUS PRN
Status: DISCONTINUED | OUTPATIENT
Start: 2021-04-21 | End: 2021-04-22 | Stop reason: HOSPADM

## 2021-04-21 RX ORDER — PROPOFOL 10 MG/ML
INJECTION, EMULSION INTRAVENOUS PRN
Status: DISCONTINUED | OUTPATIENT
Start: 2021-04-21 | End: 2021-04-21 | Stop reason: SDUPTHER

## 2021-04-21 RX ORDER — CEFAZOLIN SODIUM 2 G/50ML
2000 SOLUTION INTRAVENOUS EVERY 8 HOURS
Status: COMPLETED | OUTPATIENT
Start: 2021-04-21 | End: 2021-04-22

## 2021-04-21 RX ORDER — PROPOFOL 10 MG/ML
INJECTION, EMULSION INTRAVENOUS CONTINUOUS PRN
Status: DISCONTINUED | OUTPATIENT
Start: 2021-04-21 | End: 2021-04-21

## 2021-04-21 RX ORDER — MAGNESIUM SULFATE 1 G/100ML
1000 INJECTION INTRAVENOUS ONCE
Status: COMPLETED | OUTPATIENT
Start: 2021-04-21 | End: 2021-04-21

## 2021-04-21 RX ORDER — SODIUM CHLORIDE 9 MG/ML
INJECTION, SOLUTION INTRAVENOUS CONTINUOUS PRN
Status: DISCONTINUED | OUTPATIENT
Start: 2021-04-21 | End: 2021-04-21 | Stop reason: SDUPTHER

## 2021-04-21 RX ADMIN — CEFAZOLIN SODIUM 1000 MG: 1 POWDER, FOR SOLUTION INTRAMUSCULAR; INTRAVENOUS at 15:39

## 2021-04-21 RX ADMIN — SODIUM CHLORIDE: 9 INJECTION, SOLUTION INTRAVENOUS at 16:03

## 2021-04-21 RX ADMIN — PROPOFOL 20 MG: 10 INJECTION, EMULSION INTRAVENOUS at 16:25

## 2021-04-21 RX ADMIN — METOPROLOL TARTRATE 50 MG: 50 TABLET, FILM COATED ORAL at 09:02

## 2021-04-21 RX ADMIN — FUROSEMIDE 20 MG: 10 INJECTION, SOLUTION INTRAMUSCULAR; INTRAVENOUS at 09:02

## 2021-04-21 RX ADMIN — PROPOFOL 20 MG: 10 INJECTION, EMULSION INTRAVENOUS at 16:07

## 2021-04-21 RX ADMIN — PROPOFOL 20 MG: 10 INJECTION, EMULSION INTRAVENOUS at 16:10

## 2021-04-21 RX ADMIN — FLECAINIDE ACETATE 50 MG: 100 TABLET ORAL at 21:37

## 2021-04-21 RX ADMIN — LEVOTHYROXINE SODIUM 25 MCG: 0.03 TABLET ORAL at 06:27

## 2021-04-21 RX ADMIN — Medication 10 ML: at 09:03

## 2021-04-21 RX ADMIN — POTASSIUM CHLORIDE 20 MEQ: 1500 TABLET, EXTENDED RELEASE ORAL at 09:02

## 2021-04-21 RX ADMIN — IOHEXOL 5 ML: 350 INJECTION, SOLUTION INTRAVENOUS at 15:54

## 2021-04-21 RX ADMIN — ACETAMINOPHEN 650 MG: 325 TABLET ORAL at 18:15

## 2021-04-21 RX ADMIN — MAGNESIUM SULFATE HEPTAHYDRATE 1000 MG: 1 INJECTION, SOLUTION INTRAVENOUS at 13:58

## 2021-04-21 RX ADMIN — PROPOFOL 40 MG: 10 INJECTION, EMULSION INTRAVENOUS at 16:18

## 2021-04-21 RX ADMIN — POTASSIUM CHLORIDE 20 MEQ: 1500 TABLET, EXTENDED RELEASE ORAL at 18:13

## 2021-04-21 RX ADMIN — PROPOFOL 20 MG: 10 INJECTION, EMULSION INTRAVENOUS at 16:13

## 2021-04-21 RX ADMIN — Medication 10 ML: at 21:37

## 2021-04-21 RX ADMIN — METOPROLOL TARTRATE 50 MG: 50 TABLET, FILM COATED ORAL at 21:37

## 2021-04-21 ASSESSMENT — PULMONARY FUNCTION TESTS
PIF_VALUE: 0
PIF_VALUE: 1
PIF_VALUE: 0
PIF_VALUE: 1
PIF_VALUE: 0
PIF_VALUE: 1
PIF_VALUE: 0
PIF_VALUE: 1
PIF_VALUE: 0

## 2021-04-21 ASSESSMENT — PAIN DESCRIPTION - PROGRESSION: CLINICAL_PROGRESSION: GRADUALLY WORSENING

## 2021-04-21 ASSESSMENT — PAIN SCALES - GENERAL
PAINLEVEL_OUTOF10: 0
PAINLEVEL_OUTOF10: 8

## 2021-04-21 ASSESSMENT — PAIN DESCRIPTION - PAIN TYPE
TYPE: SURGICAL PAIN
TYPE: SURGICAL PAIN

## 2021-04-21 ASSESSMENT — PAIN DESCRIPTION - ONSET: ONSET: GRADUAL

## 2021-04-21 ASSESSMENT — PAIN - FUNCTIONAL ASSESSMENT: PAIN_FUNCTIONAL_ASSESSMENT: ACTIVITIES ARE NOT PREVENTED

## 2021-04-21 ASSESSMENT — PAIN DESCRIPTION - ORIENTATION: ORIENTATION: LEFT

## 2021-04-21 ASSESSMENT — ENCOUNTER SYMPTOMS: SHORTNESS OF BREATH: 1

## 2021-04-21 ASSESSMENT — PAIN DESCRIPTION - DESCRIPTORS: DESCRIPTORS: SORE

## 2021-04-21 NOTE — PROGRESS NOTES
Pt awake and alert this morning, complaining of no pain. RN trying to wean Pt off nasal canula. Pt able to ambulate to bathroom with minimal assistance from RN. Pt now up in chair.      Crista Navarrete

## 2021-04-21 NOTE — PROGRESS NOTES
Patient returned from having pacemaker placed. Dressing to left chest is clean, dry and intact. . Sling and swathe to left arm. Denies any pain or discomfort at this time. Call light and frequently used items are within pt's reach.

## 2021-04-21 NOTE — PROGRESS NOTES
Pharmacy Progress Note    Patient is on Heparin low dose weight based infusion, which is being monitored & adjusted using aPTT as pt received an oral Factor-Xa inhibitor within 72 hrs of starting heparin infusion, which interacts with Anti-Xa monitoring of heparin. It has now been 72 hours since heparin infusion was initiated, and the oral Factor-Xa inhibitor interaction with Anti-Xa levels is eliminated. Heparin infusion will now be monitored & adjusted using Anti-Xa levels per the usual [facility] protocol. Anti-Xa algorithm:  Maximum initial rate = 1000 units/hr    AntiXa < 0.10 International Units/mL   Bolus = 60 units/kg   (Maximum bolus = 4,000 units)                        Increase infusion by 4 units/kg/hr  0.1-0.29 International Units/mL   Bolus = 30 units/kg   (Maximum bolus = 2,000 units)                      Increase infusion by 2 units/kg/hr  0.3-0.5 International Units/mL   No bolus                                                          No change  0.51-0.99 International Units/mL   No bolus                       Decrease infusion by 2 units/kg/hr  1.0 International Units/mL  Hold heparin for 1 hour                             Decrease infusion by 3 units/kg/hr    Anti Xa levels 6 hours after any rate change  When 2 successive Anti Xa's are at goal   monitor Anti Xa level at least daily    Please call pharmacy with any questions.     Frankey Honer, PharmD, [facility]  Wireless: [phone]  4/21/2021 10:46 AM

## 2021-04-21 NOTE — PRE SEDATION
Sedation Pre-Procedure Note    Patient Name: Kathleene Homans   YOB: 1940  Room/Bed: 8073/3167-72  Medical Record Number: [id number]  Date: 4/21/2021   Time: 3:39 PM       Indication: Dual-chamber pacemaker implantation    Consent: I have discussed with the patient and/or the patient representative the indication, alternatives, and the possible risks and/or complications of the planned procedure and the anesthesia methods. The patient and/or patient representative appear to understand and agree to proceed. Vital Signs:   Vitals:    04/21/21 1157   BP: (!) 122/107   Pulse: 99   Resp: 19   Temp: 97.9 °F (36.6 °C)   SpO2: 93%       Past Medical History:   has a past medical history of Allergic rhinitis, Alveolitis of jaw, Cancer ([facility] [address] Dental disease, Diverticulosis of colon (without mention of hemorrhage), Dizziness, GERD (gastroesophageal reflux disease), Hearing loss, Myalgia and myositis, unspecified, Other and unspecified hyperlipidemia, Recurrent upper respiratory infection (URI), Screening mammogram, Tinnitus, Unspecified gastritis and gastroduodenitis without mention of hemorrhage, and Unspecified hypothyroidism. Past Surgical History:   has a past surgical history that includes Cholecystectomy (1990s); hernia repair (1990s); Hysterectomy (1975); bladder suspension (1990s); Colonoscopy (1/16/2007); Colonoscopy (02/27/2012); and [place] tooth extraction.     Medications:   Scheduled Meds:    metoprolol tartrate  50 mg Oral BID    potassium chloride  20 mEq Oral BID WC    furosemide  20 mg Intravenous BID    levothyroxine  25 mcg Oral QAM AC    sodium chloride flush  5-40 mL Intravenous 2 times per day     Continuous Infusions:    sodium chloride       PRN Meds: sodium chloride, sodium chloride flush, sodium chloride, promethazine **OR** ondansetron, polyethylene glycol, acetaminophen **OR** acetaminophen, perflutren lipid microspheres  Home Meds:   Prior to Admission medications Medication Sig Start Date End Date Taking? Authorizing Provider   metoprolol tartrate (LOPRESSOR) 100 MG tablet Take 1 tablet by mouth 2 times daily 4/8/21  Yes Eddi Johnson MD   Handicap Placard MISC by Does not apply route Dx of shortness of breath and A-fib. Effective Jan 13, 2021 until Jan 13, 2026. 1/13/21  Yes Ginger Kolb, DO   apixaban (ELIQUIS) 5 MG TABS tablet Take 1 tablet by mouth 2 times daily 1/5/21  Yes Alpesh Pelletier MD   furosemide (LASIX) 20 MG tablet Take 1 tablet by mouth daily 1/6/21  Yes Alpesh Pelletier MD   levothyroxine (SYNTHROID) 25 MCG tablet TAKE 1 TABLET BY MOUTH EVERY DAY 8/21/20  Yes Ginger Kolb, DO   Cholecalciferol (VITAMIN D) 2000 UNITS CAPS capsule Take 1 capsule by mouth daily. 5/28/14  Yes Kristen Carr MD   flecainide (TAMBOCOR) 50 MG tablet Take 2 tablets by mouth 2 times daily 4/15/21   Eddi Johnson MD     Coumadin Use Last 7 Days:  no  Antiplatelet drug therapy use last 7 days: no  Other anticoagulant use last 7 days: no  Additional Medication Information: None      Pre-Sedation Documentation and Exam:   I have personally completed a history, physical exam & review of systems for this patient (see notes). Vital signs have been reviewed (see flow sheet for vitals).     Mallampati Airway Assessment:  Mallampati Class I - (soft palate, fauces, uvula & anterior/posterior tonsillar pillars are visible)    Prior History of Anesthesia Complications:   none    ASA Classification:  Class 2 - A normal healthy patient with mild systemic disease    Sedation/ Anesthesia Plan:   Intravenous sedation    Medications Planned:   propofol intravenously    Patient is an appropriate candidate for plan of sedation: yes    Electronically signed by Eddi Johnson MD on 4/21/2021 at 3:39 PM

## 2021-04-21 NOTE — PROGRESS NOTES
Report called via phone and Pt taken with resource nurse to new room. New orders put in to stop heparin drip and keep Pt NPO for hopeful pacemaker placement this afternoon. Pt alert and oriented on phone able to tell children about her transfer with her cell phone.      Annie Baptiste

## 2021-04-21 NOTE — ANESTHESIA POSTPROCEDURE EVALUATION
Department of Anesthesiology  Postprocedure Note    Patient: Dorinda Patel  MRN: [id number]  YOB: 1940  Date of evaluation: 4/21/2021  Time:  7:12 PM     Procedure Summary     Date: 04/21/21 Room / Location: [facility] Cath Lab    Anesthesia Start: 5750 Anesthesia Stop: 9591    Procedure: CATH LAB WITH ANESTHESIA Diagnosis:     Scheduled Providers: Shantanu Brown MD; JOSE Degroot - CRNA Responsible Provider: John Sanchez MD    Anesthesia Type: MAC ASA Status: 3          Anesthesia Type: MAC    Harjinder Phase I:      Harjinder Phase II:      Last vitals: Reviewed and per EMR flowsheets.        Anesthesia Post Evaluation    Patient location during evaluation: bedside  Patient participation: complete - patient participated  Level of consciousness: awake  Pain score: 0  Airway patency: patent  Nausea & Vomiting: no nausea and no vomiting  Complications: no  Cardiovascular status: hemodynamically stable  Respiratory status: acceptable  Hydration status: euvolemic

## 2021-04-21 NOTE — PROCEDURES
[facility]     Electrophysiology Procedure Note       Date of Procedure: 4/21/2021  Patient's Name: Florin Jennings  YOB: 1940   Medical Record Number: [id number]  Procedure Performed by: Jose Harding MD    Procedures performed:  · Selective venography of left upper extremity. · Insertion of MRI compatible right ventricular pacing lead under fluoroscopy. · Insertion of MRI compatible right atrial lead under fluoroscopy  · Insertion of a MRI compatible dual chamber Pacemaker. · Electronic analysis of lead and device. · Anesthesia: Local and Monitored Anesthesia Care  · Mallampati airway assessment class: I  · ASA class: 1  · Estimated blood loss less than 20 cc    Indication of the procedure:       Solis Kasper is a [de-identified] y.o. female with symptomatic bradycardia. The patient is referred for pacemaker implantation due to non-reversible bradycardia secondary to sick sinus syndrome with symptoms of lightheadedness. Details of procedure: The patient was brought to the electrophysiology laboratory in stable condition. The patient was in a fasting and non-sedated state. The risks, benefits and alternatives of the procedure were discussed with the patient. The risks including, but not limited to, the risks of vascular injury, bleeding, infection, device malfunction, lead dislodgement, radiation exposure, injury to cardiac and surrounding structures (including pneumothorax), stroke, myocardial infarction and death were discussed in detail. The patient opted to proceed with the device implantation. Written informed consent was signed and placed in the chart. Prophylactic antibiotic using Ancef 2 g IV was given. The patient was prepped and draped in sterile fashion. A timeout protocol was completed to identify the patient and the procedure being performed. IV sedation was provided with IV Versed and IV Fentanyl.  The patient was monitored continuously with ECG, pulse oximetry, Impression:    Pre- and post-procedural diagnoses of sick sinus syndrome with symptoms of lightheadedness with successful implantation of a Medtronic 2-chamber PPM.     Plan:     Started on flecainide 50 mg p.o. twice daily  Eliquis can be restarted after 5 days    The patient will be transferred to the floor and have usual post-implant care, including chest x-ray, intravenous antibiotic therapy, and interrogation of the device. From an EP perspective, if the interrogation and CXR tomorrow AM are showing appropriate functioning, parameters and placement, the patient may be discharged from the hospital.     Follow-up will be a 1-week wound check with our nurse in the [facility] and a 1-month follow-up with Ms. Tung Bingham. Thank you for allowing us to participate in the care of your patient. If you have any questions, please do not hesitate to contact me.     Héctor Rodriguez MD   Cardiac Electrophysiology  [facility]

## 2021-04-21 NOTE — PROGRESS NOTES
INR:   Lab Results   Component Value Date    INR 1.55 04/17/2021    INR 1.06 01/02/2021        CARDIAC LABS  ENZYMES:  No results for input(s): CKMB, CKMBINDEX, TROPONINI in the last 72 hours. Invalid input(s): CKTOTAL;3  FASTING LIPID PANEL:  Lab Results   Component Value Date    HDL 78 09/10/2020    HDL 87 01/27/2012    LDLCALC 121 09/10/2020    TRIG 90 09/10/2020    TSH 14.44 04/17/2021     LIVER PROFILE:  Lab Results   Component Value Date    AST 47 04/17/2021    AST 24 09/10/2020    ALT 42 04/17/2021    ALT 27 09/10/2020       -----------------------------------------------------------------  Telemetry: Personally reviewed  AF/RVR    Objective:   Vitals: BP 98/83   Pulse 116   Temp 97.9 °F (36.6 °C) (Oral)   Resp 23   Wt 166 lb 10.7 oz (75.6 kg)   SpO2 97%   BMI 30.48 kg/m²   General appearance: alert, appears stated age and cooperative, No acute distress   Eyes: Conjunctiva and pupils normal and reactive  Skin: Skin color, texture, turgor normal. No rashes or ecchymosis. Neck: no JVD, supple, symmetrical, trachea midline   Lungs: , no accessory muscle use, no respiratory distress, exp wheezes  Heart: irreg, tachy  Abdomen: soft, non-tender; bowel sounds normal  Extremities: No edema, DP +  Psychiatric: normal insight and affect    Patient Active Problem List:     Tinnitus     Disorder of bone and cartilage     Diverticulosis of large intestine     Mixed hyperlipidemia     Alveolitis of jaw     Hypothyroidism     Osteopenia     Hx of melanoma excision     Sensorineural hearing loss, bilateral     Tinnitus, bilateral     Afib (HCC)     Atrial fibrillation with rapid ventricular response (HCC)     SOB (shortness of breath)     Bradycardia     Acute respiratory failure with hypoxia and hypercapnia (HCC)     Hypotension     ALEJA (acute kidney injury) ([address]        Assessment & Plan:        1. Hypotension  2. pAF  3. Conversion pause  4. SB  5. Fluid overload    Cardiac Hx: Jorge Vogel is a [de-identified] y.o. woman with a h/o GERD, hypothyroidism and pAF., originally dx'd 1/2021 with progressively worsening SOB, noted to be in AF/RVR, placed on dilt, placed on Elquis, BB and lasix, EF 55-60% per echo who p/w lightheadedness, N/V/D, found to be SB with HR's in the 40's, placed on dopamine, given IVF then developed AF/RVR. 6.8 sec conversion pause noted on tele. [id number] 3. TSH 14.44 (4/17/2021). AF  - In AF - heart rate in the low 100s  - On heparin - no s/s bleeding - continue  - Echo - LAE 4.4/96.6  - On metoprolol 50 mg BID  - There is evidence of tachy julia syndrome as well as a 6.8 second conversion pause  - Plan for PPM when O2 requirements at baseline - probably on Friday  - Plan AAD after PPM - flecainide and BB  - Sleep study outpatient  - ECG ordered and results personally reviewed     Fluid overload  - On lasix 20 mg IV - continue  - Neg 4.4L  - Keep K+ > 4.0 and Mg > 2.0 - replacement ordered    EF of 79-11%  No systolic HF  No known CAD  On heparin for AF   No tobacco use. All questions and concerns were addressed to the patient/family. Alternatives to my treatment were discussed. The note was completed using EMR. Every effort was made to ensure accuracy; however, inadvertent computerized transcription errors may be present.        Rayna Oar [address]

## 2021-04-21 NOTE — PROGRESS NOTES
Hospitalist Progress Note      PCP: Daina Rodriguez DO    Chief Complaint. Presented to hospital for dizziness    Date of Admission: 4/17/2021    Subjective:  Dyspnea is better  Complaining of occasional lightheadedness. Denies any chest pain. Medications:  Reviewed    Infusion Medications    sodium chloride      heparin (PORCINE) Infusion 10 Units/kg/hr (04/21/21 0610)     Scheduled Medications    magnesium sulfate  1,000 mg Intravenous Once    metoprolol tartrate  50 mg Oral BID    potassium chloride  20 mEq Oral BID WC    furosemide  20 mg Intravenous BID    levothyroxine  25 mcg Oral QAM AC    sodium chloride flush  5-40 mL Intravenous 2 times per day     PRN Meds: sodium chloride, sodium chloride flush, sodium chloride, promethazine **OR** ondansetron, polyethylene glycol, acetaminophen **OR** acetaminophen, perflutren lipid microspheres, heparin (porcine), heparin (porcine)      Intake/Output Summary (Last 24 hours) at 4/21/2021 1128  Last data filed at 4/21/2021 1058  Gross per 24 hour   Intake 557 ml   Output 4505 ml   Net -3948 ml       Physical Exam Performed:    /79   Pulse 103   Temp 97.9 °F (36.6 °C) (Oral)   Resp 20   Wt 166 lb 10.7 oz (75.6 kg)   SpO2 (!) 87%   BMI 30.48 kg/m²     General appearance: No apparent distress, lying in bed comfortably  HEENT:  Conjunctivae/corneas clear. Neck: Supple, with full range of motion. Respiratory:  Normal respiratory effort. Clear to auscultation, bilaterally without Rales/Wheezes/Rhonchi. Cardiovascular: irregular HR   Abdomen: Soft, non-tender, non-distended, normal bowel sounds. Musculoskeletal: No cyanosis or edema bilaterally  Neurologic:  without any focal sensory/motor deficits.  grossly non-focal.  Psychiatric: Alert and oriented, Normal mood  Peripheral Pulses: +2 palpable, equal bilaterally       Labs:   Recent Labs     04/19/21  0600 04/20/21  0625 04/21/21  0438   WBC 9.8 7.9 8.6   HGB 11.9* 12.8 13.8   HCT 34.5* 38.3 40.5    202 206     Recent Labs     04/19/21  0600 04/20/21  0625 04/21/21  0438    139 139   K 3.8 3.6 4.0    102 101   CO2 27 30 29   BUN 23* 17 17   CREATININE 1.0 0.8 0.7   CALCIUM 8.7 8.7 9.1     No results for input(s): AST, ALT, BILIDIR, BILITOT, ALKPHOS in the last 72 hours. No results for input(s): INR in the last 72 hours. No results for input(s): Emily Burdock in the last 72 hours. Urinalysis:      Lab Results   Component Value Date    NITRU Negative 04/17/2021    WBCUA 0-2 04/17/2021    BACTERIA Rare 04/17/2021    RBCUA 0-2 04/17/2021    BLOODU TRACE-LYSED 04/17/2021    SPECGRAV 1.020 04/17/2021    GLUCOSEU 100 04/17/2021       Radiology:  XR CHEST PORTABLE   Final Result      Cardiomegaly and mild pulmonary edema. Assessment/Plan:    Active Hospital Problems    Diagnosis    Acute respiratory failure with hypoxia and hypercapnia (HCC) [J96.01, J96.02]    Hypotension [I95.9]    ALEJA (acute kidney injury) ([facility] [address] [address] [N17.9]    Bradycardia [R00.1]     Symptomatic Bradycardia  Acute CHF with pulmonary edema  ALEJA  afib   Hypothyroidism  GERD    Plan  Cont IV dopamine, EP consulted-plan for PPM, cont IV hearin  Cont on IV lasix, follow up on BMP, monitor and replace electrolytes. Monitor on tele  DVT Prophylaxis: on IV heparin  Diet: DIET CARDIAC;  Code Status: Full Code    PT/OT Eval Status: ordered    Dispo -pending PPM placement.   Discussed with electrophysiology, they will do PPM today    Vika Vyas MD

## 2021-04-21 NOTE — PROGRESS NOTES
Carbone cleaned with soap and water, then removed.    Pt able to ambulate to bathroom and no neuro deficits and can communicate when needs to use restroom for accurate I/O's     Negrito Lizama

## 2021-04-21 NOTE — ANESTHESIA PRE PROCEDURE
Department of Anesthesiology  Preprocedure Note       Name:  Solis Kasper   Age:  [de-identified] y.o.  :  1940                                          MRN:  [id number]         Date:  2021      Surgeon: * Surgery not found *    Procedure:     Medications prior to admission:   Prior to Admission medications    Medication Sig Start Date End Date Taking? Authorizing Provider   metoprolol tartrate (LOPRESSOR) 100 MG tablet Take 1 tablet by mouth 2 times daily 21  Yes Red Baig MD   Handicap Placard MISC by Does not apply route Dx of shortness of breath and A-fib. Effective 2021 until 2026. 21  Yes Gerber Kolb DO   apixaban (ELIQUIS) 5 MG TABS tablet Take 1 tablet by mouth 2 times daily 21  Yes Shyla Leiva MD   furosemide (LASIX) 20 MG tablet Take 1 tablet by mouth daily 21  Yes Shyla Leiva MD   levothyroxine (SYNTHROID) 25 MCG tablet TAKE 1 TABLET BY MOUTH EVERY DAY 20  Yes Gerber Kolb DO   Cholecalciferol (VITAMIN D) 2000 UNITS CAPS capsule Take 1 capsule by mouth daily.  14  Yes Bronwyn Maxwell MD   flecainide (TAMBOCOR) 50 MG tablet Take 2 tablets by mouth 2 times daily 4/15/21   Red Baig MD       Current medications:    Current Facility-Administered Medications   Medication Dose Route Frequency Provider Last Rate Last Admin    metoprolol tartrate (LOPRESSOR) tablet 50 mg  50 mg Oral BID Todd Garcia MD   50 mg at 21 0902    potassium chloride (KLOR-CON M) extended release tablet 20 mEq  20 mEq Oral BID  Doc JOSE Urban - CNP   20 mEq at 21 0902    furosemide (LASIX) injection 20 mg  20 mg Intravenous BID Todd Garcia MD   20 mg at 21 0902    sodium chloride (OCEAN, BABY AYR) 0.65 % nasal spray 1 spray  1 spray Each Nostril Q2H PRN Todd Garcia MD        levothyroxine (SYNTHROID) tablet 25 mcg  25 mcg Oral QAM AC Todd Garcia MD   25 mcg at 21 6441    sodium chloride flush 0.9 % injection 5-40 mL  5-40 mL Intravenous 2 times per day Bon Soto MD   10 mL at 04/21/21 0903    sodium chloride flush 0.9 % injection 5-40 mL  5-40 mL Intravenous PRN Bon Soto MD        0.9 % sodium chloride infusion  25 mL Intravenous PRN Bon Soto MD        promethazine (PHENERGAN) tablet 12.5 mg  12.5 mg Oral Q6H PRN Bon Soto MD        Or    ondansetron [facility]) injection 4 mg  4 mg Intravenous Q6H PRN Bon Soto MD        polyethylene glycol [facility]) packet 17 g  17 g Oral Daily PRN Bon Soto MD        acetaminophen (TYLENOL) tablet 650 mg  650 mg Oral Q6H PRN Bon Soto MD        Or    acetaminophen (TYLENOL) suppository 650 mg  650 mg Rectal Q6H PRN Bon Soto MD        perflutren lipid microspheres (DEFINITY) injection 1.65 mg  1.5 mL Intravenous ONCE PRN Bon Soto MD           Allergies:     Allergies   Allergen Reactions    Bactrim [Sulfamethoxazole-Trimethoprim] Hives     hives    Seasonal Other (See Comments)     Runny nose, sneezing       Problem List:    Patient Active Problem List   Diagnosis Code    Tinnitus H93.19    Disorder of bone and cartilage M89.9, M94.9    Diverticulosis of large intestine K57.30    Mixed hyperlipidemia E78.2    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    Hypothyroidism E03.9    Osteopenia M85.80    Hx of melanoma excision Z98.890, Z85.820    Sensorineural hearing loss, bilateral H90.3    Tinnitus, bilateral H93.13    Afib (HCC) I48.91    Atrial fibrillation with rapid ventricular response (HCC) I48.91    SOB (shortness of breath) R06.02    Bradycardia R00.1    Acute respiratory failure with hypoxia and hypercapnia (HCC) J96.01, J96.02    Hypotension I95.9    ALEJA (acute kidney injury) ([facility] [address] N17.9       Past Medical History:        Diagnosis Date    Allergic rhinitis     Alveolitis of jaw     Cancer ([facility] [address]     Dental disease     Diverticulosis of colon (without mention of hemorrhage)     Dizziness     GERD (gastroesophageal reflux disease)     Hearing loss     Myalgia and myositis, unspecified     Other and unspecified hyperlipidemia     Recurrent upper respiratory infection (URI)     Screening mammogram 11/05/2008    Normal    Tinnitus     Unspecified gastritis and gastroduodenitis without mention of hemorrhage     Unspecified hypothyroidism        Past Surgical History:        Procedure Laterality Date    BLADDER SUSPENSION  1990s    CHOLECYSTECTOMY  1990s    COLONOSCOPY  1/16/2007    Normal    COLONOSCOPY  02/27/2012    Dr. Ruben Javier    Right Repair    HYSTERECTOMY  1975    WISDOM TOOTH EXTRACTION         Social History:    Social History     Tobacco Use    Smoking status: Former Smoker     Packs/day: 0.00     Years: 10.00     Pack years: 0.00     Quit date: 6/1/2005     Years since quitting: 15.8    Smokeless tobacco: Never Used   Substance Use Topics    Alcohol use: Yes     Comment: rare                                Counseling given: Not Answered      Vital Signs (Current):   Vitals:    04/21/21 1020 04/21/21 1030 04/21/21 1058 04/21/21 1157   BP:    (!) 122/107   Pulse: 102 93 103 99   Resp: 13 15 20 19   Temp:    97.9 °F (36.6 °C)   TempSrc:    Oral   SpO2: 94% 96% (!) 87% 93%   Weight:                                                  BP Readings from Last 3 Encounters:   04/21/21 (!) 122/107   04/08/21 125/85   03/11/21 112/70       NPO Status:                                                                                 BMI:   Wt Readings from Last 3 Encounters:   04/21/21 166 lb 10.7 oz (75.6 kg)   04/08/21 165 lb 3.2 oz (74.9 kg)   03/11/21 166 lb 8 oz (75.5 kg)     Body mass index is 30.48 kg/m².     CBC:   Lab Results   Component Value Date    WBC 8.6 04/21/2021    RBC 4.22 04/21/2021    HGB 13.8 04/21/2021    HCT 40.5 04/21/2021    MCV 95.9 04/21/2021    RDW 14.5 04/21/2021     04/21/2021       CMP:   Lab Results some sob/dizzy, O2 requirement on admit, required some lasix    Echo 4/19/2021  Conclusions      Summary   Normal left ventricle size. There is mild concentric left ventricular   hypertrophy. Overall left ventricular systolic function appears normal with   an ejection fraction of 55%. The right ventricle is mildly enlarged. Biatrial enlargement. Inferior vena cava appears dilated.)  Induction: intravenous. Anesthetic plan and risks discussed with patient. Plan discussed with CRNA.                 Yaya Park MD   4/21/2021

## 2021-04-21 NOTE — PROGRESS NOTES
Patient was transferred from ICU to 4313. Vitals and assessment are as noted. Patient denies any pain or discomfort. Call light and frequently used items are within pt's reach. Patient called family to notify of change in room. Plan for pacemaker placement this afternoon.

## 2021-04-21 NOTE — CARE COORDINATION
Patient transferred to PCU today. She is back to baseline with O2 and electrophysiology will place PPM today. Patient is from home and quite independent at baseline. She denies needs from  at d/c.      Electronically signed by Dennise Mario RN on 4/21/2021 at 1:10 PM  [phone]

## 2021-04-22 ENCOUNTER — PROCEDURE VISIT (OUTPATIENT)
Dept: CARDIOLOGY CLINIC | Age: 81
End: 2021-04-22
Payer: MEDICARE

## 2021-04-22 ENCOUNTER — APPOINTMENT (OUTPATIENT)
Dept: GENERAL RADIOLOGY | Age: 81
DRG: 242 | End: 2021-04-22
Payer: MEDICARE

## 2021-04-22 VITALS
RESPIRATION RATE: 18 BRPM | HEIGHT: 62 IN | DIASTOLIC BLOOD PRESSURE: 82 MMHG | BODY MASS INDEX: 29.78 KG/M2 | WEIGHT: 161.82 LBS | SYSTOLIC BLOOD PRESSURE: 119 MMHG | HEART RATE: 79 BPM | TEMPERATURE: 97.2 F | OXYGEN SATURATION: 95 %

## 2021-04-22 DIAGNOSIS — R00.1 BRADYCARDIA: ICD-10-CM

## 2021-04-22 DIAGNOSIS — Z95.0 PACEMAKER: ICD-10-CM

## 2021-04-22 LAB — INR BLD: 1.1 (ref 0.86–1.14)

## 2021-04-22 PROCEDURE — 6370000000 HC RX 637 (ALT 250 FOR IP): Performed by: INTERNAL MEDICINE

## 2021-04-22 PROCEDURE — 6360000002 HC RX W HCPCS: Performed by: INTERNAL MEDICINE

## 2021-04-22 PROCEDURE — 99232 SBSQ HOSP IP/OBS MODERATE 35: CPT | Performed by: NURSE PRACTITIONER

## 2021-04-22 PROCEDURE — 71046 X-RAY EXAM CHEST 2 VIEWS: CPT

## 2021-04-22 PROCEDURE — 93280 PM DEVICE PROGR EVAL DUAL: CPT | Performed by: INTERNAL MEDICINE

## 2021-04-22 PROCEDURE — 2580000003 HC RX 258: Performed by: INTERNAL MEDICINE

## 2021-04-22 RX ORDER — METOPROLOL TARTRATE 50 MG/1
50 TABLET, FILM COATED ORAL 2 TIMES DAILY
Qty: 60 TABLET | Refills: 3 | Status: SHIPPED | OUTPATIENT
Start: 2021-04-22 | End: 2021-04-28 | Stop reason: DRUGHIGH

## 2021-04-22 RX ORDER — POTASSIUM CHLORIDE 20 MEQ/1
20 TABLET, EXTENDED RELEASE ORAL
Status: DISCONTINUED | OUTPATIENT
Start: 2021-04-23 | End: 2021-04-22 | Stop reason: HOSPADM

## 2021-04-22 RX ORDER — FLECAINIDE ACETATE 50 MG/1
50 TABLET ORAL 2 TIMES DAILY
Qty: 60 TABLET | Refills: 3 | Status: SHIPPED | OUTPATIENT
Start: 2021-04-22 | End: 2021-09-15

## 2021-04-22 RX ADMIN — LEVOTHYROXINE SODIUM 25 MCG: 0.03 TABLET ORAL at 06:11

## 2021-04-22 RX ADMIN — Medication 10 ML: at 10:10

## 2021-04-22 RX ADMIN — CEFAZOLIN SODIUM 2000 MG: 2 SOLUTION INTRAVENOUS at 00:58

## 2021-04-22 RX ADMIN — ACETAMINOPHEN 650 MG: 325 TABLET ORAL at 03:16

## 2021-04-22 RX ADMIN — CEFAZOLIN SODIUM 2000 MG: 2 SOLUTION INTRAVENOUS at 06:11

## 2021-04-22 RX ADMIN — FLECAINIDE ACETATE 50 MG: 100 TABLET ORAL at 10:09

## 2021-04-22 RX ADMIN — METOPROLOL TARTRATE 50 MG: 50 TABLET, FILM COATED ORAL at 10:09

## 2021-04-22 ASSESSMENT — PAIN SCALES - GENERAL: PAINLEVEL_OUTOF10: 0

## 2021-04-22 NOTE — DISCHARGE INSTR - COC
Continuity of Care Form    Patient Name: Rosalba Quezada   :  1940  MRN:  [id number]    Admit date:  2021  Discharge date:  2021  Code Status Order: Full Code   Advance Directives:   [address] [facility] Documentation       Date/Time Healthcare Directive Type of Healthcare Directive Copy in [address] Agent's Name Healthcare Agent's Phone Number    21 [phone]  Unknown, patient unable to respond due to medical condition -- -- -- -- --            Admitting Physician:  Tati Ramos DO  PCP: Geovany Peña DO    Discharging Nurse: Carlos Eduardo Harp [facility] Unit/Room#: 0483/6490-83  Discharging Unit Phone Number: [phone]    Emergency Contact:   Extended Emergency Contact Information  Primary Emergency Contact: [address] [place] [address] Ave of [address] [place] [address] St Phone: [phone]  Relation: Spouse    Past Surgical History:  Past Surgical History:   Procedure Laterality Date    BLADDER SUSPENSION      CHOLECYSTECTOMY      COLONOSCOPY  2007    Normal    COLONOSCOPY  2012    Dr. Walsh Herb EXTRACTION         Immunization History:   Immunization History   Administered Date(s) Administered    Influenza Virus Vaccine 10/15/2012    Influenza, High Dose (Fluzone 65 yrs and older) 10/07/2013, 10/09/2014, 10/07/2015, 10/28/2016, 10/09/2017, 2018, 10/29/2019, 2020    Influenza, High-dose, Quadv, 65 yrs +, IM (Fluzone) 2020    Pneumococcal Conjugate 13-valent (Nolia Abelson) 2016    Pneumococcal Polysaccharide (Hyyerdjai56) 2012    Tdap (Boostrix, Adacel) 2014    Zoster Live (Zostavax) 2012       Active Problems:  Patient Active Problem List   Diagnosis Code    Tinnitus H93.19    Disorder of bone and cartilage M89.9, M94.9    Diverticulosis of large intestine K57.30    Mixed hyperlipidemia E78.2    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    Hypothyroidism E03.9    Osteopenia M85.80    Hx of melanoma excision Z98.890, Z85.820    Sensorineural hearing loss, bilateral H90.3    Tinnitus, bilateral H93.13    Afib (HCC) I48.91    Atrial fibrillation with rapid ventricular response (HCC) I48.91    SOB (shortness of breath) R06.02    Bradycardia R00.1    Acute respiratory failure with hypoxia and hypercapnia (HCC) J96.01, J96.02    Hypotension I95.9    ALEJA (acute kidney injury) ([facility] [address] N17.9    Pacemaker-MEDTRONIC Z95.0       Isolation/Infection:   Isolation            No Isolation          Patient Infection Status       Infection Onset Added Last Indicated Last Indicated By Review Planned Expiration Resolved Resolved By    None active    Resolved    C-diff Rule Out 04/17/21 04/17/21 04/18/21 Clostridium difficile toxin/antigen (Ordered)   04/19/21 Marissa Rodrigues RN    Order discontinued by MD    COVID-19 Rule Out 01/02/21 01/02/21 01/02/21 COVID-19 (Ordered)   01/03/21 Rule-Out Test Resulted            Nurse Assessment:  Last Vital Signs: /82   Pulse 79   Temp 97.2 °F (36.2 °C) (Oral)   Resp 18   Ht 5' 2\" (1.575 m)   Wt 161 lb 13.1 oz (73.4 kg)   SpO2 95%   BMI 29.60 kg/m²     Last documented pain score (0-10 scale): Pain Level: 3  Last Weight:   Wt Readings from Last 1 Encounters:   04/22/21 161 lb 13.1 oz (73.4 kg)     Mental Status:  alert    IV Access:  - None    Nursing Mobility/ADLs:  Walking   Independent  Transfer  Independent  Bathing  Independent  Dressing  Assisted  Toileting  Independent  Feeding  Independent  Med Admin  Independent  Med Delivery   none    Wound Care Documentation and Therapy:        Elimination:  Continence:   · Bowel:  Yes  · Bladder: Yes  Urinary Catheter: None   Colostomy/Ileostomy/Ileal Conduit: Yes and No       Date of Last BM: ***    Intake/Output Summary (Last 24 hours) at 4/22/2021 1028  Last data filed at 4/22/2021 0306  Gross per 24 hour   Intake 840 ml   Output 875 ml   Net -35 ml     I/O last 3 completed shifts: In: 1080 [P.O.:480; I.V.:600]  Out: 875 [Urine:875]    Safety Concerns:     None    Impairments/Disabilities:      None    Nutrition Therapy:  Current Nutrition Therapy:   - Oral Diet:  General    Routes of Feeding: Oral  Liquids: Thin Liquids  Daily Fluid Restriction: no  Last Modified Barium Swallow with Video (Video Swallowing Test): not done    Treatments at the Time of Hospital Discharge:   Respiratory Treatments: ***  Oxygen Therapy:  is not on home oxygen therapy. Ventilator:    - No ventilator support    Rehab Therapies: Physical Therapy and Occupational Therapy  Weight Bearing Status/Restrictions: Follow Cardiology required restrictions   Other Medical Equipment (for information only, NOT a DME order):  {EQUIPMENT:[id number]}  Other Treatments: ***    Patient's personal belongings (please select all that are sent with patient):  {[facility] DME Belongings:[id number]:::0}    RN SIGNATURE:  Electronically signed by Bailee Delacruz RN on 4/22/21 at 12:52 PM EDT    CASE MANAGEMENT/SOCIAL WORK SECTION    Inpatient Status Date: ***    Readmission Risk Assessment Score:  Readmission Risk              Risk of Unplanned Readmission:        12           Discharging to Facility/ Agency   · Name:   · Address:  · Phone:  · Fax:    Dialysis Facility (if applicable)   · Name:  · Address:  · Dialysis Schedule:  · Phone:  · Fax:    / signature: {Esignature:[id number]:::0}    PHYSICIAN SECTION    Prognosis: Fair    Condition at Discharge: Stable    Rehab Potential (if transferring to Rehab): Fair    Recommended Labs or Other Treatments After Discharge: follow up with cardiologist/EP    Physician Certification: I certify the above information and transfer of Preston Jennings  is necessary for the continuing treatment of the diagnosis listed and that she requires Home Care for less 30 days.      Update Admission H&P: No change in H&P    PHYSICIAN SIGNATURE:  Electronically signed by Laney Apple MD on 4/22/21 at 11:53 AM EDT

## 2021-04-22 NOTE — DISCHARGE SUMMARY
INTERNAL MEDICINE    RESIDENT DISCHARGE SUMMARY   Discharge Summaries      Patient ID: Trinidad Jennings   Gender: female      :  1940  AGE: [de-identified] y.o. MRN:  [id number]  Code Status: Full Code   PCP: Justin Zelaya DO    Admit date:  2021      Discharge date:  21  Admitting Physician:  Arian Munoz DO    Discharge Physician: Prabhu Pepper MD     Admission Condition:  serious  Discharged Condition:  fair Stable    Discharge Diagnoses:  1. Sick sinus syndrome  2. Atrial fibrillation  3. Hypothyroidism  4. Acute hypoxic and hypercapnic respiratory failure  5. Hypotension  6. ALEJA     Active Hospital Problems    Diagnosis Date Noted    Acute respiratory failure with hypoxia and hypercapnia (HCC) [J96.01, J96.02] 2021    Hypotension [I95.9] 2021    ALEJA (acute kidney injury) ([facility] [address] [N17.9]     Bradycardia [R00.1] 2021       The patient was seen and examined on day of discharge and this discharge summary is in conjunction with any daily progress note from day of discharge. Hospital Course:  [de-identified] PMH Afib (on eliquis) and recently started on flecainide, hypothyroidism presented with lightheadedness associated with diarrhea and vomiting. She was found to be bradycardic to 40s with SBP initially in 60s in conjunction with AMS and ALEJA, all of which corrected following IVF and dopamine. EP was consulted for her sick sinus syndrome and pt underwent successful implantation of [employer] 2-chamber PPM (21) following which pt was continued on half dose of flecainide and lopressor. She should follow up with EP outpatient for dosage adjustments as required. She should resume the eliquis on 21. During her course, pt also had a rapidly increasing oxygen requirement secondary to pulmonary edema which resolved with lasix and pt was successfully transitioned off oxygen.  Her lasix was then discontinued due to lower BP, she should follow up with her PCP within one week of discharge to reassess and restart lasix as BP requires. On the day of discharge, pt no longer felt lightheaded or SOB. She denied fevers, chills, nausea, vomiting, abdominal pain, chest pain, palpitations, leg swelling. Her vitals were stable with SBP in 110s and rate controlled Afib, not requiring oxygen. She was examined and determined to be stable for discharge home. She should follow up with her PCP, wound care and EP outpatient. Disposition:  Home    Physical Exam Performed:     /82   Pulse 79   Temp 97.2 °F (36.2 °C) (Oral)   Resp 18   Ht 5' 2\" (1.575 m)   Wt 161 lb 13.1 oz (73.4 kg)   SpO2 95%   BMI 29.60 kg/m²     Physical Exam   General appearance: No apparent distress, lying in bed comfortably  HEENT:  Conjunctivae/corneas clear. Neck: Supple, with full range of motion. Respiratory:  Normal respiratory effort. Clear to auscultation, bilaterally without Rales/Wheezes/Rhonchi. Cardiovascular: irregular HR   Abdomen: Soft, non-tender, non-distended, normal bowel sounds. Musculoskeletal: No cyanosis or edema bilaterally  Neurologic:  without any focal sensory/motor deficits. grossly non-focal.  Psychiatric: Alert and oriented, Normal mood  Peripheral Pulses: +2 palpable, equal bilaterally    Labs: For convenience and continuity at follow-up the following most recent labs are provided:      CBC:    Lab Results   Component Value Date    WBC 8.6 04/21/2021    HGB 13.8 04/21/2021    HCT 40.5 04/21/2021     04/21/2021       Renal:    Lab Results   Component Value Date     04/21/2021    K 4.0 04/21/2021     04/21/2021    CO2 29 04/21/2021    BUN 17 04/21/2021    CREATININE 0.7 04/21/2021    CALCIUM 9.1 04/21/2021         Significant Diagnostic Studies    Radiology:   XR CHEST (2 VW)   Final Result        Status post pacer placement. Cardiomegaly. No acute findings. XR CHEST PORTABLE   Final Result      Cardiomegaly and mild pulmonary edema.                 Consults: IP CONSULT TO HOSPITALIST  IP CONSULT TO CARDIOLOGY  IP CONSULT TO CRITICAL CARE      Discharge Instructions/Follow-up:  PCP, EP, wound care    Activity: activity as tolerated    Diet: cardiac diet    Discharge Medications:     Current Discharge Medication List           Details   metoprolol tartrate (LOPRESSOR) 100 MG tablet Take 1 tablet by mouth 2 times daily  Qty: 60 tablet, Refills: 5      Handicap Placard MISC by Does not apply route Dx of shortness of breath and A-fib. Effective Jan 13, 2021 until Jan 13, 2026. Qty: 1 each, Refills: 0    Associated Diagnoses: Atrial fibrillation, unspecified type ([facility] [address] Acquired hypothyroidism      apixaban (ELIQUIS) 5 MG TABS tablet Take 1 tablet by mouth 2 times daily  Qty: 60 tablet, Refills: 3      furosemide (LASIX) 20 MG tablet Take 1 tablet by mouth daily  Qty: 60 tablet, Refills: 3      levothyroxine (SYNTHROID) 25 MCG tablet TAKE 1 TABLET BY MOUTH EVERY DAY  Qty: 90 tablet, Refills: 3    Associated Diagnoses: Acquired hypothyroidism      Cholecalciferol (VITAMIN D) 2000 UNITS CAPS capsule Take 1 capsule by mouth daily. flecainide (TAMBOCOR) 50 MG tablet Take 2 tablets by mouth 2 times daily  Qty: 60 tablet, Refills: 5             Time Spent on discharge is more than 30 minutes in the examination, evaluation, counseling and review of medications and discharge plan. Signed:    Sonam Mcnally MD   Internal Medicine Resident, PGY-2  4/22/2021      Thank you Rishabh Montes DO for the opportunity to be involved in this patient's care. If you have any questions or concerns please feel free to contact me.

## 2021-04-22 NOTE — PROGRESS NOTES
Discharge note: Patient has been seen by doctor. Discharge order obtained, and discharge instructions reviewed. Patient educated, using the teach back method, about follow up instructions and discharge instructions. A completed copy of the AVS instructions given to patient and all questions answered. IV catheter removed without complaints, catheter intact, site WNL. [facility]

## 2021-04-22 NOTE — CARE COORDINATION
Case Management Assessment            Discharge Note                    Date / Time of Note: 4/22/2021 12:12 PM                  Discharge Note Completed by: Jen Pinzon    Patient Name: Jeri Jennings   YOB: 1940  Diagnosis: Bradycardia [R00.1]   Date / Time: 4/17/2021  3:06 PM    Current PCP: Susi Jenkins DO  Clinic patient: No    Hospitalization in the last 30 days: No    Advance Directives:  Code Status: Full Code  [place] DNR form completed and on chart: Not Indicated    Financial:  Payor: MEDICARE / Plan: MEDICARE PART A AND B / Product Type: *No Product type* /      Pharmacy:    [id number] [place] [phone] - F [phone]  [address]  Phone: [phone] Fax: [phone]      Assistance purchasing medications?: Potential Assistance Purchasing Medications: No  Assistance provided by Case Management: None at this time    Does patient want to participate in local refill/ meds to beds program?: Yes    Meds To Beds General Rules:  1. Can ONLY be done Monday- Friday between 8:30am-5pm  2. Prescription(s) must be in pharmacy by 3pm to be filled same day  3. Copy of patient's insurance/ prescription drug card and patient face sheet must be sent along with the prescription(s)  4. Cost of Rx cannot be added to hospital bill. If financial assistance is needed, please contact unit  or ;  or  CANNOT provide pharmacy voucher for patients co-pays  5.  Patients can then  the prescription on their way out of the hospital at discharge, or pharmacy can deliver to the bedside if staff is available. (payment due at time of pick-up or delivery - cash, check, or card accepted)     Able to afford home medications/ co-pay costs: Yes    ADLS:  Current PT AM-PAC Score:   /24  Current OT AM-PAC Score:   /24      DISCHARGE Disposition: Home- No Services Needed    LOC at discharge: Not Applicable  CESARIO Completed: Not Indicated    Notification completed in HENS/PAS?:  Not Applicable    IMM Completed:   No, pt left before letter given    Transportation:  Transportation PLAN for discharge: family   Mode of Transport: [address] ordered at discharge: Not [address]: Not Applicable  Orders faxed: No    Durable Medical Equipment:  DME Provider: n/a  Equipment obtained during hospitalization: n/a    Home Oxygen and Respiratory Equipment:  Oxygen needed at discharge?: Not [address]: Not Applicable  Portable tank available for discharge?: Not Indicated    Dialysis:  Dialysis patient: No    Dialysis Center:  Not Applicable    Hospice Services:  Location: Not Applicable  Agency: Not Applicable    Consents signed: Not Indicated    Referrals made at [facility] for outpatient continued care:  Not Applicable    Additional CM Notes:  Patient is from home, independent pta, drives self. No CM needs at this time. Daughter to transport home. The Plan for Transition of Care is related to the following treatment goals of Bradycardia [R00.1]    The Patient and/or patient representative aKty Long and her family were provided with a choice of provider and agrees with the discharge plan Yes    Freedom of choice list was provided with basic dialogue that supports the patient's individualized plan of care/goals and shares the quality data associated with the providers.  Yes    Care Transitions patient: Yes    Camille Urbina RN  [facility]  Case Management Department  Ph: [phone]  Fax: [phone]

## 2021-04-22 NOTE — PLAN OF CARE
Problem: Falls - Risk of:  Goal: Will remain free from falls  Description: Will remain free from falls  Outcome: Ongoing  Note: Pt free from injury or falls at this time, fall precautions in place, bed in low position, side rail up x2, [place] Fall Risk: Medium (25-44), bed alarm on, reoriented to room and call light, reminded not to get up without assistance. Pt verbalizes understanding of fall risk procedures. Will continue with hourly rounds for PO intake, pain needs, toileting, and repositioning as needed. No needs expressed at this time. Call light in reach, will continue to monitor. Problem: Cardiac:  Goal: Ability to maintain vital signs within normal range will improve  Description: Ability to maintain vital signs within normal range will improve  Outcome: Ongoing  Note: Pt remains hemodynamically stable at this time. Vss. Denies chest pain, sob, lightheadedness, dizziness, or palpitations. I/Os maintained with daily weights. A fib on tele. Pacemaker incision site in tact with dressing clean dry and intact. SpO2 remains >92% on room air. Will continue to monitor.

## 2021-04-22 NOTE — PROGRESS NOTES
Electrophysiology - PROGRESS NOTE    Admit Date: 4/17/2021     Chief Complaint: TBS, AF     Interval History:   Patient seen and examined and notes reviewed. Patient is being followed for TBS and pAF. Patient w/ pAF with RVR and bradycardic when in NSR. P/w syncope, fatigue and found to be bradycardic. S/p dual ch MDT PPM (4/21/21, Dr. Annemarie Bumpers). L chest incision CDI. In: 840 [P.O.:240; I.V.:600]  Out: -    Wt Readings from Last 2 Encounters:   04/22/21 161 lb 13.1 oz (73.4 kg)   04/08/21 165 lb 3.2 oz (74.9 kg)       Data:   Scheduled Meds:   Scheduled Meds:   sodium chloride flush  5-40 mL Intravenous 2 times per day    flecainide  50 mg Oral BID    metoprolol tartrate  50 mg Oral BID    potassium chloride  20 mEq Oral BID WC    levothyroxine  25 mcg Oral QAM AC    sodium chloride flush  5-40 mL Intravenous 2 times per day     Continuous Infusions:   sodium chloride       PRN Meds:.sodium chloride flush, sodium chloride, sodium chloride, sodium chloride flush, promethazine **OR** ondansetron, polyethylene glycol, acetaminophen **OR** acetaminophen  Continuous Infusions:   sodium chloride         Intake/Output Summary (Last 24 hours) at 4/22/2021 0807  Last data filed at 4/22/2021 0306  Gross per 24 hour   Intake 1080 ml   Output 875 ml   Net 205 ml       CBC:   Lab Results   Component Value Date    WBC 8.6 04/21/2021    HGB 13.8 04/21/2021     04/21/2021     BMP:  Lab Results   Component Value Date     04/21/2021    K 4.0 04/21/2021     04/21/2021    CO2 29 04/21/2021    BUN 17 04/21/2021    CREATININE 0.7 04/21/2021    GLUCOSE 100 04/21/2021    GLUCOSE 104 06/30/2011     INR:   Lab Results   Component Value Date    INR 1.55 04/17/2021    INR 1.06 01/02/2021        CARDIAC LABS  ENZYMES:  No results for input(s): CKMB, CKMBINDEX, TROPONINI in the last 72 hours.     Invalid input(s): CKTOTAL;3  FASTING LIPID PANEL:  Lab Results Component Value Date    HDL 78 09/10/2020    HDL 87 01/27/2012    LDLCALC 121 09/10/2020    TRIG 90 09/10/2020    TSH 14.44 04/17/2021     LIVER PROFILE:  Lab Results   Component Value Date    AST 47 04/17/2021    AST 24 09/10/2020    ALT 42 04/17/2021    ALT 27 09/10/2020       -----------------------------------------------------------------  Telemetry: Personally reviewed  AF/RVR, HR ok overnight    Objective:   Vitals: /82   Pulse 87   Temp 97.1 °F (36.2 °C) (Oral)   Resp 18   Ht 5' 2\" (1.575 m)   Wt 161 lb 13.1 oz (73.4 kg)   SpO2 94%   BMI 29.60 kg/m²   General appearance: alert, appears stated age and cooperative, No acute distress   Eyes: Conjunctiva and pupils normal and reactive  Skin: Skin color, texture, turgor normal. No rashes or ecchymosis. Neck: no JVD, supple, symmetrical, trachea midline   Lungs: , no accessory muscle use, no respiratory distress, exp wheezes  Heart: irreg, not tachy  Abdomen: soft, non-tender; bowel sounds normal  Extremities: No edema, DP +  Psychiatric: normal insight and affect    Patient Active Problem List:     Tinnitus     Disorder of bone and cartilage     Diverticulosis of large intestine     Mixed hyperlipidemia     Alveolitis of jaw     Hypothyroidism     Osteopenia     Hx of melanoma excision     Sensorineural hearing loss, bilateral     Tinnitus, bilateral     Afib (HCC)     Atrial fibrillation with rapid ventricular response (HCC)     SOB (shortness of breath)     Bradycardia     Acute respiratory failure with hypoxia and hypercapnia (HCC)     Hypotension     ALEJA (acute kidney injury) ([facility] [address]        Assessment & Plan:        1. Hypotension  2. pAF  3. Conversion pause  4. SB  5. Fluid overload  6.  PPM    Cardiac Hx: Thalia Meadows is a [address] [place] [address] y.o. woman with a h/o GERD, hypothyroidism and pAF., originally dx'd 1/2021 with progressively worsening SOB, noted to be in AF/RVR, placed on dilt, placed on Elquis, BB and lasix, EF 55-60% per echo who p/w lightheadedness, N/V/D, found to be SB with HR's in the 40's, placed on dopamine, given IVF then developed AF/RVR. 6.8 sec conversion pause noted on tele. [id number] 3. TSH 14.44 (4/17/2021). AF  - In AF - heart rate in the low 100s  - Restart OAC on 4/26  - On metoprolol 50 mg BID with HR better controlled overnight, had been on 100 mg BID at home  - Will assess HR in the office next week and increase BB if needed  - Echo - LAE 4.4/96.6  - Sleep study outpatient  - Plan for DCCV after 3 weeks of uninterrupted [address]  - ECG ordered and results personally reviewed     TBS  - S/p dual ch MDT PPM   - CXR shows stable lead placement  - L chest incision  - Device check WNL  - Reviewed wound care and activity restrictions with patient  - F/u next week for wound check    Fluid overload  - Off lasix  - Neg 8.7L  - Keep K+ > 4.0 and Mg > 2.0     EF of 78-43%  No systolic HF  No known CAD  On heparin for AF   No tobacco use. All questions and concerns were addressed to the patient/family. Alternatives to my treatment were discussed. The note was completed using EMR. Every effort was made to ensure accuracy; however, inadvertent computerized transcription errors may be present.        Shirley Waldenr [address]

## 2021-04-23 ENCOUNTER — CARE COORDINATION (OUTPATIENT)
Dept: CASE MANAGEMENT | Age: 81
End: 2021-04-23

## 2021-04-23 NOTE — CARE COORDINATION
[address] Transitions Initial Follow Up Call    Call within 2 business days of discharge: Yes    Patient:  Rick Parra Patient :  1940  MRN:  [id number]   Reason for Admission: COVID 19    Discharge Date:  21  RARS: 12      CTC attempt to reach Pt regarding recent hospital discharge. CTC left voice recording with call back number requesting a call back. Follow up appointments:    Future Appointments   Date Time Provider Trev Fox   2021 11:00 AM DO STEW Smith FP [facility]   2021 11:30 AM SCHEDULE, Marissa Wing DEVICE CHECK [place] [facility]   2021 11:45 AM JOSE Fernández CNP [place] [facility]   2021  2:00 PM MD Umberto [place] [facility]   2021  3:00 PM SCHEDULE, Reece Schmid [address] [facility]   2021  3:30 PM JOSE Fernández CNP [place] [facility]   8/3/2021  9:00 AM Marissa Marsh DEVICE CHECK [place] [facility]   8/3/2021  9:30 AM Bernard Jeans, MD [place] [facility]   2021  8:15 AM SCHEDULE, Marissa Wing REMOTE TRANSMISSION [place] [facility]       JUAN LIUS Swanson, RN  Care Transition Coordinator  Contact Number:  [phone]

## 2021-04-23 NOTE — PROGRESS NOTES
[address]   Electrophysiology  Office Visit  Date: 4/23/2021    No chief complaint on file. Cardiac HX: Ara Foster is a [de-identified] y.o. woman with a h/o GERD, hypothyroidism and pAF, originally dx'd 1/2021 with progressively worsening SOB, noted to be in AF/RVR, placed on dilt, placed on Elquis, BB and lasix, EF 55% per echo who p/w lightheadedness, N/V/D, found to be SB with HR's in the 40's, placed on dopamine, given IVF then developed AF/RVR, 6.8 sec conversion pause noted on tele, s/p dual ch MDT PPM (4/21/2021, Dr. Anali Garcia). Interval History/HPI: Patient is here for f/u for SSS, sinus pauses, pAF and PPM management. Patient presents in atrial fibrillation. She denies any heart racing, palpitations or irregular heartbeats. She is asymptomatic in her atrial fibrillation. She currently is on the Eliquis and restarted that on Monday which was 5 days post pacemaker placement. She has not missed any doses. She is on the flecainide 50 mg twice a day and metoprolol 50 mg twice a day. Her heart rate does appear to be a little elevated per device while she is in atrial fibrillation. She had been on 100 mg previously however due to sinus pauses and bradycardia prior to the pacemaker placement her dose was cut to 50 mg twice a day. She denies any chest pain, shortness of breath, PND, orthopnea or lower extremity edema. Her left chest incision is healing well. Steri-Strips are still intact. She has had no postoperative incision discomfort. Review of her device today shows that she atrially paces 0.2% of the time and ventricularly paces 12.3% of the time. She has been in atrial fibrillation 400% since the device was implanted. She is currently not taking the Lasix. We did review in the office today about her weighing herself daily and taking Lasix as needed for weight gain greater than 3 pounds.     Home medications:   Current Outpatient Medications on File Prior to Visit   Medication Sig Dispense Refill    flecainide (TAMBOCOR) 50 MG tablet Take 1 tablet by mouth 2 times daily 60 tablet 3    [START ON 4/26/2021] apixaban (ELIQUIS) 5 MG TABS tablet Take 1 tablet by mouth 2 times daily 60 tablet 3    metoprolol tartrate (LOPRESSOR) 50 MG tablet Take 1 tablet by mouth 2 times daily 60 tablet 3    Handicap Placard MISC by Does not apply route Dx of shortness of breath and A-fib. Effective Jan 13, 2021 until Jan 13, 2026. 1 each 0    levothyroxine (SYNTHROID) 25 MCG tablet TAKE 1 TABLET BY MOUTH EVERY DAY 90 tablet 3    Cholecalciferol (VITAMIN D) 2000 UNITS CAPS capsule Take 1 capsule by mouth daily. No current facility-administered medications on file prior to visit. Past Medical History:   Diagnosis Date    Allergic rhinitis     Alveolitis of jaw     Cancer ([facility] [address]     Dental disease     Diverticulosis of colon (without mention of hemorrhage)     Dizziness     GERD (gastroesophageal reflux disease)     Hearing loss     Myalgia and myositis, unspecified     Other and unspecified hyperlipidemia     Recurrent upper respiratory infection (URI)     Screening mammogram 11/05/2008    Normal    Tinnitus     Unspecified gastritis and gastroduodenitis without mention of hemorrhage     Unspecified hypothyroidism         Past Surgical History:   Procedure Laterality Date    BLADDER SUSPENSION  1990s    CHOLECYSTECTOMY  1990s    COLONOSCOPY  1/16/2007    Normal    COLONOSCOPY  02/27/2012    Dr. Francesca Garcia    Right Repair    HYSTERECTOMY  1975    WISDOM TOOTH EXTRACTION         Allergies   Allergen Reactions    Bactrim [Sulfamethoxazole-Trimethoprim] Hives     hives    Seasonal Other (See Comments)     Runny nose, sneezing       Social History:  Reviewed. reports that she quit smoking about 15 years ago. She smoked 0.00 packs per day for 10.00 years. She has never used smokeless tobacco. She reports current alcohol use.  She reports that she does not use drugs. Family History:  Reviewed. family history includes Cancer in her brother. Review of System:    · Constitutional: No fevers, chills. · Eyes: No visual changes or diplopia. No scleral icterus. · ENT: No Headaches. No mouth sores or sore throat. · Cardiovascular: No for chest pain, No for dyspnea on exertion, No for palpitations or No for loss of consciousness. No cough, hemoptysis, No for pleuritic pain, or phlebitis. · Respiratory: No for cough or wheezing. No hematemesis. · Gastrointestinal: No abdominal pain, blood in stools. · Genitourinary: No dysuria, or hematuria. · Musculoskeletal: No gait disturbance,    · Integumentary: No rash or pruritis. · Neurological: No headache, change in muscle strength, numbness or tingling. · Psychiatric: No anxiety, or depression. · Endocrine: No temperature intolerance. No excessive thirst, fluid intake, or urination. · Hem/Lymph: No abnormal bruising or bleeding, blood clots or swollen lymph nodes. · Allergic/Immunologic: No nasal congestion or hives. Physical Examination:  There were no vitals filed for this visit. Wt Readings from Last 3 Encounters:   04/22/21 161 lb 13.1 oz (73.4 kg)   04/08/21 165 lb 3.2 oz (74.9 kg)   03/11/21 166 lb 8 oz (75.5 kg)       · Constitutional: Oriented. No distress. · Head: Normocephalic and atraumatic. · Mouth/Throat: Oropharynx is clear and moist.   · Eyes: Conjunctivae clear without jaunduice. PERRL. · Neck: Neck supple. No rigidity. No JVD present. · Cardiovascular: Normal rate, regular rhythm, S1&S2. · Pulmonary/Chest: Bilateral respiratory sounds. No wheezes, No rhonchi. · Abdominal: Soft. Bowel sounds present. No distension, No tenderness. · Musculoskeletal: No tenderness. No edema    · Lymphadenopathy: Has no cervical adenopathy. · Neurological: Alert and oriented. Cranial nerve appears intact, No Gross deficit   · Skin: Skin is warm and dry. No rash noted.    · Psychiatric: Has a

## 2021-04-23 NOTE — PROGRESS NOTES
Physician Progress Note      PATIENT:               Claudia Briscoe  CSN #:                  [id number]  :                       1940  ADMIT DATE:       2021 3:06 PM  [address] DATE:        2021 1:28 PM  RESPONDING  PROVIDER #:        Claudia Sierra MD          QUERY TEXT:    Pt admitted with Acute CHF with pulmonary edema. Pt noted to have EF 55% per   21 ECHO. If possible, please document in the progress notes and discharge   summary if you are evaluating and/or treating any of the following: The medical record reflects the following:  Risk Factors: Acute hypoxic resp failure, cardiogenic shock  Clinical Indicators: SOB on presentation, hypoxia. CXR indicates cardiomegaly   and pulmonary edema  Treatment: IV Lasix, metoprolol  Options provided:  -- Acute pulmonary edema due to acute diastolic heart failure  -- Noncardiogenic acute pulmonary edema due to ***Please specify, Please   specify. -- Other - I will add my own diagnosis  -- Disagree - Not applicable / Not valid  -- Disagree - Clinically unable to determine / Unknown  -- Refer to Clinical Documentation Reviewer    PROVIDER RESPONSE TEXT:    This patient has acute pulmonary edema due to acute diastolic heart failure    Query created by: Simone Fatima on 2021 8:18 AM      QUERY TEXT:    Patient admitted with acute CHF with pulmonary edema and acute hypoxic   respiratory failure. Documentation reflects Cardiogenic shock in Pulmonology   note of  thru . No further documentation of cardiogenic shock   following  and not mentioned on DC Summary  If possible, please document   in the progress notes and discharge summary if cardiogenic shock was:     The medical record reflects the following:  Risk Factors: documented acute CHF, pulmonary edema, acute hypoxic respiratory   failure  Clinical Indicators: Hypotension, bradycardia requiring Dopamine and ICU   admission  Treatment: atropine, dopamine (3 doses of push epinephrine in ED)  Options provided:  -- Cardiogenic shock confirmed after study  -- Cardiogenic shock treated and resolved  -- Cardiogenic shock ruled out after study  -- Other - I will add my own diagnosis  -- Disagree - Not applicable / Not valid  -- Disagree - Clinically unable to determine / Unknown  -- Refer to Clinical Documentation Reviewer    PROVIDER RESPONSE TEXT:    Cardiogenic shock treated and resolved. Query created by: Jayant Chapa on 4/23/2021 8:29 AM      QUERY TEXT:    Pt admitted with hypotension and hypoxia 2/2 new onset HF exacerbation. Also   documentation of acute CHF with pulmonary edema documented 4/17, 4/19 & 4/21. Per 4/17 ECHO: Overall left ventricular systolic function appears normal with   an ejection fraction of 55%. If possible, please document in progress notes   and discharge summary further specificity regarding the type of acute CHF:    The medical record reflects the following:  Risk Factors: Acute hypoxic resp failure, cardiogenic shock  Clinical Indicators: SOB on presentation, hypoxia  Treatment: IV Lasix, metoprolol  Options provided:  -- Acute Diastolic CHF/HFpEF  -- Acute Systolic and Diastolic CHF  -- Other - I will add my own diagnosis  -- Disagree - Not applicable / Not valid  -- Disagree - Clinically unable to determine / Unknown  -- Refer to Clinical Documentation Reviewer    PROVIDER RESPONSE TEXT:    This patient is in acute diastolic CHF/HFpEF.     Query created by: Jayant Chapa on 4/21/2021 9:08 PM      Electronically signed by:  Earl Odell MD 4/23/2021 10:57 AM

## 2021-04-26 ENCOUNTER — FOLLOWUP TELEPHONE ENCOUNTER (OUTPATIENT)
Dept: TELEMETRY | Age: 81
End: 2021-04-26

## 2021-04-26 ENCOUNTER — CARE COORDINATION (OUTPATIENT)
Dept: CASE MANAGEMENT | Age: 81
End: 2021-04-26

## 2021-04-26 NOTE — CARE COORDINATION
[facility] Transitions Initial Follow Up Call    Call within 2 business days of discharge: Yes    Patient: Trinidad Jennings Patient : 1940   MRN: [id number]   Reason for Admission:  covid 19   Discharge Date: 21 RARS: Readmission Risk Score: 12      Last Discharge [facility]       Complaint Diagnosis Description Type Department Provider    21 Shortness of Breath; Dizziness Hypotension, unspecified hypotension type . .. ED to Hosp-Admission (Discharged) (ADMITTED) [address] Ramirez Rodriguez MD; MITESH Fajardo Spoke with: Robinson Gupta 85:  [facility]      Non-face-to-face services provided:  Obtained and reviewed discharge summary and/or continuity of care documents  Education of patient/family/caregiver/guardian to support self-management-   Assessment and support for treatment adherence and medication management-        Care Transitions 24 Hour Call    Do you have any ongoing symptoms?: No  Do you have a copy of your discharge instructions?: Yes  Do you have all of your prescriptions and are they filled?: Yes  Have you been contacted by a [address]?: No  Have you scheduled your follow up appointment?: Yes  Were you discharged with any Home Care or Post Acute Services: No  Do you feel like you have everything you need to keep you well at home?: Yes  Care Transitions Interventions       Challenges to be reviewed by the provider   Additional needs identified to be addressed with provider:   NO    Discussed COVID-19 related testing which was not done     Test results were not done     Patient informed of results, if available? NA               Method of communication with provider :   phone    Was this a readmission? no    Care Transition Nurse (CTN) contacted the patient by telephone to perform post hospital discharge assessment. Verified name with patient as identifier. Provided introduction to self, and explanation of the CTN role.      CTN reviewed

## 2021-04-28 ENCOUNTER — OFFICE VISIT (OUTPATIENT)
Dept: CARDIOLOGY CLINIC | Age: 81
End: 2021-04-28
Payer: MEDICARE

## 2021-04-28 ENCOUNTER — TELEPHONE (OUTPATIENT)
Dept: CARDIOLOGY CLINIC | Age: 81
End: 2021-04-28

## 2021-04-28 ENCOUNTER — TELEPHONE (OUTPATIENT)
Dept: FAMILY MEDICINE CLINIC | Age: 81
End: 2021-04-28

## 2021-04-28 ENCOUNTER — NURSE ONLY (OUTPATIENT)
Dept: CARDIOLOGY CLINIC | Age: 81
End: 2021-04-28
Payer: MEDICARE

## 2021-04-28 ENCOUNTER — OFFICE VISIT (OUTPATIENT)
Dept: FAMILY MEDICINE CLINIC | Age: 81
End: 2021-04-28

## 2021-04-28 VITALS
TEMPERATURE: 96.8 F | SYSTOLIC BLOOD PRESSURE: 97 MMHG | RESPIRATION RATE: 14 BRPM | OXYGEN SATURATION: 96 % | WEIGHT: 162 LBS | DIASTOLIC BLOOD PRESSURE: 66 MMHG | BODY MASS INDEX: 29.63 KG/M2 | HEART RATE: 79 BPM

## 2021-04-28 VITALS
SYSTOLIC BLOOD PRESSURE: 114 MMHG | HEART RATE: 90 BPM | WEIGHT: 163 LBS | HEIGHT: 62 IN | BODY MASS INDEX: 30 KG/M2 | DIASTOLIC BLOOD PRESSURE: 72 MMHG

## 2021-04-28 DIAGNOSIS — I44.1 2ND DEGREE ATRIOVENTRICULAR BLOCK: ICD-10-CM

## 2021-04-28 DIAGNOSIS — I48.91 NEW ONSET A-FIB (HCC): Primary | ICD-10-CM

## 2021-04-28 DIAGNOSIS — Z95.0 PACEMAKER: ICD-10-CM

## 2021-04-28 DIAGNOSIS — I48.91 ATRIAL FIBRILLATION WITH RAPID VENTRICULAR RESPONSE (HCC): ICD-10-CM

## 2021-04-28 DIAGNOSIS — I48.0 PAROXYSMAL ATRIAL FIBRILLATION (HCC): ICD-10-CM

## 2021-04-28 DIAGNOSIS — R00.1 BRADYCARDIA: ICD-10-CM

## 2021-04-28 DIAGNOSIS — I49.5 SSS (SICK SINUS SYNDROME) (HCC): ICD-10-CM

## 2021-04-28 DIAGNOSIS — I50.32 CHRONIC DIASTOLIC CHF (CONGESTIVE HEART FAILURE) (HCC): ICD-10-CM

## 2021-04-28 DIAGNOSIS — Z53.21 PATIENT LEFT WITHOUT BEING SEEN: Primary | ICD-10-CM

## 2021-04-28 DIAGNOSIS — I45.5 SINUS PAUSE: Primary | ICD-10-CM

## 2021-04-28 PROCEDURE — 93000 ELECTROCARDIOGRAM COMPLETE: CPT | Performed by: NURSE PRACTITIONER

## 2021-04-28 PROCEDURE — 93280 PM DEVICE PROGR EVAL DUAL: CPT | Performed by: INTERNAL MEDICINE

## 2021-04-28 PROCEDURE — G8417 CALC BMI ABV UP PARAM F/U: HCPCS | Performed by: NURSE PRACTITIONER

## 2021-04-28 PROCEDURE — 1123F ACP DISCUSS/DSCN MKR DOCD: CPT | Performed by: NURSE PRACTITIONER

## 2021-04-28 PROCEDURE — 1111F DSCHRG MED/CURRENT MED MERGE: CPT | Performed by: NURSE PRACTITIONER

## 2021-04-28 PROCEDURE — 1036F TOBACCO NON-USER: CPT | Performed by: NURSE PRACTITIONER

## 2021-04-28 PROCEDURE — G8427 DOCREV CUR MEDS BY ELIG CLIN: HCPCS | Performed by: NURSE PRACTITIONER

## 2021-04-28 PROCEDURE — 4040F PNEUMOC VAC/ADMIN/RCVD: CPT | Performed by: NURSE PRACTITIONER

## 2021-04-28 PROCEDURE — G8399 PT W/DXA RESULTS DOCUMENT: HCPCS | Performed by: NURSE PRACTITIONER

## 2021-04-28 PROCEDURE — 1090F PRES/ABSN URINE INCON ASSESS: CPT | Performed by: NURSE PRACTITIONER

## 2021-04-28 PROCEDURE — 99214 OFFICE O/P EST MOD 30 MIN: CPT | Performed by: NURSE PRACTITIONER

## 2021-04-28 PROCEDURE — 99999 PR OFFICE/OUTPT VISIT,PROCEDURE ONLY: CPT | Performed by: FAMILY MEDICINE

## 2021-04-28 RX ORDER — METOPROLOL TARTRATE 100 MG/1
100 TABLET ORAL 2 TIMES DAILY
Qty: 60 TABLET | Refills: 5 | Status: SHIPPED | OUTPATIENT
Start: 2021-04-28 | End: 2021-07-21 | Stop reason: SDUPTHER

## 2021-04-28 NOTE — TELEPHONE ENCOUNTER
Patient has to be at another appointment 1130. No charge to today's appointment. We will see patient next week.

## 2021-04-28 NOTE — LETTER
[address]  EP Procedure Sheet  4/28/21    Breana Larkin Dick  1940    Physician: Dr. Won Alicia    EP Procedures   Pacemaker implant  EP Study    ICD implant  Atrial flutter ablation     Biv implant  Atrial fibrillation ablation    Generator Change  SVT ablation    Lead revision  VT ablation    Lead extraction +/- upgrade  AVN ablation    Loop implant x Cardioversion     Other:   JONEL     Equipment  x Medtronic   FABRIZIO Mapping System    [place]  [address] [address]  CryoAblation    Biotronik  Laser Lead Extraction    Special Equipment       EP Procedures Scheduling Request  Time Requested  1100   Specific Day 5/19/21   Anesthesia    CT surgery backup    Location [place] [address]     Pre-Procedure Labs / Imaging   PT/INR  Type & cross    CBC  Units PRBC    BMP/Mg  Units FFP    Venogram  CXR    Echo  Pulmonary CTA for Pulmonary vein mapping     Patient Instructions

## 2021-04-28 NOTE — TELEPHONE ENCOUNTER
Per FW, scheduled pt for DCCV on 5/19/21 at 1100. Reviewed all instructions with pt. Posted to Five[employer] as well. Pt verbalized understanding.

## 2021-04-28 NOTE — PROGRESS NOTES
Patient had an 11 AM appointment today for hospital follow-up. Was running 24 minutes behind and she has an appointment in this area at 1130. No charge to the patient.  was able to reschedule for next week.

## 2021-04-29 ENCOUNTER — PREP FOR PROCEDURE (OUTPATIENT)
Dept: CARDIOLOGY CLINIC | Age: 81
End: 2021-04-29

## 2021-04-29 RX ORDER — SODIUM CHLORIDE 0.9 % (FLUSH) 0.9 %
5-40 SYRINGE (ML) INJECTION EVERY 12 HOURS SCHEDULED
Status: CANCELLED | OUTPATIENT
Start: 2021-04-29

## 2021-04-29 RX ORDER — SODIUM CHLORIDE 9 MG/ML
25 INJECTION, SOLUTION INTRAVENOUS PRN
Status: CANCELLED | OUTPATIENT
Start: 2021-04-29

## 2021-04-29 RX ORDER — MIDAZOLAM HYDROCHLORIDE 1 MG/ML
1 INJECTION INTRAMUSCULAR; INTRAVENOUS ONCE
Status: CANCELLED | OUTPATIENT
Start: 2021-05-19

## 2021-04-29 RX ORDER — SODIUM CHLORIDE 9 MG/ML
INJECTION, SOLUTION INTRAVENOUS CONTINUOUS
Status: CANCELLED | OUTPATIENT
Start: 2021-04-29

## 2021-04-29 RX ORDER — SODIUM CHLORIDE 0.9 % (FLUSH) 0.9 %
5-40 SYRINGE (ML) INJECTION PRN
Status: CANCELLED | OUTPATIENT
Start: 2021-04-29

## 2021-04-30 NOTE — PROGRESS NOTES
Device check and add on OV w/NPFW (per NPFW)    Dressing removed from left chest device site prior to todays visit. Incision is well approximated, CDI. Old drainage noted on steri strips. Reviewed post op wound care and restrictions. Pt informed to call office if develops any fever, increased swelling,  redness or drainage from site. Pt voiced an understanding. Discussed home monitoring. ST[employer] CONNE[employer]ED information given to patient. Normal device check. All testing appear wnl.  14.9 years to LUBA. Presenting AF since 4/21/21 Avg V rates controlled. [address] started 4/26/21. AP 0.2%   12.3%  AT/AF burden 100%  18 AT/AF episodes. 0 changes  See paceart report under cardiology tab. Follow up in 1 month w/NPFW and device.     Monitor Status  Shipped  Distribution Method  Shipped from [employer]  Order Date  28-Apr-2021  Shipped Date  29-Apr-2021  Tracking Information  Tracking Number: [id number]  Medtronic Order Number  [id number]  Monitor Serial Number  [id number]  Monitor Model  [id number]

## 2021-05-03 ENCOUNTER — CARE COORDINATION (OUTPATIENT)
Dept: CASE MANAGEMENT | Age: 81
End: 2021-05-03

## 2021-05-03 ENCOUNTER — TELEPHONE (OUTPATIENT)
Dept: CARDIOLOGY CLINIC | Age: 81
End: 2021-05-03

## 2021-05-03 NOTE — CARE COORDINATION
A[facility] Transitions Follow Up Call    5/3/2021    Patient: Aureliano Jennings  Patient : 1940   MRN: [id number]   Reason for Admission:  covid 19   Discharge Date: 21 RARS: Readmission Risk Score: 12         Spoke with: Aureliano Jennings    Care Transitions Subsequent and Final Call    Subsequent and Final Calls  Do you have any ongoing symptoms?: No  Have your medications changed?: Yes  Patient Reports: flecainide restarted, metoprolol increased 100 mg/ bid, lasix 20 mg with weight increase 3pounds  Do you have any questions related to your medications?: No  Do you currently have any active services?: No  Do you have any needs or concerns that I can assist you with?: No  Identified Barriers: None  Care Transitions Interventions  Other Interventions:         SUMMARY  CTN spoke to the Pt who denies s/s r/t Covid 19. Pt denies issues with appetite, urination, and bowel habits. Pt confirms they have all meds and taking as prescribed and taking with recent changes listed above. Pt also will take Lasix today and tomorrow d/t 3 pound weight increase over the past three days after speaking with 's office. From CDC: Are you at higher risk for severe illness?  Wash your hands often.  Avoid close contact (6 feet, which is about two arm lengths) with people who are sick.  Put distance between yourself and other people if COVID-19 is spreading in your community.  Clean and disinfect frequently touched surfaces.  Avoid all cruise travel and non-essential air travel.  Call your healthcare professional if you have concerns about COVID-19 and your underlying condition or if you are sick. Pt will take all meds as prescribed and schedule / keep doctors appt. [facility] provided education on s/s that require medical attention and when to seek medical attention. [facility] advised Pt of use urgent care or physicians 24 hr access line if assistance is needed after hours or on the weekend.   Pt denies any needs or concerns and is agreeable with additional calls.     Follow Up  Future Appointments   Date Time Provider Department Center   5/5/2021  9:40 AM DO STEW Dalton FP [facility]   5/11/2021  2:00 PM MD Linnette Hurdl Card [facility]   5/19/2021 11:00 AM SCHEDULE, [facility] CATH LAB HOLDING [facility]   5/24/2021  3:00 PM SCHEDULE, Oneyda Montejo DEVICE CHECK [place] Card [facility]   5/24/2021  3:30 PM JOSE Chen CNP [place] Card [facility]   6/30/2021 11:40 AM Konnie Landau, MD [facility] SLEEP [facility]   8/3/2021  9:00 AM Freidajuan Dawson DEVICE CHECK [place] Card [facility]   8/3/2021  9:30 AM MD Umberto Mandelwood Card [facility]   11/2/2021  8:15 AM SCHEDULE, Oneyda Montejo REMOTE TRANSMISSION [place] Card [facility]       Rey Kwon RN

## 2021-05-03 NOTE — TELEPHONE ENCOUNTER
Pt concerned about weight gain and needed to know if it was ok to take a Lasix. Per FW last office note, pt is to take Lasix 20 mg prn with any weight gain over 3 lbs. Pt informed of this and will take a Lasix today and tomorrow.   Pt will also discuss Lasix usage with PCP on Wednesday at her appointment

## 2021-05-05 ENCOUNTER — OFFICE VISIT (OUTPATIENT)
Dept: FAMILY MEDICINE CLINIC | Age: 81
End: 2021-05-05
Payer: MEDICARE

## 2021-05-05 VITALS
BODY MASS INDEX: 29.26 KG/M2 | SYSTOLIC BLOOD PRESSURE: 107 MMHG | HEART RATE: 60 BPM | RESPIRATION RATE: 16 BRPM | OXYGEN SATURATION: 92 % | WEIGHT: 160 LBS | DIASTOLIC BLOOD PRESSURE: 66 MMHG | TEMPERATURE: 97.3 F

## 2021-05-05 DIAGNOSIS — I48.91 ATRIAL FIBRILLATION, UNSPECIFIED TYPE (HCC): Primary | ICD-10-CM

## 2021-05-05 PROCEDURE — 1090F PRES/ABSN URINE INCON ASSESS: CPT | Performed by: FAMILY MEDICINE

## 2021-05-05 PROCEDURE — G8399 PT W/DXA RESULTS DOCUMENT: HCPCS | Performed by: FAMILY MEDICINE

## 2021-05-05 PROCEDURE — 99213 OFFICE O/P EST LOW 20 MIN: CPT | Performed by: FAMILY MEDICINE

## 2021-05-05 PROCEDURE — 4040F PNEUMOC VAC/ADMIN/RCVD: CPT | Performed by: FAMILY MEDICINE

## 2021-05-05 PROCEDURE — 1036F TOBACCO NON-USER: CPT | Performed by: FAMILY MEDICINE

## 2021-05-05 PROCEDURE — G8427 DOCREV CUR MEDS BY ELIG CLIN: HCPCS | Performed by: FAMILY MEDICINE

## 2021-05-05 PROCEDURE — G8417 CALC BMI ABV UP PARAM F/U: HCPCS | Performed by: FAMILY MEDICINE

## 2021-05-05 PROCEDURE — 1123F ACP DISCUSS/DSCN MKR DOCD: CPT | Performed by: FAMILY MEDICINE

## 2021-05-05 PROCEDURE — 1111F DSCHRG MED/CURRENT MED MERGE: CPT | Performed by: FAMILY MEDICINE

## 2021-05-05 RX ORDER — FUROSEMIDE 20 MG/1
20 TABLET ORAL DAILY PRN
Qty: 60 TABLET | Refills: 3 | Status: SHIPPED | OUTPATIENT
Start: 2021-05-05

## 2021-05-05 NOTE — PATIENT INSTRUCTIONS
Your primary care physician agrees to take the diuretic called Lasix on an as-needed basis and not a daily basis. Continue to track your weight at home. You do want to get familiar with what is called \"dry weight. \"  If you do see weight variation with the gain of 1 to 2 pounds within 24 hours your primary care physician is fine if you were to take the Lasix 20 mg. If you are seeing a 3 pound weight gain in 24 hours or less your primary care physician would want you to take 40 mg of Lasix and then also call to update him.

## 2021-05-05 NOTE — PROGRESS NOTES
[facility]  Progress Note  Mayra Keenemarcela DO Hallie Jennings  1940    05/05/21    Chief Complaint:   Hallie Jennings is a [de-identified] y.o. female who is here for follow-up on atrial fibrillation and diuretic        HPI:   Being followed closely by cardiology team including cardiologist cardiology nurse practitioner and electrophysiologist.  Synchronized cardioversion scheduled May 19. Patient following up with me to manage the Lasix. Has not needed the Lasix recently. Does have a reliable scale at home. Agrees to check her weight on a daily basis. She finds it feels fine otherwise. He is a little bit nervous about the upcoming cardioversion. ROS negative for headache, visionchanges, chest pain, shortness of breath, abdominal pain, urinary sx, bowel changes. Past medical, surgical, and social history reviewed. and allergies reviewed. Allergies   Allergen Reactions    Bactrim [Sulfamethoxazole-Trimethoprim] Hives     hives    Seasonal Other (See Comments)     Runny nose, sneezing     Prior to Visit Medications    Medication Sig Taking? Authorizing Provider   furosemide (LASIX) 20 MG tablet Take 1 tablet by mouth daily as needed (water weight gain.) Yes Estela Kolb, DO   metoprolol tartrate (LOPRESSOR) 100 MG tablet Take 1 tablet by mouth 2 times daily Yes Eligah Hodgkins, APRN - CNP   flecainide (TAMBOCOR) 50 MG tablet Take 1 tablet by mouth 2 times daily Yes Valeriy Thomas MD   apixaban (ELIQUIS) 5 MG TABS tablet Take 1 tablet by mouth 2 times daily Yes Valeriy Thomas MD   Handicap Placard MISC by Does not apply route Dx of shortness of breath and A-fib. Effective Jan 13, 2021 until Jan 13, 2026. Yes Estela Kolb, DO   levothyroxine (SYNTHROID) 25 MCG tablet TAKE 1 TABLET BY MOUTH EVERY DAY Yes Estela Kolb, DO   Cholecalciferol (VITAMIN D) 2000 UNITS CAPS capsule Take 1 capsule by mouth daily.  Yes Joneen Severin, MD          Vitals:    05/05/21 6091 97.3 °F (36.3 °C) (Tympanic)   Resp 16   Wt 160 lb (72.6 kg)   SpO2 92%   Breastfeeding No   BMI 29.26 kg/m²   Physical Exam  GEN: No acute distress,cooperative, well nourished, alert. HEENT: PEERLA, EOMI , normocephalic/atraumatic, external nose appears normal.  External ear is normal.    Neck: soft, supple, no appreciable thyromegaly,mass  CV: No upper extremity edema. Resp:  Breathing comfortably. Psych:normal affect. Neuro: AOx3  Other Pertinent Physical Exam findings: Check of the incision site for the pacemaker shows no erythema. Healing nicely. Exam of the legs reveals no edema. ASSESSMENT   Diagnosis Orders   1. Atrial fibrillation, unspecified type ([address]       At this time, cardiovascularly is stable. She is motivated to monitor her daily weights. We talked about when to take the furosemide on a as needed basis. She agrees to give me an update medication. Recommend a tentative follow-up 3 months from now. She will be busy with cardiology appointments. PLAN          Time spent on encounter (to include any same day medical record review): 24 minutes  Established E/M: 10-19 (16794), 20-29 (28254), 30-39 (84638), 40-54 (93136)   New E/M: 15-29 (10518), 30-44 (25516), 45-59 (79575), 60-74 (86691)  Telephone E/M: 5-10 (37190), 11-20 (95340), 21-30 (22010)    If applicable, see additional patient information and instructions under \"Patient Instructions. \"    Return in about 3 months (around 8/5/2021). Patient Instructions   Your primary care physician agrees to take the diuretic called Lasix on an as-needed basis and not a daily basis. Continue to track your weight at home. You do want to get familiar with what is called \"dry weight. \"  If you do see weight variation with the gain of 1 to 2 pounds within 24 hours your primary care physician is fine if you were to take the Lasix 20 mg.   If you are seeing a 3 pound weight gain in 24 hours or less your primary care physician would want

## 2021-05-10 ENCOUNTER — CARE COORDINATION (OUTPATIENT)
Dept: CASE MANAGEMENT | Age: 81
End: 2021-05-10

## 2021-05-10 NOTE — CARE COORDINATION
[facility] Transitions Follow Up Call    5/10/2021    Patient: Gilles Jennings  Patient : 1940   MRN: [id number]   Reason for Admission:  COVID 19   Discharge Date: 21 RARS: Readmission Risk Score: 12         Spoke with: Gilles Jennings    Care Transitions Subsequent and Final Call    Subsequent and Final Calls  Do you have any ongoing symptoms?: No  Have your medications changed?: No  Do you have any questions related to your medications?: No  Do you currently have any active services?: No  Do you have any needs or concerns that I can assist you with?: No  Identified Barriers: None  Care Transitions Interventions  Other Interventions:         SUMMARY  CTN spoke to the Pt who denies s/s r/t Covid 19. Pt denies issues with appetite, urination, and bowel habits. Pt confirms they have all meds and taking as prescribed. From CDC: Are you at higher risk for severe illness?  Wash your hands often.  Avoid close contact (6 feet, which is about two arm lengths) with people who are sick.  Put distance between yourself and other people if COVID-19 is spreading in your community.  Clean and disinfect frequently touched surfaces.  Avoid all cruise travel and non-essential air travel.  Call your healthcare professional if you have concerns about COVID-19 and your underlying condition or if you are sick. Pt will take all meds as prescribed and schedule / keep doctors appt. [facility] provided education on s/s that require medical attention and when to seek medical attention. [facility] advised Pt of use urgent care or physicians 24 hr access line if assistance is needed after hours or on the weekend. Pt denies any needs or concerns and CT calls have concluded.      Follow Up  Future Appointments   Date Time Provider Trev Fox   2021  2:00 PM MD Raymond Perez   2021 11:00 AM SCHEDULE, [facility] CATH LAB HOLDING [facility] KEVIN Hernandes [facility]   2021  3:00 PM Ilah Bence DEVICE CHECK [place] Card [facility]   5/24/2021  3:30 PM JOSE De Anda - CNP [place] Card [facility]   6/30/2021 11:40 AM Sonny Stokes MD [facility] SLEEP [facility]   8/3/2021  9:00 AM Alta Arechiganer DEVICE CHECK [place] Card [facility]   8/3/2021  9:30 AM MD Umberto [place] Card [facility]   11/2/2021  8:15 AM SCHEDULE, [place] REMOTE TRANSMISSION [place] [facility]       Carol Valle RN

## 2021-05-11 ENCOUNTER — OFFICE VISIT (OUTPATIENT)
Dept: CARDIOLOGY CLINIC | Age: 81
End: 2021-05-11
Payer: MEDICARE

## 2021-05-11 VITALS
HEART RATE: 83 BPM | SYSTOLIC BLOOD PRESSURE: 130 MMHG | WEIGHT: 161 LBS | DIASTOLIC BLOOD PRESSURE: 80 MMHG | BODY MASS INDEX: 29.45 KG/M2

## 2021-05-11 DIAGNOSIS — I10 ESSENTIAL HYPERTENSION: ICD-10-CM

## 2021-05-11 DIAGNOSIS — I49.5 SSS (SICK SINUS SYNDROME) (HCC): ICD-10-CM

## 2021-05-11 DIAGNOSIS — I48.11 LONGSTANDING PERSISTENT ATRIAL FIBRILLATION (HCC): Primary | ICD-10-CM

## 2021-05-11 DIAGNOSIS — Z95.0 PACEMAKER: ICD-10-CM

## 2021-05-11 PROCEDURE — G8399 PT W/DXA RESULTS DOCUMENT: HCPCS | Performed by: INTERNAL MEDICINE

## 2021-05-11 PROCEDURE — 1090F PRES/ABSN URINE INCON ASSESS: CPT | Performed by: INTERNAL MEDICINE

## 2021-05-11 PROCEDURE — 1036F TOBACCO NON-USER: CPT | Performed by: INTERNAL MEDICINE

## 2021-05-11 PROCEDURE — 99213 OFFICE O/P EST LOW 20 MIN: CPT | Performed by: INTERNAL MEDICINE

## 2021-05-11 PROCEDURE — G8417 CALC BMI ABV UP PARAM F/U: HCPCS | Performed by: INTERNAL MEDICINE

## 2021-05-11 PROCEDURE — 4040F PNEUMOC VAC/ADMIN/RCVD: CPT | Performed by: INTERNAL MEDICINE

## 2021-05-11 PROCEDURE — 1123F ACP DISCUSS/DSCN MKR DOCD: CPT | Performed by: INTERNAL MEDICINE

## 2021-05-11 PROCEDURE — 1111F DSCHRG MED/CURRENT MED MERGE: CPT | Performed by: INTERNAL MEDICINE

## 2021-05-11 PROCEDURE — G8427 DOCREV CUR MEDS BY ELIG CLIN: HCPCS | Performed by: INTERNAL MEDICINE

## 2021-05-11 ASSESSMENT — ENCOUNTER SYMPTOMS
CHEST TIGHTNESS: 0
SHORTNESS OF BREATH: 0
CHOKING: 0
COUGH: 0

## 2021-05-11 NOTE — PROGRESS NOTES
Subjective:      Patient ID: Gisella Resendiz is a [de-identified] y.o. female. HPI  Follow up afib/sob/HTN/SSS/Pacer. Feeling much better. Not limited by sob any more. afib persists. No chest pain. No pnd or orthopnea. No tachy/syncope/Palp. No edema. BP good. No syncope. EP following.        Past Medical History:   Diagnosis Date    Allergic rhinitis     Alveolitis of jaw     Cancer ([address]     Dental disease     Diverticulosis of colon (without mention of hemorrhage)     Dizziness     GERD (gastroesophageal reflux disease)     Hearing loss     Myalgia and myositis, unspecified     Other and unspecified hyperlipidemia     Recurrent upper respiratory infection (URI)     Screening mammogram 11/05/2008    Normal    Tinnitus     Unspecified gastritis and gastroduodenitis without mention of hemorrhage     Unspecified hypothyroidism      Past Surgical History:   Procedure Laterality Date    BLADDER SUSPENSION  1990s    CHOLECYSTECTOMY  1990s    COLONOSCOPY  1/16/2007    Normal    COLONOSCOPY  02/27/2012    Dr. Anjel Ching    Right Repair    HYSTERECTOMY  1975    WISDOM TOOTH EXTRACTION       Social History     Socioeconomic History    Marital status:      Spouse name: Not on file    Number of children: Not on file    Years of education: Not on file    Highest education level: Not on file   Occupational History    Not on file   Social Needs    Financial resource strain: Not hard at all   "[employer] insecurity     Worry: Never true     Inability: Never true   [employer] needs     Medical: No     Non-medical: No   Tobacco Use    Smoking status: Former Smoker     Packs/day: 0.00     Years: 10.00     Pack years: 0.00     Quit date: 6/1/2005     Years since quitting: 15.9    Smokeless tobacco: Never Used   Substance and Sexual Activity    Alcohol use: Yes     Comment: rare    Drug use: No    Sexual activity: Yes   Lifestyle    Physical activity     Days per week: Not on file     Minutes per session: Not on file    Stress: Not on file   Relationships    Social connections     Talks on phone: Not on file     Gets together: Not on file     Attends Mandaen service: Not on file     Active member of club or organization: Not on file     Attends meetings of clubs or organizations: Not on file     Relationship status: Not on file    Intimate partner violence     Fear of current or ex partner: Not on file     Emotionally abused: Not on file     Physically abused: Not on file     Forced sexual activity: Not on file   Other Topics Concern    Not on file   Social History Narrative    Not on file     FH reviewed    Vitals:    05/11/21 1400   BP: 130/80   Pulse: 83       Wt 165    Review of Systems   Constitutional: Negative for activity change, appetite change and fatigue. Respiratory: Negative for cough, choking, chest tightness and shortness of breath. Cardiovascular: Negative for chest pain, palpitations and leg swelling. Denies PND or orthopnea. No tachycardia or syncope. Neurological: Negative for dizziness, syncope and light-headedness. Psychiatric/Behavioral: Negative for agitation, behavioral problems and confusion. All other systems reviewed and are negative. Objective:   Physical Exam  Constitutional:       General: She is not in acute distress. Appearance: Normal appearance. She is well-developed. HENT:      Head: Normocephalic and atraumatic. Right Ear: External ear normal.      Left Ear: External ear normal.   Neck:      Musculoskeletal: Normal range of motion. Vascular: No JVD. Cardiovascular:      Rate and Rhythm: Normal rate. Rhythm irregularly irregular. Heart sounds: Normal heart sounds. No murmur. No gallop. Pulmonary:      Effort: Pulmonary effort is normal. No respiratory distress. Breath sounds: Normal breath sounds. No wheezing or rales.    Abdominal:      General: Bowel sounds are normal.      Palpations: Abdomen is soft. Tenderness: There is no abdominal tenderness. Musculoskeletal: Normal range of motion. Skin:     General: Skin is warm and dry. Neurological:      General: No focal deficit present. Mental Status: She is alert and oriented to person, place, and time. Psychiatric:         Mood and Affect: Mood normal.         Behavior: Behavior normal.         Assessment:       Diagnosis Orders   1. Longstanding persistent atrial fibrillation ([address]     2. SSS (sick sinus syndrome) ([address]     3. Pacemaker     4. Essential hypertension             Plan:      CV stable. Now S/P pacer for SSS/   On AC.  no bleeding issues. Continue  lopressor to 100 mg bid. Remains afib by exam.   Reviewed previous records and testing including echo 1/21. Follow up 3 months. EP following for pacer/afib. Plan ECV.          Florentino Chase MD

## 2021-05-17 ENCOUNTER — TELEPHONE (OUTPATIENT)
Dept: CARDIOLOGY CLINIC | Age: 81
End: 2021-05-17

## 2021-05-17 NOTE — PROGRESS NOTES
[address]   Electrophysiology  Office Visit  Date: 5/24/2021    Chief Complaint   Patient presents with    Atrial Fibrillation    Bradycardia    Tachycardia    Congestive Heart Failure       Cardiac HX: Marisol Jimenez is a [de-identified] y.o. woman with a h/o GERD, hypothyroidism and pAF, originally dx'd 1/2021 with progressively worsening SOB, noted to be in AF/RVR, placed on dilt, placed on Elquis, BB and lasix, EF 55% per echo who p/w lightheadedness, N/V/D, found to be SB with HR's in the 40's, placed on dopamine, given IVF then developed AF/RVR, 6.8 sec conversion pause noted on tele, s/p dual ch MDT PPM (4/21/2021, Dr. Andressa Bowie). Interval History/HPI: Patient is here for f/u for SSS, sinus pauses, pAF and PPM management. Patient presents in NSR today. She denies any heart racing, palpitations or irregular heartbeats. She currently is on  Eliquis, no bleeding noted, she has not missed any doses. She is on flecainide 50 mg BID and metoprolol 100 mg BID which was increased last visit. She was scheduled for DCCV on 5/19/21, but upon review of EKG she was in NSR. She denies any chest pain, SOB, PND, orthopnea or lower extremity edema. MDT dual chamber PPM placed 4/21/21, left chest incision is healing well, and she has had no postoperative incision discomfort. Review of her device today shows that she AP 98.9% of the time and  <0.1% of the time, no arrhythmias. She is currently not taking Lasix, she checks her weight daily and monitors for a 3# weight difference, has not taken it since end of April.     Home medications:   Current Outpatient Medications on File Prior to Visit   Medication Sig Dispense Refill    furosemide (LASIX) 20 MG tablet Take 1 tablet by mouth daily as needed (water weight gain.) 60 tablet 3    metoprolol tartrate (LOPRESSOR) 100 MG tablet Take 1 tablet by mouth 2 times daily 60 tablet 5    flecainide (TAMBOCOR) 50 MG tablet Take 1 tablet by mouth 2 times daily 60 tablet 3    apixaban (ELIQUIS) 5 MG TABS tablet Take 1 tablet by mouth 2 times daily 60 tablet 3    Handicap Placard MISC by Does not apply route Dx of shortness of breath and A-fib. Effective Jan 13, 2021 until Jan 13, 2026. 1 each 0    levothyroxine (SYNTHROID) 25 MCG tablet TAKE 1 TABLET BY MOUTH EVERY DAY 90 tablet 3    Cholecalciferol (VITAMIN D) 2000 UNITS CAPS capsule Take 1 capsule by mouth daily. No current facility-administered medications on file prior to visit. Past Medical History:   Diagnosis Date    Allergic rhinitis     Alveolitis of jaw     Cancer ([facility] [address]     Dental disease     Diverticulosis of colon (without mention of hemorrhage)     Dizziness     GERD (gastroesophageal reflux disease)     Hearing loss     Myalgia and myositis, unspecified     Other and unspecified hyperlipidemia     Recurrent upper respiratory infection (URI)     Screening mammogram 11/05/2008    Normal    Tinnitus     Unspecified gastritis and gastroduodenitis without mention of hemorrhage     Unspecified hypothyroidism         Past Surgical History:   Procedure Laterality Date    BLADDER SUSPENSION  1990s    CHOLECYSTECTOMY  1990s    COLONOSCOPY  1/16/2007    Normal    COLONOSCOPY  02/27/2012    Dr. Kamila Garrido    Right Repair    HYSTERECTOMY  1975    WISDOM TOOTH EXTRACTION         Allergies   Allergen Reactions    Bactrim [Sulfamethoxazole-Trimethoprim] Hives     hives    Seasonal Other (See Comments)     Runny nose, sneezing       Social History:  Reviewed. reports that she quit smoking about 15 years ago. She smoked 0.00 packs per day for 10.00 years. She has never used smokeless tobacco. She reports current alcohol use. She reports that she does not use drugs. Family History:  Reviewed. family history includes Cancer in her brother. Review of System:    · Constitutional: No fevers, chills. · Eyes: No visual changes or diplopia. No scleral icterus.   · ENT: No Headaches. No mouth sores or sore throat. · Cardiovascular: No for chest pain, No for dyspnea on exertion, No for palpitations or No for loss of consciousness. No cough, hemoptysis, No for pleuritic pain, or phlebitis. · Respiratory: No for cough or wheezing. No hematemesis. · Gastrointestinal: No abdominal pain, blood in stools. · Genitourinary: No dysuria, or hematuria. · Musculoskeletal: No gait disturbance,    · Integumentary: No rash or pruritis. · Neurological: No headache, change in muscle strength, numbness or tingling. · Psychiatric: No anxiety, or depression. · Endocrine: No temperature intolerance. No excessive thirst, fluid intake, or urination. · Hem/Lymph: No abnormal bruising or bleeding, blood clots or swollen lymph nodes. · Allergic/Immunologic: No nasal congestion or hives. Physical Examination:  Vitals:    05/24/21 1527   BP: 134/86   Pulse: 62         Wt Readings from Last 3 Encounters:   05/24/21 166 lb (75.3 kg)   05/19/21 159 lb (72.1 kg)   05/11/21 161 lb (73 kg)       · Constitutional: Oriented. No distress. · Head: Normocephalic and atraumatic. · Mouth/Throat: Oropharynx is clear and moist.   · Eyes: Conjunctivae clear without jaunduice. PERRL. · Neck: Neck supple. No rigidity. No JVD present. · Cardiovascular: Normal rate, regular rhythm, S1&S2. · Pulmonary/Chest: Bilateral respiratory sounds. No wheezes, No rhonchi. · Abdominal: Soft. Bowel sounds present. No distension, No tenderness. · Musculoskeletal: No tenderness. No edema    · Lymphadenopathy: Has no cervical adenopathy. · Neurological: Alert and oriented. Cranial nerve appears intact, No Gross deficit   · Skin: Skin is warm and dry. No rash noted. · Psychiatric: Has a normal mood, affect and behavior     Labs:  Reviewed. No results for input(s): NA, K, CL, CO2, PHOS, BUN, CREATININE in the last 72 hours.     Invalid input(s): CA,  TSH  No results for input(s): WBC, HGB, HCT, MCV, PLT in the last 72 hours. Lab Results   Component Value Date    TROPONINI <0.01 04/17/2021     No results found for: BNP  Lab Results   Component Value Date    PROTIME 19.2 05/19/2021    PROTIME 18.1 04/17/2021    PROTIME 12.3 01/02/2021    INR 1.65 05/19/2021    INR 1.10 04/21/2021    INR 1.55 04/17/2021     Lab Results   Component Value Date    CHOL 217 09/10/2020    HDL 78 09/10/2020    HDL 87 01/27/2012    TRIG 90 09/10/2020       ECG: Personally reviewed: AP/ VS, HR 62, , QTc 428 QRS 96    ECHO:  4.19.2021 (Limited TTE)   Summary   Normal left ventricle size. There is mild concentric left ventricular   hypertrophy. Overall left ventricular systolic function appears normal with   an ejection fraction of 55%. The right ventricle is mildly enlarged. Biatrial enlargement. Inferior vena cava appears dilated. Stress Test: 4.14.2021  Summary   Rosanne Mettle is normal isotope uptake at stress and rest. There is no evidence of    myocardial ischemia or scar.    Normal LV size and systolic function.    Left ventricular ejection fraction of 73 %.    There are no regional wall motion abnormalities.    Overall findings represent a low risk scan. Cardiac Angiography: n/a    Problem List:   Patient Active Problem List    Diagnosis Date Noted    [facility] 04/21/2021    Acute respiratory failure with hypoxia and hypercapnia (HCC) 04/18/2021    Hypotension 04/18/2021    ALEJA (acute kidney injury) ([address]     Bradycardia 04/17/2021    SOB (shortness of breath) 01/03/2021    Afib ([address] 01/02/2021    Atrial fibrillation with rapid ventricular response ([address] 01/02/2021    Sensorineural hearing loss, bilateral 09/21/2020    Tinnitus, bilateral 09/21/2020    Hx of melanoma excision 04/30/2019    Osteopenia 05/28/2015    Tinnitus     Disorder of bone and cartilage     Diverticulosis of large intestine     Mixed hyperlipidemia     Alveolitis of jaw     Hypothyroidism         Assessment:   1.  Paroxysmal A-fib ([facility] [address]    2. Tachy-julia syndrome ([facility] [address]    3. Pacemaker    4. Chronic diastolic CHF (congestive heart failure) ([facility] [address]          Cardiac HX: Cas Del Toro is a [de-identified] y.o. woman with a h/o GERD, hypothyroidism and pAF, originally dx'd 1/2021 with progressively worsening SOB, noted to be in AF/RVR, placed on dilt, placed on Elquis, BB and lasix, EF 55% per echo who p/w lightheadedness, N/V/D, found to be SB with HR's in the 40's, placed on dopamine, given IVF then developed AF/RVR, 6.8 sec conversion pause noted on tele, s/p dual ch MDT PPM (4/21/2021, Dr. Bobo Marie). [id number] 3. TSH 14.44 (4/17/2021). AF  - NSR today, no breakthrough per pt  - On Eliquis 5mg BID, no s/sx bleeding, continue  - On metoprolol 100 mg BID  - On flecainide 50 mg BID - QRS 96  - Echo - LAE 4.4/96.6  - Sleep study - scheduled 6/30/21 w/ Dr. Jose Chris  - DCCV cancelled (5/19/21) - pt chemically converted to NSR  - ECG ordered and results personally reviewed      TBS  - S/p dual ch MDT PPM   - Device check today shows 98.9% AP, <0.1% , no arrhythmias, other parameters stable  - L chest incision healed well     Fluid overload/chronic dCHF  - Off lasix  - Patient to weigh self daily. Take a Lasix 20 mg as needed weight gain greater than 3 pounds     EF of 48%  No systolic HF  No known CAD  On eliquis for AF   No tobacco use. All questions and concerns were addressed to the patient/family. Alternatives to my treatment were discussed. The note was completed using EMR. Every effort was made to ensure accuracy; however, inadvertent computerized transcription errors may be present. Patient received education regarding their diagnosis, treatment and medications while in the office today.       [place] [address]

## 2021-05-17 NOTE — TELEPHONE ENCOUNTER
Spoke with pt and confirmed Cardioversion on 05/19/2021, pt arriving at [facility] at 09:00am.  Reviewed all preop instructions previously given to pt. COVID testing, CTA, and labs to be completed. Pt denies missing any doses of Eliquis and advised to hold [address] the morning of the procedure. Pt verbalized understanding.

## 2021-05-19 ENCOUNTER — HOSPITAL ENCOUNTER (OUTPATIENT)
Dept: CARDIAC CATH/INVASIVE PROCEDURES | Age: 81
Discharge: HOME | End: 2021-05-19
Attending: INTERNAL MEDICINE | Admitting: INTERNAL MEDICINE
Payer: MEDICARE

## 2021-05-19 ENCOUNTER — PROCEDURE VISIT (OUTPATIENT)
Dept: CARDIOLOGY CLINIC | Age: 81
End: 2021-05-19
Payer: MEDICARE

## 2021-05-19 VITALS — BODY MASS INDEX: 29.26 KG/M2 | HEIGHT: 62 IN | WEIGHT: 159 LBS | TEMPERATURE: 97.5 F

## 2021-05-19 DIAGNOSIS — I48.91 ATRIAL FIBRILLATION WITH RAPID VENTRICULAR RESPONSE (HCC): ICD-10-CM

## 2021-05-19 DIAGNOSIS — R00.1 BRADYCARDIA: ICD-10-CM

## 2021-05-19 DIAGNOSIS — Z95.0 PACEMAKER: ICD-10-CM

## 2021-05-19 LAB
ANION GAP SERPL CALCULATED.3IONS-SCNC: 11 MMOL/L (ref 3–16)
BUN BLDV-MCNC: 18 MG/DL (ref 7–20)
CALCIUM SERPL-MCNC: 9.2 MG/DL (ref 8.3–10.6)
CHLORIDE BLD-SCNC: 103 MMOL/L (ref 99–110)
CO2: 22 MMOL/L (ref 21–32)
CREAT SERPL-MCNC: 0.8 MG/DL (ref 0.6–1.2)
EKG ATRIAL RATE: 416 BPM
EKG DIAGNOSIS: NORMAL
EKG P AXIS: 99 DEGREES
EKG P-R INTERVAL: 278 MS
EKG Q-T INTERVAL: 446 MS
EKG QRS DURATION: 96 MS
EKG QTC CALCULATION (BAZETT): 446 MS
EKG R AXIS: 28 DEGREES
EKG T AXIS: 41 DEGREES
EKG VENTRICULAR RATE: 60 BPM
GFR AFRICAN AMERICAN: >60
GFR NON-AFRICAN AMERICAN: >60
GLUCOSE BLD-MCNC: 115 MG/DL (ref 70–99)
INR BLD: 1.65 (ref 0.86–1.14)
POTASSIUM SERPL-SCNC: 4.8 MMOL/L (ref 3.5–5.1)
PROTHROMBIN TIME: 19.2 SEC (ref 10–13.2)
SODIUM BLD-SCNC: 136 MMOL/L (ref 136–145)

## 2021-05-19 PROCEDURE — 93010 ELECTROCARDIOGRAM REPORT: CPT | Performed by: INTERNAL MEDICINE

## 2021-05-19 PROCEDURE — 93005 ELECTROCARDIOGRAM TRACING: CPT | Performed by: INTERNAL MEDICINE

## 2021-05-19 PROCEDURE — 80048 BASIC METABOLIC PNL TOTAL CA: CPT

## 2021-05-19 PROCEDURE — 85610 PROTHROMBIN TIME: CPT

## 2021-05-19 RX ORDER — SODIUM CHLORIDE 9 MG/ML
INJECTION, SOLUTION INTRAVENOUS CONTINUOUS
Status: DISCONTINUED | OUTPATIENT
Start: 2021-05-19 | End: 2021-05-19 | Stop reason: HOSPADM

## 2021-05-19 RX ORDER — SODIUM CHLORIDE 0.9 % (FLUSH) 0.9 %
5-40 SYRINGE (ML) INJECTION EVERY 12 HOURS SCHEDULED
Status: DISCONTINUED | OUTPATIENT
Start: 2021-05-19 | End: 2021-05-19 | Stop reason: HOSPADM

## 2021-05-19 RX ORDER — MIDAZOLAM HYDROCHLORIDE 1 MG/ML
1 INJECTION INTRAMUSCULAR; INTRAVENOUS ONCE
Status: DISCONTINUED | OUTPATIENT
Start: 2021-05-19 | End: 2021-05-19 | Stop reason: HOSPADM

## 2021-05-19 RX ORDER — SODIUM CHLORIDE 0.9 % (FLUSH) 0.9 %
5-40 SYRINGE (ML) INJECTION PRN
Status: DISCONTINUED | OUTPATIENT
Start: 2021-05-19 | End: 2021-05-19 | Stop reason: HOSPADM

## 2021-05-19 RX ORDER — SODIUM CHLORIDE 9 MG/ML
25 INJECTION, SOLUTION INTRAVENOUS PRN
Status: DISCONTINUED | OUTPATIENT
Start: 2021-05-19 | End: 2021-05-19 | Stop reason: HOSPADM

## 2021-05-24 ENCOUNTER — OFFICE VISIT (OUTPATIENT)
Dept: CARDIOLOGY CLINIC | Age: 81
End: 2021-05-24
Payer: MEDICARE

## 2021-05-24 ENCOUNTER — NURSE ONLY (OUTPATIENT)
Dept: CARDIOLOGY CLINIC | Age: 81
End: 2021-05-24
Payer: MEDICARE

## 2021-05-24 VITALS
BODY MASS INDEX: 30.36 KG/M2 | HEART RATE: 62 BPM | DIASTOLIC BLOOD PRESSURE: 86 MMHG | SYSTOLIC BLOOD PRESSURE: 134 MMHG | WEIGHT: 166 LBS

## 2021-05-24 DIAGNOSIS — R00.1 BRADYCARDIA: ICD-10-CM

## 2021-05-24 DIAGNOSIS — I48.0 PAROXYSMAL A-FIB (HCC): Primary | ICD-10-CM

## 2021-05-24 DIAGNOSIS — Z95.0 PACEMAKER: ICD-10-CM

## 2021-05-24 DIAGNOSIS — I50.32 CHRONIC DIASTOLIC CHF (CONGESTIVE HEART FAILURE) (HCC): ICD-10-CM

## 2021-05-24 DIAGNOSIS — I49.5 TACHY-BRADY SYNDROME (HCC): ICD-10-CM

## 2021-05-24 DIAGNOSIS — I48.91 ATRIAL FIBRILLATION, UNSPECIFIED TYPE (HCC): ICD-10-CM

## 2021-05-24 DIAGNOSIS — I48.91 ATRIAL FIBRILLATION WITH RAPID VENTRICULAR RESPONSE (HCC): ICD-10-CM

## 2021-05-24 PROCEDURE — G8399 PT W/DXA RESULTS DOCUMENT: HCPCS | Performed by: NURSE PRACTITIONER

## 2021-05-24 PROCEDURE — 93280 PM DEVICE PROGR EVAL DUAL: CPT | Performed by: INTERNAL MEDICINE

## 2021-05-24 PROCEDURE — 1123F ACP DISCUSS/DSCN MKR DOCD: CPT | Performed by: NURSE PRACTITIONER

## 2021-05-24 PROCEDURE — 4040F PNEUMOC VAC/ADMIN/RCVD: CPT | Performed by: NURSE PRACTITIONER

## 2021-05-24 PROCEDURE — 1090F PRES/ABSN URINE INCON ASSESS: CPT | Performed by: NURSE PRACTITIONER

## 2021-05-24 PROCEDURE — 1036F TOBACCO NON-USER: CPT | Performed by: NURSE PRACTITIONER

## 2021-05-24 PROCEDURE — G8417 CALC BMI ABV UP PARAM F/U: HCPCS | Performed by: NURSE PRACTITIONER

## 2021-05-24 PROCEDURE — G8427 DOCREV CUR MEDS BY ELIG CLIN: HCPCS | Performed by: NURSE PRACTITIONER

## 2021-05-24 PROCEDURE — 99214 OFFICE O/P EST MOD 30 MIN: CPT | Performed by: NURSE PRACTITIONER

## 2021-05-24 NOTE — PROGRESS NOTES
1 month device check and ov w/NPFW  5/19/21 DCCV cx-pt in NSR  Removed steri strips. Incision healing nicely. Normal device function. 12 years to LUBA. All testing appear wnl. Presenting ApVs @ 75-80 bpm  Underlying AP @ 30 bpm  (AVD @ 350 ms)  AP 98.9%   <0.1%  AT/AF burden 0%  0 episodes   0 changes  See Paceart report under the Cardiology tab. Follow up in 3 months w/UL and device.

## 2021-05-28 NOTE — TELEPHONE ENCOUNTER
Patient needs refill, 60-day supply:     apixaban (ELIQUIS) 5 MG TABS tablet  Take 1 tablet by mouth 2 times daily, Disp-60 tablet, R-3  Normal Last Dose: Not Recorded  Refills:3 ordered     Pharmacy:[facility]/pharm[facility] [place] [address], [place] - [address] [facility] [phone]    Last visit: 5/24/21  Next visit: 8/3/21  Last labs: 5/19/21  Last filled: 4/26/21

## 2021-05-29 PROCEDURE — 93280 PM DEVICE PROGR EVAL DUAL: CPT | Performed by: INTERNAL MEDICINE

## 2021-06-15 ENCOUNTER — OFFICE VISIT (OUTPATIENT)
Dept: FAMILY MEDICINE CLINIC | Age: 81
End: 2021-06-15
Payer: MEDICARE

## 2021-06-15 VITALS
WEIGHT: 166 LBS | TEMPERATURE: 95.7 F | DIASTOLIC BLOOD PRESSURE: 88 MMHG | HEART RATE: 84 BPM | RESPIRATION RATE: 16 BRPM | OXYGEN SATURATION: 95 % | BODY MASS INDEX: 30.36 KG/M2 | SYSTOLIC BLOOD PRESSURE: 144 MMHG

## 2021-06-15 DIAGNOSIS — N39.0 URINARY TRACT INFECTION WITHOUT HEMATURIA, SITE UNSPECIFIED: Primary | ICD-10-CM

## 2021-06-15 LAB
BILIRUBIN, POC: ABNORMAL
BLOOD URINE, POC: ABNORMAL
CLARITY, POC: ABNORMAL
COLOR, POC: YELLOW
GLUCOSE URINE, POC: ABNORMAL
KETONES, POC: ABNORMAL
LEUKOCYTE EST, POC: ABNORMAL
NITRITE, POC: POSITIVE
PH, POC: 6
PROTEIN, POC: ABNORMAL
SPECIFIC GRAVITY, POC: 1.01
UROBILINOGEN, POC: 0.2

## 2021-06-15 PROCEDURE — 4040F PNEUMOC VAC/ADMIN/RCVD: CPT | Performed by: FAMILY MEDICINE

## 2021-06-15 PROCEDURE — G8427 DOCREV CUR MEDS BY ELIG CLIN: HCPCS | Performed by: FAMILY MEDICINE

## 2021-06-15 PROCEDURE — 1123F ACP DISCUSS/DSCN MKR DOCD: CPT | Performed by: FAMILY MEDICINE

## 2021-06-15 PROCEDURE — 1036F TOBACCO NON-USER: CPT | Performed by: FAMILY MEDICINE

## 2021-06-15 PROCEDURE — G8417 CALC BMI ABV UP PARAM F/U: HCPCS | Performed by: FAMILY MEDICINE

## 2021-06-15 PROCEDURE — 81002 URINALYSIS NONAUTO W/O SCOPE: CPT | Performed by: FAMILY MEDICINE

## 2021-06-15 PROCEDURE — G8399 PT W/DXA RESULTS DOCUMENT: HCPCS | Performed by: FAMILY MEDICINE

## 2021-06-15 PROCEDURE — 1090F PRES/ABSN URINE INCON ASSESS: CPT | Performed by: FAMILY MEDICINE

## 2021-06-15 PROCEDURE — 99213 OFFICE O/P EST LOW 20 MIN: CPT | Performed by: FAMILY MEDICINE

## 2021-06-15 RX ORDER — CIPROFLOXACIN 500 MG/1
500 TABLET, FILM COATED ORAL 2 TIMES DAILY
Qty: 28 TABLET | Refills: 0 | Status: SHIPPED | OUTPATIENT
Start: 2021-06-15 | End: 2021-06-29

## 2021-06-15 NOTE — PROGRESS NOTES
[facility]  Progress Note  Mayra Cleary DO          Hallie Jennings  1940    06/15/21    Chief Complaint:   Hallie Jennings is a [de-identified] y.o. female who is here for UTI        HPI:     urinary frequency, urgency, abodomianl pain . onset 4 days. Also has experienced diverticulitis but does not think it is diverticulitis. ROS negative for headache, visionchanges, chest pain, shortness of breath, abdominal pain,  bowel changes. Past medical, surgical, and social history reviewed. and allergies reviewed. Allergies   Allergen Reactions    Bactrim [Sulfamethoxazole-Trimethoprim] Hives     hives    Seasonal Other (See Comments)     Runny nose, sneezing     Prior to Visit Medications    Medication Sig Taking? Authorizing Provider   ciprofloxacin (CIPRO) 500 MG tablet Take 1 tablet by mouth 2 times daily for 14 days Yes Estela Kolb DO   apixaban (ELIQUIS) 5 MG TABS tablet Take 1 tablet by mouth 2 times daily Yes JOSE Finley CNP   furosemide (LASIX) 20 MG tablet Take 1 tablet by mouth daily as needed (water weight gain.) Yes Estela Kolb, DO   metoprolol tartrate (LOPRESSOR) 100 MG tablet Take 1 tablet by mouth 2 times daily Yes Eligah Hodgkins, APRN - CNP   flecainide (TAMBOCOR) 50 MG tablet Take 1 tablet by mouth 2 times daily Yes Valeriy Thomas MD   Handicap Placard MISC by Does not apply route Dx of shortness of breath and A-fib. Effective Jan 13, 2021 until Jan 13, 2026. Yes Estela Kolb, DO   levothyroxine (SYNTHROID) 25 MCG tablet TAKE 1 TABLET BY MOUTH EVERY DAY Yes Estela Kolb DO   Cholecalciferol (VITAMIN D) 2000 UNITS CAPS capsule Take 1 capsule by mouth daily.  Yes Joneen Severin, MD          Vitals:    06/15/21 1134 06/15/21 1149   BP: (!) 167/92 (!) 144/88   Pulse: 84    Resp: 16    Temp: 95.7 °F (35.4 °C)    TempSrc: Oral    SpO2: 95%    Weight: 166 lb (75.3 kg)       Wt Readings from Last 3 Encounters:   06/15/21 166 lb (75.3 kg)  CHOLECYSTECTOMY  1990s    COLONOSCOPY  2007    Normal    COLONOSCOPY  2012    Dr. Lianna Gross    Right Repair    HYSTERECTOMY  1975    WISDOM TOOTH EXTRACTION       Family History   Problem Relation Age of Onset    Cancer Brother      Social History     Socioeconomic History    Marital status:      Spouse name: Not on file    Number of children: Not on file    Years of education: Not on file    Highest education level: Not on file   Occupational History    Not on file   Tobacco Use    Smoking status: Former Smoker     Packs/day: 0.00     Years: 10.00     Pack years: 0.00     Quit date: 2005     Years since quittin.0    Smokeless tobacco: Never Used   Vaping Use    Vaping Use: Never used   Substance and Sexual Activity    Alcohol use: Yes     Comment: rare    Drug use: No    Sexual activity: Yes   Other Topics Concern    Not on file   Social History Narrative    Not on file     Social Determinants of Health     Financial Resource Strain: Low Risk     Difficulty of Paying Living Expenses: Not hard at all   Food Insecurity: No Food Insecurity    Worried About [address] in the Last Year: Never true    [address] [employer] in the Last Year: Never true   Transportation Needs: No Transportation Needs    Lack of Transportation (Medical): No    Lack of Transportation (Non-Medical):  No   Physical Activity:     Days of Exercise per Week:     Minutes of Exercise per Session:    Stress:     Feeling of Stress :    Social Connections:     Frequency of Communication with Friends and Family:     Frequency of Social Gatherings with Friends and Family:     Attends Congregational Services:     Active Member of Clubs or Organizations:     Attends Club or Organization Meetings:     Marital Status:    Intimate Partner Violence:     Fear of Current or Ex-Partner:     Emotionally Abused:     Physically Abused:     Sexually Abused:        O: BP (!) 144/88

## 2021-06-17 LAB
ORGANISM: ABNORMAL
ORGANISM: ABNORMAL
URINE CULTURE, ROUTINE: ABNORMAL
URINE CULTURE, ROUTINE: ABNORMAL

## 2021-06-22 ENCOUNTER — NURSE ONLY (OUTPATIENT)
Dept: CARDIOLOGY CLINIC | Age: 81
End: 2021-06-22
Payer: MEDICARE

## 2021-06-22 DIAGNOSIS — Z95.0 PACEMAKER: ICD-10-CM

## 2021-06-22 DIAGNOSIS — R00.1 BRADYCARDIA: ICD-10-CM

## 2021-06-22 NOTE — PROGRESS NOTES
Remote transmission received for patient's dual chamber PACEMAKER. Transmission shows normal sensing and pacing function. EP physician will review. See interrogation under the cardiology tab in the [facility] [address] field for more details. Will continue to monitor remotely. No  arrhythmias recorded.

## 2021-06-27 PROCEDURE — 93296 REM INTERROG EVL PM/IDS: CPT | Performed by: INTERNAL MEDICINE

## 2021-06-27 PROCEDURE — 93294 REM INTERROG EVL PM/LDLS PM: CPT | Performed by: INTERNAL MEDICINE

## 2021-06-30 ENCOUNTER — OFFICE VISIT (OUTPATIENT)
Dept: PULMONOLOGY | Age: 81
End: 2021-06-30
Payer: MEDICARE

## 2021-06-30 VITALS
HEART RATE: 81 BPM | DIASTOLIC BLOOD PRESSURE: 74 MMHG | HEIGHT: 62 IN | WEIGHT: 167 LBS | OXYGEN SATURATION: 97 % | SYSTOLIC BLOOD PRESSURE: 140 MMHG | BODY MASS INDEX: 30.73 KG/M2

## 2021-06-30 DIAGNOSIS — G47.10 HYPERSOMNIA: Primary | ICD-10-CM

## 2021-06-30 DIAGNOSIS — E66.9 NON MORBID OBESITY, UNSPECIFIED OBESITY TYPE: Chronic | ICD-10-CM

## 2021-06-30 DIAGNOSIS — I48.91 ATRIAL FIBRILLATION, UNSPECIFIED TYPE (HCC): Chronic | ICD-10-CM

## 2021-06-30 DIAGNOSIS — E03.9 ACQUIRED HYPOTHYROIDISM: Chronic | ICD-10-CM

## 2021-06-30 PROCEDURE — G8417 CALC BMI ABV UP PARAM F/U: HCPCS | Performed by: INTERNAL MEDICINE

## 2021-06-30 PROCEDURE — 99204 OFFICE O/P NEW MOD 45 MIN: CPT | Performed by: INTERNAL MEDICINE

## 2021-06-30 PROCEDURE — G8427 DOCREV CUR MEDS BY ELIG CLIN: HCPCS | Performed by: INTERNAL MEDICINE

## 2021-06-30 PROCEDURE — 1036F TOBACCO NON-USER: CPT | Performed by: INTERNAL MEDICINE

## 2021-06-30 PROCEDURE — 1123F ACP DISCUSS/DSCN MKR DOCD: CPT | Performed by: INTERNAL MEDICINE

## 2021-06-30 PROCEDURE — G8399 PT W/DXA RESULTS DOCUMENT: HCPCS | Performed by: INTERNAL MEDICINE

## 2021-06-30 PROCEDURE — 1090F PRES/ABSN URINE INCON ASSESS: CPT | Performed by: INTERNAL MEDICINE

## 2021-06-30 PROCEDURE — 4040F PNEUMOC VAC/ADMIN/RCVD: CPT | Performed by: INTERNAL MEDICINE

## 2021-06-30 ASSESSMENT — SLEEP AND FATIGUE QUESTIONNAIRES
NECK CIRCUMFERENCE (INCHES): 15.5
ESS TOTAL SCORE: 1
HOW LIKELY ARE YOU TO NOD OFF OR FALL ASLEEP IN A CAR, WHILE STOPPED FOR A FEW MINUTES IN TRAFFIC: 0
HOW LIKELY ARE YOU TO NOD OFF OR FALL ASLEEP WHILE SITTING INACTIVE IN A PUBLIC PLACE: 0
HOW LIKELY ARE YOU TO NOD OFF OR FALL ASLEEP WHILE WATCHING TV: 1
HOW LIKELY ARE YOU TO NOD OFF OR FALL ASLEEP WHILE SITTING AND TALKING TO SOMEONE: 0
HOW LIKELY ARE YOU TO NOD OFF OR FALL ASLEEP WHEN YOU ARE A PASSENGER IN A CAR FOR AN HOUR WITHOUT A BREAK: 0
HOW LIKELY ARE YOU TO NOD OFF OR FALL ASLEEP WHILE LYING DOWN TO REST IN THE AFTERNOON WHEN CIRCUMSTANCES PERMIT: 0
HOW LIKELY ARE YOU TO NOD OFF OR FALL ASLEEP WHILE SITTING QUIETLY AFTER LUNCH WITHOUT ALCOHOL: 0
HOW LIKELY ARE YOU TO NOD OFF OR FALL ASLEEP WHILE SITTING AND READING: 0

## 2021-06-30 NOTE — LETTER
[facility]  [place] [address]  Phone: [phone]  Fax: [phone]           Erica Gross MD      June 30, 2021     Patient: Calixto Rivera   MR Number: [id number]   YOB: 1940   Date of Visit: 6/30/2021       Dear Dr. Maribel Cullen:    Thank you for referring Fredi Lockwood to me for evaluation/treatment. Below are the relevant portions of my assessment and plan of care. Visit Diagnoses and Associated Orders     Hypersomnia   (New Problem)  -  Primary    needs work-up    POLYSOMNOGRAPHY (PSG) - Diagnostic Testing [07508 Custom]   - Future Order    Home Sleep Study [[id number] Custom]   - Future Order         Atrial fibrillation, unspecified type ([address]   (Stable)           Acquired hypothyroidism   (Stable)           Non morbid obesity, unspecified obesity type   (Not Stable)                 One or more undiagnosed new problem with uncertain prognosis till final diagnosis is made. Differential diagnosis includes but not limited to: NAKIA, PLMD's, narcolepsy, parasomnias. Reviewed NAKIA (which is the highest likelihood diagnosis): pathophysiology, diagnosis, complications and treatment. Instructed her not to drive if drowsy. Continue medications per her PCP and other physicians. Limit caffeine use after 3pm. Will do PSG to rule-out NAKIA and other sleep disorders. 1 wk follow up after study to review her results. The chronic medical conditions listed are directly related to the primary diagnosis listed above. The management of the primary diagnosis affects the secondary diagnosis and vice versa. Although an in lab PSG is the recommended diagnostic tests the patient has concerns about her  who has dementia that she is the primary caregiver for and cannot leave home alone nor does she have support to watch him for at night. At this point she is asking to start with a home sleep testing.   She understands that is an inferior test to in lab PSG and cannot rule out sleep apnea, only rule in. Reviewed referral note from cardiology dated 4/20/2021 showing patient at risk for sleep apnea. Patient reviewed EKG tracing from 5/24/2021 that shows possible A. fib with poor P wave presentation and appears to be PACs. Echo report from 4/19/2021 shows an EF of 55%. This information was analyzed to assess complexity and medical decision making in regards to further testing and management. Continue meds for: a fib and hypothryoid. Pt would medically benefit from wt loss for NAKIA (diet, exercise, surgical). Orders Placed This Encounter   Procedures    POLYSOMNOGRAPHY (PSG) - Diagnostic Testing    Home Sleep Study       If you have questions, please do not hesitate to call me. I look forward to following Luis Alberto Christianson along with you.     Sincerely,        Marisel Qiu MD    CC providers:  Mar Espinoza, APRN - CNP  [address] [facility]  [address] [address]  Via In [facility] [address]  [address] [address]  Via In [employer]

## 2021-06-30 NOTE — PROGRESS NOTES
Comment: rare    Drug use: No    Sexual activity: Yes   Other Topics Concern    Not on file   Social History Narrative    Not on file     Social Determinants of Health     Financial Resource Strain: Low Risk     Difficulty of Paying Living Expenses: Not hard at all   Food Insecurity: No Food Insecurity    Worried About Running Out of Food in the Last Year: Never true    [place] of Food in the Last Year: Never true   Transportation Needs: No Transportation Needs    Lack of Transportation (Medical): No    Lack of Transportation (Non-Medical): No   Physical Activity:     Days of Exercise per Week:     Minutes of Exercise per Session:    Stress:     Feeling of Stress :    Social Connections:     Frequency of Communication with Friends and Family:     Frequency of Social Gatherings with Friends and Family:     Attends Alevism Services:     Active Member of Clubs or Organizations:     Attends Club or Organization Meetings:     Marital Status:    Intimate Partner Violence:     Fear of Current or Ex-Partner:     Emotionally Abused:     Physically Abused:     Sexually Abused:         Current Outpatient Medications   Medication Instructions    apixaban (ELIQUIS) 5 mg, Oral, 2 TIMES DAILY    Cholecalciferol (VITAMIN D) 2000 UNITS CAPS capsule 2,000 Int'l Units, Oral, DAILY    flecainide (TAMBOCOR) 50 mg, Oral, 2 TIMES DAILY    furosemide (LASIX) 20 mg, Oral, DAILY PRN    Handicap Placard MISC Does not apply, Dx of shortness of breath and A-fib. Effective Jan 13, 2021 until Jan 13, 2026.  levothyroxine (SYNTHROID) 25 MCG tablet TAKE 1 TABLET BY MOUTH EVERY DAY    metoprolol (LOPRESSOR) 100 mg, Oral, 2 TIMES DAILY          Objective:     Vitals:  Weight BMI   Wt Readings from Last 3 Encounters:   06/30/21 167 lb (75.8 kg)   06/15/21 166 lb (75.3 kg)   05/24/21 166 lb (75.3 kg)    Body mass index is 30.54 kg/m².      BP HR SaO2   BP Readings from Last 3 Encounters:   06/30/21 (!) 140/74   06/15/21 normal.         Thought Content:  Thought content normal.         Electronically signed by Judit Lyle MD on6/30/2021 at 12:47 PM

## 2021-07-02 LAB
EKG ATRIAL RATE: 66 BPM
EKG DIAGNOSIS: NORMAL
EKG P AXIS: 83 DEGREES
EKG P-R INTERVAL: 226 MS
EKG Q-T INTERVAL: 472 MS
EKG QRS DURATION: 88 MS
EKG QTC CALCULATION (BAZETT): 494 MS
EKG R AXIS: 44 DEGREES
EKG T AXIS: 23 DEGREES
EKG VENTRICULAR RATE: 66 BPM

## 2021-07-07 ENCOUNTER — HOSPITAL ENCOUNTER (OUTPATIENT)
Dept: SLEEP CENTER | Age: 81
Discharge: HOME | End: 2021-07-07
Payer: MEDICARE

## 2021-07-07 DIAGNOSIS — G47.10 HYPERSOMNIA: ICD-10-CM

## 2021-07-07 PROCEDURE — 95806 SLEEP STUDY UNATT&RESP EFFT: CPT

## 2021-07-07 PROCEDURE — 95806 SLEEP STUDY UNATT&RESP EFFT: CPT | Performed by: INTERNAL MEDICINE

## 2021-07-13 ENCOUNTER — TELEPHONE (OUTPATIENT)
Dept: PULMONOLOGY | Age: 81
End: 2021-07-13

## 2021-07-13 NOTE — PROGRESS NOTES
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) [] CSA (G47.31) [] Apnea (G47.30)   Length of Need: [x] 12 Months [] 99 Months [] Other:    Machine (FRANCISCA!): [] Respironics Dream Station   2   Auto [x] ResMed AirSense     Auto [] Other:     [x]  CPAP () [] Bilevel ()   Mode: [x] Auto [] Spontaneous    Mode: [] Auto [] Spontaneous        P min 7 cmH2O  P max 10 cmH2O                Comfort Settings:   - Ramp Pressure:4 cmH2O                                        - Ramp time: 15 min                                     -  Flex/EPR - 3 full time                                    - For ResMed Bilevel (TiMax-4 sec   TiMin- 0.2 sec)     Humidifier: [x] Heated ()        [x] Water chamber replacement ()/ 1 per 6 months        Mask:   [x] Nasal () /1 per 3 months [] Full Face () /1 per 3 months   [x] Patient choice -Size and fit mask [] Patient Choice - Size and fit mask   [] Dispense:  [] Dispense:    [x] Headgear () / 1 per 3 months [] Headgear () / 1 per 3 months   [x] Replacement Nasal Cushion ()/2 per month [] Interface Replacement ()/1 per month   [] Replacement Nasal Pillows ()/2 per month         Tubing: [x] Heated ()/1 per 3 months    [] Standard ()/1 per 3 months [] Other:           Filters: [x] Non-disposable ()/1 per 6 months     [x] Ultra-Fine, Disposable ()/2 per month        Miscellaneous: [] Chin Strap ()/ 1 per 6 months [] O2 bleed-in:       LPM   [] Oximetry on CPAP/Bilevel []  Other:    [x] Modem: ()         Start Order Date: 21    MEDICAL JUSTIFICATION:  I, the undersigned, certify that the above prescribed supplies are medically necessary for this patients wellbeing. In my opinion, the supplies are both reasonable and necessary in reference to accepted standards of medicalpractice in treatment of this patients condition.     Amber Camarillo MD      NPI: [id number]       Order Signed Date: 21    Electronically signed by Rita Souza MD on 2021 at 2:21 PM    Jayesh Jennings  1940  [address]  [phone] (home)   [phone] (mobile)      Insurance Info (confirm with patient if correct):  Payor/Plan Subscr  Sex Relation Sub.  Ins. ID Effective Group Num

## 2021-07-13 NOTE — TELEPHONE ENCOUNTER
Spoke with pt to review HST. Order to be sent to [address] [place] [address]. F/U scheduled.

## 2021-07-21 ENCOUNTER — TELEPHONE (OUTPATIENT)
Dept: CARDIOLOGY CLINIC | Age: 81
End: 2021-07-21

## 2021-07-21 RX ORDER — METOPROLOL TARTRATE 100 MG/1
100 TABLET ORAL 2 TIMES DAILY
Qty: 60 TABLET | Refills: 5 | Status: SHIPPED | OUTPATIENT
Start: 2021-07-21 | End: 2021-11-01

## 2021-07-21 NOTE — TELEPHONE ENCOUNTER
Please call the patient to clarify her metoprolol tartrate dose. The patient states she has been taking 100 mg 1 tablet twice daily, but the pharmacy fill the prescription for 50 mg tablets.   [phone]

## 2021-07-21 NOTE — TELEPHONE ENCOUNTER
Spoke to pt to let her know it's 100mg BID, she has asked we send an updated rx to the pharmacy.  Please sign

## 2021-07-26 NOTE — PROGRESS NOTES
[facility]   Electrophysiology  Office Visit  Date: 8/5/2021    Chief Complaint   Patient presents with    Atrial Fibrillation    Bradycardia    Congestive Heart Failure       Cardiac HX: Raf Verdin is a [de-identified] y.o. woman with a h/o GERD, hypothyroidism and pAF, originally dx'd 1/2021 with progressively worsening SOB, noted to be in AF/RVR, placed on dilt, placed on Eliquis, BB and lasix, EF 55% per echo who p/w enthesis 4/17/2021) lightheadedness, N/V/D, found to be SB with HR's in the 40's, placed on dopamine, given IVF then developed AF/RVR, 6.8 sec conversion pause noted on tele, s/p dual ch MDT PPM (4/21/2021, Dr. Devang Dumont), planned for DCCV on 6/22/2021 however patient was in normal sinus rhythm and DCCV was canceled. Interval History/HPI: Patient is here to follow-up for paroxysmal AF, SSS, sinus pauses, and PPM management. Review of her device today shows 98.9% AP and less than 1% . She has had no episodes of atrial fibrillation on her device. She remains on flecainide 50 mg twice a day along with metoprolol 100 mg twice a day. Her blood pressure and heart rate are well controlled. She denies any heart racing, palpitations or irregular heartbeats. She underwent a sleep study in June and started with a CPAP and states that she feels great since the CPAP. She is sleeping all night every night and feels like she is having energy during the day. She denies any chest pain, shortness of breath, PND, orthopnea or lower extremity edema.     Home medications:   Current Outpatient Medications on File Prior to Visit   Medication Sig Dispense Refill    metoprolol (LOPRESSOR) 100 MG tablet Take 1 tablet by mouth 2 times daily 60 tablet 5    apixaban (ELIQUIS) 5 MG TABS tablet Take 1 tablet by mouth 2 times daily 90 tablet 0    furosemide (LASIX) 20 MG tablet Take 1 tablet by mouth daily as needed (water weight gain.) 60 tablet 3    flecainide (TAMBOCOR) 50 MG tablet Take 1 tablet by mouth 2 times daily 60 tablet 3    Handicap Placard MISC by Does not apply route Dx of shortness of breath and A-fib. Effective Jan 13, 2021 until Jan 13, 2026. 1 each 0    levothyroxine (SYNTHROID) 25 MCG tablet TAKE 1 TABLET BY MOUTH EVERY DAY 90 tablet 3    Cholecalciferol (VITAMIN D) 2000 UNITS CAPS capsule Take 1 capsule by mouth daily. No current facility-administered medications on file prior to visit. Past Medical History:   Diagnosis Date    Allergic rhinitis     Alveolitis of jaw     Cancer ([facility]     Dental disease     Diverticulosis of colon (without mention of hemorrhage)     Dizziness     GERD (gastroesophageal reflux disease)     Hearing loss     Myalgia and myositis, unspecified     Other and unspecified hyperlipidemia     Recurrent upper respiratory infection (URI)     Screening mammogram 11/05/2008    Normal    Tinnitus     Unspecified gastritis and gastroduodenitis without mention of hemorrhage     Unspecified hypothyroidism         Past Surgical History:   Procedure Laterality Date    BLADDER SUSPENSION  1990s    CHOLECYSTECTOMY  1990s    COLONOSCOPY  1/16/2007    Normal    COLONOSCOPY  02/27/2012    Dr. Wendie Dunham    Right Repair    HYSTERECTOMY  1975    WISDOM TOOTH EXTRACTION         Allergies   Allergen Reactions    Bactrim [Sulfamethoxazole-Trimethoprim] Hives     hives    Seasonal Other (See Comments)     Runny nose, sneezing       Social History:  Reviewed. reports that she quit smoking about 16 years ago. She smoked 0.00 packs per day for 10.00 years. She has never used smokeless tobacco. She reports current alcohol use. She reports that she does not use drugs. Family History:  Reviewed. family history includes Cancer in her brother; Sleep Apnea in her son. Review of System:    · Constitutional: No fevers, chills. · Eyes: No visual changes or diplopia. No scleral icterus. · ENT: No Headaches.  No mouth sores or sore throat. · Cardiovascular: No for chest pain, No for dyspnea on exertion, No for palpitations or No for loss of consciousness. No cough, hemoptysis, No for pleuritic pain, or phlebitis. · Respiratory: No for cough or wheezing. No hematemesis. · Gastrointestinal: No abdominal pain, blood in stools. · Genitourinary: No dysuria, or hematuria. · Musculoskeletal: No gait disturbance,    · Integumentary: No rash or pruritis. · Neurological: No headache, change in muscle strength, numbness or tingling. · Psychiatric: No anxiety, or depression. · Endocrine: No temperature intolerance. No excessive thirst, fluid intake, or urination. · Hem/Lymph: No abnormal bruising or bleeding, blood clots or swollen lymph nodes. · Allergic/Immunologic: No nasal congestion or hives. Physical Examination:  There were no vitals filed for this visit. Wt Readings from Last 3 Encounters:   08/05/21 171 lb 9.6 oz (77.8 kg)   06/30/21 167 lb (75.8 kg)   06/15/21 166 lb (75.3 kg)       · Constitutional: Oriented. No distress. · Head: Normocephalic and atraumatic. · Mouth/Throat: Oropharynx is clear and moist.   · Eyes: Conjunctivae clear without jaunduice. PERRL. · Neck: Neck supple. No rigidity. No JVD present. · Cardiovascular: Normal rate, regular rhythm, S1&S2. · Pulmonary/Chest: Bilateral respiratory sounds. No wheezes, No rhonchi. · Abdominal: Soft. Bowel sounds present. No distension, No tenderness. · Musculoskeletal: No tenderness. No edema    · Lymphadenopathy: Has no cervical adenopathy. · Neurological: Alert and oriented. Cranial nerve appears intact, No Gross deficit   · Skin: Skin is warm and dry. No rash noted. · Psychiatric: Has a normal mood, affect and behavior     Labs:  Reviewed. No results for input(s): NA, K, CL, CO2, PHOS, BUN, CREATININE in the last 72 hours. Invalid input(s): CA,  TSH  No results for input(s): WBC, HGB, HCT, MCV, PLT in the last 72 hours.   Lab Results Component Value Date    TROPONINI <0.01 04/17/2021     No results found for: BNP  Lab Results   Component Value Date    PROTIME 19.2 05/19/2021    PROTIME 18.1 04/17/2021    PROTIME 12.3 01/02/2021    INR 1.65 05/19/2021    INR 1.10 04/21/2021    INR 1.55 04/17/2021     Lab Results   Component Value Date    CHOL 217 09/10/2020    HDL 78 09/10/2020    HDL 87 01/27/2012    TRIG 90 09/10/2020       ECG: Personally reviewed: AP/ VS, HR 62, , QTc 428,  QRS 96    ECHO:  4.19.2021 (Limited TTE)   Summary   Normal left ventricle size. There is mild concentric left ventricular   hypertrophy. Overall left ventricular systolic function appears normal with   an ejection fraction of 55%. The right ventricle is mildly enlarged. Biatrial enlargement. Inferior vena cava appears dilated. Stress Test: 4.14.2021  Summary   Mable Pearl is normal isotope uptake at stress and rest. There is no evidence of    myocardial ischemia or scar.    Normal LV size and systolic function.    Left ventricular ejection fraction of 73 %.    There are no regional wall motion abnormalities.    Overall findings represent a low risk scan. Cardiac Angiography: n/a    Problem List:   Patient Active Problem List    Diagnosis Date Noted    [facility] 04/21/2021    Acute respiratory failure with hypoxia and hypercapnia (HCC) 04/18/2021    Hypotension 04/18/2021    ALEJA (acute kidney injury) ([address]     Bradycardia 04/17/2021    SOB (shortness of breath) 01/03/2021    Afib ([address] 01/02/2021    Atrial fibrillation with rapid ventricular response ([address] 01/02/2021    Sensorineural hearing loss, bilateral 09/21/2020    Tinnitus, bilateral 09/21/2020    Hx of melanoma excision 04/30/2019    Osteopenia 05/28/2015    Tinnitus     Disorder of bone and cartilage     Diverticulosis of large intestine     Mixed hyperlipidemia     Alveolitis of jaw     Hypothyroidism         Assessment:   1. Paroxysmal atrial fibrillation (HCC)    2. SSS (sick sinus syndrome) ([facility]    3. Sinus pause    4. Pacemaker    5. Chronic diastolic CHF (congestive heart failure) ([facility]          Cardiac HX: Raf Verdin is a [de-identified] y.o. woman with a h/o GERD, hypothyroidism and pAF, originally dx'd 1/2021 with progressively worsening SOB, noted to be in AF/RVR, placed on dilt, placed on Eliquis, BB and lasix, EF 55% per echo who p/w enthesis 4/17/2021) lightheadedness, N/V/D, found to be SB with HR's in the 40's, placed on dopamine, given IVF then developed AF/RVR, 6.8 sec conversion pause noted on tele, s/p dual ch MDT PPM (4/21/2021, Dr. Devang Dumont), planned for DCCV on 6/22/2021 however patient was in normal sinus rhythm and DCCV was canceled. [id number] 3. TSH 14.44 (4/17/2021). pAF  - NSR today, no AF on device  - On Eliquis 5mg BID, no s/sx bleeding, continue  - On metoprolol 100 mg BID  - On flecainide 50 mg BID - QRS 98  - Echo - LAE 4.4/96.6  - + NAKIA - compliant w/ CPAP  - DCCV cancelled (5/19/21) - pt chemically converted to NSR  - ECG ordered and results personally reviewed      TBS  - S/p dual ch MDT PPM (4/12/21)  - Device check today shows 98.9% AP, <0.1% , no arrhythmias, other parameters stable  - L chest incision healed well     Fluid overload/chronic dCHF  - Takes lasix prn  - Patient to weigh self daily. Take a Lasix 20 mg as needed weight gain greater than 3 pounds     EF of 74%  No systolic HF  No known CAD  On eliquis for AF   No tobacco use. All questions and concerns were addressed to the patient/family. Alternatives to my treatment were discussed. The note was completed using EMR. Every effort was made to ensure accuracy; however, inadvertent computerized transcription errors may be present. Patient received education regarding their diagnosis, treatment and medications while in the office today.       Edvin Kaufman0 [facility]

## 2021-08-05 ENCOUNTER — OFFICE VISIT (OUTPATIENT)
Dept: CARDIOLOGY CLINIC | Age: 81
End: 2021-08-05
Payer: MEDICARE

## 2021-08-05 ENCOUNTER — NURSE ONLY (OUTPATIENT)
Dept: CARDIOLOGY CLINIC | Age: 81
End: 2021-08-05
Payer: MEDICARE

## 2021-08-05 VITALS
SYSTOLIC BLOOD PRESSURE: 124 MMHG | HEART RATE: 62 BPM | HEIGHT: 62 IN | BODY MASS INDEX: 31.58 KG/M2 | WEIGHT: 171.6 LBS | DIASTOLIC BLOOD PRESSURE: 72 MMHG

## 2021-08-05 DIAGNOSIS — R00.1 BRADYCARDIA: ICD-10-CM

## 2021-08-05 DIAGNOSIS — I44.1 ATRIOVENTRICULAR BLOCK, MOBITZ TYPE 2: ICD-10-CM

## 2021-08-05 DIAGNOSIS — I45.5 SINUS PAUSE: ICD-10-CM

## 2021-08-05 DIAGNOSIS — I48.0 PAROXYSMAL ATRIAL FIBRILLATION (HCC): Primary | ICD-10-CM

## 2021-08-05 DIAGNOSIS — Z95.0 PACEMAKER: ICD-10-CM

## 2021-08-05 DIAGNOSIS — I50.32 CHRONIC DIASTOLIC CHF (CONGESTIVE HEART FAILURE) (HCC): ICD-10-CM

## 2021-08-05 DIAGNOSIS — I48.91 ATRIAL FIBRILLATION WITH RAPID VENTRICULAR RESPONSE (HCC): ICD-10-CM

## 2021-08-05 DIAGNOSIS — I49.5 SSS (SICK SINUS SYNDROME) (HCC): ICD-10-CM

## 2021-08-05 DIAGNOSIS — I49.5 SICK SINUS SYNDROME (HCC): ICD-10-CM

## 2021-08-05 PROCEDURE — 1036F TOBACCO NON-USER: CPT | Performed by: NURSE PRACTITIONER

## 2021-08-05 PROCEDURE — G8399 PT W/DXA RESULTS DOCUMENT: HCPCS | Performed by: NURSE PRACTITIONER

## 2021-08-05 PROCEDURE — 99214 OFFICE O/P EST MOD 30 MIN: CPT | Performed by: NURSE PRACTITIONER

## 2021-08-05 PROCEDURE — G8417 CALC BMI ABV UP PARAM F/U: HCPCS | Performed by: NURSE PRACTITIONER

## 2021-08-05 PROCEDURE — 1090F PRES/ABSN URINE INCON ASSESS: CPT | Performed by: NURSE PRACTITIONER

## 2021-08-05 PROCEDURE — 93280 PM DEVICE PROGR EVAL DUAL: CPT | Performed by: INTERNAL MEDICINE

## 2021-08-05 PROCEDURE — 4040F PNEUMOC VAC/ADMIN/RCVD: CPT | Performed by: NURSE PRACTITIONER

## 2021-08-05 PROCEDURE — 1123F ACP DISCUSS/DSCN MKR DOCD: CPT | Performed by: NURSE PRACTITIONER

## 2021-08-05 PROCEDURE — G8427 DOCREV CUR MEDS BY ELIG CLIN: HCPCS | Performed by: NURSE PRACTITIONER

## 2021-08-05 NOTE — PROGRESS NOTES
Patient comes in for programming evaluation on her Medtronic dual chamber pacemaker. All sensing and pacing parameters are within normal range. Battery life 13.4 years  AP 98.9%.  <0.1%. No episodes noted. Patient remains on metoprolol, eliquis, furosemide, flecainide. No changes need to be made at this time. Please see interrogation for more detail. Patient will see NPFW today and follow up in 3 months in office or remotely.

## 2021-08-11 DIAGNOSIS — E03.9 ACQUIRED HYPOTHYROIDISM: ICD-10-CM

## 2021-08-11 NOTE — TELEPHONE ENCOUNTER
Requested Prescriptions     Pending Prescriptions Disp Refills    levothyroxine (SYNTHROID) 25 MCG tablet [Pharmacy Med Name: LEVOTHYROXINE 25 MCG TABLET] 90 tablet 3     Sig: TAKE 1 TABLET BY MOUTH EVERY DAY     Last ov 6/15/21  Last labs 4/17/21  Next ov 8/17/21

## 2021-08-12 RX ORDER — LEVOTHYROXINE SODIUM 0.03 MG/1
TABLET ORAL
Qty: 90 TABLET | Refills: 3 | Status: SHIPPED | OUTPATIENT
Start: 2021-08-12 | End: 2022-07-25 | Stop reason: SDUPTHER

## 2021-08-17 ENCOUNTER — OFFICE VISIT (OUTPATIENT)
Dept: CARDIOLOGY CLINIC | Age: 81
End: 2021-08-17
Payer: MEDICARE

## 2021-08-17 VITALS
DIASTOLIC BLOOD PRESSURE: 70 MMHG | BODY MASS INDEX: 31.31 KG/M2 | SYSTOLIC BLOOD PRESSURE: 120 MMHG | HEART RATE: 88 BPM | WEIGHT: 171.2 LBS

## 2021-08-17 DIAGNOSIS — R06.02 SOB (SHORTNESS OF BREATH): ICD-10-CM

## 2021-08-17 DIAGNOSIS — Z95.0 PACEMAKER: ICD-10-CM

## 2021-08-17 DIAGNOSIS — I49.5 SSS (SICK SINUS SYNDROME) (HCC): ICD-10-CM

## 2021-08-17 DIAGNOSIS — I10 ESSENTIAL HYPERTENSION: ICD-10-CM

## 2021-08-17 DIAGNOSIS — I48.19 PERSISTENT ATRIAL FIBRILLATION (HCC): Primary | ICD-10-CM

## 2021-08-17 PROCEDURE — G8417 CALC BMI ABV UP PARAM F/U: HCPCS | Performed by: INTERNAL MEDICINE

## 2021-08-17 PROCEDURE — 4040F PNEUMOC VAC/ADMIN/RCVD: CPT | Performed by: INTERNAL MEDICINE

## 2021-08-17 PROCEDURE — 1090F PRES/ABSN URINE INCON ASSESS: CPT | Performed by: INTERNAL MEDICINE

## 2021-08-17 PROCEDURE — 1036F TOBACCO NON-USER: CPT | Performed by: INTERNAL MEDICINE

## 2021-08-17 PROCEDURE — 1123F ACP DISCUSS/DSCN MKR DOCD: CPT | Performed by: INTERNAL MEDICINE

## 2021-08-17 PROCEDURE — G8399 PT W/DXA RESULTS DOCUMENT: HCPCS | Performed by: INTERNAL MEDICINE

## 2021-08-17 PROCEDURE — G8427 DOCREV CUR MEDS BY ELIG CLIN: HCPCS | Performed by: INTERNAL MEDICINE

## 2021-08-17 PROCEDURE — 99213 OFFICE O/P EST LOW 20 MIN: CPT | Performed by: INTERNAL MEDICINE

## 2021-08-17 ASSESSMENT — ENCOUNTER SYMPTOMS
CHOKING: 0
SHORTNESS OF BREATH: 0
COUGH: 0
CHEST TIGHTNESS: 0

## 2021-08-17 NOTE — PROGRESS NOTES
Subjective:      Patient ID: Mariebl Salas is a [de-identified] y.o. female. HPI  Follow up afib/sob/HTN/SSS/Pacer. Feeling much better. Not limited by sob any more. afib persists. No chest pain. No pnd or orthopnea. No tachy/syncope/Palp. No edema. BP good. No syncope. EP following.        Past Medical History:   Diagnosis Date    Allergic rhinitis     Alveolitis of jaw     Cancer ([address]     Dental disease     Diverticulosis of colon (without mention of hemorrhage)     Dizziness     GERD (gastroesophageal reflux disease)     Hearing loss     Myalgia and myositis, unspecified     Other and unspecified hyperlipidemia     Recurrent upper respiratory infection (URI)     Screening mammogram 2008    Normal    Tinnitus     Unspecified gastritis and gastroduodenitis without mention of hemorrhage     Unspecified hypothyroidism      Past Surgical History:   Procedure Laterality Date    BLADDER SUSPENSION      CHOLECYSTECTOMY      COLONOSCOPY  2007    Normal    COLONOSCOPY  2012    Dr. Candido Singh    Right Repair    HYSTERECTOMY  1975    WISDOM TOOTH EXTRACTION       Social History     Socioeconomic History    Marital status:      Spouse name: Not on file    Number of children: Not on file    Years of education: Not on file    Highest education level: Not on file   Occupational History    Not on file   Tobacco Use    Smoking status: Former Smoker     Packs/day: 0.00     Years: 10.00     Pack years: 0.00     Quit date: 2005     Years since quittin.2    Smokeless tobacco: Never Used   Vaping Use    Vaping Use: Never used   Substance and Sexual Activity    Alcohol use: Yes     Comment: rare    Drug use: No    Sexual activity: Yes   Other Topics Concern    Not on file   Social History Narrative    Not on file     Social Determinants of Health     Financial Resource Strain: Low Risk     Difficulty of Paying Living Expenses: Not hard at all   Food Insecurity: No Food Insecurity    Worried About Running Out of Food in the Last Year: Never true    Ran Out of Food in the Last Year: Never true   Transportation Needs: No Transportation Needs    Lack of Transportation (Medical): No    Lack of Transportation (Non-Medical): No   Physical Activity:     Days of Exercise per Week:     Minutes of Exercise per Session:    Stress:     Feeling of Stress :    Social Connections:     Frequency of Communication with Friends and Family:     Frequency of Social Gatherings with Friends and Family:     Attends Sabianism Services:     Active Member of Clubs or Organizations:     Attends Club or Organization Meetings:     Marital Status:    Intimate Partner Violence:     Fear of Current or Ex-Partner:     Emotionally Abused:     Physically Abused:     Sexually Abused:      FH reviewed    Vitals:    08/17/21 1329   BP: 120/70   Pulse: 88       Wt 165    Review of Systems   Constitutional: Negative for activity change, appetite change and fatigue. Respiratory: Negative for cough, choking, chest tightness and shortness of breath. Cardiovascular: Negative for chest pain, palpitations and leg swelling. Denies PND or orthopnea. No tachycardia or syncope. Neurological: Negative for dizziness, syncope and light-headedness. Psychiatric/Behavioral: Negative for agitation, behavioral problems and confusion. All other systems reviewed and are negative. Objective:   Physical Exam  Constitutional:       General: She is not in acute distress. Appearance: Normal appearance. She is well-developed. HENT:      Head: Normocephalic and atraumatic. Right Ear: External ear normal.      Left Ear: External ear normal.   Neck:      Vascular: No JVD. Cardiovascular:      Rate and Rhythm: Normal rate. Rhythm irregularly irregular. Heart sounds: Normal heart sounds. No murmur heard. No gallop.     Pulmonary:      Effort: Pulmonary effort is normal. No respiratory distress. Breath sounds: Normal breath sounds. No wheezing or rales. Abdominal:      General: Bowel sounds are normal.      Palpations: Abdomen is soft. Tenderness: There is no abdominal tenderness. Musculoskeletal:         General: Normal range of motion. Cervical back: Normal range of motion. Skin:     General: Skin is warm and dry. Neurological:      General: No focal deficit present. Mental Status: She is alert and oriented to person, place, and time. Psychiatric:         Mood and Affect: Mood normal.         Behavior: Behavior normal.         Assessment:       Diagnosis Orders   1. Persistent atrial fibrillation ([address]     2. SOB (shortness of breath)     3. Essential hypertension     4. SSS (sick sinus syndrome) ([address]     5. Pacemaker             Plan:      CV stable. Now S/P pacer for SSS   On AC.  no bleeding issues. Continue  lopressor to 100 mg bid. Remains afib by exam.   Reviewed previous records and testing including echo 1/21. Follow up 3 months. EP following for pacer/afib.           Chelsey Love MD

## 2021-09-08 ENCOUNTER — OFFICE VISIT (OUTPATIENT)
Dept: PULMONOLOGY | Age: 81
End: 2021-09-08
Payer: MEDICARE

## 2021-09-08 VITALS
SYSTOLIC BLOOD PRESSURE: 138 MMHG | HEART RATE: 91 BPM | WEIGHT: 170 LBS | HEIGHT: 62 IN | OXYGEN SATURATION: 95 % | BODY MASS INDEX: 31.28 KG/M2 | DIASTOLIC BLOOD PRESSURE: 88 MMHG

## 2021-09-08 DIAGNOSIS — G47.33 OSA (OBSTRUCTIVE SLEEP APNEA): Primary | ICD-10-CM

## 2021-09-08 DIAGNOSIS — J96.01 ACUTE RESPIRATORY FAILURE WITH HYPOXIA AND HYPERCAPNIA (HCC): ICD-10-CM

## 2021-09-08 DIAGNOSIS — J96.02 ACUTE RESPIRATORY FAILURE WITH HYPOXIA AND HYPERCAPNIA (HCC): ICD-10-CM

## 2021-09-08 DIAGNOSIS — I48.91 ATRIAL FIBRILLATION, UNSPECIFIED TYPE (HCC): Chronic | ICD-10-CM

## 2021-09-08 DIAGNOSIS — E03.9 ACQUIRED HYPOTHYROIDISM: Chronic | ICD-10-CM

## 2021-09-08 DIAGNOSIS — E66.9 OBESITY (BMI 30.0-34.9): ICD-10-CM

## 2021-09-08 PROBLEM — E66.811 OBESITY (BMI 30.0-34.9): Status: ACTIVE | Noted: 2021-09-08

## 2021-09-08 PROCEDURE — G8417 CALC BMI ABV UP PARAM F/U: HCPCS | Performed by: NURSE PRACTITIONER

## 2021-09-08 PROCEDURE — 1036F TOBACCO NON-USER: CPT | Performed by: NURSE PRACTITIONER

## 2021-09-08 PROCEDURE — 99214 OFFICE O/P EST MOD 30 MIN: CPT | Performed by: NURSE PRACTITIONER

## 2021-09-08 PROCEDURE — G8427 DOCREV CUR MEDS BY ELIG CLIN: HCPCS | Performed by: NURSE PRACTITIONER

## 2021-09-08 PROCEDURE — 1123F ACP DISCUSS/DSCN MKR DOCD: CPT | Performed by: NURSE PRACTITIONER

## 2021-09-08 PROCEDURE — 1090F PRES/ABSN URINE INCON ASSESS: CPT | Performed by: NURSE PRACTITIONER

## 2021-09-08 PROCEDURE — 4040F PNEUMOC VAC/ADMIN/RCVD: CPT | Performed by: NURSE PRACTITIONER

## 2021-09-08 PROCEDURE — G8399 PT W/DXA RESULTS DOCUMENT: HCPCS | Performed by: NURSE PRACTITIONER

## 2021-09-08 ASSESSMENT — SLEEP AND FATIGUE QUESTIONNAIRES
HOW LIKELY ARE YOU TO NOD OFF OR FALL ASLEEP WHILE WATCHING TV: 1
HOW LIKELY ARE YOU TO NOD OFF OR FALL ASLEEP WHEN YOU ARE A PASSENGER IN A CAR FOR AN HOUR WITHOUT A BREAK: 0
HOW LIKELY ARE YOU TO NOD OFF OR FALL ASLEEP WHILE SITTING AND READING: 0
HOW LIKELY ARE YOU TO NOD OFF OR FALL ASLEEP WHILE SITTING QUIETLY AFTER LUNCH WITHOUT ALCOHOL: 0
ESS TOTAL SCORE: 2
HOW LIKELY ARE YOU TO NOD OFF OR FALL ASLEEP WHILE SITTING AND TALKING TO SOMEONE: 0
HOW LIKELY ARE YOU TO NOD OFF OR FALL ASLEEP WHILE LYING DOWN TO REST IN THE AFTERNOON WHEN CIRCUMSTANCES PERMIT: 1
HOW LIKELY ARE YOU TO NOD OFF OR FALL ASLEEP WHILE SITTING INACTIVE IN A PUBLIC PLACE: 0
HOW LIKELY ARE YOU TO NOD OFF OR FALL ASLEEP IN A CAR, WHILE STOPPED FOR A FEW MINUTES IN TRAFFIC: 0

## 2021-09-08 NOTE — PROGRESS NOTES
Diagnosis: [x] NAKIA (G47.33) [] CSA (G47.31) [] Apnea (G47.30)   Length of Need: [x] 15 Months [] 99 Months [] Other:   Machine (FRANCISCA!): [] Respironics Dream Station      Auto [] ResMed AirSense     Auto [] Other:     []  CPAP () [] Bilevel ()   Mode: [] Auto [] Spontaneous    Mode: [] Auto [] Spontaneous                        Comfort Settings:      Humidifier: [] Heated ()        [x] Water chamber replacement ()/ 1 per 6 months        Mask:   [x] Nasal () /1 per 3 months [] Full Face () /1 per 3 months   [x] Patient choice -Size and fit mask [] Patient Choice - Size and fit mask   [] Dispense: [] Dispense:   [x] Headgear () / 1 per 3 months [] Headgear () / 1 per 3 months   [] Replacement Nasal Cushion ()/2 per month [] Interface Replacement ()/1 per month   [x] Replacement Nasal Pillows ()/2 per month         Tubing: [x] Heated ()/1 per 3 months    [] Standard ()/1 per 3 months [] Other:           Filters: [x] Non-disposable ()/1 per 6 months     [x] Ultra-Fine, Disposable ()/2 per month        Miscellaneous: [] Chin Strap ()/ 1 per 6 months [] O2 bleed-in:        LPM   [] Oxymetry on CPAP/Bilevel []  Other:         Start Order Date: 09/08/21    MEDICAL JUSTIFICATION:  I, the undersigned, certify that the above prescribed supplies are medically necessary for this patients wellbeing. In my opinion, the supplies are both reasonable and necessary in reference to accepted standards of medicalpractice in treatment of this patients condition. Paolo Horowitz NP    NPI: [id number]       Order Signed Date: 09/08/21  [facility]  Pulmonary, Sleep, and Critical Care    Pulmonary, Sleep, and Critical Care  [address] [place] [address].  [address] [facility], [address] [facility] , [address]                                    [facility]  Phone: [phone]    Fax: Munir Maza  1940  [address] [facility]  [phone] (home)   [phone] (mobile)      Insurance Info (confirm with patient if correct):  Payor/Plan Subscr  Sex Relation Sub. Ins. ID Effective Group Num   1. MEDICARE - ME* CHAD MAZANDA [place] 1940 Female  [id number] Not Eff                                    [address]  [place] 1940 Female  [id number] Not Eff                                    [address]

## 2021-09-08 NOTE — LETTER
[facility]  [place] [address] [place] [address]  Phone: [phone]  Fax: [phone]    September 8, 2021       Patient: Franki Byrd   MR Number: [id number]   YOB: 1940   Date of Visit: 9/8/2021       Marleny Jefferson was seen for a follow up visit today. Here is my assessment and plan as well as an attached copy of her visit today:    Afib ([facility]   Chronic- Stable. Discussed the importance of treating obstructive sleep apnea as part of the management of this disorder. Cont any meds per PCP and other physicians. Hypothyroidism   Chronic- Stable. Discussed the importance of treating obstructive sleep apnea as part of the management of this disorder. Cont any meds per PCP and other physicians. Acute respiratory failure with hypoxia and hypercapnia (HCC)  Chronic- Stable. Discussed the importance of treating obstructive sleep apnea as part of the management of this disorder. Cont any meds per PCP and other physicians. Obesity (BMI 30.0-34. 9)  Chronic-not stable:  Discussed importance of treating obstructive sleep apnea and getting sufficient sleep to assist with weight control. Encouraged her to work on weight loss through diet and exercise. Recommended DASH or Mediterranean diets. NAKIA (obstructive sleep apnea)  Chronic-Improving: Reviewed HST with patient and provided a copy for his records. Reviewed and analyzed results of physiologic download from patient's machine and reviewed with patient. Supplies and parts as needed for her machine. These are medically necessary. Limit caffeine use after 3pm. Based on the analyzed data will change following settings: Pmin to 10, Pmax: 20. Will need overnight oximetry/titration once stable due to hypoxia shown on HST. Patient to work with her equipment company on mask fit. Verbal and written instruction on PAP equipment cleaning and disinfection schedule provided. Follow up in 3 months.         If you have questions or concerns, please do not hesitate to call me. I look forward to following Carlos Alberto Gutiérrez along with you.     Sincerely,    JOSE Zarate    CC providers:  DO Valdez Johansen [place] [address]  [facility]

## 2021-09-08 NOTE — PROGRESS NOTES
Manasa Velazquez MD, Amita Torres, [facility]  Tiffanie Kehrt CNP Kittie Reading CNP [address]  [facility] [address] [facility], [address] [facility] [phone]   [facility]   [place]. [address] [facility]. [address] [phone]     [place] [id number] [phone]      Assessment/Plan:     1. NAKIA (obstructive sleep apnea)  Assessment & Plan:  Chronic-Improving: Reviewed HST with patient and provided a copy for his records. Reviewed and analyzed results of physiologic download from patient's machine and reviewed with patient. Supplies and parts as needed for her machine. These are medically necessary. Limit caffeine use after 3pm. Based on the analyzed data will change following settings: Pmin to 10, Pmax: 20. Will need overnight oximetry/titration once stable due to hypoxia shown on HST. Patient to work with her equipment company on mask fit. Verbal and written instruction on PAP equipment cleaning and disinfection schedule provided. Follow up in 3 months. 2. Atrial fibrillation, unspecified type [facility])  Assessment & Plan:   Chronic- Stable. Discussed the importance of treating obstructive sleep apnea as part of the management of this disorder. Cont any meds per PCP and other physicians. 3. Acute respiratory failure with hypoxia and hypercapnia (HCC)  Assessment & Plan:  Chronic- Stable. Discussed the importance of treating obstructive sleep apnea as part of the management of this disorder. Cont any meds per PCP and other physicians. 4. Acquired hypothyroidism  Assessment & Plan:   Chronic- Stable. Discussed the importance of treating obstructive sleep apnea as part of the management of this disorder. Cont any meds per PCP and other physicians. 5. Obesity (BMI 30.0-34. 9)  Assessment & Plan:  Chronic-not stable:  Discussed importance of treating obstructive sleep apnea and getting sufficient sleep to assist with weight control. Encouraged her to work on weight loss through diet and exercise. Recommended DASH or Mediterranean diets. Reviewed, analyzed, and documented physiologic data from patient's PAP machine. This information was analyzed to assess complexity and medical decision making in regards to further testing and management. The primary encounter diagnosis was NAKIA (obstructive sleep apnea). Diagnoses of Atrial fibrillation, unspecified type ([address] Acute respiratory failure with hypoxia and hypercapnia ([facility] [address] Acquired hypothyroidism, and Obesity (BMI 30.0-34.9) were also pertinent to this visit. The chronic medical conditions listed are directly related to the primary diagnosis listed above. The management of the primary diagnosis affects the secondary diagnosis and vice versa. Subjective:   Subjective   Patient ID: Annemarie Awad is a [de-identified] y.o. female. Chief Complaint   Patient presents with    Sleep Apnea       HPI:  Machine Modem/Download Info:  Compliance (hours/night): 7.42 hrs/night  % of nights >= 4 hrs: 98 %  Download AHI (/hour): 2.7 /HR  Average CPAP Pressure : 9.9 cmH2O      APAP - Settings  Pressure Min: 7 cmH2O  Pressure Max: 10 cmH2O                 Comfort Settings  Heated Tubing (Yes/No): Yes  Flex/EPR (0-3): 3 PAP Mask  Clinically Relevant Leak: Yes     Em Jennings is doing well acclimating to her machine. She has noticed that she is waking more rested and is feeling less sleepy during the day. Pressure feels good and she is waking rested. she denies headaches, congestion, nosebleeds, dryness, aerophagia, or drowsiness while driving. She has noticed some mask leak. Had insurance mandated HST on 7/8/21 which showed and MARTHA-50.9/hr and low sat-59% with time below 88% of 449.1 min.     [address]    [place] - Total score: 2    Social History     Socioeconomic History    Marital status:      Spouse name: Not on file    Number of children: Not on file    Years of education: Not on file    Highest education level: Not on file   Occupational History    Not on file   Tobacco Use    Smoking status: Former Smoker     Packs/day: 0.00     Years: 10.00     Pack years: 0.00     Quit date: 2005     Years since quittin.2    Smokeless tobacco: Never Used   Vaping Use    Vaping Use: Never used   Substance and Sexual Activity    Alcohol use: Yes     Comment: rare    Drug use: No    Sexual activity: Yes   Other Topics Concern    Not on file   Social History Narrative    Not on file     Social Determinants of Health     Financial Resource Strain: Low Risk     Difficulty of Paying Living Expenses: Not hard at all   Food Insecurity: No Food Insecurity    Worried About [address] in the Last Year: Never true    [address] in the Last Year: Never true   Transportation Needs: No Transportation Needs    Lack of Transportation (Medical): No    Lack of Transportation (Non-Medical): No   Physical Activity:     Days of Exercise per Week:     Minutes of Exercise per Session:    Stress:     Feeling of Stress :    Social Connections:     Frequency of Communication with Friends and Family:     Frequency of Social Gatherings with Friends and Family:     Attends Synagogue Services:     Active Member of Clubs or Organizations:     Attends Club or Organization Meetings:     Marital Status:    Intimate Partner Violence:     Fear of Current or Ex-Partner:     Emotionally Abused:     Physically Abused:     Sexually Abused:        Current Outpatient Medications   Medication Instructions    apixaban (ELIQUIS) 5 mg, Oral, 2 TIMES DAILY    Cholecalciferol (VITAMIN D) 2000 UNITS CAPS capsule 2,000 Int'l Units, Oral, DAILY    flecainide (TAMBOCOR) 50 mg, Oral, 2 TIMES DAILY    furosemide (LASIX) 20 mg, Oral, DAILY PRN    Handicap Placard MISC Does not apply, Dx of shortness of breath and A-fib. Effective 2021 until 2026.  levothyroxine (SYNTHROID) 25 MCG tablet TAKE 1 TABLET BY MOUTH EVERY DAY    metoprolol (LOPRESSOR) 100 mg, Oral, 2 TIMES DAILY          Vitals:  Weight BMI   Wt Readings from Last 3 Encounters:   09/08/21 170 lb (77.1 kg)   08/17/21 171 lb 3.2 oz (77.7 kg)   08/05/21 171 lb 9.6 oz (77.8 kg)    Body mass index is 31.09 kg/m².      BP HR SaO2   BP Readings from Last 3 Encounters:   09/08/21 138/88   08/17/21 120/70   08/05/21 124/72    Pulse Readings from Last 3 Encounters:   09/08/21 91   08/17/21 88   08/05/21 62    SpO2 Readings from Last 3 Encounters:   09/08/21 95%   06/30/21 97%   06/15/21 95%        Electronically signed by JOSE Lund on 9/8/2021 at 4:24 PM

## 2021-09-15 RX ORDER — FLECAINIDE ACETATE 50 MG/1
50 TABLET ORAL 2 TIMES DAILY
Qty: 60 TABLET | Refills: 3 | OUTPATIENT
Start: 2021-09-15

## 2021-09-15 RX ORDER — FLECAINIDE ACETATE 50 MG/1
50 TABLET ORAL 2 TIMES DAILY
Qty: 180 TABLET | Refills: 1 | Status: SHIPPED | OUTPATIENT
Start: 2021-09-15 | End: 2022-03-14 | Stop reason: SDUPTHER

## 2021-09-15 NOTE — TELEPHONE ENCOUNTER
Bedside shift change report given to Ivana (oncoming nurse) by Chioma Martini (offgoing nurse). Report included the following information SBAR. Pt is completely out of Flecinade

## 2021-09-21 ENCOUNTER — NURSE ONLY (OUTPATIENT)
Dept: CARDIOLOGY CLINIC | Age: 81
End: 2021-09-21

## 2021-09-21 DIAGNOSIS — R00.1 BRADYCARDIA: ICD-10-CM

## 2021-09-21 DIAGNOSIS — Z95.0 PACEMAKER: ICD-10-CM

## 2021-09-23 PROCEDURE — 93294 REM INTERROG EVL PM/LDLS PM: CPT | Performed by: INTERNAL MEDICINE

## 2021-09-23 PROCEDURE — 93296 REM INTERROG EVL PM/IDS: CPT | Performed by: INTERNAL MEDICINE

## 2021-09-23 NOTE — PROGRESS NOTES
We received remote transmission from patient's dual chamber pacemaker monitor at home. Transmission shows normal sensing and pacing function. No new arrhythmias/events recorded. EP physician will review. See interrogation under cardiology tab in the [address] [place] [address] [address] field for more details.

## 2021-10-08 ENCOUNTER — PATIENT MESSAGE (OUTPATIENT)
Dept: FAMILY MEDICINE CLINIC | Age: 81
End: 2021-10-08

## 2021-10-08 NOTE — TELEPHONE ENCOUNTER
I received my Fluzone High Dose quad 2021-22 Stormy Red on 10/09/2021 at [facility] [place]    Thank you, Laureano Larger

## 2021-11-01 RX ORDER — METOPROLOL TARTRATE 100 MG/1
TABLET ORAL
Qty: 180 TABLET | Refills: 3 | Status: SHIPPED | OUTPATIENT
Start: 2021-11-01

## 2021-11-23 ENCOUNTER — OFFICE VISIT (OUTPATIENT)
Dept: CARDIOLOGY CLINIC | Age: 81
End: 2021-11-23
Payer: MEDICARE

## 2021-11-23 VITALS
SYSTOLIC BLOOD PRESSURE: 128 MMHG | HEART RATE: 68 BPM | WEIGHT: 180 LBS | DIASTOLIC BLOOD PRESSURE: 80 MMHG | BODY MASS INDEX: 32.92 KG/M2

## 2021-11-23 DIAGNOSIS — Z95.0 PACEMAKER: ICD-10-CM

## 2021-11-23 DIAGNOSIS — I49.5 SSS (SICK SINUS SYNDROME) (HCC): ICD-10-CM

## 2021-11-23 DIAGNOSIS — I10 ESSENTIAL HYPERTENSION: ICD-10-CM

## 2021-11-23 DIAGNOSIS — I48.19 PERSISTENT ATRIAL FIBRILLATION (HCC): Primary | ICD-10-CM

## 2021-11-23 PROCEDURE — G8399 PT W/DXA RESULTS DOCUMENT: HCPCS | Performed by: INTERNAL MEDICINE

## 2021-11-23 PROCEDURE — G8482 FLU IMMUNIZE ORDER/ADMIN: HCPCS | Performed by: INTERNAL MEDICINE

## 2021-11-23 PROCEDURE — G8417 CALC BMI ABV UP PARAM F/U: HCPCS | Performed by: INTERNAL MEDICINE

## 2021-11-23 PROCEDURE — 1036F TOBACCO NON-USER: CPT | Performed by: INTERNAL MEDICINE

## 2021-11-23 PROCEDURE — G8427 DOCREV CUR MEDS BY ELIG CLIN: HCPCS | Performed by: INTERNAL MEDICINE

## 2021-11-23 PROCEDURE — 4040F PNEUMOC VAC/ADMIN/RCVD: CPT | Performed by: INTERNAL MEDICINE

## 2021-11-23 PROCEDURE — 1123F ACP DISCUSS/DSCN MKR DOCD: CPT | Performed by: INTERNAL MEDICINE

## 2021-11-23 PROCEDURE — 1090F PRES/ABSN URINE INCON ASSESS: CPT | Performed by: INTERNAL MEDICINE

## 2021-11-23 PROCEDURE — 99214 OFFICE O/P EST MOD 30 MIN: CPT | Performed by: INTERNAL MEDICINE

## 2021-11-23 RX ORDER — APIXABAN 5 MG/1
TABLET, FILM COATED ORAL
Qty: 180 TABLET | Refills: 3 | Status: SHIPPED | OUTPATIENT
Start: 2021-11-23 | End: 2022-09-16

## 2021-11-23 ASSESSMENT — ENCOUNTER SYMPTOMS
CHEST TIGHTNESS: 0
CHOKING: 0
COUGH: 0
SHORTNESS OF BREATH: 0

## 2021-11-23 NOTE — TELEPHONE ENCOUNTER
Requested Prescriptions     Pending Prescriptions Disp Refills    ELIQUIS 5 MG TABS tablet [Pharmacy Med Name: ELIQUIS 5 MG TABLET] 180 tablet 3     Sig: TAKE 1 TABLET BY MOUTH 2 TIMES DAILY                  Last Office Visit: 8/17/2021     Next Office Visit: 11/23/2021

## 2021-11-23 NOTE — PROGRESS NOTES
Subjective:      Patient ID: Mik Hancock is a [de-identified] y.o. female. HPI  Follow up afib/sob/HTN/SSS/Pacer. Feeling much better. Not feeling palp/arrhythmia. Breathing good. No chest pain. No pnd or orthopnea. No tachy/syncope/Palp. No edema. BP good. No syncope. EP following.        Past Medical History:   Diagnosis Date    Allergic rhinitis     Alveolitis of jaw     Cancer ([address]     Dental disease     Diverticulosis of colon (without mention of hemorrhage)     Dizziness     GERD (gastroesophageal reflux disease)     Hearing loss     Myalgia and myositis, unspecified     NAKIA (obstructive sleep apnea) 2021    Other and unspecified hyperlipidemia     Recurrent upper respiratory infection (URI)     Screening mammogram 2008    Normal    Tinnitus     Unspecified gastritis and gastroduodenitis without mention of hemorrhage     Unspecified hypothyroidism      Past Surgical History:   Procedure Laterality Date    BLADDER SUSPENSION      CHOLECYSTECTOMY      COLONOSCOPY  2007    Normal    COLONOSCOPY  2012    Dr. Sanjuanita Phillips    Right Repair    HYSTERECTOMY  1975    WISDOM TOOTH EXTRACTION       Social History     Socioeconomic History    Marital status:      Spouse name: Not on file    Number of children: Not on file    Years of education: Not on file    Highest education level: Not on file   Occupational History    Not on file   Tobacco Use    Smoking status: Former Smoker     Packs/day: 0.00     Years: 10.00     Pack years: 0.00     Quit date: 2005     Years since quittin.4    Smokeless tobacco: Never Used   Vaping Use    Vaping Use: Never used   Substance and Sexual Activity    Alcohol use: Yes     Comment: rare    Drug use: No    Sexual activity: Yes   Other Topics Concern    Not on file   Social History Narrative    Not on file     Social Determinants of Health     Financial Resource Strain: Low Risk     Difficulty of Paying Living Expenses: Not hard at all   Food Insecurity: No Food Insecurity    Worried About Running Out of Food in the Last Year: Never true    Ran Out of Food in the Last Year: Never true   Transportation Needs: No Transportation Needs    Lack of Transportation (Medical): No    Lack of Transportation (Non-Medical): No   Physical Activity:     Days of Exercise per Week: Not on file    Minutes of Exercise per Session: Not on file   Stress:     Feeling of Stress : Not on file   Social Connections:     Frequency of Communication with Friends and Family: Not on file    Frequency of Social Gatherings with Friends and Family: Not on file    Attends Sabianist Services: Not on file    Active Member of [address] [place] [address] or Organizations: Not on file    Attends Club or Organization Meetings: Not on file    Marital Status: Not on file   Intimate Partner Violence:     Fear of Current or Ex-Partner: Not on file    Emotionally Abused: Not on file    Physically Abused: Not on file    Sexually Abused: Not on file   Housing Stability:     Unable to Pay for Housing in the Last Year: Not on file    Number of [place] in the Last Year: Not on file    Unstable Housing in the Last Year: Not on file     FH reviewed    Vitals:    11/23/21 1428   BP: 128/80   Pulse: 68         Wt 180    Review of Systems   Constitutional: Negative for activity change, appetite change and fatigue. Respiratory: Negative for cough, choking, chest tightness and shortness of breath. Cardiovascular: Negative for chest pain, palpitations and leg swelling. Denies PND or orthopnea. No tachycardia or syncope. Neurological: Negative for dizziness, syncope and light-headedness. Psychiatric/Behavioral: Negative for agitation, behavioral problems and confusion. All other systems reviewed and are negative. Objective:   Physical Exam  Constitutional:       General: She is not in acute distress.      Appearance: Normal appearance. She is well-developed. HENT:      Head: Normocephalic and atraumatic. Right Ear: External ear normal.      Left Ear: External ear normal.   Neck:      Vascular: No JVD. Cardiovascular:      Rate and Rhythm: Normal rate and regular rhythm. Heart sounds: Normal heart sounds. No murmur heard. No gallop. Pulmonary:      Effort: Pulmonary effort is normal. No respiratory distress. Breath sounds: Normal breath sounds. No wheezing or rales. Abdominal:      General: Bowel sounds are normal.      Palpations: Abdomen is soft. Tenderness: There is no abdominal tenderness. Musculoskeletal:         General: Normal range of motion. Cervical back: Normal range of motion. Skin:     General: Skin is warm and dry. Neurological:      General: No focal deficit present. Mental Status: She is alert and oriented to person, place, and time. Psychiatric:         Mood and Affect: Mood normal.         Behavior: Behavior normal.         Assessment:       Diagnosis Orders   1. Persistent atrial fibrillation ([address]     2. SSS (sick sinus syndrome) ([address]     3. Pacemaker     4. Essential hypertension             Plan:      CV stable. S/P pacer for SSS   On AC.  no bleeding issues. Continue  lopressor to 100 mg bid. Will continue flecanide for afib. Reviewed previous records and testing including echo 1/21. Follow up 3 months. EP following for pacer/afib.           Stevan Ortiz MD

## 2021-12-08 ENCOUNTER — OFFICE VISIT (OUTPATIENT)
Dept: PULMONOLOGY | Age: 81
End: 2021-12-08
Payer: MEDICARE

## 2021-12-08 VITALS
DIASTOLIC BLOOD PRESSURE: 88 MMHG | BODY MASS INDEX: 33.57 KG/M2 | HEIGHT: 62 IN | SYSTOLIC BLOOD PRESSURE: 138 MMHG | WEIGHT: 182.4 LBS | OXYGEN SATURATION: 96 % | HEART RATE: 85 BPM

## 2021-12-08 DIAGNOSIS — J96.01 ACUTE RESPIRATORY FAILURE WITH HYPOXIA AND HYPERCAPNIA (HCC): ICD-10-CM

## 2021-12-08 DIAGNOSIS — E03.9 ACQUIRED HYPOTHYROIDISM: Chronic | ICD-10-CM

## 2021-12-08 DIAGNOSIS — G47.33 OSA (OBSTRUCTIVE SLEEP APNEA): Primary | ICD-10-CM

## 2021-12-08 DIAGNOSIS — J96.02 ACUTE RESPIRATORY FAILURE WITH HYPOXIA AND HYPERCAPNIA (HCC): ICD-10-CM

## 2021-12-08 DIAGNOSIS — I48.91 ATRIAL FIBRILLATION, UNSPECIFIED TYPE (HCC): Chronic | ICD-10-CM

## 2021-12-08 DIAGNOSIS — E66.9 OBESITY (BMI 30.0-34.9): ICD-10-CM

## 2021-12-08 PROCEDURE — 4040F PNEUMOC VAC/ADMIN/RCVD: CPT | Performed by: NURSE PRACTITIONER

## 2021-12-08 PROCEDURE — 1123F ACP DISCUSS/DSCN MKR DOCD: CPT | Performed by: NURSE PRACTITIONER

## 2021-12-08 PROCEDURE — 1036F TOBACCO NON-USER: CPT | Performed by: NURSE PRACTITIONER

## 2021-12-08 PROCEDURE — G8482 FLU IMMUNIZE ORDER/ADMIN: HCPCS | Performed by: NURSE PRACTITIONER

## 2021-12-08 PROCEDURE — G8427 DOCREV CUR MEDS BY ELIG CLIN: HCPCS | Performed by: NURSE PRACTITIONER

## 2021-12-08 PROCEDURE — G8399 PT W/DXA RESULTS DOCUMENT: HCPCS | Performed by: NURSE PRACTITIONER

## 2021-12-08 PROCEDURE — 1090F PRES/ABSN URINE INCON ASSESS: CPT | Performed by: NURSE PRACTITIONER

## 2021-12-08 PROCEDURE — 99214 OFFICE O/P EST MOD 30 MIN: CPT | Performed by: NURSE PRACTITIONER

## 2021-12-08 PROCEDURE — G8417 CALC BMI ABV UP PARAM F/U: HCPCS | Performed by: NURSE PRACTITIONER

## 2021-12-08 ASSESSMENT — SLEEP AND FATIGUE QUESTIONNAIRES
HOW LIKELY ARE YOU TO NOD OFF OR FALL ASLEEP WHILE WATCHING TV: 0
HOW LIKELY ARE YOU TO NOD OFF OR FALL ASLEEP WHILE SITTING INACTIVE IN A PUBLIC PLACE: 0
ESS TOTAL SCORE: 3
HOW LIKELY ARE YOU TO NOD OFF OR FALL ASLEEP WHILE SITTING AND READING: 1
HOW LIKELY ARE YOU TO NOD OFF OR FALL ASLEEP WHEN YOU ARE A PASSENGER IN A CAR FOR AN HOUR WITHOUT A BREAK: 0
HOW LIKELY ARE YOU TO NOD OFF OR FALL ASLEEP WHILE SITTING AND TALKING TO SOMEONE: 0
HOW LIKELY ARE YOU TO NOD OFF OR FALL ASLEEP WHILE LYING DOWN TO REST IN THE AFTERNOON WHEN CIRCUMSTANCES PERMIT: 1
HOW LIKELY ARE YOU TO NOD OFF OR FALL ASLEEP IN A CAR, WHILE STOPPED FOR A FEW MINUTES IN TRAFFIC: 0
HOW LIKELY ARE YOU TO NOD OFF OR FALL ASLEEP WHILE SITTING QUIETLY AFTER LUNCH WITHOUT ALCOHOL: 1

## 2021-12-08 NOTE — PROGRESS NOTES
Diagnosis: [x] NAKIA (G47.33) [] CSA (G47.31) [] Apnea (G47.30)   Length of Need: [x] 15 Months [] 99 Months [] Other:   Machine (FRANCISCA!): [] Respironics Dream Station      Auto [] ResMed AirSense     Auto [] Other:     []  CPAP () [] Bilevel ()   Mode: [] Auto [] Spontaneous    Mode: [] Auto [] Spontaneous                        Comfort Settings:      Humidifier: [] Heated ()        [x] Water chamber replacement ()/ 1 per 6 months        Mask:   [x] Nasal () /1 per 3 months [] Full Face () /1 per 3 months   [x] Patient choice -Size and fit mask [] Patient Choice - Size and fit mask   [] Dispense: [] Dispense:   [x] Headgear () / 1 per 3 months [] Headgear () / 1 per 3 months   [x] Replacement Nasal Cushion ()/2 per month [] Interface Replacement ()/1 per month   [x] Replacement Nasal Pillows ()/2 per month         Tubing: [x] Heated ()/1 per 3 months    [] Standard ()/1 per 3 months [] Other:           Filters: [x] Non-disposable ()/1 per 6 months     [x] Ultra-Fine, Disposable ()/2 per month        Miscellaneous: [x] Chin Strap ()/ 1 per 6 months [] O2 bleed-in:        LPM   [] Oxymetry on CPAP/Bilevel []  Other:         Start Order Date: 12/08/21    MEDICAL JUSTIFICATION:  I, the undersigned, certify that the above prescribed supplies are medically necessary for this patients wellbeing. In my opinion, the supplies are both reasonable and necessary in reference to accepted standards of medicalpractice in treatment of this patients condition. Roni Campa NP    NPI: [id number]       Order Signed Date: 12/08/21  [facility]  Pulmonary, Sleep, and Critical Care    Pulmonary, Sleep, and Critical Care  [address] [place] [address].  [address] [facility], [address] includes the [facility] , [address] [facility]                                    [facility] AT [facility]  Phone: [phone]    Fax: Mnuir Maza  1940  [address] [facility]  [phone] (home)   [phone] (mobile)      Insurance Info (confirm with patient if correct):  Payor/Plan Subscr  Sex Relation Sub. Ins. ID Effective Group Num   1. MEDICARE - ME* CALVIN MAZA [place] 1940 Female  [id number] Not Eff                                    [address]  [place] 1940 Female  [id number] Not Eff                                    [address]

## 2021-12-08 NOTE — PROGRESS NOTES
Dorinda Love MD, Awilda Bravo, [facility]  Tiffanie Kehrt CNP  Larjayjay Hallmark CNP Autumn [address]  [place] [address], [address] [facility]  P- [phone]   [facility] [address]  [place]. [address] [facility]. [address] [phone]     [address] [facility] [address] [phone]      Assessment/Plan:      1. NAKIA (obstructive sleep apnea)  Assessment & Plan:  Chronic-Stable: Reviewed and analyzed results of physiologic download from patient's machine and reviewed with patient. Supplies and parts as needed for her machine. These are medically necessary. Limit caffeine use after 3pm. Based on the analyzed data will continue with current settings. Stable on her machine at current settings, getting benefit from the use, and having minimal side effects. Patient to work with DME on mask fit and comfort. Discussed trying a chin strap for oral leak. Will place order for overnight oximetry due to hypoxia shown on HST. Verbal and written instruction on PAP equipment cleaning and disinfection schedule provided. Follow up in 1 year or sooner if issues arise. 2. Atrial fibrillation, unspecified type [facility])  Assessment & Plan:   Chronic- Stable. Discussed the importance of treating obstructive sleep apnea as part of the management of this disorder. Cont any meds per PCP and other physicians. 3. Acute respiratory failure with hypoxia and hypercapnia (HCC)  Assessment & Plan:   Chronic- Stable. Discussed the importance of treating obstructive sleep apnea as part of the management of this disorder. Cont any meds per PCP and other physicians. 4. Acquired hypothyroidism  Assessment & Plan:   Chronic- Stable. Discussed the importance of treating obstructive sleep apnea as part of the management of this disorder. Cont any meds per PCP and other physicians. 5. Obesity (BMI 30.0-34. 9)  Assessment & Plan:   Chronic-not stable:  Discussed importance of treating obstructive sleep apnea and getting sufficient sleep to assist with weight control. Encouraged her to work on weight loss through diet and exercise. Recommended DASH or Mediterranean diets. Reviewed, analyzed, and documented physiologic data from patient's PAP machine. This information was analyzed to assess complexity and medical decision making in regards to further testing and management. The primary encounter diagnosis was NAKIA (obstructive sleep apnea). Diagnoses of Atrial fibrillation, unspecified type ([facility] [address] Acute respiratory failure with hypoxia and hypercapnia ([facility] [address] Acquired hypothyroidism, and Obesity (BMI 30.0-34.9) were also pertinent to this visit. The chronic medical conditions listed are directly related to the primary diagnosis listed above. The management of the primary diagnosis affects the secondary diagnosis and vice versa. Subjective:   Subjective   Patient ID: Abby Crespo is a [de-identified] y.o. female. Chief Complaint   Patient presents with    Sleep Apnea       HPI:  Machine Modem/Download Info:  Compliance (hours/night): 7.93 hrs/night  % of nights >= 4 hrs: 86 %  Download AHI (/hour): 2.1 /HR  Average CPAP Pressure : 12 cmH2O      APAP - Settings  Pressure Min: 10 cmH2O  Pressure Max: 20 cmH2O                 Comfort Settings  Heated Tubing (Yes/No): Yes  Flex/EPR (0-3): 3 PAP Mask  Clinically Relevant Leak: No     Jin Jennings reports she is doing well with her machine. Pressure feels good and she is waking rested. Tried a nasal pillow mask and loved it  but had loud leaking at the tube connection. She was told there was nothing else compatible with her machine. she denies headaches, congestion, nosebleeds, dryness, aerophagia, or drowsiness while driving.     [address]    [place] - Total score: 3    Social History     Socioeconomic History    Marital status:      Spouse name: Not on file    Number of children: Not on file  Years of education: Not on file    Highest education level: Not on file   Occupational History    Not on file   Tobacco Use    Smoking status: Former Smoker     Packs/day: 0.00     Years: 10.00     Pack years: 0.00     Quit date: 2005     Years since quittin.5    Smokeless tobacco: Never Used   Vaping Use    Vaping Use: Never used   Substance and Sexual Activity    Alcohol use: Yes     Comment: rare    Drug use: No    Sexual activity: Yes   Other Topics Concern    Not on file   Social History Narrative    Not on file     Social Determinants of Health     Financial Resource Strain: Low Risk     Difficulty of Paying Living Expenses: Not hard at all   Food Insecurity: No Food Insecurity    Worried About [address] in the Last Year: Never true    [address] in the Last Year: Never true   Transportation Needs: No Transportation Needs    Lack of Transportation (Medical): No    Lack of Transportation (Non-Medical):  No   Physical Activity:     Days of Exercise per Week: Not on file    Minutes of Exercise per Session: Not on file   Stress:     Feeling of Stress : Not on file   Social Connections:     Frequency of Communication with Friends and Family: Not on file    Frequency of Social Gatherings with Friends and Family: Not on file    Attends Buddhism Services: Not on file    Active Member of [address] [place] [address] or Organizations: Not on file    Attends Club or Organization Meetings: Not on file    Marital Status: Not on file   Intimate Partner Violence:     Fear of Current or Ex-Partner: Not on file    Emotionally Abused: Not on file    Physically Abused: Not on file    Sexually Abused: Not on file   Housing Stability:     Unable to Pay for Housing in the Last Year: Not on file    Number of [place] in the Last Year: Not on file    Unstable Housing in the Last Year: Not on file       Current Outpatient Medications   Medication Instructions    Cholecalciferol (VITAMIN D)  UNITS CAPS capsule 2,000 Int'l Units, Oral, DAILY    ELIQUIS 5 MG TABS tablet TAKE 1 TABLET BY MOUTH 2 TIMES DAILY    flecainide (TAMBOCOR) 50 mg, Oral, 2 TIMES DAILY    furosemide (LASIX) 20 mg, Oral, DAILY PRN    Handicap Placard MISC Does not apply, Dx of shortness of breath and A-fib. Effective Jan 13, 2021 until Jan 13, 2026.  levothyroxine (SYNTHROID) 25 MCG tablet TAKE 1 TABLET BY MOUTH EVERY DAY    metoprolol (LOPRESSOR) 100 MG tablet TAKE 1 TABLET BY MOUTH TWICE A DAY          Vitals:  Weight BMI   Wt Readings from Last 3 Encounters:   12/08/21 182 lb 6.4 oz (82.7 kg)   11/23/21 180 lb (81.6 kg)   09/08/21 170 lb (77.1 kg)    Body mass index is 33.36 kg/m².      BP HR SaO2   BP Readings from Last 3 Encounters:   12/08/21 138/88   11/23/21 128/80   09/08/21 138/88    Pulse Readings from Last 3 Encounters:   12/08/21 85   11/23/21 68   09/08/21 91    SpO2 Readings from Last 3 Encounters:   12/08/21 96%   09/08/21 95%   06/30/21 97%        Electronically signed by JOSE Morse on 12/8/2021 at 2:01 PM

## 2021-12-08 NOTE — PROGRESS NOTES
Relation Sub. Ins. ID Effective Group Num   1. MEDICARE - [place]* CALVIN MAZA 1940 Female  [id number] Not Eff                                    [address] [address]   2.  [place] 1940 Female  [id number] Not Eff                                    [address]

## 2021-12-08 NOTE — LETTER
[facility] Sleep [facility]  [place] [address] [place] [address]  Phone: [phone]  Fax: [phone]    December 8, 2021       Patient: Anders Dee   MR Number: [id number]   YOB: 1940   Date of Visit: 12/8/2021       Tk Tripp was seen for a follow up visit today. Here is my assessment and plan as well as an attached copy of her visit today:    Afib ([address]   Chronic- Stable. Discussed the importance of treating obstructive sleep apnea as part of the management of this disorder. Cont any meds per PCP and other physicians. Hypothyroidism   Chronic- Stable. Discussed the importance of treating obstructive sleep apnea as part of the management of this disorder. Cont any meds per PCP and other physicians. Acute respiratory failure with hypoxia and hypercapnia (HCC)   Chronic- Stable. Discussed the importance of treating obstructive sleep apnea as part of the management of this disorder. Cont any meds per PCP and other physicians. Obesity (BMI 30.0-34. 9)   Chronic-not stable:  Discussed importance of treating obstructive sleep apnea and getting sufficient sleep to assist with weight control. Encouraged her to work on weight loss through diet and exercise. Recommended DASH or Mediterranean diets. NAKIA (obstructive sleep apnea)  Chronic-Stable: Reviewed and analyzed results of physiologic download from patient's machine and reviewed with patient. Supplies and parts as needed for her machine. These are medically necessary. Limit caffeine use after 3pm. Based on the analyzed data will continue with current settings. Stable on her machine at current settings, getting benefit from the use, and having minimal side effects. Patient to work with DME on mask fit and comfort. Discussed trying a chin strap for oral leak. Will place order for overnight oximetry due to hypoxia shown on HST.  Verbal and written instruction on PAP equipment cleaning and disinfection schedule provided. Follow up in 1 year or sooner if issues arise. If you have questions or concerns, please do not hesitate to call me. I look forward to following Tracy Villasenor along with you.     Sincerely,    JOSE Fatima providers:  Maximo Schirmer, DO [place]  [address] [place] [address]  [place]

## 2021-12-16 ENCOUNTER — TELEPHONE (OUTPATIENT)
Dept: PULMONOLOGY | Age: 81
End: 2021-12-16

## 2021-12-16 DIAGNOSIS — G47.33 OSA (OBSTRUCTIVE SLEEP APNEA): Primary | ICD-10-CM

## 2021-12-16 NOTE — TELEPHONE ENCOUNTER
I called the patient and informed her per Nette Flynn. She's interested in a chin strap; do we need to order?

## 2021-12-16 NOTE — TELEPHONE ENCOUNTER
Called to schedule cpap sleep study  -in lab over night and pt said she can not come in to the lab to do this sleep study - as she takes care of her  at home, with dementia. I told pt I would send the dr a msg and go from there.

## 2021-12-16 NOTE — TELEPHONE ENCOUNTER
Pressure increased on machine. Patient to work with DME on mask fit due to high leak. Consider a chin strap or FFM. Patient to call the office once she has mask leak controlled and will re order overnight oximetry.

## 2021-12-16 NOTE — TELEPHONE ENCOUNTER
Patients overnight oximetry still shows hypoxia after pressure increases to her machine despite a normal AHI and good compliance. Patient will need an in lab titration and 6 week follow up after titration is complete.

## 2021-12-17 NOTE — TELEPHONE ENCOUNTER
I sent the order for a chin strap after her last visit on 12/8/21. She will need to call her DME to request they send it to her.

## 2021-12-21 ENCOUNTER — TELEPHONE (OUTPATIENT)
Dept: PULMONOLOGY | Age: 81
End: 2021-12-21

## 2021-12-21 ENCOUNTER — NURSE ONLY (OUTPATIENT)
Dept: CARDIOLOGY CLINIC | Age: 81
End: 2021-12-21
Payer: MEDICARE

## 2021-12-21 DIAGNOSIS — I49.5 SSS (SICK SINUS SYNDROME) (HCC): ICD-10-CM

## 2021-12-21 DIAGNOSIS — Z95.0 PACEMAKER: ICD-10-CM

## 2021-12-21 PROCEDURE — 93296 REM INTERROG EVL PM/IDS: CPT | Performed by: INTERNAL MEDICINE

## 2021-12-21 PROCEDURE — 93294 REM INTERROG EVL PM/LDLS PM: CPT | Performed by: INTERNAL MEDICINE

## 2021-12-21 NOTE — TELEPHONE ENCOUNTER
Patient called stating after her pressure was increased, her nose has become very dry. Patient needs instructions on how to increase humidity and tubing temperature. Please call patient at [phone].

## 2021-12-21 NOTE — PROGRESS NOTES
We received remote transmission from patient's dual chamber pacemaker monitor at home. Transmission shows normal sensing and pacing function. No new arrhythmias/events recorded. EP physician will review. See interrogation under cardiology tab in the [address] [place] [address] [address] field for more details. Will continue to monitor remotely.

## 2021-12-21 NOTE — TELEPHONE ENCOUNTER
Called patient and informed her of change. Also instructed patient she could use AYR saline or gel or KY jelly, but to not use petroleum jelly.

## 2021-12-22 ENCOUNTER — HOSPITAL ENCOUNTER (OUTPATIENT)
Dept: MAMMOGRAPHY | Age: 81
Discharge: HOME | End: 2021-12-22
Payer: MEDICARE

## 2021-12-22 DIAGNOSIS — Z12.31 BREAST CANCER SCREENING BY MAMMOGRAM: ICD-10-CM

## 2021-12-22 PROCEDURE — 77063 BREAST TOMOSYNTHESIS BI: CPT

## 2022-01-14 ENCOUNTER — TELEPHONE (OUTPATIENT)
Dept: CARDIOLOGY CLINIC | Age: 82
End: 2022-01-14

## 2022-01-14 ENCOUNTER — HOSPITAL ENCOUNTER (OUTPATIENT)
Dept: WOMENS IMAGING | Age: 82
Discharge: HOME | End: 2022-01-14
Payer: MEDICARE

## 2022-01-14 DIAGNOSIS — R92.8 ABNORMAL MAMMOGRAM: ICD-10-CM

## 2022-01-14 PROCEDURE — G0279 TOMOSYNTHESIS, MAMMO: HCPCS

## 2022-01-14 PROCEDURE — 76642 ULTRASOUND BREAST LIMITED: CPT

## 2022-01-14 NOTE — TELEPHONE ENCOUNTER
Gracy- nurse from [facility] called with a question regarding Rubi Dougherty PRADEEP- 1940- Eliquis- 5mg(twice daily) medication. Patient has a breast biopsy on the 2022. Gracy needs to know whether its alright to hold this medication before the procedure. She also prefers to called with the information at [phone].   Thanks

## 2022-01-14 NOTE — PROGRESS NOTES
[address]   [facility], [address] [place] [address]   Phone: [phone]         ULTRASOUND BIOPSY EDUCATION    NAME:  Judd Jennings   YOB: 1940   MEDICAL RECORD NUMBER:  [id number]   TODAY'S DATE:  1/14/2022    Referring Physician: Dr. Renetta Josue    Procedure: U/S Core Bx    Left Breast    Date of biopsy: 1/21/22    Patient taking blood thinners: yes - Eliquis 5mg BID, call to Dr Anthony Mata office to hold x2 days prior to bx. Medicine allergies: yes - Bactrim    Special Instructions: see above    Biopsy order form faxed to referring MD.          What is an Ultrasound Guided Breast Biopsy? Ultrasound guided breast biopsy is a test that uses ultrasound to find an area of your breast where a tissue sample will be taken. The sample is then looked at under a microscope to check for signs of breast cancer. Why is it done? An Ultrasound biopsy is usually done to check for cancer in a lump or cyst found during a mammogram or ultrasound. Preparing for the test?     * Take your medications as prescribed    You may eat and drink fluids before the test    Take a shower the evening or morning before the biopsy. What happens before the test?  Images are taken to find the exact site to be biopsied.   Your skin is washed with an alcohol prep.   You will be given an injection of medication to numb your breast.   What happens during the test?     Once your breast is numb, a small cut (incision) is made.   Using the imaging, the doctor will guide the needle into the biopsy area.   A sample of breast tissue is taken through the needle.   A small \"Clip\" or Marker is inserted into your breast to tod the biopsy site.   The needle is removed and pressure put on the needle site to stop any bleeding.   A bandage will be placed over the site.   A post mammogram picture will be taken to document the clip placement.   How long does the test take? Approximately 60 minutes. Most of the time is spent finding the area for the biopsy. What are the risks?   Bleeding: You may have some bleeding which can cause bruising, swelling,    or a bleeding under your skin.   Infection:  Signs of infection are redness, swelling, heat, or increasing pain    at the biopsy Site.   Sample size not adequate: This would require repeating the biopsy. What happens after the test?    The nurse will review your post biopsy instructions which include:         Placing an ice pack in your bra.    Wearing a firm fitting bra.    You may use Tylenol (Acetaminiphen) for discomfort. Lab results take about 2-3 business days. The Nurse Navigator or your doctor will call you with the results. Ultrasound Breast Biopsy:     (Please arrive 15 minutes early)                                                 [x] Blood thinner history reviewed with patient    [x] Take all your other medications on your normal routine schedule. [x] You may eat and drink as normal before your biopsy. [x] You may drive yourself. [x] No heavy lifting or exercise for 48 hours after the biopsy. [x] Printed Pre Ultrasound Biopsy Instructions were provided, reviewed with the patient and all questions were answered. The Breast Center's Information:   Should you experience any significant changes in your health or have questions about your care, please contact:  Paty Gale, Breast Nurse Navigator at [address] [place] [address]:  [phone]  Monday-Friday. If you need help with your care outside these hours and cannot wait until we are again available, contact your Physician or go to the hospital emergency room. [x] Patient/POA/Caregiver verbalized understanding of instructions.        Electronically signed by Paty Gale RN on 1/14/2022 at 2:23 PM

## 2022-01-18 NOTE — TELEPHONE ENCOUNTER
Per Dr.Whnag marquez to stop Eliquis. 3 days Prior.  Information given to Angela at [facility]

## 2022-01-20 ENCOUNTER — TELEPHONE (OUTPATIENT)
Dept: FAMILY MEDICINE CLINIC | Age: 82
End: 2022-01-20

## 2022-01-20 DIAGNOSIS — R92.8 OTHER ABNORMAL AND INCONCLUSIVE FINDINGS ON DIAGNOSTIC IMAGING OF BREAST: ICD-10-CM

## 2022-01-20 DIAGNOSIS — N63.20 LEFT BREAST LUMP: Primary | ICD-10-CM

## 2022-01-20 NOTE — TELEPHONE ENCOUNTER
[facility] requesting an order for a left breast ultrasound- guided biopsy order, please place order in epic patient's appointment is tomorrow. Please advise. Thanks.  Fax # if needed [phone]

## 2022-01-21 ENCOUNTER — HOSPITAL ENCOUNTER (OUTPATIENT)
Dept: WOMENS IMAGING | Age: 82
Discharge: HOME | End: 2022-01-21
Payer: MEDICARE

## 2022-01-21 DIAGNOSIS — N63.20 LEFT BREAST LUMP: ICD-10-CM

## 2022-01-21 DIAGNOSIS — R92.8 OTHER ABNORMAL AND INCONCLUSIVE FINDINGS ON DIAGNOSTIC IMAGING OF BREAST: ICD-10-CM

## 2022-01-21 DIAGNOSIS — R92.8 ABNORMAL MAMMOGRAM: ICD-10-CM

## 2022-01-21 PROCEDURE — G0279 TOMOSYNTHESIS, MAMMO: HCPCS

## 2022-01-21 PROCEDURE — 2709999900 US BREAST BIOPSY W LOC DEVICE 1ST LESION LEFT

## 2022-01-21 PROCEDURE — 88305 TISSUE EXAM BY PATHOLOGIST: CPT

## 2022-01-21 PROCEDURE — 88360 TUMOR IMMUNOHISTOCHEM/MANUAL: CPT

## 2022-01-21 NOTE — PROGRESS NOTES
Patient in [address] [place] [address] for breast biopsy. Radiologist reviewed procedure with patient, consent signed. Patient tolerated procedure well. Compression held. Site cleansed with chloraprep, steri strips and dry dressing applied. Ice pack provided. Reviewed discharge instructions with patient. Patient verbalized understanding and agreed to contact the Breast Navigator with any questions. Patient was A&Ox3 and steady on feet and discharged to waiting area. The [address]   Discharge Instructions  [facility]  [address]  [address]  Telephone: [phone]    NAME:  Fadia Jennings  YOB: 1940  GENDER: female  MEDICAL RECORD NUMBER:  [id number]  TODAY'S DATE:  1/21/2022    Discharge Instructions Breast Center:    Post Breast Biopsy Instructions     [x] You may remove your outer dressing in 24 hours and you may shower. \"Steri-strips\" tape will stay on for 5-7 days. [x] Place a cold pack inside your bra on top of the dressing for at least 3 hours, removing it every 15 minutes for 15 minutes after your biopsy. [x] Wear a firm fitting bra for at least 24 hours after your biopsy, including while you sleep. [x] Place an ice pack inside your bra on top of the dressing for at least 3 hours, removing it every 15 minutes for 15 minutes after your biopsy. [x] You may resume your held medications tomorrow, unless otherwise directed by your physician. [x] Your physician has instructed you to take Tylenol (Acetaminophen) the day of your biopsy for any discomfort. [x] Watch for excessive bleeding. If bleeding occurs apply pressure to site. Watch for signs of infection, increased pain, redness, swelling and heat. If this occurs call your physician. [x] Do not participate in any strenuous exercise for 48 hours after your biopsy and do not lift, pull or push anything over 5-10 lbs.       [x] Results will be available in 2-3 working days.  Your referring physician or the Nurse Navigator will call you with results. The Breast Center Information:   Should you experience any significant changes in your health or have questions about your care, please contact:  Leroy Moreno Nurse Breast Navigator with [facility]  [phone]  Monday-Friday. If you need help with your care outside these hours and cannot wait until we are again available, contact your Physician or go to the hospital emergency room.         Electronically signed by Leroy Moreno RN on 1/21/2022 at 1:05 PM

## 2022-01-25 ENCOUNTER — TELEPHONE (OUTPATIENT)
Dept: WOMENS IMAGING | Age: 82
End: 2022-01-25

## 2022-01-25 ENCOUNTER — TELEPHONE (OUTPATIENT)
Dept: FAMILY MEDICINE CLINIC | Age: 82
End: 2022-01-25

## 2022-01-25 ENCOUNTER — TELEPHONE (OUTPATIENT)
Dept: PULMONOLOGY | Age: 82
End: 2022-01-25

## 2022-01-25 DIAGNOSIS — C50.912 INFILTRATING DUCTAL CARCINOMA OF LEFT BREAST (HCC): Primary | ICD-10-CM

## 2022-01-25 NOTE — TELEPHONE ENCOUNTER
I spoke to patient about the positive pathology report of breast cancer. I did refer her to one of our breast physicians. She is agreeable to meet with Dr. Anthony Dee. Ph# given. She also asked for recommendation of a physician who can over take her care because I did inform her of my departure from [facility] my March 11. I did recommend the [facility] group.

## 2022-01-25 NOTE — TELEPHONE ENCOUNTER
Please call patient to see if she was able to get a new mask. I pulled her download and it is still showing some leak but looks like it is better than previous reports. If she feels the leak on her mask is better and pressure on her machine feels good, I would like to order a repeat overnight oximetry since she is not able to come in for a titration and last oximetry still showed hypoxia.

## 2022-01-25 NOTE — TELEPHONE ENCOUNTER
Called to review breast biopsy results. Patient states her PCP called already this morning and reviewed with her. Patient states her PCP is referring her to Dr. Mateusz Hinton and his office will get the referral over to Morris's office. All questions answered. Will remain available for any needs.  Maxime Mcqueen RN

## 2022-02-02 NOTE — PROGRESS NOTES
PCP:  Medical Oncology:  Radiation:  Other:      [id number]:  IA left breast cancer      HPI:  Ms. Munir Gilmore is a [de-identified] y.o. 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 ER + PRlow positive HER2 negative. She states that she has not noticed any new abnormalities such as masses, skin changes, color changes, nipple discharge, or changes to the nipple-areolar complex. She has never had an abnormal mammogram. Prior to this she has never had breast related problems and has never required a breast biopsy. She has no family history of breast or ovarian cancer. She is here today in initial consultation to discuss her recent breast diagnosis. She was referred by her PCP. INTERVAL HISTORY:    On 1/14/2022 she was recalled from screening mammogram for a 5 mm asymmetry of the left breast.  The right breast was negative. There is a 5 mm spiculated mass in the lower inner quadrant of the left breast corresponding to the mammographic findings. Ultrasound correlate identifies a 5 x 5 x 5 mm hypoechoic lesion at 8:00. The left axilla is negative. BI-RADS 4. On 1/21/2022 she underwent core needle biopsy of the left breast. 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 ER positive (>95%) ND low positive (2%) HER2 negative. Review of Systems   Constitutional: Negative. HENT: Negative. Eyes: Negative. Respiratory: Negative. Cardiovascular: Negative. Gastrointestinal: Negative. Genitourinary: Negative. Musculoskeletal: Negative. Skin: Negative. Neurological: Negative. Psychiatric/Behavioral: Negative. All other systems reviewed and are negative.  :    There is no new onset of persistent dry cough, abdominal pains, new onset of headache, change in bowel function, or bony tenderness to suggest metastatic disease at this time.       Pathology:    Department of Pathology   FINAL SURGICAL PATHOLOGY REPORT   Patient Name: Silvia Shabazz      Accession No:  [id number]    Age Sex:   1940   [de-identified] Y / F       Location:      [facility] [place]   Account No:   [de-identified]                 Collected:     2022   [facility] Rec No:    [id number]                Received:      2022   Attend Phys: Ramon Medina DO      Completed:     2022   Perform Phys: Roxana Cooper MD             FINAL DIAGNOSIS:     Left breast, 8 o'clock, biopsy:      - Invasive ductal carcinoma with apocrine features, Doreen Grade 3        - SEE SYNOPTIC REPORT.      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 SYNOPTIC REPORT   Invasive Carcinoma of the Breast: Biopsy     SPECIMEN   Procedure: Needle biopsy   Specimen Laterality: Left     TUMOR   Tumor Site: 8 o'clock   Tumor Size: Greatest dimension of largest invasive focus: 6 mm   Histologic Type:  Invasive ductal carcinoma with apocrine features   Histologic Grade (Doreen Histologic Score)      Glandular (Acinar)/Tubular Differentiation: Score 3 (<10% of tumor   area forming glandular/tubular structures)      Nuclear Pleomorphism: Score 3 (vesicular nuclei, often with prominent   nucleoli, exhibiting marked variation in size and shape, occasionally   with very large and bizarre forms)      Mitotic Rate: Score 2      Overall Grade: Grade 3, Score 8   Ductal carcinoma in-situ: Present   Architectural pattern: Cribriform, solid   Nuclear grade: Grade II (intermediate)   Necrosis: Not identified   Lymphovascular invasion: Not identified   Microcalcifications: Not identified     SPECIAL STUDIES   Breast biomarker studies: Performed on block A1     Estrogen Receptor (ER) Status   %Tumor staining/Intensity:  >95%/Strong   Interpretation:  Positive   Status of Internal Controls: Internal control cells present and stain as   expected     Progesterone Receptor (PgR) Status   %Tumor staining/Intensity:  2%/Moderate   Interpretation:  Low positive   Status of Internal Controls: Internal control cells present and stain as   expected     HER-2 EXPRESSION (by Immunohistochemistry): Negative (score 0)     Fixative: Formalin   Cold Ischemia and Fixation Times     Meet requirements specified in latest version of the ASCO/CAP   guidelines     Duration of fixation: Greater than 6 hours and less than 72 hours   Performed by immunohistochemistry with manual quantitation. ER clone is   [place] SP1. AK clone is [place] clone [id number]. HER2 clone is [facility]   [id number] ([id number]).  Tests are performed on formalin fixed paraffin   embedded tissue using ULTRAVIEW universal DAB detection kit. Positive and   negative control tissue shows appropriate staining. ER and AK scoring includes the percentage of cells staining positive and   average intensity of expression.  Interpretation follows the ASCO/CAP   guidelines, with any staining of >1% considered positive.  ER results   between 1 and 10% are considered are considered low positive. Her2/naida scoring follows the ASCO/CAP guidelines: 0, 1+, 2+, 3+. Her2/naida   \"positive\" (3+) requires circumferential membrane staining that is   complete, intense and in >10% of tumor cells. Equivocal (2+) cases are   defined as weak to moderate complete membrane staining in >10% of cells. Negative cases (0 or 1+, respectively) display no staining or incomplete   membrane staining that is faint/barely perceptible and in >10% of tumor   cells.   Select cases, including all 2+/equivocal for Her2/naida on routine   IHC staining, will be sent for Her2/naida analysis by FISH.      References:   tracey Strauss al, Estrogen and Progesterone Receptor Testing in   Breast Cancer, Arch Pathol Lab Med, Vol 144, May, 2020   Suzie Whitman al, Human Epidermal Growth Factor Receptor 2 Testing in   Breast Cancer, Arch Pathol Lab Med, Vol 142, November, 2018     All immunohistochemical (IHC) stains were performed using single   antibodies on separate tissue sections.  No multiple antibody cocktails   were used unless specifically documented.  Appropriate positive controls   were performed with each antibody and the results were reviewed.  The   control stains showed the expected positive results within technically   acceptable parameters. Analyte specific reagent (ASR) disclaimer: The use of one or more   reagents in the above tests is regulated as an analyte specific reagent. These tests were developed and their performance characteristics   determined by the [facility]. They have   not been cleared by the [employer] and Drug Administration. The FDA has   determined that such clearance or approval is not necessary.      Technical component is performed at [facility].   Professional interpretation is performed at [facility].      ROCJO/ROCJO         Past Medical History:   Diagnosis Date    Allergic rhinitis     Alveolitis of jaw     Cancer ([facility] [address]     Dental disease     Diverticulosis of colon (without mention of hemorrhage)     Dizziness     GERD (gastroesophageal reflux disease)     Hearing loss     Myalgia and myositis, unspecified     NAKIA (obstructive sleep apnea) 9/8/2021    Other and unspecified hyperlipidemia     Recurrent upper respiratory infection (URI)     Screening mammogram 11/05/2008    Normal    Tinnitus     Unspecified gastritis and gastroduodenitis without mention of hemorrhage     Unspecified hypothyroidism    :        Past Surgical History:   Procedure Laterality Date    BLADDER SUSPENSION  1990s    CHOLECYSTECTOMY  1990s    COLONOSCOPY  1/16/2007    Normal    COLONOSCOPY  02/27/2012    Dr. Marisa Marshall    Right Repair    HYSTERECTOMY  1975    US BREAST NEEDLE BIOPSY LEFT Left 1/21/2022    US BREAST NEEDLE BIOPSY LEFT 1/21/2022 [address] [facility]    WISDOM TOOTH EXTRACTION     :        Allergies as of 2022 - Fully Reviewed 2021   Allergen Reaction Noted    Bactrim [sulfamethoxazole-trimethoprim] Hives 2019    Seasonal Other (See Comments) 2019   :        Social History     Tobacco Use    Smoking status: Former Smoker     Packs/day: 0.00     Years: 10.00     Pack years: 0.00     Quit date: 2005     Years since quittin.6    Smokeless tobacco: Never Used   Vaping Use    Vaping Use: Never used   Substance Use Topics    Alcohol use: Yes     Comment: rare    Drug use: No   :      Family History:  Brother: prostate cancer, diagnosed age 72  No additional family history of breast or ovarian cancer  Please see intake form for additional family details. Hormonal History:   a.0  m.0  Menarche at age 16. First live birth at age 24. did not breastfeed. Postmenopausal at age 29  Hysterectomy: at age 29  No estrogen supplementation currently, previously taken for 10 years stopped age 40  OCP not taking    Medications:  Medication documentation has been reviewed in the electronic medical record and patient office intake form. Exam:  There were no vitals taken for this visit. Constitutional: She appears well-nourished. No apparent distress. Breast: The patient was examined in the upright and supine position. She has a \"C\" cup breast. Breasts are symmetrically ptotic. Right: There were no new masses or changes in breast contour. There were no skin changes of the breast or nipple areolar complex. There was no nipple inversion or discharge. Left: There is ecchymosis related to her recent biopsy. There were no new masses or changes in breast contour. There were no skin changes of the breast or nipple areolar complex. There was no nipple inversion or discharge. There is no axillary lymphadenopathy palpated bilaterally. Head: Normocephalic and atraumatic.    Eyes: EOM are normal. Pupilsare equal, round, and reactive to light. Neck: Neck supple. No tracheal deviation present. Cardiovascular: regular rate. Extremities appear well perfused. Pulmonary: respirations are non-labored and without audible distress  Lymphatics: no palpable axillary or cervical lymphadenopathy. Skin: No rash noted. No erythema. Neurologic: alert and oriented. Assessment/Plan:  [id number] STAGE:  IA left breast cancer  ER positive FL low positive HER-2 negative      I have reviewed the results of her most recent breast imaging and pathology with her. The surgical rational and technical details of undergoing breast conservation therapy versus a mastectomy were reviewed. She understood that patients who undergo breast conservation therapy, systemic therapy, and radiotherapy have comparable local recurrences and overall survival to those patients undergoing a mastectomy. She understands that patients may experience an asymmetric change in breast contour with breast conservation that can be exacerbated with radiation therapy. We discussed the technique of localization. I explained that prior to surgery she will be taken to radiology where they will target the lesion near which the clip was placed post biopsy. This is performed using either mammographic or ultrasound guidance. We discussed the aspects of breast conservation including the need for negative margins. We discussed the risk of up to a 15-20% chance of having a positive margin and that this would in fact lead to additional surgery. We discussed marking the surgical cavity for potential future radiation. We also discussed the option of a mastectomy. We discussed that although we remove the breast in this procedure, there is still a risk of recurrent disease and reviewed expectations on residual breast tissue. We discussed margins. We reviewed possible hospitalization and the need for surgical drains.  We discussed additional risk and benefits of surgery which include but are not limited to anesthesia related risks, bleeding, range of motion difficulty, infection (<8%), wound complications and unappealing cosmetics. We discussed the risk of contralateral breast cancer. We also discussed the possibility of future recurrences. We reviewed expectations for recovery. We discussed a sentinel lymph node biopsy in great detail. I described the procedure of both an injection of a radioisotope as well as blue dye with migration to the axilla. I discussed the side effects including discoloration of the urine, stool, and breast as well as the possibility that the axilla would not map. We discussed the possibility of a false negative result and the potential need for further surgery. We reviewed the 3-5% risk of lymphedema. We discussed additional risks such as permanent arm numbness, the potential for nerve injury, and the possibility of a false negative finding. I discussed the fact that if we identified positive nodes, she may require a further surgical management. I reviewed the considerations for omission of sentinel node biopsy. We reviewed the details of the Choosing Wisely campaign to omit a sentinel lymph node biopsy. She is over the age of 80, her cancer is hormonally responsive and she has a clinically negative axilla. We discussed importance of endocrine therapy. She understands that avoiding a sentinel lymph node biopsy should not affect mortality or local regional recurrence. We reviewed the [facility] nomogram to predict positive sentinel lymph nodes. This was predicted at approximately 13%. She verbalizes understanding and desires to proceed without a sentinel lymph node biopsy. Systemic therapy including chemotherapy and endocrine therapy were reviewed. I have recommended she undergo a consultation with medical oncology to discuss these treatments.     We discussed that radiation therapy is traditionally given to patients undergoing breast conservation therapy to reduce the risk of local recurrence. I have recommended she undergo a postoperative consultation with radiation oncology to discuss this treatment. Fertility does not apply. The role of genetic consultation was discussed. She declines at this time. Reconstructive options in patients undergoing breast conservation versus a mastectomy were reviewed. Anticipated clinic follow up and survivorship were discussed. Self breast evaluation was reviewed. We will plan a left partial mastectomy in the upcoming few weeks. We will reach out to her cardiologist, Dr. Karan Pal for perioperative management of her Eliquis    All of the patient's questions were answered at this time however, she was encouraged to call the office with any further inquiries. Approximately 80 minutes of time were spent in preparation, direct patient contact, record review, imaging interpretation, care coordination, documentation and activities otherwise related to this encounter.

## 2022-02-08 ENCOUNTER — OFFICE VISIT (OUTPATIENT)
Dept: SURGERY | Age: 82
End: 2022-02-08
Payer: MEDICARE

## 2022-02-08 VITALS
HEIGHT: 62 IN | OXYGEN SATURATION: 92 % | WEIGHT: 182 LBS | RESPIRATION RATE: 19 BRPM | BODY MASS INDEX: 33.49 KG/M2 | DIASTOLIC BLOOD PRESSURE: 94 MMHG | HEART RATE: 98 BPM | SYSTOLIC BLOOD PRESSURE: 163 MMHG

## 2022-02-08 DIAGNOSIS — C50.912 PRIMARY BREAST MALIGNANCY, LEFT (HCC): Primary | ICD-10-CM

## 2022-02-08 DIAGNOSIS — C50.912 PRIMARY MALIGNANT NEOPLASM OF LEFT BREAST (HCC): Primary | ICD-10-CM

## 2022-02-08 PROCEDURE — 99205 OFFICE O/P NEW HI 60 MIN: CPT | Performed by: SURGERY

## 2022-02-08 PROCEDURE — G8417 CALC BMI ABV UP PARAM F/U: HCPCS | Performed by: SURGERY

## 2022-02-08 PROCEDURE — G8427 DOCREV CUR MEDS BY ELIG CLIN: HCPCS | Performed by: SURGERY

## 2022-02-08 PROCEDURE — G8482 FLU IMMUNIZE ORDER/ADMIN: HCPCS | Performed by: SURGERY

## 2022-02-08 PROCEDURE — 1090F PRES/ABSN URINE INCON ASSESS: CPT | Performed by: SURGERY

## 2022-02-08 RX ORDER — SODIUM CHLORIDE 0.9 % (FLUSH) 0.9 %
5-40 SYRINGE (ML) INJECTION EVERY 12 HOURS SCHEDULED
Status: CANCELLED | OUTPATIENT
Start: 2022-02-08

## 2022-02-08 RX ORDER — SODIUM CHLORIDE 9 MG/ML
25 INJECTION, SOLUTION INTRAVENOUS PRN
Status: CANCELLED | OUTPATIENT
Start: 2022-02-08

## 2022-02-08 RX ORDER — SODIUM CHLORIDE, SODIUM LACTATE, POTASSIUM CHLORIDE, CALCIUM CHLORIDE 600; 310; 30; 20 MG/100ML; MG/100ML; MG/100ML; MG/100ML
INJECTION, SOLUTION INTRAVENOUS CONTINUOUS
Status: CANCELLED | OUTPATIENT
Start: 2022-02-08

## 2022-02-08 RX ORDER — SODIUM CHLORIDE 0.9 % (FLUSH) 0.9 %
5-40 SYRINGE (ML) INJECTION PRN
Status: CANCELLED | OUTPATIENT
Start: 2022-02-08

## 2022-02-08 ASSESSMENT — ENCOUNTER SYMPTOMS
EYES NEGATIVE: 1
GASTROINTESTINAL NEGATIVE: 1
RESPIRATORY NEGATIVE: 1

## 2022-02-09 ENCOUNTER — TELEPHONE (OUTPATIENT)
Dept: CARDIOLOGY CLINIC | Age: 82
End: 2022-02-09

## 2022-02-09 DIAGNOSIS — C50.912 PRIMARY BREAST MALIGNANCY, LEFT (HCC): Primary | ICD-10-CM

## 2022-02-09 NOTE — TELEPHONE ENCOUNTER
Hira Ojeda from [facility] called. Pt is having breast surgery with radiofrequency tag placed and lumpectomy. She needs permission to stop Eliquis from 2/17/22-2/24/22. Letter can be placed in epic, or fax number is [phone].

## 2022-02-10 NOTE — PROGRESS NOTES
[facility]   Cardiac Consultation    Referring Provider:  Lee Macias DO     Chief Complaint   Patient presents with    6 Month Follow-Up     pacer    Atrial Fibrillation     s/p rfa    Other     breast cancer     HPI:  Kathleene Homans is a [de-identified] y.o. female with PMH of GERD, hypothyroidism and pAF, originally dx'd 1/2021 with progressively worsening SOB, noted to be in AF/RVR, placed on diltiazem, Eliquis, BB and lasix. EF 55% per echo. Following normal stress test, she was started on flecainide (6/2021). A few days later, she p/w LHD, N/V/D, found to be SB with HR's in the 40's, placed on dopamine, given IVF then developed AF/RVR, 6.8 sec conversion pause noted on tele, s/p dual chamber MDT PPM (4/21/21, Dr. Lico Gray) and started on flecainide post implant. She was planned for DCCV on 6/22/21; however, patient was in NSR and DCCV was canceled. Today, she is here for 6 mo f/u. She has an upcoming L lumpectomy due to CA. Dr. Carmita Baldwin has addressed questions and  Eliquis instructions. Otherwise, denies complaints of palpitations, dizziness, CP, SOB, orthopnea, presyncope, or syncope. Device interrogation today shows normally functioning PPM with stable sensing and pacing thresholds. AP 99%,  <0.1%. AF burden of 0.1% with episodes all on 12/24/21 (longest lasting over 2 hrs). LUBA at 14 yrs. Past Medical History:   has a past medical history of Allergic rhinitis, Alveolitis of jaw, Cancer ([address] Dental disease, Diverticulosis of colon (without mention of hemorrhage), Dizziness, GERD (gastroesophageal reflux disease), Hearing loss, Myalgia and myositis, unspecified, NAKIA (obstructive sleep apnea), Other and unspecified hyperlipidemia, Recurrent upper respiratory infection (URI), Screening mammogram, Tinnitus, Unspecified gastritis and gastroduodenitis without mention of hemorrhage, and Unspecified hypothyroidism.     Surgical History:   has a past surgical history that includes Cholecystectomy (1990s); hernia repair (1990s); bladder suspension (1990s); Colonoscopy (1/16/2007); Colonoscopy (02/27/2012); [place] tooth extraction; Hysterectomy (1975); and US BREAST BIOPSY W LOC DEVICE 1ST LESION LEFT (Left, 1/21/2022). Social History:   reports that she quit smoking about 16 years ago. She smoked 0.00 packs per day for 10.00 years. She has never used smokeless tobacco. She reports current alcohol use. She reports that she does not use drugs. Family History:  family history includes Cancer in her brother; Sleep Apnea in her son. Home Medications:  Outpatient Encounter Medications as of 2/16/2022   Medication Sig Dispense Refill    ELIQUIS 5 MG TABS tablet TAKE 1 TABLET BY MOUTH 2 TIMES DAILY 180 tablet 3    metoprolol (LOPRESSOR) 100 MG tablet TAKE 1 TABLET BY MOUTH TWICE A  tablet 3    flecainide (TAMBOCOR) 50 MG tablet TAKE 1 TABLET BY MOUTH 2 TIMES DAILY 180 tablet 1    levothyroxine (SYNTHROID) 25 MCG tablet TAKE 1 TABLET BY MOUTH EVERY DAY 90 tablet 3    furosemide (LASIX) 20 MG tablet Take 1 tablet by mouth daily as needed (water weight gain.) 60 tablet 3    Handicap Placard MISC by Does not apply route Dx of shortness of breath and A-fib. Effective Jan 13, 2021 until Jan 13, 2026. 1 each 0    Cholecalciferol (VITAMIN D) 2000 UNITS CAPS capsule Take 1 capsule by mouth daily. No facility-administered encounter medications on file as of 2/16/2022. Allergies:  Bactrim [sulfamethoxazole-trimethoprim] and Seasonal     Review of Systems   Constitutional: Negative for activity change and appetite change. HENT: Negative for facial swelling and neck pain. Eyes: Negative for discharge and itching. Respiratory: Negative for chest tightness and shortness of breath. Cardiovascular: Negative for palpitations. Negative for chest pain. Negative for leg swelling. Gastrointestinal: Negative for abdominal pain and abdominal distention.     Genitourinary: Negative. Musculoskeletal: Negative. Skin: Negative for color change and pallor. Neurological: Negative for dizziness, syncope and light-headedness. Hematological: Negative. Psychiatric/Behavioral: Negative for behavioral problems and agitation. BP (!) 140/85   Pulse 63   Ht 5' 2\" (1.575 m)   Wt 184 lb (83.5 kg)   BMI 33.65 kg/m²     ECG 2/16/22, personally reviewed    Echo 4/19/21  Summary   Normal left ventricle size. There is mild concentric left ventricular   hypertrophy. Overall left ventricular systolic function appears normal with   an ejection fraction of 55%. The right ventricle is mildly enlarged. Biatrial enlargement. Inferior vena cava appears dilated. Lexiscan 4/14/21  Summary    There is normal isotope uptake at stress and rest. There is no evidence of    myocardial ischemia or scar.    Normal LV size and systolic function.    Left ventricular ejection fraction of 73 %.    There are no regional wall motion abnormalities.    Overall findings represent a low risk scan. Objective:  Physical Exam   Nursing note and vitals reviewed. Constitutional: She appears well-developed and well-nourished. Head: atraumatic. Eyes: Right eye exhibits no discharge. Left eye exhibits no discharge. Neck: Neck supple. Cardiovascular: Normal rate, regular rhythm and normal heart sounds. Pulmonary/Chest: Effort normal and breath sounds normal.   Abdominal: Soft. Musculoskeletal: She exhibits no edema. Neurological: She is alert without any gross abnormalities. Skin: Skin is warm and dry. Psychiatric: She has a normal mood and affect. Left pectoral implant intact    Assessment:  1.  SSS, s/p dual chamber PPM 6/2021  2. AF - on flecainide 50 mg BID and Eliquis 5 mg BID. [id number] score 3.  3. Breast cancer  lumpectomy soon      Plan:  1. Continue flecainide 50 mg BID, Lopressor 100 mg BID, and Eliquis 5 mg BID  2.   Continue with remote home transmissions every 3 months  3. Follow up with EP NP in 6 months with device check and flecainide    I, Lolita Hollingsworth, RN, am scribing for and in the presence of Dr. Shana Walsh 02/16/22 3:36 PM  Lolita Hollingsworth RN  The scribes documentation has been prepared under my direction and personally reviewed by me in its entirety. I confirm that the note above accurately reflects all work, treatment, procedures, and medical decision making performed by me. Shana Batista.  Nico TSE

## 2022-02-11 NOTE — TELEPHONE ENCOUNTER
Per Dr. Lindsay Saenz may discontinue Eliquis 3 days prior to procedure. Resume as soon as able after procedure. Letter in patient's chart.

## 2022-02-16 ENCOUNTER — NURSE ONLY (OUTPATIENT)
Dept: CARDIOLOGY CLINIC | Age: 82
End: 2022-02-16
Payer: MEDICARE

## 2022-02-16 ENCOUNTER — OFFICE VISIT (OUTPATIENT)
Dept: CARDIOLOGY CLINIC | Age: 82
End: 2022-02-16
Payer: MEDICARE

## 2022-02-16 VITALS
HEART RATE: 63 BPM | WEIGHT: 184 LBS | BODY MASS INDEX: 33.86 KG/M2 | DIASTOLIC BLOOD PRESSURE: 85 MMHG | HEIGHT: 62 IN | SYSTOLIC BLOOD PRESSURE: 140 MMHG

## 2022-02-16 DIAGNOSIS — I49.5 SSS (SICK SINUS SYNDROME) (HCC): ICD-10-CM

## 2022-02-16 DIAGNOSIS — Z95.0 PACEMAKER: ICD-10-CM

## 2022-02-16 DIAGNOSIS — I48.19 PERSISTENT ATRIAL FIBRILLATION (HCC): ICD-10-CM

## 2022-02-16 DIAGNOSIS — R00.1 BRADYCARDIA: ICD-10-CM

## 2022-02-16 DIAGNOSIS — I48.91 ATRIAL FIBRILLATION WITH RAPID VENTRICULAR RESPONSE (HCC): ICD-10-CM

## 2022-02-16 DIAGNOSIS — I48.91 ATRIAL FIBRILLATION, UNSPECIFIED TYPE (HCC): Chronic | ICD-10-CM

## 2022-02-16 DIAGNOSIS — Z95.0 PACEMAKER: Primary | ICD-10-CM

## 2022-02-16 PROCEDURE — 4040F PNEUMOC VAC/ADMIN/RCVD: CPT | Performed by: INTERNAL MEDICINE

## 2022-02-16 PROCEDURE — G8399 PT W/DXA RESULTS DOCUMENT: HCPCS | Performed by: INTERNAL MEDICINE

## 2022-02-16 PROCEDURE — G8427 DOCREV CUR MEDS BY ELIG CLIN: HCPCS | Performed by: INTERNAL MEDICINE

## 2022-02-16 PROCEDURE — G8417 CALC BMI ABV UP PARAM F/U: HCPCS | Performed by: INTERNAL MEDICINE

## 2022-02-16 PROCEDURE — G8482 FLU IMMUNIZE ORDER/ADMIN: HCPCS | Performed by: INTERNAL MEDICINE

## 2022-02-16 PROCEDURE — 93280 PM DEVICE PROGR EVAL DUAL: CPT | Performed by: INTERNAL MEDICINE

## 2022-02-16 PROCEDURE — 1123F ACP DISCUSS/DSCN MKR DOCD: CPT | Performed by: INTERNAL MEDICINE

## 2022-02-16 PROCEDURE — 1090F PRES/ABSN URINE INCON ASSESS: CPT | Performed by: INTERNAL MEDICINE

## 2022-02-16 PROCEDURE — 1036F TOBACCO NON-USER: CPT | Performed by: INTERNAL MEDICINE

## 2022-02-16 PROCEDURE — 99213 OFFICE O/P EST LOW 20 MIN: CPT | Performed by: INTERNAL MEDICINE

## 2022-02-16 NOTE — PROGRESS NOTES
Patient comes in for programming evaluation on her Medtronic dual chamber pacemaker. All sensing and pacing parameters are within normal range. Battery life 13y  AP 99.44%.  0.06%. 6 episodes AT/AF on 12/24/2021 - 0.1% burden - longest 02:09:08 - converted with ATP  Patient remains on metoprolol, eliquis, furosemide, flecainide. No changes need to be made at this time. Please see interrogation for more detail. Patient will see Dr. Shakir Morse today and follow up in 3 months in office or remotely.

## 2022-02-18 ENCOUNTER — HOSPITAL ENCOUNTER (OUTPATIENT)
Dept: ULTRASOUND IMAGING | Age: 82
Discharge: HOME | End: 2022-02-18
Payer: MEDICARE

## 2022-02-18 ENCOUNTER — HOSPITAL ENCOUNTER (OUTPATIENT)
Dept: MAMMOGRAPHY | Age: 82
Discharge: HOME | End: 2022-02-18
Payer: MEDICARE

## 2022-02-18 DIAGNOSIS — C50.912 PRIMARY BREAST MALIGNANCY, LEFT (HCC): ICD-10-CM

## 2022-02-18 DIAGNOSIS — R92.8 ABNORMAL MAMMOGRAM OF LEFT BREAST: ICD-10-CM

## 2022-02-18 PROCEDURE — 77065 DX MAMMO INCL CAD UNI: CPT

## 2022-02-18 PROCEDURE — A4648 IMPLANTABLE TISSUE MARKER: HCPCS

## 2022-02-21 NOTE — PROGRESS NOTES
Name_______________________________________Printed:____________________  Date and time of surgery__02/22/22___0900___________________Arrival Time:_1030___[facility]____________   1. The instructions given regarding when and if a patient needs to stop oral intake prior to surgery varies. Follow the specific instructions you were given                  _X__Nothing to eat or to drink after Midnight the night before.                   ____Carbo loading or ERAS instructions will be given to select patients-if you have been given those instructions -please do the following                           The evening before your surgery after dinner before midnight drink 40 ounces of gatorade. If you are diabetic use sugar free. The morning of surgery drink 40 ounces of water. This needs to be finished 3 hours prior to your surgery start time. 2. Take the following pills with a small sip of water on the morning of surgery_Metoprolol, Flecainide, Synthroid__________________________________________________                  Do not take blood pressure medications ending in pril or sartan the jaja prior to surgery or the morning of surgery_   3. Aspirin, Ibuprofen, Advil, Naproxen, Vitamin E and other Anti-inflammatory products and supplements should be stopped for 5 -7days before surgery or as directed by your physician. 4. Check with your Doctor regarding stopping Plavix, Coumadin,Eliquis, Lovenox,Effient,Pradaxa,Xarelto, Fragmin or other blood thinners and follow their instructions. 5. Do not smoke, and do not drink any alcoholic beverages 24 hours prior to surgery. This includes NA Beer. Refrain from the usage of any recreational drugs. 6. You may brush your teeth and gargle the morning of surgery. DO NOT SWALLOW WATER   7. You MUST make arrangements for a responsible adult to stay on site while you are here and take you home after your surgery. You will not be allowed to leave alone or drive yourself home.   It is strongly suggested someone stay with you the first 24 hrs. Your surgery will be cancelled if you do not have a ride home. 8. A parent/legal guardian must accompany a child scheduled for surgery and plan to stay at the hospital until the child is discharged. Please do not bring other children with you. 9. Please wear simple, loose fitting clothing to the hospital.  Stefan He not bring valuables (money, credit cards, checkbooks, etc.) Do not wear any makeup (including no eye makeup) or nail polish on your fingers or toes. 10. DO NOT wear any jewelry or piercings on day of surgery. All body piercing jewelry must be removed. 11. If you have ___dentures, they will be removed before going to the OR; we will provide you a container. If you wear ___contact lenses or ___glasses, they will be removed; please bring a case for them. 12. Please see your family doctor/pediatrician for a history & physical and/or concerning medications. Bring any test results/reports from your physician's office. PCP__________________Phone___________H&P Appt. Date________             13 If you  have a Living Will and Durable Power of  for Healthcare, please bring in a copy. 15. Notify your Surgeon if you develop any illness between now and surgery  time, cough, cold, fever, sore throat, nausea, vomiting, etc.  Please notify your surgeon if you experience dizziness, shortness of breath or blurred vision between now & the time of your surgery             15. DO NOT shave your operative site 96 hours prior to surgery. For face & neck surgery, men may use an electric razor 48 hours prior to surgery. 16. Shower the night before or morning of surgery using an antibacterial soap or as you have been instructed. 17. To provide excellent care visitors will be limited to one in the room at any given time. 18.  Please bring picture ID and insurance card.              19. Visit our web site for additional information:  [email or web address]              20.During flu season no children under the age of 15 are permitted in the hospital for the safety of all patients. 21. If you take a long acting insulin in the evening only  take half of your usual  dose the night  before your procedure              22. If you use a c-pap please bring DOS if staying overnight,             23.For your convenience [id number] [place] [address] has a pharmacy on site to fill your prescriptions. 24. If you use oxygen and have a portable tank please bring it  with you the DOS             25. Bring a complete list of all your medications with name and dose include any supplements. 26. Other__Will get H&P from Urgent care will bring DOS________________________________________   *Please call pre admission testing if you any further questions   [address]    [phone]  [phone]   [facility] [address] GONZALEZ GUDINO   [phone]           COVID TESTING    _X__ Covid test to be done 3-5 days prior to scheduled surgery -patient aware they are REQUIRED to bring a copy of the negative result DOS-if they receive a positive result to notify their surgeon         If known - indicate where patient is getting covid test done ____[facility] 02/16___Will bring DOS____________________________________________________    ___ Rapid - DOS    ___ Other___________________________________      Huong Scales POLICY(subject to change)    There is a one visitor policy at [facility] for all surgeries and endoscopies. Whether the visitor can stay or will be asked to wait in the car will depend on the current policy and if social distancing can be maintained. The policy is subject to change at any time. Please make sure the visitor has a cell phone that is on,charged and able to accept calls, as this may be the way that the staff communicates with them. Pain management is NO VISITOR policyThe patients ride is expected to remain in the car with a cell phone for communication. If the ride is leaving the hospital grounds please make sure they are back in time for pickup. Have the patient inform the staff on arrival what their rides plans are while the patient is in the facility. At the MAIN there is one visitor allowed. Please note that the visitor policy is subject to change. All above information reviewed with patient in person or by phone. Patient verbalizes understanding. All questions and concerns addressed.                                                                                                  Patient/Rep___Patient_________________                                                                                                                                    PRE OP INSTRUCTIONS

## 2022-02-22 ENCOUNTER — ANESTHESIA EVENT (OUTPATIENT)
Dept: OPERATING ROOM | Age: 82
End: 2022-02-22
Payer: MEDICARE

## 2022-02-22 ENCOUNTER — ANESTHESIA (OUTPATIENT)
Dept: OPERATING ROOM | Age: 82
End: 2022-02-22
Payer: MEDICARE

## 2022-02-22 ENCOUNTER — HOSPITAL ENCOUNTER (OUTPATIENT)
Age: 82
Setting detail: OUTPATIENT SURGERY
Discharge: HOME | End: 2022-02-22
Attending: SURGERY | Admitting: SURGERY
Payer: MEDICARE

## 2022-02-22 ENCOUNTER — APPOINTMENT (OUTPATIENT)
Dept: WOMENS IMAGING | Age: 82
End: 2022-02-22
Attending: SURGERY
Payer: MEDICARE

## 2022-02-22 VITALS
RESPIRATION RATE: 15 BRPM | DIASTOLIC BLOOD PRESSURE: 79 MMHG | OXYGEN SATURATION: 97 % | TEMPERATURE: 96.8 F | SYSTOLIC BLOOD PRESSURE: 123 MMHG

## 2022-02-22 VITALS
HEIGHT: 62 IN | HEART RATE: 60 BPM | OXYGEN SATURATION: 94 % | RESPIRATION RATE: 14 BRPM | SYSTOLIC BLOOD PRESSURE: 121 MMHG | TEMPERATURE: 97.2 F | WEIGHT: 183 LBS | DIASTOLIC BLOOD PRESSURE: 73 MMHG | BODY MASS INDEX: 33.68 KG/M2

## 2022-02-22 DIAGNOSIS — G89.18 POST-OP PAIN: Primary | ICD-10-CM

## 2022-02-22 DIAGNOSIS — R92.8 ABNORMAL MAMMOGRAM: ICD-10-CM

## 2022-02-22 LAB
ABO/RH: NORMAL
ANTIBODY SCREEN: NORMAL

## 2022-02-22 PROCEDURE — 2580000003 HC RX 258: Performed by: SURGERY

## 2022-02-22 PROCEDURE — 86850 RBC ANTIBODY SCREEN: CPT

## 2022-02-22 PROCEDURE — 3600000014 HC SURGERY LEVEL 4 ADDTL 15MIN: Performed by: SURGERY

## 2022-02-22 PROCEDURE — A4648 IMPLANTABLE TISSUE MARKER: HCPCS | Performed by: SURGERY

## 2022-02-22 PROCEDURE — 6360000002 HC RX W HCPCS: Performed by: NURSE ANESTHETIST, CERTIFIED REGISTERED

## 2022-02-22 PROCEDURE — 6360000002 HC RX W HCPCS: Performed by: SURGERY

## 2022-02-22 PROCEDURE — 88305 TISSUE EXAM BY PATHOLOGIST: CPT

## 2022-02-22 PROCEDURE — 3700000000 HC ANESTHESIA ATTENDED CARE: Performed by: SURGERY

## 2022-02-22 PROCEDURE — 19301 PARTIAL MASTECTOMY: CPT | Performed by: SURGERY

## 2022-02-22 PROCEDURE — 86901 BLOOD TYPING SEROLOGIC RH(D): CPT

## 2022-02-22 PROCEDURE — 7100000000 HC PACU RECOVERY - FIRST 15 MIN: Performed by: SURGERY

## 2022-02-22 PROCEDURE — 88307 TISSUE EXAM BY PATHOLOGIST: CPT

## 2022-02-22 PROCEDURE — 3700000001 HC ADD 15 MINUTES (ANESTHESIA): Performed by: SURGERY

## 2022-02-22 PROCEDURE — 7100000001 HC PACU RECOVERY - ADDTL 15 MIN: Performed by: SURGERY

## 2022-02-22 PROCEDURE — 7100000011 HC PHASE II RECOVERY - ADDTL 15 MIN: Performed by: SURGERY

## 2022-02-22 PROCEDURE — 2720000010 HC SURG SUPPLY STERILE: Performed by: SURGERY

## 2022-02-22 PROCEDURE — 76098 X-RAY EXAM SURGICAL SPECIMEN: CPT

## 2022-02-22 PROCEDURE — 2709999900 HC NON-CHARGEABLE SUPPLY: Performed by: SURGERY

## 2022-02-22 PROCEDURE — 19294 PREPJ TUM CAV IORT PRTL MAST: CPT | Performed by: SURGERY

## 2022-02-22 PROCEDURE — 86900 BLOOD TYPING SEROLOGIC ABO: CPT

## 2022-02-22 PROCEDURE — 88342 IMHCHEM/IMCYTCHM 1ST ANTB: CPT

## 2022-02-22 PROCEDURE — 3600000004 HC SURGERY LEVEL 4 BASE: Performed by: SURGERY

## 2022-02-22 PROCEDURE — 14001 TIS TRNFR TRUNK 10.1-30SQCM: CPT | Performed by: SURGERY

## 2022-02-22 PROCEDURE — 2500000003 HC RX 250 WO HCPCS: Performed by: NURSE ANESTHETIST, CERTIFIED REGISTERED

## 2022-02-22 PROCEDURE — 88341 IMHCHEM/IMCYTCHM EA ADD ANTB: CPT

## 2022-02-22 PROCEDURE — 7100000010 HC PHASE II RECOVERY - FIRST 15 MIN: Performed by: SURGERY

## 2022-02-22 PROCEDURE — 2500000003 HC RX 250 WO HCPCS: Performed by: SURGERY

## 2022-02-22 DEVICE — MARKER SURG TISS W2XH1XL2CM BIOZORB LO PROF: Type: IMPLANTABLE DEVICE | Site: BREAST | Status: FUNCTIONAL

## 2022-02-22 RX ORDER — LIDOCAINE HYDROCHLORIDE 10 MG/ML
1 INJECTION, SOLUTION EPIDURAL; INFILTRATION; INTRACAUDAL; PERINEURAL
Status: DISCONTINUED | OUTPATIENT
Start: 2022-02-22 | End: 2022-02-22 | Stop reason: HOSPADM

## 2022-02-22 RX ORDER — SODIUM CHLORIDE 9 MG/ML
25 INJECTION, SOLUTION INTRAVENOUS PRN
Status: DISCONTINUED | OUTPATIENT
Start: 2022-02-22 | End: 2022-02-22 | Stop reason: HOSPADM

## 2022-02-22 RX ORDER — FENTANYL CITRATE 50 UG/ML
INJECTION, SOLUTION INTRAMUSCULAR; INTRAVENOUS PRN
Status: DISCONTINUED | OUTPATIENT
Start: 2022-02-22 | End: 2022-02-22 | Stop reason: SDUPTHER

## 2022-02-22 RX ORDER — DEXAMETHASONE SODIUM PHOSPHATE 4 MG/ML
INJECTION, SOLUTION INTRA-ARTICULAR; INTRALESIONAL; INTRAMUSCULAR; INTRAVENOUS; SOFT TISSUE PRN
Status: DISCONTINUED | OUTPATIENT
Start: 2022-02-22 | End: 2022-02-22 | Stop reason: SDUPTHER

## 2022-02-22 RX ORDER — HYDROMORPHONE HCL 110MG/55ML
PATIENT CONTROLLED ANALGESIA SYRINGE INTRAVENOUS PRN
Status: DISCONTINUED | OUTPATIENT
Start: 2022-02-22 | End: 2022-02-22 | Stop reason: SDUPTHER

## 2022-02-22 RX ORDER — SODIUM CHLORIDE 0.9 % (FLUSH) 0.9 %
5-40 SYRINGE (ML) INJECTION PRN
Status: DISCONTINUED | OUTPATIENT
Start: 2022-02-22 | End: 2022-02-22 | Stop reason: HOSPADM

## 2022-02-22 RX ORDER — LABETALOL HYDROCHLORIDE 5 MG/ML
10 INJECTION, SOLUTION INTRAVENOUS
Status: DISCONTINUED | OUTPATIENT
Start: 2022-02-22 | End: 2022-02-22 | Stop reason: HOSPADM

## 2022-02-22 RX ORDER — HYDRALAZINE HYDROCHLORIDE 20 MG/ML
10 INJECTION INTRAMUSCULAR; INTRAVENOUS
Status: DISCONTINUED | OUTPATIENT
Start: 2022-02-22 | End: 2022-02-22 | Stop reason: HOSPADM

## 2022-02-22 RX ORDER — ONDANSETRON 2 MG/ML
INJECTION INTRAMUSCULAR; INTRAVENOUS PRN
Status: DISCONTINUED | OUTPATIENT
Start: 2022-02-22 | End: 2022-02-22 | Stop reason: SDUPTHER

## 2022-02-22 RX ORDER — SODIUM CHLORIDE, SODIUM LACTATE, POTASSIUM CHLORIDE, CALCIUM CHLORIDE 600; 310; 30; 20 MG/100ML; MG/100ML; MG/100ML; MG/100ML
INJECTION, SOLUTION INTRAVENOUS CONTINUOUS
Status: DISCONTINUED | OUTPATIENT
Start: 2022-02-22 | End: 2022-02-22 | Stop reason: HOSPADM

## 2022-02-22 RX ORDER — LIDOCAINE HYDROCHLORIDE 20 MG/ML
INJECTION, SOLUTION INFILTRATION; PERINEURAL PRN
Status: DISCONTINUED | OUTPATIENT
Start: 2022-02-22 | End: 2022-02-22 | Stop reason: SDUPTHER

## 2022-02-22 RX ORDER — PROPOFOL 10 MG/ML
INJECTION, EMULSION INTRAVENOUS PRN
Status: DISCONTINUED | OUTPATIENT
Start: 2022-02-22 | End: 2022-02-22 | Stop reason: SDUPTHER

## 2022-02-22 RX ORDER — FENTANYL CITRATE 50 UG/ML
25 INJECTION, SOLUTION INTRAMUSCULAR; INTRAVENOUS EVERY 5 MIN PRN
Status: DISCONTINUED | OUTPATIENT
Start: 2022-02-22 | End: 2022-02-22 | Stop reason: HOSPADM

## 2022-02-22 RX ORDER — ONDANSETRON 2 MG/ML
4 INJECTION INTRAMUSCULAR; INTRAVENOUS
Status: DISCONTINUED | OUTPATIENT
Start: 2022-02-22 | End: 2022-02-22 | Stop reason: HOSPADM

## 2022-02-22 RX ORDER — SUCCINYLCHOLINE CHLORIDE 20 MG/ML
INJECTION INTRAMUSCULAR; INTRAVENOUS PRN
Status: DISCONTINUED | OUTPATIENT
Start: 2022-02-22 | End: 2022-02-22 | Stop reason: SDUPTHER

## 2022-02-22 RX ORDER — HYDROCODONE BITARTRATE AND ACETAMINOPHEN 5; 325 MG/1; MG/1
1 TABLET ORAL EVERY 4 HOURS PRN
Qty: 42 TABLET | Refills: 0 | Status: SHIPPED | OUTPATIENT
Start: 2022-02-22 | End: 2022-03-01

## 2022-02-22 RX ORDER — PROCHLORPERAZINE EDISYLATE 5 MG/ML
5 INJECTION INTRAMUSCULAR; INTRAVENOUS
Status: DISCONTINUED | OUTPATIENT
Start: 2022-02-22 | End: 2022-02-22 | Stop reason: HOSPADM

## 2022-02-22 RX ORDER — ACETAMINOPHEN 325 MG/1
650 TABLET ORAL
Status: DISCONTINUED | OUTPATIENT
Start: 2022-02-22 | End: 2022-02-22 | Stop reason: HOSPADM

## 2022-02-22 RX ORDER — BUPIVACAINE HYDROCHLORIDE AND EPINEPHRINE 2.5; 5 MG/ML; UG/ML
INJECTION, SOLUTION EPIDURAL; INFILTRATION; INTRACAUDAL; PERINEURAL
Status: COMPLETED | OUTPATIENT
Start: 2022-02-22 | End: 2022-02-22

## 2022-02-22 RX ORDER — SODIUM CHLORIDE 0.9 % (FLUSH) 0.9 %
5-40 SYRINGE (ML) INJECTION EVERY 12 HOURS SCHEDULED
Status: DISCONTINUED | OUTPATIENT
Start: 2022-02-22 | End: 2022-02-22 | Stop reason: HOSPADM

## 2022-02-22 RX ORDER — OXYCODONE HYDROCHLORIDE 5 MG/1
5 TABLET ORAL
Status: DISCONTINUED | OUTPATIENT
Start: 2022-02-22 | End: 2022-02-22 | Stop reason: HOSPADM

## 2022-02-22 RX ORDER — HYDROMORPHONE HCL 110MG/55ML
0.25 PATIENT CONTROLLED ANALGESIA SYRINGE INTRAVENOUS EVERY 5 MIN PRN
Status: DISCONTINUED | OUTPATIENT
Start: 2022-02-22 | End: 2022-02-22 | Stop reason: HOSPADM

## 2022-02-22 RX ADMIN — PROPOFOL 120 MG: 10 INJECTION, EMULSION INTRAVENOUS at 10:26

## 2022-02-22 RX ADMIN — SODIUM CHLORIDE, POTASSIUM CHLORIDE, SODIUM LACTATE AND CALCIUM CHLORIDE: 600; 310; 30; 20 INJECTION, SOLUTION INTRAVENOUS at 10:11

## 2022-02-22 RX ADMIN — DEXAMETHASONE SODIUM PHOSPHATE 4 MG: 4 INJECTION, SOLUTION INTRAMUSCULAR; INTRAVENOUS at 10:35

## 2022-02-22 RX ADMIN — ONDANSETRON 4 MG: 2 INJECTION INTRAMUSCULAR; INTRAVENOUS at 11:31

## 2022-02-22 RX ADMIN — CEFAZOLIN SODIUM 2000 MG: 10 INJECTION, POWDER, FOR SOLUTION INTRAVENOUS at 10:14

## 2022-02-22 RX ADMIN — FENTANYL CITRATE 50 MCG: 50 INJECTION, SOLUTION INTRAMUSCULAR; INTRAVENOUS at 10:26

## 2022-02-22 RX ADMIN — LIDOCAINE HYDROCHLORIDE 60 MG: 20 INJECTION, SOLUTION INFILTRATION; PERINEURAL at 10:26

## 2022-02-22 RX ADMIN — PHENYLEPHRINE HYDROCHLORIDE 100 MCG: 10 INJECTION INTRAVENOUS at 11:01

## 2022-02-22 RX ADMIN — SUCCINYLCHOLINE CHLORIDE 100 MG: 20 INJECTION, SOLUTION INTRAMUSCULAR; INTRAVENOUS at 10:26

## 2022-02-22 RX ADMIN — FENTANYL CITRATE 50 MCG: 50 INJECTION, SOLUTION INTRAMUSCULAR; INTRAVENOUS at 11:41

## 2022-02-22 RX ADMIN — PHENYLEPHRINE HYDROCHLORIDE 50 MCG: 10 INJECTION INTRAVENOUS at 11:19

## 2022-02-22 RX ADMIN — HYDROMORPHONE HYDROCHLORIDE 0.2 MG: 2 INJECTION, SOLUTION INTRAMUSCULAR; INTRAVENOUS; SUBCUTANEOUS at 10:37

## 2022-02-22 ASSESSMENT — PULMONARY FUNCTION TESTS
PIF_VALUE: 15
PIF_VALUE: 20
PIF_VALUE: 25
PIF_VALUE: 24
PIF_VALUE: 14
PIF_VALUE: 24
PIF_VALUE: 20
PIF_VALUE: 17
PIF_VALUE: 20
PIF_VALUE: 25
PIF_VALUE: 20
PIF_VALUE: 20
PIF_VALUE: 1
PIF_VALUE: 25
PIF_VALUE: 14
PIF_VALUE: 20
PIF_VALUE: 25
PIF_VALUE: 20
PIF_VALUE: 20
PIF_VALUE: 4
PIF_VALUE: 0
PIF_VALUE: 20
PIF_VALUE: 17
PIF_VALUE: 24
PIF_VALUE: 2
PIF_VALUE: 15
PIF_VALUE: 7
PIF_VALUE: 25
PIF_VALUE: 20
PIF_VALUE: 13
PIF_VALUE: 20
PIF_VALUE: 20
PIF_VALUE: 25
PIF_VALUE: 21
PIF_VALUE: 25
PIF_VALUE: 20
PIF_VALUE: 20
PIF_VALUE: 1
PIF_VALUE: 20
PIF_VALUE: 20
PIF_VALUE: 41
PIF_VALUE: 19
PIF_VALUE: 20
PIF_VALUE: 0
PIF_VALUE: 20
PIF_VALUE: 20
PIF_VALUE: 26
PIF_VALUE: 20
PIF_VALUE: 17
PIF_VALUE: 20
PIF_VALUE: 20
PIF_VALUE: 2
PIF_VALUE: 20
PIF_VALUE: 17
PIF_VALUE: 20
PIF_VALUE: 20
PIF_VALUE: 15
PIF_VALUE: 17
PIF_VALUE: 23
PIF_VALUE: 20
PIF_VALUE: 20
PIF_VALUE: 3
PIF_VALUE: 17
PIF_VALUE: 3
PIF_VALUE: 20
PIF_VALUE: 20
PIF_VALUE: 25
PIF_VALUE: 2
PIF_VALUE: 3
PIF_VALUE: 17
PIF_VALUE: 3
PIF_VALUE: 20
PIF_VALUE: 1
PIF_VALUE: 1
PIF_VALUE: 23
PIF_VALUE: 15
PIF_VALUE: 14
PIF_VALUE: 15
PIF_VALUE: 20
PIF_VALUE: 25

## 2022-02-22 ASSESSMENT — ENCOUNTER SYMPTOMS: SHORTNESS OF BREATH: 0

## 2022-02-22 ASSESSMENT — PAIN - FUNCTIONAL ASSESSMENT
PAIN_FUNCTIONAL_ASSESSMENT: 0-10
PAIN_FUNCTIONAL_ASSESSMENT: ACTIVITIES ARE NOT PREVENTED

## 2022-02-22 NOTE — ANESTHESIA POSTPROCEDURE EVALUATION
Department of Anesthesiology  Postprocedure Note    Patient: Alexander Jiménez  MRN: [id number]  YOB: 1940  Date of evaluation: 2/22/2022  Time:  11:59 AM     Procedure Summary     Date: 02/22/22 Room / Location: [address]    Anesthesia Start: 1020 Anesthesia Stop: 9873    Procedure: LEFT BREAST LOCALIZED PARTIAL MASTECTOMY, BIOZORB, (Left Breast) Diagnosis: (C50.912 PRIMARY BREAST MALIGNANCY, LEFT (HCC)-PRIMARY)    Surgeons: Derek Prajapati MD Responsible Provider: Ildefonso King MD    Anesthesia Type: general ASA Status: 3          Anesthesia Type: general    Harjinder Phase I: Harjinder Score: 10    Harjinder Phase II:      Last vitals: Reviewed and per EMR flowsheets.        Anesthesia Post Evaluation    Patient location during evaluation: PACU  Patient participation: complete - patient participated  Level of consciousness: awake  Airway patency: patent  Nausea & Vomiting: no vomiting and no nausea  Complications: no  Cardiovascular status: hemodynamically stable  Respiratory status: acceptable  Hydration status: stable  Multimodal analgesia pain management approach

## 2022-02-22 NOTE — PROGRESS NOTES
Discharge instructions review with patient and NEREYDA (christy). All home medications have been reviewed, pt v/u. Discharge instructions signed. Pt discharged via wheelchair. Pt discharged with 1 RX, surgi bra and all belongings. NEREYDA taking stable pt home.

## 2022-02-22 NOTE — OP NOTE
Operative Note    Postoperative Note    Gerry Jennings  YOB: 1940  [id number]    Pre-operative Diagnosis: T 1a N0 left breast cancer    Post-operative Diagnosis: Same    Procedure:  left localizer directed partial mastectomy,   left tissue rearrangement procedure for area of 20 sq. Cm. Immediate insertion of a breast prosthesis: BioZorb    Anesthesia: General    Surgeons/Assistants: Kevin Self: UHMPHREY Joseph    Estimated Blood Loss: less than 50     Drains: none    Complications: None apparent at conclusion of procedure    Specimens: left breast tissue (see below for details)    Findings: specimen radiograph shows capture of lesion in question    Post-Op Condition: Stable    Disposition: to recovery room    Description of Procedure:   Ms. Uriel Solomon is a [de-identified] y.o. woman with a clinical T1a N0 left breast cancer. She has elected to proceed with left breast conservation therapy and omission of lymph node assessment. The indications for the planned procedure, along with the potential benefits and risks which include but are not limited to the risk of anesthesia, bleeding, infection, possible failed operation, possible need for additional surgery pending final pathologic assessment, lymphedema, sensation changes, and unappealing cosmetics were reviewed. All questions were answered and she agrees to proceed. Ms. Uriel Solomon was met by me in the preoperative area. The surgical site was identified. Consent was obtained. The appropriate breast imaging was reviewed. She underwent an image guided localization procedure preoperatively which was performed by the on site radiologist. The left breast lesion was again noted with the biopsy clip. The localizer is in good position. She was brought to the operating room and placed supine with her arms extended on boards. She was appropriately positioned and padded. Compression stockings were placed.   Appropriate antibiotics were administered within 60 minutes of the incision. Breast imaging was available in the room. After induction of general anesthesia, the appropriate World Health Organization timeout procedure was performed. The left breast and axillary region, upper arm, and chest wall were prepped and draped in the normal sterile fashion. There was one localizer placed by the mammographer marking the left breast cancer. The skin and soft tissues over the proposed incision were infiltrated with local. A periareolar incision was made at the 5-7:00 location. Flaps were raised and dissection was carried out in a rectangular fashion around the localizer and target. No skin was removed. Dissection was not carried to the chest wall. Dissection was carried out carefully to try and maintain the localizer centrally within the specimen. The specimen was excised and gross examination revealed possible close deep but otherwise adequate margins. An additional margin was obtained from the deep aspect and marked with a stitch on the new margin. The specimen was oriented with a margin map. It was submitted for specimen radiography which revealed the lesion in question with adequate radiographic margins. The wound was irrigated and hemostasis was obtained. In order to obtain the best cosmesis while closing the surgical cavity, I performed a tissue rearrangement. A separate tissue incision was made superiorly and inferiorly. Flaps were elevated and advanced for a total area of 20 square cm. Once tissue mobilization was complete, the dimensions of the cavity were assessed. A three-dimensional BioZorb implant was then placed in the prior tumor bed to facilitate potential radiation treatment and future follow-up surveillance. The implant was then seated and sutured into place at multiple points of contact with a 3-0 Vicryl stitch.  The previously mobilized tissue was then reapproximated to enclose the implant and re-create the natural breast cosmesis. Hemostasis was reassessed. The incision was closed in layers using a 3-0 monocryl stitch followed by a 4-0 monocryl subcuticular approximation. Skin glue was applied. The instrument, sponge, and needle counts were correct. Ms. Sandy Edward was awakened and placed into a surgical bra. She was taken to the recovery room in stable condition. Her family was notified of intraoperative findings.     Electronically signed by Lynda Briseno MD on 2/22/22 at 8:25 AM EST

## 2022-02-22 NOTE — H&P
H&P Update    The patient's most recent H&P, office notes, breast imaging, and pathology were reviewed. Patient examined and laterality marked. There has been no changes. We will plan to proceed with a left breast partial mastectomy. The patient was counseled at length about the risks of ashley Covid-19 during their perioperative period and any recovery window from their procedure. The patient was made aware that ashley Covid-19  may worsen their prognosis for recovering from their procedure  and lend to a higher morbidity and/or mortality risk. All material risks, benefits, and reasonable alternatives including postponing the procedure were discussed. The patient does wish to proceed with the procedure at this time.           Suze Moran MD

## 2022-02-22 NOTE — ANESTHESIA PRE PROCEDURE
Department of Anesthesiology  Preprocedure Note       Name:  Manisha Painter   Age:  [de-identified] y.o.  :  1940                                          MRN:  [id number]         Date:  2022      Surgeon: Fransisco Godoy):  Jen Trinidad MD    Procedure: Procedure(s):  LEFT BREAST LOCALIZED PARTIAL MASTECTOMY, BIOZORB,    Medications prior to admission:   Prior to Admission medications    Medication Sig Start Date End Date Taking? Authorizing Provider   ELIQUIS 5 MG TABS tablet TAKE 1 TABLET BY MOUTH 2 TIMES DAILY 21  Yes JOSE Camarillo CNP   HYDROcodone-acetaminophen (NORCO) 5-325 MG per tablet Take 1 tablet by mouth every 4 hours as needed for Pain for up to 7 days. Intended supply: 7 days. Take lowest dose possible to manage pain 2/22/22 3/1/22  Jen Trinidad MD   metoprolol (LOPRESSOR) 100 MG tablet TAKE 1 TABLET BY MOUTH TWICE A DAY 21   JOSE Jaeger CNP   flecainide (TAMBOCOR) 50 MG tablet TAKE 1 TABLET BY MOUTH 2 TIMES DAILY 9/15/21   Edward Kolb DO   levothyroxine (SYNTHROID) 25 MCG tablet TAKE 1 TABLET BY MOUTH EVERY DAY 21   Edward Kolb DO   furosemide (LASIX) 20 MG tablet Take 1 tablet by mouth daily as needed (water weight gain.) 21   Edward Kolb DO   Handicap Placard MISC by Does not apply route Dx of shortness of breath and A-fib. Effective 2021 until 2026. 21   Edward Kolb DO   Cholecalciferol (VITAMIN D) 2000 UNITS CAPS capsule Take 1 capsule by mouth daily.  14   Manju Bains MD       Current medications:    Current Facility-Administered Medications   Medication Dose Route Frequency Provider Last Rate Last Admin    0.9 % sodium chloride infusion  25 mL IntraVENous PRN Jen Trinidad MD        ceFAZolin (ANCEF) 2000 mg in dextrose 5 % 50 mL IVPB  2,000 mg IntraVENous On Call to [facility], MD        lactated ringers infusion   IntraVENous Continuous Bert Bennett MD        sodium chloride flush 0.9 % injection 5-40 mL  5-40 mL IntraVENous 2 times per day Bert Bennett MD        sodium chloride flush 0.9 % injection 5-40 mL  5-40 mL IntraVENous PRN Bert Bennett MD           Allergies: Allergies   Allergen Reactions    Bactrim [Sulfamethoxazole-Trimethoprim] Hives     hives    Seasonal Other (See Comments)     Runny nose, sneezing       Problem List:    Patient Active Problem List   Diagnosis Code    Tinnitus H93.19    Disorder of bone and cartilage M89.9, M94.9    Diverticulosis of large intestine K57.30    Mixed hyperlipidemia E78.2    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    Hypothyroidism E03.9    Osteopenia M85.80    Hx of melanoma excision Z98.890, Z85.820    Sensorineural hearing loss, bilateral H90.3    Tinnitus, bilateral H93.13    Afib ([facility]) I48.91    Atrial fibrillation with rapid ventricular response ([facility]) I48.91    SOB (shortness of breath) R06.02    Bradycardia R00.1    Acute respiratory failure with hypoxia and hypercapnia ([facility]) J96.01, J96.02    Hypotension I95.9    ALEJA (acute kidney injury) (HCC) N17.9    Pacemaker-[employer]TR[employer] Z95.0    NAKIA (obstructive sleep apnea) G47.33    Obesity (BMI 30.0-34. 9) E66.9    Primary breast malignancy, left ([address] C50.912       Past Medical History:        Diagnosis Date    Allergic rhinitis     Alveolitis of jaw     Cancer ([facility]     Dental disease     Diverticulosis of colon (without mention of hemorrhage)     Dizziness     GERD (gastroesophageal reflux disease)     Hearing loss     Myalgia and myositis, unspecified     NAKIA (obstructive sleep apnea) 9/8/2021    Other and unspecified hyperlipidemia     Recurrent upper respiratory infection (URI)     Screening mammogram 11/05/2008    Normal    Tinnitus     Unspecified gastritis and gastroduodenitis without mention of hemorrhage     Unspecified hypothyroidism        Past Surgical History:        Procedure Laterality Date    BLADDER SUSPENSION      CHOLECYSTECTOMY      COLONOSCOPY  2007    Normal    COLONOSCOPY  2012    Dr. Annamarie Brumfield    Right Repair   [address] BREAST NEEDLE BIOPSY LEFT Left 2022    US BRE[facility]    US GUIDED NEEDLE LOC OF LEFT BREAST Left 2022    US GUIDED NEEDLE LOC OF LEFT BREAST 2022 [facility]    WISDOM TOOTH EXTRACTION         Social History:    Social History     Tobacco Use    Smoking status: Former Smoker     Packs/day: 0.00     Years: 10.00     Pack years: 0.00     Quit date: 2005     Years since quittin.7    Smokeless tobacco: Never Used   Substance Use Topics    Alcohol use: Yes     Comment: rare                                Counseling given: Not Answered      Vital Signs (Current):   Vitals:    22 0903   Weight: 184 lb (83.5 kg)   Height: 5' 2\" (1.575 m)                                              BP Readings from Last 3 Encounters:   22 (!) 140/85   22 (!) 163/94   21 138/88       NPO Status: Time of last liquid consumption:                         Time of last solid consumption: 0600                        Date of last liquid consumption: 22                        Date of last solid food consumption: 22    BMI:   Wt Readings from Last 3 Encounters:   22 184 lb (83.5 kg)   22 184 lb (83.5 kg)   22 182 lb (82.6 kg)     Body mass index is 33.65 kg/m².     CBC:   Lab Results   Component Value Date    WBC 8.6 2021    RBC 4.22 2021    HGB 13.8 2021    HCT 40.5 2021    MCV 95.9 2021    RDW 14.5 2021     2021       CMP:   Lab Results   Component Value Date     2021    K 4.8 2021    K 4.0 2021     2021    CO2 22 2021    BUN 18 2021    CREATININE 0.8 2021    GFRAA >60 2021    GFRAA >60 04/15/2013    AGRATIO 1.5 04/17/2021    LABGLOM >60 05/19/2021    GLUCOSE 115 05/19/2021    GLUCOSE 104 06/30/2011    PROT 6.9 04/17/2021    PROT 6.3 11/13/2012    CALCIUM 9.2 05/19/2021    BILITOT 0.7 04/17/2021    ALKPHOS 123 04/17/2021    AST 47 04/17/2021    ALT 42 04/17/2021       POC Tests: No results for input(s): POCGLU, POCNA, POCK, POCCL, POCBUN, POCHEMO, POCHCT in the last 72 hours. Coags:   Lab Results   Component Value Date    PROTIME 19.2 05/19/2021    INR 1.65 05/19/2021    APTT 67.4 04/21/2021       HCG (If Applicable): No results found for: PREGTESTUR, PREGSERUM, HCG, HCGQUANT     ABGs:   Lab Results   Component Value Date    PHART 7.382 04/18/2021    PO2ART 129.0 04/18/2021    LJP8WVC 41.8 04/18/2021    VXR9KMN 25 04/18/2021    BEART -0.4 04/18/2021    F4OWEAOX 99 04/18/2021        Type & Screen (If Applicable):  No results found for: LABABO, LABRH    Drug/Infectious Status (If Applicable):  No results found for: HIV, HEPCAB    COVID-19 Screening (If Applicable):   Lab Results   Component Value Date    COVID19 Not Detected 01/02/2021           Anesthesia Evaluation  Patient summary reviewed and Nursing notes reviewed no history of anesthetic complications:   Airway: Mallampati: I  TM distance: >3 FB   Neck ROM: full  Mouth opening: > = 3 FB Dental:    (+) caps  Comment: All crowns    Pulmonary:   (+) sleep apnea:      (-) asthma and shortness of breath                           Cardiovascular:    (+) pacemaker: pacemaker and no interval change, dysrhythmias: atrial fibrillation,     (-) hypertension and  angina                Neuro/Psych:      (-) CVA           GI/Hepatic/Renal:   (+) GERD:,      (-) liver disease       Endo/Other:    (+) hypothyroidism::., malignancy/cancer. (-) diabetes mellitus               Abdominal:             Vascular:     - PVD. Other Findings:           Anesthesia Plan      general     ASA 3       Induction: intravenous.     MIPS: Postoperative opioids intended and Prophylactic antiemetics administered. Anesthetic plan and risks discussed with patient. Use of blood products discussed with patient whom. Plan discussed with CRNA.                   Mariel Castanon MD   2/22/2022

## 2022-03-01 ENCOUNTER — OFFICE VISIT (OUTPATIENT)
Dept: CARDIOLOGY CLINIC | Age: 82
End: 2022-03-01
Payer: MEDICARE

## 2022-03-01 VITALS
BODY MASS INDEX: 33.29 KG/M2 | HEART RATE: 76 BPM | WEIGHT: 182 LBS | SYSTOLIC BLOOD PRESSURE: 130 MMHG | DIASTOLIC BLOOD PRESSURE: 94 MMHG

## 2022-03-01 DIAGNOSIS — I49.5 SSS (SICK SINUS SYNDROME) (HCC): ICD-10-CM

## 2022-03-01 DIAGNOSIS — Z95.0 PACEMAKER: ICD-10-CM

## 2022-03-01 DIAGNOSIS — I48.19 PERSISTENT ATRIAL FIBRILLATION (HCC): Primary | ICD-10-CM

## 2022-03-01 DIAGNOSIS — I10 ESSENTIAL HYPERTENSION: ICD-10-CM

## 2022-03-01 DIAGNOSIS — I50.32 CHRONIC DIASTOLIC CONGESTIVE HEART FAILURE (HCC): ICD-10-CM

## 2022-03-01 PROCEDURE — 1123F ACP DISCUSS/DSCN MKR DOCD: CPT | Performed by: INTERNAL MEDICINE

## 2022-03-01 PROCEDURE — G8427 DOCREV CUR MEDS BY ELIG CLIN: HCPCS | Performed by: INTERNAL MEDICINE

## 2022-03-01 PROCEDURE — G8417 CALC BMI ABV UP PARAM F/U: HCPCS | Performed by: INTERNAL MEDICINE

## 2022-03-01 PROCEDURE — G8399 PT W/DXA RESULTS DOCUMENT: HCPCS | Performed by: INTERNAL MEDICINE

## 2022-03-01 PROCEDURE — 1036F TOBACCO NON-USER: CPT | Performed by: INTERNAL MEDICINE

## 2022-03-01 PROCEDURE — 4040F PNEUMOC VAC/ADMIN/RCVD: CPT | Performed by: INTERNAL MEDICINE

## 2022-03-01 PROCEDURE — 99214 OFFICE O/P EST MOD 30 MIN: CPT | Performed by: INTERNAL MEDICINE

## 2022-03-01 PROCEDURE — G8482 FLU IMMUNIZE ORDER/ADMIN: HCPCS | Performed by: INTERNAL MEDICINE

## 2022-03-01 PROCEDURE — 1090F PRES/ABSN URINE INCON ASSESS: CPT | Performed by: INTERNAL MEDICINE

## 2022-03-01 ASSESSMENT — ENCOUNTER SYMPTOMS
CHOKING: 0
SHORTNESS OF BREATH: 0
CHEST TIGHTNESS: 0
COUGH: 0

## 2022-03-01 NOTE — PROGRESS NOTES
Subjective:      Patient ID: Oj Lowe is a [de-identified] y.o. female. HPI  Follow up afib/sob/HTN/SSS/Pacer. Feeling much better. Dx br ca 12.21.  Just had lumpectomy. Not feeling palp/arrhythmia. Breathing good. No chest pain. No pnd or orthopnea. No tachy/syncope/Palp. No edema. BP good. No syncope. EP following.        Past Medical History:   Diagnosis Date    Allergic rhinitis     Alveolitis of jaw     Cancer ([facility]     Dental disease     Diverticulosis of colon (without mention of hemorrhage)     Dizziness     GERD (gastroesophageal reflux disease)     Hearing loss     Myalgia and myositis, unspecified     NAKIA (obstructive sleep apnea) 9/8/2021    Other and unspecified hyperlipidemia     Recurrent upper respiratory infection (URI)     Screening mammogram 11/05/2008    Normal    Tinnitus     Unspecified gastritis and gastroduodenitis without mention of hemorrhage     Unspecified hypothyroidism      Past Surgical History:   Procedure Laterality Date    BLADDER SUSPENSION  1990s    BREAST LUMPECTOMY Left 2/22/2022    LEFT BREAST LOCALIZED PARTIAL MASTECTOMY, BIOZORB, performed by Gen Reyna MD at [address]  1/16/2007    Normal    COLONOSCOPY  02/27/2012    Dr. Laureano Johnson    Right Repair   [address] LEFT Left 1/21/2022    US BREAST NEEDLE BIOPSY LEFT 1/21/2022 [facility]    US GUIDED NEEDLE LOC OF LEFT BREAST Left 2/18/2022    US GUIDED NEEDLE LOC OF LEFT BREAST 2/18/2022 [facility]TR[facility]    WISDOM TOOTH EXTRACTION       Social History     Socioeconomic History    Marital status:      Spouse name: Not on file    Number of children: Not on file    Years of education: Not on file    Highest education level: Not on file   Occupational History    Not on file   Tobacco Use    Smoking status: Former Smoker     Packs/day: 0.00     Years: 10.00     Pack years: 0.00     Quit date: 2005     Years since quittin.7    Smokeless tobacco: Never Used   Vaping Use    Vaping Use: Never used   Substance and Sexual Activity    Alcohol use: Yes     Comment: rare    Drug use: No    Sexual activity: Yes   Other Topics Concern    Not on file   Social History Narrative    Not on file     Social Determinants of Health     Financial Resource Strain: Low Risk     Difficulty of Paying Living Expenses: Not hard at all   Food Insecurity: No Food Insecurity    Worried About Running Out of Food in the Last Year: Never true    Deven of Food in the Last Year: Never true   Transportation Needs: No Transportation Needs    Lack of Transportation (Medical): No    Lack of Transportation (Non-Medical): No   Physical Activity:     Days of Exercise per Week: Not on file    Minutes of Exercise per Session: Not on file   Stress:     Feeling of Stress : Not on file   Social Connections:     Frequency of Communication with Friends and Family: Not on file    Frequency of Social Gatherings with Friends and Family: Not on file    Attends Synagogue Services: Not on file    Active Member of [address] [place] [address] or Organizations: Not on file    Attends Club or Organization Meetings: Not on file    Marital Status: Not on file   Intimate Partner Violence:     Fear of Current or Ex-Partner: Not on file    Emotionally Abused: Not on file    Physically Abused: Not on file    Sexually Abused: Not on file   Housing Stability:     Unable to Pay for Housing in the Last Year: Not on file    Number of [place] in the Last Year: Not on file    Unstable Housing in the Last Year: Not on file      reviewed    Vitals:    22 1323   BP: (!) 130/94   Pulse: 76         Wt 180    Review of Systems   Constitutional: Negative for activity change, appetite change and fatigue. Respiratory: Negative for cough, choking, chest tightness and shortness of breath.     Cardiovascular: Negative for chest pain, palpitations and leg swelling. Denies PND or orthopnea. No tachycardia or syncope. Neurological: Negative for dizziness, syncope and light-headedness. Psychiatric/Behavioral: Negative for agitation, behavioral problems and confusion. All other systems reviewed and are negative. Objective:   Physical Exam  Constitutional:       General: She is not in acute distress. Appearance: Normal appearance. She is well-developed. HENT:      Head: Normocephalic and atraumatic. Right Ear: External ear normal.      Left Ear: External ear normal.   Neck:      Vascular: No JVD. Cardiovascular:      Rate and Rhythm: Normal rate and regular rhythm. Heart sounds: Normal heart sounds. No murmur heard. No gallop. Pulmonary:      Effort: Pulmonary effort is normal. No respiratory distress. Breath sounds: Normal breath sounds. No wheezing or rales. Abdominal:      General: Bowel sounds are normal.      Palpations: Abdomen is soft. Tenderness: There is no abdominal tenderness. Musculoskeletal:         General: Normal range of motion. Cervical back: Normal range of motion. Skin:     General: Skin is warm and dry. Neurological:      General: No focal deficit present. Mental Status: She is alert and oriented to person, place, and time. Psychiatric:         Mood and Affect: Mood normal.         Behavior: Behavior normal.         Assessment:       Diagnosis Orders   1. Persistent atrial fibrillation ([address]     2. SSS (sick sinus syndrome) ([address]     3. Pacemaker     4. Essential hypertension     5. Chronic diastolic congestive heart failure ([address]             Plan:      CV stable. S/P pacer for SSS  Compensated. On AC. Continue Eliquis 5 mg bid. No bleeding issues. Continue  lopressor to 100 mg bid. Will continue flecanide for afib. Reviewed previous records and testing including echo 1/21. Follow up 3 months. EP following for pacer/afib.           Tonya Matson, MD

## 2022-03-07 NOTE — PROGRESS NOTES
PCP:  Medical Oncology: Taylor Mccormick  Radiation:  Other:      pTisNx  STAGE:  0 left breast cancer      Ms. Jennings is a 81y.o.-year-old woman who is now s/p left partial mastectomy for left breast cancer. She underwent this procedure on 2022 and tolerated it well. She is doing quite well postoperatively and her pain is continuing to improve. INTERVAL HISTORY:  On 2022 she underwent left breast partial mastectomy. Pathology identified 6 mm of grade 2 DCIS. Deep margin was focally closed with additional deep margin at that time was negative. ER positive. [id number]      Pathology:    Department of Pathology   FINAL SURGICAL PATHOLOGY REPORT   Patient Name: Mustapha Savage         Accession No:  [id number]    Age Sex:   1940    81 Y / F       Location:      [facility]   Account No:   [de-identified]                  Collected:     2022   Med Rec No:    [id number]                 Received:      2022   Attend Phys: Jessica LORENZO             Completed:     2022   Perform Phys: Jessica LORENZO             FINAL DIAGNOSIS:        A. Left breast tissue, excision:        - Ductal carcinoma in situ (DCIS), intermediate nuclear grade- See          Comment/ Case Summary.        B. Left breast tissue, deep margin, excision:        - Negative for carcinoma. COMMENT: There is no invasive carcinoma identified in this resection (P40   and P63 stains on selected slides show intact myoepithelial layer). Below   Case Summary is issued combining the findings in prior biopsy   [id number] (Invasive ductal carcinoma with apocrine features).      CASE SUMMARY: (INVASIVE CARCINOMA OF THE BREAST: Resection)   Standard(s): AJCC-UICC 8     SPECIMEN   Procedure   Excision (less than total mastectomy)     Specimen Laterality   Left     TUMOR   Histologic Type   Per prior biopsy, Invasive carcinoma with apocrine feature     Histologic Grade ([place] Histologic Score)   Dany Mendoza Score    Glandular (Acinar) / Tubular Differentiation    Score 3 (less than 10% of tumor area forming glandular / tubular   structures)    Nuclear Pleomorphism    Score 3 (Vesicular nuclei, often with prominent nucleoli, exhibiting    marked variation in size and shape, occasionally with very large and    bizarre forms)    Mitotic Rate    Score 2    Overall Grade    Grade 3 (scores of 8 or 9)     Tumor Size   Per prior biopsy, Tumor Size: Greatest dimension of largest invasive   focus: 6 mm     Ductal Carcinoma In Situ (DCIS)   Present    Negative for extensive intraductal component (EIC)    Size (Extent) of DCIS    Estimated size (extent) of DCIS is at least in Millimeters (mm): at   least 15 mm      Number of Blocks with DCIS: 4      Number of Blocks Examined: 14    Architectural Patterns    Comedo    Cribriform    Solid    Nuclear Grade    Grade II (intermediate)    Necrosis    Present, central (expansive \"comedo\" necrosis)     Treatment Effect in the Breast   No known presurgical therapy     MARGINS   Margin Status for Invasive Carcinoma   Not applicable (residual invasive carcinoma in specimen is absent)     Margin Status for DCIS   All margins negative for DCIS    Distance from DCIS to Closest Margin: In main specimen- 1mm to   posterior/ deep margin, 4mm to anterior margin;  In deep re-excision   margin- negative for carcinoma     REGIONAL LYMPH NODES   Regional Lymph Node Status   Not applicable (no regional lymph nodes submitted or found)     PATHOLOGIC STAGE CLASSIFICATION (pTNM, AJCC 8th Edition)   pT Category   Per prior biopsy, pT1b: Tumor greater than 5 mm but less than or equal to   10 mm in greatest dimension     pN Category   pN not assigned (no nodes submitted or found)     SPECIAL STUDIES   Breast Biomarker Testing Performed on Previous Biopsy [id number]   Estrogen Receptor (ER) Status   %Tumor staining/Intensity:   > 95%/Strong   Interpretation:  Positive   Status of Internal Controls: Internal control cells present and stain as   expected     Progesterone Receptor (PgR) Status   %Tumor staining/Intensity:  2%/Moderate   Interpretation:  Low positive   Status of Internal Controls: Internal control cells present and stain as   expected     HER-2 EXPRESSION (by Immunohistochemistry): Negative (score 0)         JINMI/KAROLYN         Exam:  General: no acute distress  Breast: There is a well healing scar on the lower left breast.  There is no active drainage or signs of infection. There is no ecchymosis, apparent hematomas or significant seromas present. She has good range of motion with her arm. Respiratory: respirations are non-labored and there is no audible distress  Cardiovascular: regular rate, extremities appear well perfused  Neurologic: alert, oriented      Assessment/Plan:  pTisNx  STAGE:  0 left breast cancer  ER positive  S/p partial mastectomy    Her pathology results were reviewed with her in detail today. She was provided a copy of her pathology report for her records. Systemic treatment was reviewed. She will have a follow up with medical oncology in order to further discuss these adjuvant treatments. We reviewed that radiation therapy is recommended in patients undergoing breast conservation therapy to reduce the risk of local recurrence. She will follow up with radiation oncology to discuss planning. She is now approximately 2 weeks postop and healing very well. She should be able to begin radiation treatments in the next 1-2 weeks. We will make her a referral.    At this time she can resume normal activity and wearing her regular bras. She should be fully healed in another 6 weeks at which time she can begin massage with lotion to soften scar tissue. I encouraged her to continue self breast evaluation. Follow up surveillance was discussed. Our plan at this time is to follow up with surgical breast oncology office in 3 months.   At that time we can re-evaluate her and set her up for new baseline imaging. This will likely be due in late December/January 2022 with clinical exam as well. All of the patient's questions were answered at this time, but she was encouraged to call the office with any further inquiries.

## 2022-03-10 ENCOUNTER — OFFICE VISIT (OUTPATIENT)
Dept: SURGERY | Age: 82
End: 2022-03-10

## 2022-03-10 VITALS
HEART RATE: 87 BPM | DIASTOLIC BLOOD PRESSURE: 90 MMHG | BODY MASS INDEX: 33.68 KG/M2 | HEIGHT: 62 IN | WEIGHT: 183 LBS | RESPIRATION RATE: 18 BRPM | SYSTOLIC BLOOD PRESSURE: 146 MMHG | OXYGEN SATURATION: 98 %

## 2022-03-10 DIAGNOSIS — C50.912 PRIMARY BREAST MALIGNANCY, LEFT (HCC): Primary | ICD-10-CM

## 2022-03-10 DIAGNOSIS — Z09 POSTOP CHECK: ICD-10-CM

## 2022-03-10 PROCEDURE — 99024 POSTOP FOLLOW-UP VISIT: CPT | Performed by: SURGERY

## 2022-03-11 DIAGNOSIS — C50.912 PRIMARY BREAST MALIGNANCY, LEFT (HCC): Primary | ICD-10-CM

## 2022-03-14 RX ORDER — FLECAINIDE ACETATE 50 MG/1
50 TABLET ORAL 2 TIMES DAILY
Qty: 180 TABLET | Refills: 3 | Status: SHIPPED | OUTPATIENT
Start: 2022-03-14

## 2022-03-14 NOTE — TELEPHONE ENCOUNTER
[facility] Medication Refills:    flecainide (TAMBOCOR) 50 MG tablet [[id number]]     Order Details  Dose: 50 mg Route: Oral Frequency: 2 TIMES DAILY   Dispense Quantity: 180 tablet Refills: 1          Sig: TAKE 1 TABLET BY MOUTH 2 TIMES DAILY     [facility] [address] SHARON Reid [facility] [phone] Digna Sethi [phone]    Last Office Visit: 03/01/22    Next Office Visit: 06/07/22    Last Refill: 09/15/21    Last Labs: 02/22/22

## 2022-03-22 ENCOUNTER — NURSE ONLY (OUTPATIENT)
Dept: CARDIOLOGY CLINIC | Age: 82
End: 2022-03-22
Payer: MEDICARE

## 2022-03-22 DIAGNOSIS — I48.91 ATRIAL FIBRILLATION, UNSPECIFIED TYPE (HCC): Chronic | ICD-10-CM

## 2022-03-22 DIAGNOSIS — Z95.0 PACEMAKER: ICD-10-CM

## 2022-03-22 DIAGNOSIS — R00.1 BRADYCARDIA: ICD-10-CM

## 2022-03-24 PROCEDURE — 93294 REM INTERROG EVL PM/LDLS PM: CPT | Performed by: INTERNAL MEDICINE

## 2022-03-24 PROCEDURE — 93296 REM INTERROG EVL PM/IDS: CPT | Performed by: INTERNAL MEDICINE

## 2022-04-07 ENCOUNTER — OFFICE VISIT (OUTPATIENT)
Dept: INTERNAL MEDICINE CLINIC | Age: 82
End: 2022-04-07
Payer: MEDICARE

## 2022-04-07 VITALS
HEART RATE: 71 BPM | WEIGHT: 182.4 LBS | BODY MASS INDEX: 33.36 KG/M2 | OXYGEN SATURATION: 92 % | RESPIRATION RATE: 16 BRPM | SYSTOLIC BLOOD PRESSURE: 132 MMHG | DIASTOLIC BLOOD PRESSURE: 88 MMHG

## 2022-04-07 DIAGNOSIS — E03.8 OTHER SPECIFIED HYPOTHYROIDISM: ICD-10-CM

## 2022-04-07 DIAGNOSIS — C50.912 PRIMARY BREAST MALIGNANCY, LEFT (HCC): ICD-10-CM

## 2022-04-07 DIAGNOSIS — R73.9 HYPERGLYCEMIA: ICD-10-CM

## 2022-04-07 DIAGNOSIS — I48.91 ATRIAL FIBRILLATION WITH RAPID VENTRICULAR RESPONSE (HCC): ICD-10-CM

## 2022-04-07 DIAGNOSIS — N39.3 STRESS INCONTINENCE OF URINE: ICD-10-CM

## 2022-04-07 DIAGNOSIS — E78.2 MIXED HYPERLIPIDEMIA: ICD-10-CM

## 2022-04-07 DIAGNOSIS — E78.49 OTHER HYPERLIPIDEMIA: ICD-10-CM

## 2022-04-07 DIAGNOSIS — Z76.89 ENCOUNTER TO ESTABLISH CARE: Primary | ICD-10-CM

## 2022-04-07 PROCEDURE — 1123F ACP DISCUSS/DSCN MKR DOCD: CPT | Performed by: INTERNAL MEDICINE

## 2022-04-07 PROCEDURE — G8417 CALC BMI ABV UP PARAM F/U: HCPCS | Performed by: INTERNAL MEDICINE

## 2022-04-07 PROCEDURE — 99214 OFFICE O/P EST MOD 30 MIN: CPT | Performed by: INTERNAL MEDICINE

## 2022-04-07 PROCEDURE — 1090F PRES/ABSN URINE INCON ASSESS: CPT | Performed by: INTERNAL MEDICINE

## 2022-04-07 PROCEDURE — 4040F PNEUMOC VAC/ADMIN/RCVD: CPT | Performed by: INTERNAL MEDICINE

## 2022-04-07 PROCEDURE — G8427 DOCREV CUR MEDS BY ELIG CLIN: HCPCS | Performed by: INTERNAL MEDICINE

## 2022-04-07 PROCEDURE — 1036F TOBACCO NON-USER: CPT | Performed by: INTERNAL MEDICINE

## 2022-04-07 PROCEDURE — G8399 PT W/DXA RESULTS DOCUMENT: HCPCS | Performed by: INTERNAL MEDICINE

## 2022-04-07 SDOH — ECONOMIC STABILITY: FOOD INSECURITY: WITHIN THE PAST 12 MONTHS, YOU WORRIED THAT YOUR FOOD WOULD RUN OUT BEFORE YOU GOT MONEY TO BUY MORE.: NEVER TRUE

## 2022-04-07 SDOH — ECONOMIC STABILITY: FOOD INSECURITY: WITHIN THE PAST 12 MONTHS, THE FOOD YOU BOUGHT JUST DIDN'T LAST AND YOU DIDN'T HAVE MONEY TO GET MORE.: NEVER TRUE

## 2022-04-07 ASSESSMENT — PATIENT HEALTH QUESTIONNAIRE - PHQ9
SUM OF ALL RESPONSES TO PHQ QUESTIONS 1-9: 0
1. LITTLE INTEREST OR PLEASURE IN DOING THINGS: 0
SUM OF ALL RESPONSES TO PHQ QUESTIONS 1-9: 0
SUM OF ALL RESPONSES TO PHQ QUESTIONS 1-9: 0
SUM OF ALL RESPONSES TO PHQ9 QUESTIONS 1 & 2: 0
2. FEELING DOWN, DEPRESSED OR HOPELESS: 0
DEPRESSION UNABLE TO ASSESS: PT REFUSES
SUM OF ALL RESPONSES TO PHQ QUESTIONS 1-9: 0

## 2022-04-07 ASSESSMENT — SOCIAL DETERMINANTS OF HEALTH (SDOH): HOW HARD IS IT FOR YOU TO PAY FOR THE VERY BASICS LIKE FOOD, HOUSING, MEDICAL CARE, AND HEATING?: NOT HARD AT ALL

## 2022-04-07 NOTE — PROGRESS NOTES
Lambert Jennings (:  1940) is a 81 y.o. female, here for evaluation of the following chief complaint(s):    New Patient      ASSESSMENT/PLAN:  1. Encounter to establish care  Preventive healthcare addressed. Patient is interested in trying to lose her pandemic weight and will work on diet and exercise. 2. Primary breast malignancy, left ([facility] [address]  Currently undergoing radiation therapy. Current plans include aromatase inhibitor. 3. Atrial fibrillation with rapid ventricular response (HCC)  Stable on flecainide, Eliquis, metoprolol. She takes occasional Lasix when she feels edematous  -     CBC; Future  4. Other specified hypothyroidism   Check labs on levothyroxine  -     TSH; Future  5. Mixed hyperlipidemia  Not currently on statin  -     Lipid Panel; Future  -     Comprehensive Metabolic Panel; Future  6. Stress incontinence of urine  -     [facility] - Krystle Garcia MD, Urology, [place]      Return in about 6 months (around 10/7/2022). SUBJECTIVE/OBJECTIVE:  HPI   Patient is here to transfer care. Chart reviewed and updated. Overall she feels well. She is undergoing radiation treatment for breast cancer and feels she is doing okay. Her breast cancer is early stage. She is frustrated by some weight gain she has had this year. She denies chest pain, shortness of breath. She sometimes gets swelling she describes around her mid abdomen and when she takes Lasix she feels better. She has been told by her cardiologist that she can take it for these symptoms.     Ros:  Stress incontinence-history of remote bladder surgery    Past Medical History:   Diagnosis Date    Age related osteoporosis     Allergic rhinitis     seasonal    Alveolitis of jaw     from fosamax    [facility] ([facility]     dr Tasha Torres and dr Alvarez Calles enlargement     Cancer [facility])     breast - left  - xrt and chemo ([facility])    Diastolic CHF ([facility]     Diverticulosis     Generalized edema     lasix as needed    GERD (gastroesophageal reflux disease)     Hearing loss     Melanoma (HCC)     left scapula    NAKIA (obstructive sleep apnea) 09/08/2021    Other and unspecified hyperlipidemia     Tinnitus     Unspecified gastritis and gastroduodenitis without mention of hemorrhage     aloe vera juice for gerd    Unspecified hypothyroidism        Current Outpatient Medications   Medication Sig Dispense Refill    flecainide (TAMBOCOR) 50 MG tablet Take 1 tablet by mouth 2 times daily 180 tablet 3    ELIQUIS 5 MG TABS tablet TAKE 1 TABLET BY MOUTH 2 TIMES DAILY 180 tablet 3    metoprolol (LOPRESSOR) 100 MG tablet TAKE 1 TABLET BY MOUTH TWICE A  tablet 3    levothyroxine (SYNTHROID) 25 MCG tablet TAKE 1 TABLET BY MOUTH EVERY DAY 90 tablet 3    furosemide (LASIX) 20 MG tablet Take 1 tablet by mouth daily as needed (water weight gain.) 60 tablet 3    Handicap Placard MISC by Does not apply route Dx of shortness of breath and A-fib. Effective Jan 13, 2021 until Jan 13, 2026. 1 each 0    Cholecalciferol (VITAMIN D) 2000 UNITS CAPS capsule Take 1 capsule by mouth daily. No current facility-administered medications for this visit. Physical Exam  Vitals and nursing note reviewed. Constitutional:       General: She is not in acute distress. Appearance: She is well-developed. HENT:      Head: Normocephalic and atraumatic. Right Ear: External ear normal.      Left Ear: External ear normal.   Eyes:      General: No scleral icterus. Extraocular Movements: Extraocular movements intact. Neck:      Thyroid: No thyromegaly. Cardiovascular:      Rate and Rhythm: Normal rate and regular rhythm. Heart sounds: No murmur heard. Pulmonary:      Effort: No respiratory distress. Breath sounds: Normal breath sounds. No wheezing or rales. Abdominal:      General: Bowel sounds are normal. There is no distension. Palpations: Abdomen is soft. Tenderness:  There is no abdominal tenderness. Musculoskeletal:         General: Normal range of motion. Right lower leg: No edema. Left lower leg: No edema. Lymphadenopathy:      Cervical: No cervical adenopathy. Skin:     General: Skin is warm and dry. Neurological:      Mental Status: She is alert and oriented to person, place, and time. Cranial Nerves: No cranial nerve deficit. Sensory: No sensory deficit. Coordination: Coordination normal.   Psychiatric:         Behavior: Behavior normal.               This note was generated completely or in part utilizing Dragon dictation speech recognition software. Occasionally, words are mistranscribed and despite editing, the text may contain inaccuracies due to incorrect word recognition. If further clarification is needed please contact the office at [phone]          An electronic signature was used to authenticate this note.     --Oral Small MD

## 2022-04-08 ENCOUNTER — NURSE ONLY (OUTPATIENT)
Dept: CARDIOLOGY CLINIC | Age: 82
End: 2022-04-08

## 2022-04-08 NOTE — PROGRESS NOTES
Elif [address] transmission received 04.08.22 @ 1:03 pm from [facility] for patient's dual chamber pacemaker. REASON FOR TRANSMISSION  Pre-op/Post-op or magnet application  TECHNICAL FINDINGS  No device or lead performance issue(s) observed  ADDITIONAL NOTES  DEVICE ASSESSMENT: Available daily battery/lead measurements within expected range. Lead trends stable. ARRHYTHMIA SUMMARY: No arrhythmias/high rate episodes detected since last interrogation on 21-mar-2022   OBSERVATIONS: Device appears to be currently functioning as programmed. EP physician will review. See interrogation under cardiology tab in the [address] [place] [address] [address] field for more details.

## 2022-04-11 ENCOUNTER — TELEPHONE (OUTPATIENT)
Dept: INTERNAL MEDICINE CLINIC | Age: 82
End: 2022-04-11

## 2022-04-11 DIAGNOSIS — R73.9 HYPERGLYCEMIA: ICD-10-CM

## 2022-04-11 DIAGNOSIS — E78.49 OTHER HYPERLIPIDEMIA: ICD-10-CM

## 2022-04-11 NOTE — TELEPHONE ENCOUNTER
Patient was given all lab results, she states she is not interested in starting any Statins at this time as she is going through radiation for her breast cancer.  She will get back with the Dr after this is a finished

## 2022-04-12 LAB
ESTIMATED AVERAGE GLUCOSE: 119.8 MG/DL
HBA1C MFR BLD: 5.8 %

## 2022-04-15 ENCOUNTER — NURSE ONLY (OUTPATIENT)
Dept: CARDIOLOGY CLINIC | Age: 82
End: 2022-04-15

## 2022-04-15 NOTE — PROGRESS NOTES
Elif [address] 97 transmission received 04.15.22 @ 1:50pm from  [facility] Radiology for patient's dual chamber pacemaker. REASON FOR TRANSMISSION  Other (please specify in Additional Notes)  TECHNICAL FINDINGS  No device or lead performance issue(s) observed  ADDITIONAL NOTES  Patient Name: Ji Jennings   Reason for Check: Post Radiation Treatment   DEVICE ASSESSMENT: Available daily battery/lead measurements within expected range. Lead trends stable. ARRHYTHMIA SUMMARY: No arrhythmias/high rate episodes detected since last interrogation on 8Apr2022   OBSERVATIONS: Device appears to be currently functioning as programmed. EP physician will review. See interrogation under cardiology tab in the [address] [place] [address] [address] field for more details.

## 2022-06-07 ENCOUNTER — OFFICE VISIT (OUTPATIENT)
Dept: CARDIOLOGY CLINIC | Age: 82
End: 2022-06-07
Payer: MEDICARE

## 2022-06-07 VITALS
HEART RATE: 72 BPM | BODY MASS INDEX: 32.37 KG/M2 | WEIGHT: 177 LBS | SYSTOLIC BLOOD PRESSURE: 136 MMHG | DIASTOLIC BLOOD PRESSURE: 70 MMHG

## 2022-06-07 DIAGNOSIS — Z95.0 PACEMAKER: ICD-10-CM

## 2022-06-07 DIAGNOSIS — I49.5 SSS (SICK SINUS SYNDROME) (HCC): ICD-10-CM

## 2022-06-07 DIAGNOSIS — I50.32 CHRONIC DIASTOLIC CONGESTIVE HEART FAILURE (HCC): ICD-10-CM

## 2022-06-07 DIAGNOSIS — I10 ESSENTIAL HYPERTENSION: ICD-10-CM

## 2022-06-07 DIAGNOSIS — I48.0 PAROXYSMAL ATRIAL FIBRILLATION (HCC): Primary | ICD-10-CM

## 2022-06-07 PROCEDURE — 1090F PRES/ABSN URINE INCON ASSESS: CPT | Performed by: INTERNAL MEDICINE

## 2022-06-07 PROCEDURE — G8417 CALC BMI ABV UP PARAM F/U: HCPCS | Performed by: INTERNAL MEDICINE

## 2022-06-07 PROCEDURE — 99214 OFFICE O/P EST MOD 30 MIN: CPT | Performed by: INTERNAL MEDICINE

## 2022-06-07 PROCEDURE — G8399 PT W/DXA RESULTS DOCUMENT: HCPCS | Performed by: INTERNAL MEDICINE

## 2022-06-07 PROCEDURE — 1036F TOBACCO NON-USER: CPT | Performed by: INTERNAL MEDICINE

## 2022-06-07 PROCEDURE — 1123F ACP DISCUSS/DSCN MKR DOCD: CPT | Performed by: INTERNAL MEDICINE

## 2022-06-07 PROCEDURE — G8427 DOCREV CUR MEDS BY ELIG CLIN: HCPCS | Performed by: INTERNAL MEDICINE

## 2022-06-07 RX ORDER — ANASTROZOLE 1 MG/1
1 TABLET ORAL
COMMUNITY
Start: 2022-05-06 | End: 2022-10-13

## 2022-06-07 ASSESSMENT — ENCOUNTER SYMPTOMS
CHOKING: 0
COUGH: 0
CHEST TIGHTNESS: 0
SHORTNESS OF BREATH: 0

## 2022-06-07 NOTE — PROGRESS NOTES
Subjective:      Patient ID: Quirino Mccabe is a 81 y.o. female. HPI  Follow up afib/sob/HTN/SSS/Pacer. Feeling ok. Dx br ca 12.21. s.p  lumpectomy. Not feeling palp/arrhythmia. Some fatigue. Breathing good. No chest pain. No pnd or orthopnea. No tachy/syncope/Palp. No edema. BP good. No syncope. EP following.        Past Medical History:   Diagnosis Date    Age related osteoporosis     Allergic rhinitis     seasonal    Alveolitis of jaw     from fosamax    Atrial fibrillation ([facility]     dr Ramy Cuello and dr Pascual Round    Biatrial enlargement     Cancer [facility])     breast - left  - xrt and chemo (AI)    Diastolic CHF ([facility]     Diverticulosis     Generalized edema     lasix as needed    GERD (gastroesophageal reflux disease)     Hearing loss     Melanoma ([facility]     left scapula    NAKIA (obstructive sleep apnea) 09/08/2021    Other and unspecified hyperlipidemia     Pre-diabetes     Tinnitus     Unspecified gastritis and gastroduodenitis without mention of hemorrhage     aloe vera juice for gerd    Unspecified hypothyroidism      Past Surgical History:   Procedure Laterality Date    BLADDER SUSPENSION  1990s    BREAST LUMPECTOMY Left 02/22/2022    LEFT BREAST LOCALIZED PARTIAL MASTECTOMY, BIOZORB, performed by Criselda Childs MD at [address]  01/16/2007    Normal    COLONOSCOPY  02/27/2012    Dr. Perlita Collazo    Right Repair-inguinal    HYSTERECTOMY  1975    ovaries left    OTHER SURGICAL HISTORY  04/21/2021    pacemaker    US BREAST NEEDLE BIOPSY LEFT Left 01/21/2022    US BREAST NEEDLE BIOPSY LEFT 1/21/2022 [facility]    US GUIDED NEEDLE LOC OF LEFT BREAST Left 02/18/2022    US GUIDED NEEDLE LOC OF LEFT BREAST 2/18/2022 [facility] ULTR[facility]    WISDOM TOOTH EXTRACTION       Social History     Socioeconomic History    Marital status:      Spouse name: Not on file    Number of children: Not on file    Years of education: Not on file    Highest education level: Not on file   Occupational History    Occupation: [employer]   Tobacco Use    Smoking status: Former Smoker     Packs/day: 0.00     Years: 10.00     Pack years: 0.00     Quit date: 2005     Years since quittin.0    Smokeless tobacco: Never Used   Vaping Use    Vaping Use: Never used   Substance and Sexual Activity    Alcohol use: Yes     Comment: rare    Drug use: No    Sexual activity: Yes   Other Topics Concern    Not on file   Social History Narrative    Had 1, oldest daughter passed     8 grandkids      Social Determinants of Health     Financial Resource Strain: Low Risk     Difficulty of Paying Living Expenses: Not hard at all   Food Insecurity: No Food Insecurity    Worried About [id number] in the Last Year: Never true    [facility] in the Last Year: Never true   Transportation Needs:     Lack of Transportation (Medical): Not on file    Lack of Transportation (Non-Medical):  Not on file   Physical Activity:     Days of Exercise per Week: Not on file    Minutes of Exercise per Session: Not on file   Stress:     Feeling of Stress : Not on file   Social Connections:     Frequency of Communication with Friends and Family: Not on file    Frequency of Social Gatherings with Friends and Family: Not on file    Attends Gnosticism Services: Not on file    Active Member of [address] [place] [address] or Organizations: Not on file    Attends Club or Organization Meetings: Not on file    Marital Status: Not on file   Intimate Partner Violence:     Fear of Current or Ex-Partner: Not on file    Emotionally Abused: Not on file    Physically Abused: Not on file    Sexually Abused: Not on file   Housing Stability:     Unable to Pay for Housing in the Last Year: Not on file    Number of [place] in the Last Year: Not on file    Unstable Housing in the Last Year: Not on file     Valdez Looney reviewed      Vitals:    22 1322   BP: 136/70   Pulse: 72         Wt 17    Review of Systems   Constitutional: Negative for activity change, appetite change and fatigue. Respiratory: Negative for cough, choking, chest tightness and shortness of breath. Cardiovascular: Negative for chest pain, palpitations and leg swelling. Denies PND or orthopnea. No tachycardia or syncope. Neurological: Negative for dizziness, syncope and light-headedness. Psychiatric/Behavioral: Negative for agitation, behavioral problems and confusion. All other systems reviewed and are negative. Objective:   Physical Exam  Constitutional:       General: She is not in acute distress. Appearance: Normal appearance. She is well-developed. HENT:      Head: Normocephalic and atraumatic. Right Ear: External ear normal.      Left Ear: External ear normal.   Neck:      Vascular: No JVD. Cardiovascular:      Rate and Rhythm: Normal rate and regular rhythm. Heart sounds: Normal heart sounds. No murmur heard. No gallop. Pulmonary:      Effort: Pulmonary effort is normal. No respiratory distress. Breath sounds: Normal breath sounds. No wheezing or rales. Abdominal:      General: Bowel sounds are normal.      Palpations: Abdomen is soft. Tenderness: There is no abdominal tenderness. Musculoskeletal:         General: Normal range of motion. Cervical back: Normal range of motion. Skin:     General: Skin is warm and dry. Neurological:      General: No focal deficit present. Mental Status: She is alert and oriented to person, place, and time. Psychiatric:         Mood and Affect: Mood normal.         Behavior: Behavior normal.         Assessment:       Diagnosis Orders   1. Paroxysmal atrial fibrillation (HCC)     2. SSS (sick sinus syndrome) ([facility]     3. Pacemaker     4. Essential hypertension     5. Chronic diastolic congestive heart failure ([facility]                 Plan:      CV stable.   S/P pacer for SSS Compensated. On AC. Continue Eliquis 5 mg bid. No bleeding issues. Continue  lopressor to 100 mg bid for HTN/afib. Will continue flecanide for afib. Reviewed previous records and testing including echo 1/21. Follow up 3 months. EP following for pacer/afib.           Sunil Ramachandran MD

## 2022-06-23 ENCOUNTER — OFFICE VISIT (OUTPATIENT)
Dept: SURGERY | Age: 82
End: 2022-06-23
Payer: MEDICARE

## 2022-06-23 VITALS
SYSTOLIC BLOOD PRESSURE: 126 MMHG | RESPIRATION RATE: 18 BRPM | BODY MASS INDEX: 32.94 KG/M2 | OXYGEN SATURATION: 98 % | WEIGHT: 179 LBS | TEMPERATURE: 97.1 F | HEIGHT: 62 IN | DIASTOLIC BLOOD PRESSURE: 78 MMHG | HEART RATE: 89 BPM

## 2022-06-23 DIAGNOSIS — Z90.12 S/P PARTIAL MASTECTOMY, LEFT: ICD-10-CM

## 2022-06-23 DIAGNOSIS — Z85.3 ENCOUNTER FOR FOLLOW-UP SURVEILLANCE OF BREAST CANCER: ICD-10-CM

## 2022-06-23 DIAGNOSIS — Z85.3 ENCOUNTER FOR FOLLOW-UP SURVEILLANCE OF BREAST CANCER: Primary | ICD-10-CM

## 2022-06-23 DIAGNOSIS — Z85.3 HISTORY OF BREAST CANCER: ICD-10-CM

## 2022-06-23 DIAGNOSIS — Z08 ENCOUNTER FOR FOLLOW-UP SURVEILLANCE OF BREAST CANCER: ICD-10-CM

## 2022-06-23 DIAGNOSIS — Z08 ENCOUNTER FOR FOLLOW-UP SURVEILLANCE OF BREAST CANCER: Primary | ICD-10-CM

## 2022-06-23 DIAGNOSIS — Z12.39 ENCOUNTER FOR SCREENING BREAST EXAMINATION: Primary | ICD-10-CM

## 2022-06-23 PROCEDURE — G8427 DOCREV CUR MEDS BY ELIG CLIN: HCPCS | Performed by: NURSE PRACTITIONER

## 2022-06-23 PROCEDURE — 1123F ACP DISCUSS/DSCN MKR DOCD: CPT | Performed by: NURSE PRACTITIONER

## 2022-06-23 PROCEDURE — G8399 PT W/DXA RESULTS DOCUMENT: HCPCS | Performed by: NURSE PRACTITIONER

## 2022-06-23 PROCEDURE — 1036F TOBACCO NON-USER: CPT | Performed by: NURSE PRACTITIONER

## 2022-06-23 PROCEDURE — 1090F PRES/ABSN URINE INCON ASSESS: CPT | Performed by: NURSE PRACTITIONER

## 2022-06-23 PROCEDURE — 99213 OFFICE O/P EST LOW 20 MIN: CPT | Performed by: NURSE PRACTITIONER

## 2022-06-23 PROCEDURE — G8417 CALC BMI ABV UP PARAM F/U: HCPCS | Performed by: NURSE PRACTITIONER

## 2022-06-23 NOTE — PROGRESS NOTES
[facility]  Surgical Breast Oncology      Medical Oncologist: Dr. Jacquelin Gallardo Oncologist: Dr. Chao Leung       CC: 3 months follow-up, breast cancer f/u      HPI: Nikita Hines is a 81 y.o. woman here for 3 months follow up for breast check secondary to personal history of left breast cancer. She is s/p left partial mastectomy 2/22/2022 with Dr. Vi Gavin for 6 mm of grade 2 DCIS, ER positive, negative margins. S/p adjuvant radiation therapy. She started Arimidex on 5/9/2022, tolerating it well but feeling very fatigued, she is going to give it a few months to see if her energy levels improve. She has no breast related concerns today. She states that she does perform routine self breast evaluations and has not noticed any new abnormalities such as masses, skin changes, color changes,nipple discharge, or changes to the nipple-areolar complex. INTERVAL HX:  On 2/22/2022 she underwent left breast partial mastectomy. Pathology identified 6 mm of grade 2 DCIS. Deep margin was focally closed with additional deep margin at that time was negative. ER positive. [id number]     On 4/15/2022 she completed adjuvant radiation therapy. On 5/9/2022 she initiated Arimidex.       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 Bay Gardiner    Right Repair-inguinal    HYSTERECTOMY (CERVIX STATUS UNKNOWN)  1975    ovaries left    OTHER SURGICAL HISTORY  2021    pacemaker    US BREAST NEEDLE BIOPSY LEFT Left 2022    [facility]    US GUIDED NEEDLE LOC OF LEFT BREAST Left 2022    US GUIDED NEEDLE LOC OF LEFT BREAST 2022 [facility]TR[facility]    WISDOM TOOTH EXTRACTION         Family History   Problem Relation Age of Onset    Cancer Brother         prostate    Dementia Brother     Sleep Apnea Son     Heart Attack Child        Allergies as of 2022 - Fully Reviewed 2022   Allergen Reaction Noted    Bactrim [sulfamethoxazole-trimethoprim] Hives 2019    Seasonal Other (See Comments) 2019    Fosamax [alendronate]  2022       Social History     Tobacco Use    Smoking status: Former Smoker     Packs/day: 0.00     Years: 10.00     Pack years: 0.00     Quit date: 2005     Years since quittin.0    Smokeless tobacco: Never Used   Vaping Use    Vaping Use: Never used   Substance Use Topics    Alcohol use: Yes     Comment: rare    Drug use: No       Current Outpatient Medications on File Prior to Visit   Medication Sig Dispense Refill    anastrozole (ARIMIDEX) 1 MG tablet Take 1 mg by mouth      flecainide (TAMBOCOR) 50 MG tablet Take 1 tablet by mouth 2 times daily 180 tablet 3    ELIQUIS 5 MG TABS tablet TAKE 1 TABLET BY MOUTH 2 TIMES DAILY 180 tablet 3    metoprolol (LOPRESSOR) 100 MG tablet TAKE 1 TABLET BY MOUTH TWICE A  tablet 3    levothyroxine (SYNTHROID) 25 MCG tablet TAKE 1 TABLET BY MOUTH EVERY DAY 90 tablet 3    furosemide (LASIX) 20 MG tablet Take 1 tablet by mouth daily as needed (water weight gain.) 60 tablet 3    Handicap Placard MISC by Does not apply route Dx of shortness of breath and A-fib. Effective Jan 13, 2021 until Jan 13, 2026. 1 each 0    Cholecalciferol (VITAMIN D) 2000 UNITS CAPS capsule Take 1 capsule by mouth daily. No current facility-administered medications on file prior to visit. Medications: documentation has been reviewed in the electronic medical record and patient office intake form. REVIEW OF SYSTEMS:  Constitutional: Negative for fever  HENT: Negative for sore throat  Eyes: Negative for redness   Respiratory: Negative for dyspnea, cough  Cardiovascular: Negative for chest pain  Gastrointestinal: Negative for vomiting, diarrhea   Genitourinary: Negative for hematuria   Musculoskeletal: Negative for arthralgias   Skin: Negative for rash  Neurological: Negative for syncope  Hematological: Negative for adenopathy  Psychiatric/Behavorial: Negative for anxiety         PHYSICAL EXAM:  /78   Pulse 89   Temp 97.1 °F (36.2 °C)   Resp 18   Ht 5' 2\" (1.575 m)   Wt 179 lb (81.2 kg)   SpO2 98%   BMI 32.74 kg/m²   Constitutional: She appearswell-nourished. No apparent distress. Breast: The patient was examined in the upright and supine position. Breasts are symmetrically ptotic. Right: No new masses or changes in breast contour. No skin changes of the breast or nipple areolar complex. No nipple inversion or discharge. No erythema, thickening (peau d'orange), or dimpling. Left: Well-healed scar. No new masses or changes in breast contour. No skin changes of the breast or nipple areolar complex. No nipple inversion or discharge. No erythema, thickening (peau d'orange), or dimpling. There is no axillary lymphadenopathy palpated bilaterally.    Head: Normocephalic and atraumatic:   Eyes: EOM are normal. Pupils are patient's questions were answered at this time however, she was encouraged to call the office with any further inquiries. Approximately 25 minutes of time were spent in preparation, direct patient contact, counseling, care coordination, documentation and activities otherwise related to this encounter.

## 2022-06-23 NOTE — PATIENT INSTRUCTIONS
Healthy Lifestyle Recommendations: healthy diet (decrease consumption of red meat, increase fresh fruits and vegetables), decreased alcohol consumption (less than 4 drinks/week), adequate sleep (goal 6-8 hours), routine exercise (goal 150 minutes/week or greater), weight control. Patient Education        Breast Self-Exam: Care Instructions  Your Care Instructions     A breast self-exam is when you check your breasts for lumps or changes. This regular exam helps you learn how your breasts normally look and feel. Mostbreast problems or changes are not because of cancer. Breast self-exam is not a substitute for a mammogram. Having regular breast exams by your doctor and regular mammograms improve your chances of finding anyproblems with your breasts. Some women set a time each month to do a step-by-step breast self-exam. Other women like a less formal system. They might look at their breasts as they brushtheir teeth, or feel their breasts once in a while in the shower. If you notice a change in your breast, tell your doctor. Follow-up care is a key part of your treatment and safety. Be sure to make and go to all appointments, and call your doctor if you are having problems. It's also a good idea to know your test results and keep alist of the medicines you take. How do you do a breast self-exam?   The best time to examine your breasts is usually one week after your menstrual period begins. Your breasts should not be tender then. If you do not have periods, you might do your exam on a day of the month that is easy to remember.  To examine your breasts:  ? Remove all your clothes above the waist and lie down. When you are lying down, your breast tissue spreads evenly over your chest wall, which makes it easier to feel all your breast tissue. ?  Use the padsnot the fingertipsof the 3 middle fingers of your left hand to check your right breast. Move your fingers slowly in small coin-sized circles that

## 2022-06-28 ENCOUNTER — NURSE ONLY (OUTPATIENT)
Dept: CARDIOLOGY CLINIC | Age: 82
End: 2022-06-28
Payer: MEDICARE

## 2022-06-28 DIAGNOSIS — R00.1 BRADYCARDIA: ICD-10-CM

## 2022-06-28 DIAGNOSIS — Z95.0 PACEMAKER: ICD-10-CM

## 2022-06-28 PROCEDURE — 93294 REM INTERROG EVL PM/LDLS PM: CPT | Performed by: INTERNAL MEDICINE

## 2022-06-28 PROCEDURE — 93296 REM INTERROG EVL PM/IDS: CPT | Performed by: INTERNAL MEDICINE

## 2022-06-30 NOTE — PROGRESS NOTES
We received remote transmission from patient's dual chamber pacemaker monitor at home. Transmission shows normal sensing and pacing function. AT/AF/L noted, 1.5% burden, 39.3% pace-terminated episodes (Eliquis, flecainide, Lopressor). EP physician will review. See interrogation under cardiology tab in the [address] [place] [address] [address] field for more details. Will continue to monitor remotely. (End of 91-day monitoring period 6/28/22).

## 2022-07-25 DIAGNOSIS — E03.9 ACQUIRED HYPOTHYROIDISM: ICD-10-CM

## 2022-07-25 RX ORDER — LEVOTHYROXINE SODIUM 0.03 MG/1
TABLET ORAL
Qty: 90 TABLET | Refills: 1 | Status: SHIPPED | OUTPATIENT
Start: 2022-07-25

## 2022-08-01 ENCOUNTER — HOSPITAL ENCOUNTER (OUTPATIENT)
Dept: GENERAL RADIOLOGY | Age: 82
Discharge: HOME | End: 2022-08-01
Payer: MEDICARE

## 2022-08-01 DIAGNOSIS — Z79.811 USE OF AROMATASE INHIBITORS: ICD-10-CM

## 2022-08-01 PROCEDURE — 77080 DXA BONE DENSITY AXIAL: CPT

## 2022-08-02 ENCOUNTER — TELEPHONE (OUTPATIENT)
Dept: INTERNAL MEDICINE CLINIC | Age: 82
End: 2022-08-02

## 2022-08-02 ENCOUNTER — E-VISIT (OUTPATIENT)
Dept: INTERNAL MEDICINE CLINIC | Age: 82
End: 2022-08-02
Payer: MEDICARE

## 2022-08-02 DIAGNOSIS — N30.00 ACUTE CYSTITIS WITHOUT HEMATURIA: Primary | ICD-10-CM

## 2022-08-02 DIAGNOSIS — R39.9 UTI SYMPTOMS: Primary | ICD-10-CM

## 2022-08-02 LAB
BACTERIA: ABNORMAL /HPF
BILIRUBIN URINE: NEGATIVE
BLOOD, URINE: ABNORMAL
CLARITY: ABNORMAL
COLOR: YELLOW
EPITHELIAL CELLS, UA: 1 /HPF (ref 0–5)
GLUCOSE URINE: NEGATIVE MG/DL
HYALINE CASTS: 0 /LPF (ref 0–8)
KETONES, URINE: NEGATIVE MG/DL
LEUKOCYTE ESTERASE, URINE: ABNORMAL
MICROSCOPIC EXAMINATION: YES
NITRITE, URINE: POSITIVE
PH UA: 6.5 (ref 5–8)
PROTEIN UA: ABNORMAL MG/DL
RBC UA: 5 /HPF (ref 0–4)
SPECIFIC GRAVITY UA: 1.01 (ref 1–1.03)
URINE TYPE: ABNORMAL
UROBILINOGEN, URINE: 0.2 E.U./DL
WBC UA: 855 /HPF (ref 0–5)

## 2022-08-02 PROCEDURE — 81003 URINALYSIS AUTO W/O SCOPE: CPT | Performed by: INTERNAL MEDICINE

## 2022-08-02 PROCEDURE — 99421 OL DIG E/M SVC 5-10 MIN: CPT | Performed by: INTERNAL MEDICINE

## 2022-08-02 RX ORDER — NITROFURANTOIN 25; 75 MG/1; MG/1
100 CAPSULE ORAL 2 TIMES DAILY
Qty: 10 CAPSULE | Refills: 0 | Status: SHIPPED | OUTPATIENT
Start: 2022-08-02 | End: 2022-08-07

## 2022-08-02 NOTE — TELEPHONE ENCOUNTER
----- Message from Josette Ackerman sent at 8/2/2022  9:26 AM EDT -----  Subject: Message to Provider    QUESTIONS  Information for Provider? Pt has UTI and wanted to see if the doctor would   call in antibiotics for her.  ---------------------------------------------------------------------------  --------------  [address] [facility]  [phone]; OK to leave message on voicemail  ---------------------------------------------------------------------------  --------------  SCRIPT ANSWERS  Relationship to Patient?  Self

## 2022-08-02 NOTE — TELEPHONE ENCOUNTER
Pt made aware of the recommendations    Pt does not know how to do a evisit     Pt was asked to come to leave a urine sample

## 2022-08-04 LAB
ORGANISM: ABNORMAL
URINE CULTURE, ROUTINE: ABNORMAL

## 2022-08-08 ENCOUNTER — TELEPHONE (OUTPATIENT)
Dept: CARDIOLOGY CLINIC | Age: 82
End: 2022-08-08

## 2022-08-08 NOTE — TELEPHONE ENCOUNTER
Pt's pcp would like to treat UTI with cipro but would like to run it past  as this can lower her BP. [phone]

## 2022-08-09 ENCOUNTER — TELEPHONE (OUTPATIENT)
Dept: INTERNAL MEDICINE CLINIC | Age: 82
End: 2022-08-09

## 2022-08-09 RX ORDER — CIPROFLOXACIN 250 MG/1
250 TABLET, FILM COATED ORAL 2 TIMES DAILY
Qty: 6 TABLET | Refills: 0 | Status: SHIPPED | OUTPATIENT
Start: 2022-08-09 | End: 2022-08-12

## 2022-08-11 LAB — URINE CULTURE, ROUTINE: NORMAL

## 2022-08-14 NOTE — PROGRESS NOTES
[address]   Electrophysiology  Office Visit  Date: 8/16/2022    Chief Complaint   Patient presents with    Atrial Fibrillation    Bradycardia    Shortness of Breath    Congestive Heart Failure         Cardiac HX: Girish Cuellar is a 81 y.o. woman with a h/o GERD, hypothyroidism and pAF, originally dx'd 1/2021 with progressively worsening SOB, noted to be in AF/RVR, placed on dilt, Eliquis, BB and lasix, EF 55% per echo, then p/w (4/17/2021) lightheadedness, N/V/D, found to be SB with HR's in the 40's, placed on dopamine, given IVF then developed AF/RVR, 6.8 sec conversion pause noted on tele, s/p dual ch MDT PPM (4/21/2021, Dr. Noelle Mijares), planned for DCCV on 6/22/2021, however patient in NSR in f/u. Interval History/HPI: Patient is here for follow-up for pAF, SSS, sinus pauses and PPM management. Patient was originally diagnosed with paroxysmal atrial fibrillation in January 2021 when she presented with progressively worsening shortness of breath and was noted to be in AF/RVR. She was placed on Eliquis 5 mg twice a day, diltiazem and metoprolol. Then presented in April 2021 with lightheadedness, nausea, vomiting and diarrhea and was found to be sinus bradycardic with heart rates in the 40s. She was placed on dopamine and given IV fluids and then developed AF/RVR with a 6.8-second conversion pause noted on telemetry. She underwent a dual-chamber MDT PPM on 4/21/2021. She had a planned DCCV for 6/22/2021 however in follow-up she was in NSR. She presents in NSR with an HR of 69. Review of her device today shows that she atrially paces 98.8% of the time and ventricularly paced 0.3% of the time. She had 28 AT AF episodes that were treated and 21 other AT/AF episodes for total burden of 0.9%. She is atrially dependent today. Her most recent episode was on July 28 for 54 minutes and she had a treated episode on 610 that lasted about 9 hours. She did admit today that she is not wearing her CPAP. We had a long discussion regarding the correlation between atrial fibrillation and sleep apnea. She is going to try and be more compliant with her CPAP. She does note shortness of breath with exertion. We did discuss a repeat echo today and she would like to wait till the next office visit. She has no chest pain, PND, orthopnea or lower extremity edema. Home medications:   Current Outpatient Medications on File Prior to Visit   Medication Sig Dispense Refill    levothyroxine (SYNTHROID) 25 MCG tablet TAKE 1 TABLET BY MOUTH EVERY DAY 90 tablet 1    anastrozole (ARIMIDEX) 1 MG tablet Take 1 mg by mouth      flecainide (TAMBOCOR) 50 MG tablet Take 1 tablet by mouth 2 times daily 180 tablet 3    ELIQUIS 5 MG TABS tablet TAKE 1 TABLET BY MOUTH 2 TIMES DAILY 180 tablet 3    metoprolol (LOPRESSOR) 100 MG tablet TAKE 1 TABLET BY MOUTH TWICE A  tablet 3    furosemide (LASIX) 20 MG tablet Take 1 tablet by mouth daily as needed (water weight gain.) 60 tablet 3    Handicap Placard MISC by Does not apply route Dx of shortness of breath and A-fib. Effective Jan 13, 2021 until Jan 13, 2026. 1 each 0    Cholecalciferol (VITAMIN D) 2000 UNITS CAPS capsule Take 1 capsule by mouth daily. No current facility-administered medications on file prior to visit.        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    Atrial fibrillation ([facility]     dr Iman Perdomo and dr Margaret Burger    Biatrial enlargement     Cancer [facility])     breast - left  - xrt and chemo (AI)    Diastolic CHF ([facility]     Diverticulosis     Generalized edema     lasix as needed    GERD (gastroesophageal reflux disease)     Hearing loss     Melanoma ([facility]     left scapula    NAKIA (obstructive sleep apnea) 09/08/2021    Other and unspecified hyperlipidemia     Pre-diabetes     Tinnitus     Unspecified gastritis and gastroduodenitis without mention of hemorrhage     aloe vera juice for gerd    Unspecified hypothyroidism         Past Surgical History:   Procedure Laterality Date    BLADDER SUSPENSION  1990s    BREAST LUMPECTOMY Left 02/22/2022    LEFT BREAST LOCALIZED PARTIAL MASTECTOMY, BIOZORB, performed by Saige Newman MD at [address]  01/16/2007    Normal    COLONOSCOPY  02/27/2012    Dr. Janice Torres    Right Repair-inguinal    HYSTERECTOMY (CERVIX STATUS UNKNOWN)  1975    ovaries left    OTHER SURGICAL HISTORY  04/21/2021    pacemaker    US BREAST NEEDLE BIOPSY LEFT Left 01/21/2022    US BREAST NEEDLE BIOPSY LEFT 1/21/2022 [facility]    US GUIDED NEEDLE LOC OF LEFT BREAST Left 02/18/2022    US GUIDED NEEDLE LOC OF LEFT BREAST 2/18/2022 [facility]    WISDOM TOOTH EXTRACTION         Allergies   Allergen Reactions    Bactrim [Sulfamethoxazole-Trimethoprim] Hives     hives    Seasonal Other (See Comments)     Runny nose, sneezing    Fosamax [Alendronate]      Jaw problems       Social History:  Reviewed. reports that she quit smoking about 17 years ago. Her smoking use included cigarettes. She has never used smokeless tobacco. She reports current alcohol use. She reports that she does not use drugs. Family History:  Reviewed. family history includes Cancer in her brother; Dementia in her brother; Heart Attack in her child; Sleep Apnea in her son. Review of System:    Constitutional: No fevers, chills. Eyes: No visual changes or diplopia. No scleral icterus. ENT: No Headaches. No mouth sores or sore throat. Cardiovascular: No for chest pain, No for dyspnea on exertion, No for palpitations or No for loss of consciousness. No cough, hemoptysis, No for pleuritic pain, or phlebitis. Respiratory: No for cough or wheezing. No hematemesis. Gastrointestinal: No abdominal pain, blood in stools. Genitourinary: No dysuria, or hematuria.   Musculoskeletal: No gait disturbance,    Integumentary: No rash or pruritis. Neurological: No headache, change in muscle strength, numbness or tingling. Psychiatric: No anxiety, or depression. Endocrine: No temperature intolerance. No excessive thirst, fluid intake, or urination. Hem/Lymph: No abnormal bruising or bleeding, blood clots or swollen lymph nodes. Allergic/Immunologic: No nasal congestion or hives. Physical Examination:  Vitals:    08/16/22 1409   BP: 124/80   Pulse: 69         Wt Readings from Last 3 Encounters:   08/16/22 175 lb (79.4 kg)   06/23/22 179 lb (81.2 kg)   06/07/22 177 lb (80.3 kg)       Constitutional: Oriented. No distress. Head: Normocephalic and atraumatic. Mouth/Throat: Oropharynx is clear and moist.   Eyes: Conjunctivae clear without jaunduice. PERRL. Neck: Neck supple. No rigidity. No JVD present. Cardiovascular: Normal rate, regular rhythm, S1&S2. Pulmonary/Chest: Bilateral respiratory sounds. No wheezes, No rhonchi. Abdominal: Soft. Bowel sounds present. No distension, No tenderness. Musculoskeletal: No tenderness. No edema    Lymphadenopathy: Has no cervical adenopathy. Neurological: Alert and oriented. Cranial nerve appears intact, No Gross deficit   Skin: Skin is warm and dry. No rash noted. Psychiatric: Has a normal mood, affect and behavior     Labs:  Reviewed. No results for input(s): NA, K, CL, CO2, PHOS, BUN, CREATININE, CA in the last 72 hours. Invalid input(s):  TSH  No results for input(s): WBC, HGB, HCT, MCV, PLT in the last 72 hours.   Lab Results   Component Value Date/Time    TROPONINI <0.01 04/17/2021 06:00 PM     No results found for: BNP  Lab Results   Component Value Date/Time    PROTIME 19.2 05/19/2021 10:08 AM    PROTIME 18.1 04/17/2021 06:00 PM    PROTIME 12.3 01/02/2021 06:18 PM    INR 1.65 05/19/2021 10:08 AM    INR 1.10 04/21/2021 03:37 PM    INR 1.55 04/17/2021 06:00 PM     Lab Results   Component Value Date/Time    CHOL 239 04/07/2022 02:37 PM    HDL 64 04/07/2022 02:37 PM    HDL 87 01/27/2012 10:37 AM    TRIG 141 04/07/2022 02:37 PM       ECG: Personally reviewed: AP/ VS, HR 69, , , QTc 447    ECHO:  4.19.2021 (Limited TTE)   Summary   Normal left ventricle size. There is mild concentric left ventricular   hypertrophy. Overall left ventricular systolic function appears normal with   an ejection fraction of 55%. The right ventricle is mildly enlarged. Biatrial enlargement. Inferior vena cava appears dilated. Stress Test: 4.14.2021  Summary    There is normal isotope uptake at stress and rest. There is no evidence of    myocardial ischemia or scar. Normal LV size and systolic function. Left ventricular ejection fraction of 73 %. There are no regional wall motion abnormalities. Overall findings represent a low risk scan. Cardiac Angiography: n/a    Problem List:   Patient Active Problem List    Diagnosis Date Noted    Chronic diastolic CHF (congestive heart failure) ([facility] 03/01/2022    Primary breast malignancy, left (HCC)     NAKIA (obstructive sleep apnea) 09/08/2021    Obesity (BMI 30.0-34.9) 09/08/2021    Pacemaker-MEDTRONIC 04/21/2021    Acute respiratory failure with hypoxia and hypercapnia (HCC) 04/18/2021    Hypotension 04/18/2021    ALEJA (acute kidney injury) ([address]     Bradycardia 04/17/2021    SOB (shortness of breath) 01/03/2021    Atrial fibrillation ([address] 01/02/2021    Atrial fibrillation with rapid ventricular response ([address] 01/02/2021    Sensorineural hearing loss, bilateral 09/21/2020    Tinnitus, bilateral 09/21/2020    Hx of melanoma excision 04/30/2019    Osteopenia 05/28/2015    Tinnitus     Disorder of bone and cartilage     Diverticulosis of large intestine     Mixed hyperlipidemia     Alveolitis of jaw     Hypothyroidism         Assessment:   1. Paroxysmal atrial fibrillation (HCC)    2. On continuous oral anticoagulation    3. Encounter for monitoring flecainide therapy    4. Tachycardia-bradycardia syndrome ([address]    5.  Pacemaker 6. Chronic diastolic CHF (congestive heart failure) ([facility]          Cardiac HX: Esperanza Rashid is a [de-identified] y.o. woman with a h/o GERD, hypothyroidism and pAF, originally dx'd 1/2021 with progressively worsening SOB, noted to be in AF/RVR, placed on dilt, placed on Eliquis, BB and lasix, EF 55% per echo who p/w enthesis 4/17/2021) lightheadedness, N/V/D, found to be SB with HR's in the 40's, placed on dopamine, given IVF then developed AF/RVR, 6.8 sec conversion pause noted on tele, s/p dual ch MDT PPM (4/21/2021, Dr. Sandie Flores), planned for DCCV on 6/22/2021 however patient was in normal sinus rhythm and DCCV was canceled. [id number] 3. TSH 14.44 (4/17/2021). pAF  - In NSR - 1 AT/AF episode on 7/28 lasting 54 minutes tubal episodes noted in June and April  - On Eliquis 5mg BID, no s/sx bleeding, continue  - On metoprolol 100 mg BID  - On flecainide 50 mg BID -   - Echo - LAE 4.4/96.6  - + NAKIA - noncompliant w/ CPAP, encouraged to retry  - Reviewed recent labs  - ECG ordered and results personally reviewed      TBS  - S/p dual ch MDT PPM (4/12/21)  - Device check today shows 98.8% AP, 0.3% , no arrhythm 48 episodes of AT/AF 28 which were treated, longest was greater than 9 hours in length, total burden 0.9%, other parameters stable    Fluid overload/chronic dCHF  - Takes lasix prn  - Weight down to 175 mg  - Patient to weigh self daily. Take a Lasix 20 mg as needed weight gain greater than 3 pounds   - Consider echo at next OV      EF of 38%  No systolic HF  No known CAD  On eliquis for AF   No tobacco use. All questions and concerns were addressed to the patient/family. Alternatives to my treatment were discussed. The note was completed using EMR. Every effort was made to ensure accuracy; however, inadvertent computerized transcription errors may be present. Patient received education regarding their diagnosis, treatment and medications while in the office today.       Perlita Lozoya [address] [place] [address] [facility]

## 2022-08-16 ENCOUNTER — NURSE ONLY (OUTPATIENT)
Dept: CARDIOLOGY CLINIC | Age: 82
End: 2022-08-16
Payer: MEDICARE

## 2022-08-16 ENCOUNTER — OFFICE VISIT (OUTPATIENT)
Dept: CARDIOLOGY CLINIC | Age: 82
End: 2022-08-16
Payer: MEDICARE

## 2022-08-16 VITALS
SYSTOLIC BLOOD PRESSURE: 124 MMHG | DIASTOLIC BLOOD PRESSURE: 80 MMHG | HEART RATE: 69 BPM | BODY MASS INDEX: 32.01 KG/M2 | WEIGHT: 175 LBS

## 2022-08-16 DIAGNOSIS — R00.1 BRADYCARDIA: ICD-10-CM

## 2022-08-16 DIAGNOSIS — I50.32 CHRONIC DIASTOLIC CHF (CONGESTIVE HEART FAILURE) (HCC): ICD-10-CM

## 2022-08-16 DIAGNOSIS — I48.91 ATRIAL FIBRILLATION WITH RAPID VENTRICULAR RESPONSE (HCC): ICD-10-CM

## 2022-08-16 DIAGNOSIS — Z95.0 PACEMAKER: Primary | ICD-10-CM

## 2022-08-16 DIAGNOSIS — I48.0 PAROXYSMAL ATRIAL FIBRILLATION (HCC): Primary | ICD-10-CM

## 2022-08-16 DIAGNOSIS — I48.0 PAROXYSMAL ATRIAL FIBRILLATION (HCC): ICD-10-CM

## 2022-08-16 DIAGNOSIS — Z51.81 ENCOUNTER FOR MONITORING FLECAINIDE THERAPY: ICD-10-CM

## 2022-08-16 DIAGNOSIS — I49.5 TACHYCARDIA-BRADYCARDIA SYNDROME (HCC): ICD-10-CM

## 2022-08-16 DIAGNOSIS — Z95.0 PACEMAKER: ICD-10-CM

## 2022-08-16 DIAGNOSIS — Z79.899 ENCOUNTER FOR MONITORING FLECAINIDE THERAPY: ICD-10-CM

## 2022-08-16 DIAGNOSIS — Z79.01 ON CONTINUOUS ORAL ANTICOAGULATION: ICD-10-CM

## 2022-08-16 PROCEDURE — 99214 OFFICE O/P EST MOD 30 MIN: CPT | Performed by: NURSE PRACTITIONER

## 2022-08-16 PROCEDURE — 1036F TOBACCO NON-USER: CPT | Performed by: NURSE PRACTITIONER

## 2022-08-16 PROCEDURE — 1123F ACP DISCUSS/DSCN MKR DOCD: CPT | Performed by: NURSE PRACTITIONER

## 2022-08-16 PROCEDURE — G8417 CALC BMI ABV UP PARAM F/U: HCPCS | Performed by: NURSE PRACTITIONER

## 2022-08-16 PROCEDURE — 93000 ELECTROCARDIOGRAM COMPLETE: CPT | Performed by: NURSE PRACTITIONER

## 2022-08-16 PROCEDURE — 93280 PM DEVICE PROGR EVAL DUAL: CPT | Performed by: INTERNAL MEDICINE

## 2022-08-16 PROCEDURE — 1090F PRES/ABSN URINE INCON ASSESS: CPT | Performed by: NURSE PRACTITIONER

## 2022-08-16 PROCEDURE — G8399 PT W/DXA RESULTS DOCUMENT: HCPCS | Performed by: NURSE PRACTITIONER

## 2022-08-16 PROCEDURE — G8427 DOCREV CUR MEDS BY ELIG CLIN: HCPCS | Performed by: NURSE PRACTITIONER

## 2022-08-16 NOTE — PROGRESS NOTES
Patient comes in for programming evaluation on their Medtronic dual chamber pacemaker. Last remote 6.28.2022    All sensing and pacing parameters are within normal range. Battery life 12.3 years  Underlying Atrially dependent @ 35 bpm.  AP 98.8%.  0.3%. AT/AF burden 0.9%  Since 4.15.2022:  -28 treated AT/AF and 21 monitored AT/AF. Recent treated 6.10.2022 and appears longest x >9hrs. 6.18.8186 and 6.10.2022 most accumulative monitored episodes. Patient remains on eliquis, flecainide, furosemide, metoprolol. Additional carelink alerts turned On. ADL response changed to 4 w/setpoint at 6. Please see interrogation for more detail. Patient will see NPFW today and follow up in 3 months in office or remotely.

## 2022-08-26 ENCOUNTER — TELEPHONE (OUTPATIENT)
Dept: CARDIOLOGY CLINIC | Age: 82
End: 2022-08-26

## 2022-08-26 NOTE — TELEPHONE ENCOUNTER
Jasmin from Oncology hematology States pt prescribed Tomoxifen and Flecainide and they interact with each other please call [phone]

## 2022-08-29 NOTE — TELEPHONE ENCOUNTER
Called and spoke with Jasmin the pharmacist.  Patient has been on Arimidex and has developed severe osteoporosis. They were planning to put her on tamoxifen however tamoxifen interacts with flecainide. She was placed on raloxifene today. If patient needs to go on tamoxifen then we would need to discontinue the flecainide and consider different antiarrhythmic medication.

## 2022-09-14 ENCOUNTER — OFFICE VISIT (OUTPATIENT)
Dept: CARDIOLOGY CLINIC | Age: 82
End: 2022-09-14
Payer: MEDICARE

## 2022-09-14 VITALS — BODY MASS INDEX: 32.01 KG/M2 | HEART RATE: 83 BPM | WEIGHT: 175 LBS

## 2022-09-14 DIAGNOSIS — I10 ESSENTIAL HYPERTENSION: ICD-10-CM

## 2022-09-14 DIAGNOSIS — Z95.0 PACEMAKER: ICD-10-CM

## 2022-09-14 DIAGNOSIS — I49.5 SSS (SICK SINUS SYNDROME) (HCC): ICD-10-CM

## 2022-09-14 DIAGNOSIS — I48.0 PAROXYSMAL ATRIAL FIBRILLATION (HCC): Primary | ICD-10-CM

## 2022-09-14 DIAGNOSIS — I50.32 CHRONIC DIASTOLIC CONGESTIVE HEART FAILURE (HCC): ICD-10-CM

## 2022-09-14 PROCEDURE — 1036F TOBACCO NON-USER: CPT | Performed by: INTERNAL MEDICINE

## 2022-09-14 PROCEDURE — G8399 PT W/DXA RESULTS DOCUMENT: HCPCS | Performed by: INTERNAL MEDICINE

## 2022-09-14 PROCEDURE — G8428 CUR MEDS NOT DOCUMENT: HCPCS | Performed by: INTERNAL MEDICINE

## 2022-09-14 PROCEDURE — 1123F ACP DISCUSS/DSCN MKR DOCD: CPT | Performed by: INTERNAL MEDICINE

## 2022-09-14 PROCEDURE — 99214 OFFICE O/P EST MOD 30 MIN: CPT | Performed by: INTERNAL MEDICINE

## 2022-09-14 PROCEDURE — 1090F PRES/ABSN URINE INCON ASSESS: CPT | Performed by: INTERNAL MEDICINE

## 2022-09-14 PROCEDURE — G8417 CALC BMI ABV UP PARAM F/U: HCPCS | Performed by: INTERNAL MEDICINE

## 2022-09-14 ASSESSMENT — ENCOUNTER SYMPTOMS
SHORTNESS OF BREATH: 0
CHOKING: 0
COUGH: 0
CHEST TIGHTNESS: 0

## 2022-09-14 NOTE — PROGRESS NOTES
Subjective:      Patient ID: Sunday Siddiqi is a 81 y.o. female. HPI  Follow up afib/sob/HTN/SSS/Pacer. Feeling good. Dx br ca 12.21. s/p  lumpectomy. Not feeling palp/arrhythmia. Some fatigue. Breathing good. No chest pain. No pnd or orthopnea. No tachy/syncope/Palp. No edema. BP good. No syncope. EP following.        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    Atrial fibrillation ([facility]     dr Camryn Dougherty and dr Michael Thomason    Biatrial enlargement     Cancer [facility])     breast - left  - xrt and chemo (AI)    Diastolic CHF ([facility]     Diverticulosis     Generalized edema     lasix as needed    GERD (gastroesophageal reflux disease)     Hearing loss     Melanoma ([facility]     left scapula    NAKIA (obstructive sleep apnea) 09/08/2021    Other and unspecified hyperlipidemia     Pre-diabetes     Tinnitus     Unspecified gastritis and gastroduodenitis without mention of hemorrhage     aloe vera juice for gerd    Unspecified hypothyroidism      Past Surgical History:   Procedure Laterality Date    BLADDER SUSPENSION  1990s    BREAST LUMPECTOMY Left 02/22/2022    LEFT BREAST LOCALIZED PARTIAL MASTECTOMY, BIOZORB, performed by Dylan Crabtree MD at [address]  01/16/2007    Normal    COLONOSCOPY  02/27/2012    Dr. Drake Goel    Right Repair-inguinal    HYSTERECTOMY (CERVIX STATUS UNKNOWN)  1975    ovaries left    OTHER SURGICAL HISTORY  04/21/2021    pacemaker    US BREAST NEEDLE BIOPSY LEFT Left 01/21/2022    US BREAST NEEDLE BIOPSY LEFT 1/21/2022 [facility]    US GUIDED NEEDLE LOC OF LEFT BREAST Left 02/18/2022    US GUIDED NEEDLE LOC OF LEFT BREAST 2/18/2022 [facility]    WISDOM TOOTH EXTRACTION       Social History     Socioeconomic History    Marital status:      Spouse name: Not on file    Number of children: Not on file    Years of education: Not on file    Highest education level: Not on file   Occupational History    Occupation: [employer]   Tobacco Use    Smoking status: Former     Packs/day: 0.00     Years: 10.00     Pack years: 0.00     Types: Cigarettes     Quit date: 2005     Years since quittin.2    Smokeless tobacco: Never   Vaping Use    Vaping Use: Never used   Substance and Sexual Activity    Alcohol use: Yes     Comment: rare    Drug use: No    Sexual activity: Yes   Other Topics Concern    Not on file   Social History Narrative    Had 1, oldest daughter passed     8 grandkids      Social Determinants of Health     Financial Resource Strain: Low Risk     Difficulty of Paying Living Expenses: Not hard at all   Food Insecurity: No Food Insecurity    Worried About Running Out of Food in the Last Year: Never true    Ran Out of Food in the Last Year: Never true   Transportation Needs: Not on file   Physical Activity: Not on file   Stress: Not on file   Social Connections: Not on file   Intimate Partner Violence: Not on file   Housing Stability: Not on file     FH reviewed      Vitals:    22 1321   Pulse: 83           Wt 175    Review of Systems   Constitutional:  Negative for activity change, appetite change and fatigue. Respiratory:  Negative for cough, choking, chest tightness and shortness of breath. Cardiovascular:  Negative for chest pain, palpitations and leg swelling. Denies PND or orthopnea. No tachycardia or syncope. Neurological:  Negative for dizziness, syncope and light-headedness. Psychiatric/Behavioral:  Negative for agitation, behavioral problems and confusion. All other systems reviewed and are negative. Objective:   Physical Exam  Constitutional:       General: She is not in acute distress. Appearance: Normal appearance. She is well-developed. HENT:      Head: Normocephalic and atraumatic.       Right Ear: External ear normal.      Left Ear: External ear normal.   Neck:      Vascular: No JVD. Cardiovascular:      Rate and Rhythm: Normal rate and regular rhythm. Heart sounds: Normal heart sounds. No murmur heard. No gallop. Pulmonary:      Effort: Pulmonary effort is normal. No respiratory distress. Breath sounds: Normal breath sounds. No wheezing or rales. Abdominal:      General: Bowel sounds are normal.      Palpations: Abdomen is soft. Tenderness: There is no abdominal tenderness. Musculoskeletal:         General: Normal range of motion. Cervical back: Normal range of motion. Skin:     General: Skin is warm and dry. Neurological:      General: No focal deficit present. Mental Status: She is alert and oriented to person, place, and time. Psychiatric:         Mood and Affect: Mood normal.         Behavior: Behavior normal.       Assessment:       Diagnosis Orders   1. Paroxysmal atrial fibrillation (HCC)        2. SSS (sick sinus syndrome) ([facility]        3. Pacemaker        4. Essential hypertension        5. Chronic diastolic congestive heart failure ([facility]                Plan:      CV stable. S/P pacer for SSS  Compensated. On AC. Continue Eliquis 5 mg bid. No bleeding issues. Continue  lopressor 100 mg bid / flecanide 50 mg bid for HTN/afib. Will continue flecanide for afib. Ep following. Reviewed previous records and testing including echo 1/21. Follow up 3 months.            Luis Oneal MD

## 2022-09-16 RX ORDER — APIXABAN 5 MG/1
TABLET, FILM COATED ORAL
Qty: 180 TABLET | Refills: 3 | Status: SHIPPED | OUTPATIENT
Start: 2022-09-16

## 2022-10-05 ENCOUNTER — NURSE ONLY (OUTPATIENT)
Dept: CARDIOLOGY CLINIC | Age: 82
End: 2022-10-05
Payer: MEDICARE

## 2022-10-05 DIAGNOSIS — R00.1 BRADYCARDIA: ICD-10-CM

## 2022-10-05 DIAGNOSIS — Z95.0 PACEMAKER: Primary | ICD-10-CM

## 2022-10-05 PROCEDURE — 93294 REM INTERROG EVL PM/LDLS PM: CPT | Performed by: INTERNAL MEDICINE

## 2022-10-05 PROCEDURE — 93296 REM INTERROG EVL PM/IDS: CPT | Performed by: INTERNAL MEDICINE

## 2022-10-06 NOTE — PROGRESS NOTES
We received remote transmission from patient's dual chamber pacemaker monitor at home. Transmission shows normal sensing and pacing function. AT/AF/L noted, 0.4% burden, 71.4% pace-terminated episodes (Eliquis, flecainide, Lopressor). EP physician will review. See interrogation under cardiology tab in the [address] [place] [address] [address] field for more details. Will continue to monitor remotely. (End of 91-day monitoring period 10/5/22).

## 2022-10-13 ENCOUNTER — OFFICE VISIT (OUTPATIENT)
Dept: INTERNAL MEDICINE CLINIC | Age: 82
End: 2022-10-13
Payer: MEDICARE

## 2022-10-13 VITALS
OXYGEN SATURATION: 94 % | HEIGHT: 62 IN | BODY MASS INDEX: 32.9 KG/M2 | WEIGHT: 178.8 LBS | DIASTOLIC BLOOD PRESSURE: 88 MMHG | SYSTOLIC BLOOD PRESSURE: 136 MMHG | TEMPERATURE: 97.6 F | HEART RATE: 83 BPM

## 2022-10-13 DIAGNOSIS — E78.2 MIXED HYPERLIPIDEMIA: ICD-10-CM

## 2022-10-13 DIAGNOSIS — E03.8 OTHER SPECIFIED HYPOTHYROIDISM: Primary | ICD-10-CM

## 2022-10-13 DIAGNOSIS — I48.91 ATRIAL FIBRILLATION WITH RAPID VENTRICULAR RESPONSE (HCC): ICD-10-CM

## 2022-10-13 DIAGNOSIS — Z23 NEED FOR INFLUENZA VACCINATION: ICD-10-CM

## 2022-10-13 PROCEDURE — 1090F PRES/ABSN URINE INCON ASSESS: CPT | Performed by: INTERNAL MEDICINE

## 2022-10-13 PROCEDURE — 1123F ACP DISCUSS/DSCN MKR DOCD: CPT | Performed by: INTERNAL MEDICINE

## 2022-10-13 PROCEDURE — G0008 ADMIN INFLUENZA VIRUS VAC: HCPCS | Performed by: INTERNAL MEDICINE

## 2022-10-13 PROCEDURE — 90694 VACC AIIV4 NO PRSRV 0.5ML IM: CPT | Performed by: INTERNAL MEDICINE

## 2022-10-13 PROCEDURE — G8427 DOCREV CUR MEDS BY ELIG CLIN: HCPCS | Performed by: INTERNAL MEDICINE

## 2022-10-13 PROCEDURE — G8417 CALC BMI ABV UP PARAM F/U: HCPCS | Performed by: INTERNAL MEDICINE

## 2022-10-13 PROCEDURE — 99213 OFFICE O/P EST LOW 20 MIN: CPT | Performed by: INTERNAL MEDICINE

## 2022-10-13 PROCEDURE — 1036F TOBACCO NON-USER: CPT | Performed by: INTERNAL MEDICINE

## 2022-10-13 PROCEDURE — G8484 FLU IMMUNIZE NO ADMIN: HCPCS | Performed by: INTERNAL MEDICINE

## 2022-10-13 PROCEDURE — G8399 PT W/DXA RESULTS DOCUMENT: HCPCS | Performed by: INTERNAL MEDICINE

## 2022-10-13 RX ORDER — RALOXIFENE HYDROCHLORIDE 60 MG/1
TABLET, FILM COATED ORAL
COMMUNITY
Start: 2022-09-28

## 2022-10-13 NOTE — PROGRESS NOTES
Rodri Jennings (:  1940) is a 81 y.o. female, here for evaluation of the following chief complaint(s):    6 Month Follow-Up      ASSESSMENT/PLAN:  1. Other specified hypothyroidism  Check labs on levothyroxine  -     TSH with Reflex; Future  2. Mixed hyperlipidemia  -     Lipid Panel; Future  3. Atrial fibrillation with rapid ventricular response (HCC)  Stable on flecainide, Eliquis, metoprolol.  -     Comprehensive Metabolic Panel; Future  -     CBC; Future  4. Need for influenza vaccination  -     Influenza, FLUAD, (age 72 y+), IM, Preservative Free, 0.5 mL  --  Primary breast malignancy, left ([facility]  Now on evista (also good for her bone density)    Return in about 6 months (around 2023) for awv. SUBJECTIVE/OBJECTIVE:  HPI  Patient is here for routine visit. She feels well and is without new complaints.     Review of Systems    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    Atrial fibrillation ([facility]     dr Radha Tran and dr Flori Jerome    Biatrial enlargement     Cancer [facility])     breast - left  - xrt and chemo (AI)    Diastolic CHF ([address]     Diverticulosis     Generalized edema     lasix as needed    GERD (gastroesophageal reflux disease)     Hearing loss     Melanoma (HCC)     left scapula    NAKIA (obstructive sleep apnea) 2021    Other and unspecified hyperlipidemia     Pre-diabetes     Tinnitus     Unspecified gastritis and gastroduodenitis without mention of hemorrhage     aloe vera juice for gerd    Unspecified hypothyroidism        Current Outpatient Medications   Medication Sig Dispense Refill    raloxifene (EVISTA) 60 MG tablet       ELIQUIS 5 MG TABS tablet TAKE 1 TABLET BY MOUTH TWICE A  tablet 3    levothyroxine (SYNTHROID) 25 MCG tablet TAKE 1 TABLET BY MOUTH EVERY DAY 90 tablet 1    flecainide (TAMBOCOR) 50 MG tablet Take 1 tablet by mouth 2 times daily 180 tablet 3    metoprolol (LOPRESSOR) 100 MG tablet TAKE 1 TABLET BY MOUTH TWICE A  tablet 3    furosemide (LASIX) 20 MG tablet Take 1 tablet by mouth daily as needed (water weight gain.) 60 tablet 3    Handicap Placard MISC by Does not apply route Dx of shortness of breath and A-fib. Effective Jan 13, 2021 until Jan 13, 2026. 1 each 0    Cholecalciferol (VITAMIN D) 2000 UNITS CAPS capsule Take 1 capsule by mouth daily. No current facility-administered medications for this visit. Physical Exam  Vitals reviewed. Constitutional:       General: She is not in acute distress. Cardiovascular:      Rate and Rhythm: Normal rate and regular rhythm. Pulmonary:      Effort: Pulmonary effort is normal.      Breath sounds: Normal breath sounds. Musculoskeletal:      Right lower leg: Edema (tr) present. Left lower leg: Edema (tr) present. Neurological:      General: No focal deficit present. Mental Status: She is alert and oriented to person, place, and time. Psychiatric:         Mood and Affect: Mood normal.             This note was generated completely or in part utilizing Dragon dictation speech recognition software. Occasionally, words are mistranscribed and despite editing, the text may contain inaccuracies due to incorrect word recognition. If further clarification is needed please contact the office at [phone]          An electronic signature was used to authenticate this note.     --Jak Antonio MD

## 2022-10-26 NOTE — RESULT ENCOUNTER NOTE
Unremarkable device check.    Continue to monitor    Dorinda Chaves MD   Cardiac Electrophysiology  [facility]  Office [phone]

## 2022-11-21 NOTE — PROGRESS NOTES
Vaccine Information Sheet, \"Influenza - Inactivated\"  given to Julio Espinoza, or parent/legal guardian of  Julio Espinoza and verbalized understanding. Patient responses:    Have you ever had a reaction to a flu vaccine? No  Are you able to eat eggs without adverse effects? Yes  Do you have any current illness? No  Have you ever had Guillian Rufus Syndrome? No    Flu vaccine given per order. Please see immunization tab.
frequency, urgency, incontinence hematuria or dysuria. She does have slight odor to urine and          urine positive for WBCS. SKIN: No cyanosis or skin lesions. MUSCULOSKELETAL: No new muscle or joint pain. NEUROLOGICAL: No syncope or TIA-like symptoms. No change in sensation or strength. PSYCHIATRIC: No anxiety, pain, insomnia or depression    Objective:   PHYSICAL EXAM:        VITALS:  /66   Ht 5' 2\" (1.575 m)   Wt 164 lb 6.4 oz (74.6 kg)   BMI 30.07 kg/m²   CONSTITUTIONAL: Cooperative, no apparent distress, and appears well nourished / developed  NEUROLOGIC:  Awake and orientated to person, place and time. Sensation normal Motor normal.  PSYCH: Calm affect. SKIN: Warm and dry. HEENT:  normocephalic. EOMI MELISSA  Neck: Supple, no elevation of JVP, normal carotid pulses with no bruits and thyroid normal size. LUNGS:  No increased work of breathing and clear to auscultation, no crackles or wheezing  CARDIOVASCULAR:  Regular rate and rhythm with no murmurs, gallops, rubs, or abnormal heart sounds. ABDOMEN:  Normal bowel sounds, non-distended and non-tender to palpation. No liver or spleen enlargement. EXT: No edema, no calf tenderness. Pulses are present bilaterally. Assessment Plan :   1. Patient is here with a several day history of symptoms consistent with Urinary Tract infection based on symptoms       Odor in urine and finding of WBCs in urine.           PLAN: Bactrim DS # 20                     One po bid for 10 days    Liane Angelucci MD  I attest that this note was completed entirely by me
No

## 2022-12-08 ENCOUNTER — OFFICE VISIT (OUTPATIENT)
Dept: PULMONOLOGY | Age: 82
End: 2022-12-08
Payer: MEDICARE

## 2022-12-08 VITALS
HEART RATE: 80 BPM | BODY MASS INDEX: 32.72 KG/M2 | DIASTOLIC BLOOD PRESSURE: 82 MMHG | HEIGHT: 62 IN | WEIGHT: 177.8 LBS | SYSTOLIC BLOOD PRESSURE: 128 MMHG | OXYGEN SATURATION: 98 %

## 2022-12-08 DIAGNOSIS — I48.0 PAROXYSMAL ATRIAL FIBRILLATION (HCC): ICD-10-CM

## 2022-12-08 DIAGNOSIS — E66.9 OBESITY (BMI 30.0-34.9): ICD-10-CM

## 2022-12-08 DIAGNOSIS — G47.33 OSA (OBSTRUCTIVE SLEEP APNEA): Primary | ICD-10-CM

## 2022-12-08 DIAGNOSIS — E03.9 ACQUIRED HYPOTHYROIDISM: Chronic | ICD-10-CM

## 2022-12-08 DIAGNOSIS — I50.32 CHRONIC DIASTOLIC CHF (CONGESTIVE HEART FAILURE) (HCC): ICD-10-CM

## 2022-12-08 PROCEDURE — G8484 FLU IMMUNIZE NO ADMIN: HCPCS | Performed by: NURSE PRACTITIONER

## 2022-12-08 PROCEDURE — 1123F ACP DISCUSS/DSCN MKR DOCD: CPT | Performed by: NURSE PRACTITIONER

## 2022-12-08 PROCEDURE — G8399 PT W/DXA RESULTS DOCUMENT: HCPCS | Performed by: NURSE PRACTITIONER

## 2022-12-08 PROCEDURE — G8427 DOCREV CUR MEDS BY ELIG CLIN: HCPCS | Performed by: NURSE PRACTITIONER

## 2022-12-08 PROCEDURE — 99214 OFFICE O/P EST MOD 30 MIN: CPT | Performed by: NURSE PRACTITIONER

## 2022-12-08 PROCEDURE — 1090F PRES/ABSN URINE INCON ASSESS: CPT | Performed by: NURSE PRACTITIONER

## 2022-12-08 PROCEDURE — G8417 CALC BMI ABV UP PARAM F/U: HCPCS | Performed by: NURSE PRACTITIONER

## 2022-12-08 PROCEDURE — 1036F TOBACCO NON-USER: CPT | Performed by: NURSE PRACTITIONER

## 2022-12-08 ASSESSMENT — SLEEP AND FATIGUE QUESTIONNAIRES
HOW LIKELY ARE YOU TO NOD OFF OR FALL ASLEEP WHILE SITTING QUIETLY AFTER LUNCH WITHOUT ALCOHOL: 2
HOW LIKELY ARE YOU TO NOD OFF OR FALL ASLEEP WHILE SITTING AND READING: 3
HOW LIKELY ARE YOU TO NOD OFF OR FALL ASLEEP IN A CAR, WHILE STOPPED FOR A FEW MINUTES IN TRAFFIC: 0
ESS TOTAL SCORE: 7
HOW LIKELY ARE YOU TO NOD OFF OR FALL ASLEEP WHILE WATCHING TV: 2
HOW LIKELY ARE YOU TO NOD OFF OR FALL ASLEEP WHILE SITTING INACTIVE IN A PUBLIC PLACE: 0
HOW LIKELY ARE YOU TO NOD OFF OR FALL ASLEEP WHILE LYING DOWN TO REST IN THE AFTERNOON WHEN CIRCUMSTANCES PERMIT: 0
HOW LIKELY ARE YOU TO NOD OFF OR FALL ASLEEP WHILE SITTING AND TALKING TO SOMEONE: 0
HOW LIKELY ARE YOU TO NOD OFF OR FALL ASLEEP WHEN YOU ARE A PASSENGER IN A CAR FOR AN HOUR WITHOUT A BREAK: 0

## 2022-12-08 NOTE — PROGRESS NOTES
Jailyn Vernon Dub CNP [facility]  [address] [facility], [address] [facility]  P- [phone]   [facility] [address]. [facility] [facility]. [address] [phone]     [facility] [id number] [phone]      Assessment/Plan:      1. NAKIA (obstructive sleep apnea)  Assessment & Plan:   Chronic-with progression/exacerbation: Reviewed and analyzed results of physiologic download from patient's machine and reviewed with patient. Supplies and parts as needed for her machine. These are medically necessary. Limit caffeine use after 3pm. Based on the analyzed data will change following settings: P min increased to 15, response changed to standard. Will increase pressure on her machine to help control obstructive events. Recommended an in lab titration due to no improvement despite multiple adjustments to her machine and hypoxia shown on her HST and overnight oximetry with APAP. Will need to assess potential need to be changed to bilevel and/or bleed in oxygen use with her machine. She is unable to complete the in lab titration at this time due to being the full-time caregiver for her  and she does not have anyone to help her. At this time will increase pressure on her machine and have her work with her equipment company on mask fit and comfort. Will see her back in 3 months. If she is doing well with her machine at that time will order an overnight oximetry to assess hypoxia. If not, would strongly encourage an in lab titration. Encouraged consistent use of her machine each night, all night. Discussed the importance of treating NAKIA from a physiological standpoint. Instructed not to drive unless had 4 hrs of effective therapy for her NAKIA the night before.   Did review the risks of under or untreated NAKIA including, but not limited to, higher risks of motor vehicle accidents, stroke, heart attacks, and death. Sheunderstands and accepts all these risks. 2. Chronic diastolic CHF (congestive heart failure) (HCC)  Assessment & Plan:   Chronic- Stable. Discussed the importance of treating obstructive sleep apnea as part of the management of this disorder. Cont any meds per PCP and other physicians. 3. Paroxysmal atrial fibrillation (HCC)  Assessment & Plan:   Chronic- Stable. Discussed the importance of treating obstructive sleep apnea as part of the management of this disorder. Cont any meds per PCP and other physicians. 4. Acquired hypothyroidism  Assessment & Plan:   Chronic- Stable. Discussed the importance of treating obstructive sleep apnea as part of the management of this disorder. Cont any meds per PCP and other physicians. 5. Obesity (BMI 30.0-34. 9)  Assessment & Plan:   Chronic-not stable:  Discussed importance of treating obstructive sleep apnea and getting sufficient sleep to assist with weight control. Encouraged her to work on weight loss through diet and exercise. Recommended DASH or Mediterranean diets. Reviewed, analyzed, and documented physiologic data from patient's PAP machine. This information was analyzed to assess complexity and medical decision making in regards to further testing and management. The primary encounter diagnosis was NAKIA (obstructive sleep apnea). Diagnoses of Chronic diastolic CHF (congestive heart failure) ([address] Paroxysmal atrial fibrillation ([address] Acquired hypothyroidism, and Obesity (BMI 30.0-34.9) were also pertinent to this visit. The chronic medical conditions listed are directly related to the primary diagnosis listed above. The management of the primary diagnosis affects the secondary diagnosis and vice versa. Subjective:   Subjective   Patient ID: Chuy Mary is a 81 y.o. female.     Chief Complaint   Patient presents with    Sleep Apnea       HPI:  Machine Modem/Download Info:  Compliance (hours/night): 3.5 hrs/night  % of nights >= 4 hrs: 1 %  Download AHI (/hour): 5.4 /HR  Average CPAP Pressure : 17.9 cmH2O      APAP - Settings  Pressure Min: 12 cmH2O  Pressure Max: 20 cmH2O                 Comfort Settings  Flex/EPR (0-3): 3 PAP Mask  Mask Type: Nasal mask     Lane Jennings presents today for follow-up for sleep apnea. She completed an overnight oximetry which continued to show hypoxia with her APAP. A titration sleep study was recommended but she was unable to complete due to inability to stay overnight due to being the full time caregiver for her . The pressure on her machine was increased and she was recommended to work with her equipment company on mask fit to control mask leak. Since that time she has not had much use of her machine. She had breast cancer surgery in February which caused difficulty sleeping and she has been feeling tired all the time due to difficulties with sleep maintenance. She has tried to use the machine on a few occasions and feels she will wake up after a few hours feeling short of breath. she denies headaches, congestion, nosebleeds, dryness, aerophagia, or drowsiness while driving. She has noticed some mask leak and has tried a few different masks and a chinstrap but did not like them.     [address]    [place] Sleepiness Score: 7    Social History     Socioeconomic History    Marital status:      Spouse name: Not on file    Number of children: Not on file    Years of education: Not on file    Highest education level: Not on file   Occupational History    Occupation: [employer]   Tobacco Use    Smoking status: Former     Packs/day: 0.00     Years: 10.00     Pack years: 0.00     Types: Cigarettes     Quit date: 2005     Years since quittin.5    Smokeless tobacco: Never   Vaping Use    Vaping Use: Never used   Substance and Sexual Activity    Alcohol use: Yes     Comment: rare    Drug

## 2022-12-08 NOTE — LETTER
[facility]  [address] [place] [address]  Phone: [phone]  Fax: [phone]    December 8, 2022       Patient: Fernanda Hammans   MR Number: [id number]   YOB: 1940   Date of Visit: 12/8/2022       Jessie Torres was seen for a follow up visit today. Here is my assessment and plan as well as an attached copy of her visit today:    Atrial fibrillation ([address]   Chronic- Stable. Discussed the importance of treating obstructive sleep apnea as part of the management of this disorder. Cont any meds per PCP and other physicians. Chronic diastolic CHF (congestive heart failure) (HCC)   Chronic- Stable. Discussed the importance of treating obstructive sleep apnea as part of the management of this disorder. Cont any meds per PCP and other physicians. Obesity (BMI 30.0-34. 9)   Chronic-not stable:  Discussed importance of treating obstructive sleep apnea and getting sufficient sleep to assist with weight control. Encouraged her to work on weight loss through diet and exercise. Recommended DASH or Mediterranean diets. Hypothyroidism   Chronic- Stable. Discussed the importance of treating obstructive sleep apnea as part of the management of this disorder. Cont any meds per PCP and other physicians. NAKIA (obstructive sleep apnea)   Chronic-with progression/exacerbation: Reviewed and analyzed results of physiologic download from patient's machine and reviewed with patient. Supplies and parts as needed for her machine. These are medically necessary. Limit caffeine use after 3pm. Based on the analyzed data will change following settings: P min increased to 15, response changed to standard. Will increase pressure on her machine to help control obstructive events. Recommended an in lab titration due to no improvement despite multiple adjustments to her machine and hypoxia shown on her HST and overnight oximetry with APAP.   Will need to assess potential need to be changed to bilevel and/or bleed in oxygen use with her machine. She is unable to complete the in lab titration at this time due to being the full-time caregiver for her  and she does not have anyone to help her. At this time will increase pressure on her machine and have her work with her equipment company on mask fit and comfort. Will see her back in 3 months. If she is doing well with her machine at that time will order an overnight oximetry to assess hypoxia. If not, would strongly encourage an in lab titration. Encouraged consistent use of her machine each night, all night. Discussed the importance of treating NAKIA from a physiological standpoint. Instructed not to drive unless had 4 hrs of effective therapy for her NAKIA the night before. Did review the risks of under or untreated NAKIA including, but not limited to, higher risks of motor vehicle accidents, stroke, heart attacks, and death. Sheunderstands and accepts all these risks. If you have questions or concerns, please do not hesitate to call me. I look forward to following Tayla Hammond along with you.     Sincerely,    JOSE Velasquez    CC providers:  Angie Bennett, [address] [facility] [address]  [address] [place]

## 2022-12-08 NOTE — PROGRESS NOTES
Diagnosis: [x] NAKIA (G47.33) [] CSA (G47.31) [] Apnea (G47.30)   Length of Need: [x] 15 Months [] 99 Months [] Other:   Machine (FRANCISCA!): [] Respironics Dream Station      Auto [] ResMed AirSense     Auto [] Other:     []  CPAP () [] Bilevel ()   Mode: [] Auto [] Spontaneous    Mode: [] Auto [] Spontaneous             Comfort Settings:      Humidifier: [] Heated ()        [x] Water chamber replacement ()/ 1 per 6 months        Mask:   [] Nasal () /1 per 3 months [x] Full Face () /1 per 3 months   [] Patient choice -Size and fit mask [x] Patient Choice - Size and fit mask   [] Dispense: [] Dispense:   [] Headgear () / 1 per 3 months [x] Headgear () / 1 per 3 months   [] Replacement Nasal Cushion ()/2 per month [x] Interface Replacement ()/1 per month   [] Replacement Nasal Pillows ()/2 per month         Tubing: [x] Heated ()/1 per 3 months    [] Standard ()/1 per 3 months [] Other:           Filters: [x] Non-disposable ()/1 per 6 months     [x] Ultra-Fine, Disposable ()/2 per month        Miscellaneous: [] Chin Strap ()/ 1 per 6 months [] O2 bleed-in:        LPM   [] Oxymetry on CPAP/Bilevel []  Other:         Start Order Date: 12/08/22    MEDICAL JUSTIFICATION:  I, the undersigned, certify that the above prescribed supplies are medically necessary for this patients wellbeing. In my opinion, the supplies are both reasonable and necessary in reference to accepted standards of medicalpractice in treatment of this patients condition. Ngozi Hart NP    NPI: [id number]       Order Signed Date: 12/08/22  [address] [facility]  Pulmonary, Sleep, and Critical Care    Pulmonary, Sleep, and Critical Care  [address] [place] [address].  [facility], [address] [facility] , [address] [facility]  Phone: [phone]    Fax: uMnir Jennings  1940  [address] [facility]  [phone] (home)   [phone] (mobile)      Insurance Info (confirm with patient if correct):  Payer/Plan Subscr  Sex Relation Sub.  Ins. ID Effective Group Num

## 2022-12-08 NOTE — PROGRESS NOTES
Diagnosis: [x] NAKIA (G47.33) [] CSA (G47.31) [] Apnea (G47.30)   Length of Need: [x] 15 Months [] 99 Months [] Other:   Machine (FRANCISCA!): [] Respironics Dream Station      Auto [] ResMed AirSense     Auto [] Other:     []  CPAP () [] Bilevel ()   Mode: [] Auto [] Spontaneous    Mode: [] Auto [] Spontaneous            Comfort Settings:      Humidifier: [] Heated ()        [x] Water chamber replacement ()/ 1 per 6 months        Mask:   [x] Nasal () /1 per 3 months [] Full Face () /1 per 3 months   [x] Patient choice -Size and fit mask [] Patient Choice - Size and fit mask   [] Dispense: [] Dispense:   [x] Headgear () / 1 per 3 months [] Headgear () / 1 per 3 months   [x] Replacement Nasal Cushion ()/2 per month [] Interface Replacement ()/1 per month   [x] Replacement Nasal Pillows ()/2 per month         Tubing: [x] Heated ()/1 per 3 months    [] Standard ()/1 per 3 months [] Other:           Filters: [x] Non-disposable ()/1 per 6 months     [x] Ultra-Fine, Disposable ()/2 per month        Miscellaneous: [x] Chin Strap ()/ 1 per 6 months [] O2 bleed-in:        LPM   [] Oxymetry on CPAP/Bilevel []  Other:         Start Order Date: 12/08/22    MEDICAL JUSTIFICATION:  I, the undersigned, certify that the above prescribed supplies are medically necessary for this patients wellbeing. In my opinion, the supplies are both reasonable and necessary in reference to accepted standards of medicalpractice in treatment of this patients condition. Thai Astorga NP    NPI: [id number]       Order Signed Date: 12/08/22  [address] [place] [address]  Pulmonary, Sleep, and Critical Care    Pulmonary, Sleep, and Critical Care  [address] [place] [address], [address] [place] [address] , [address]                                    [facility]  Phone: [phone]    Fax: Munir Jennings  1940  [address] [facility]  [phone] (home)   [phone] (mobile)      Insurance Info (confirm with patient if correct):  Payer/Plan Subscr  Sex Relation Sub.  Ins. ID Effective Group Num

## 2022-12-08 NOTE — ASSESSMENT & PLAN NOTE
Chronic-with progression/exacerbation: Reviewed and analyzed results of physiologic download from patient's machine and reviewed with patient. Supplies and parts as needed for her machine. These are medically necessary. Limit caffeine use after 3pm. Based on the analyzed data will change following settings: P min increased to 15, response changed to standard. Will increase pressure on her machine to help control obstructive events. Recommended an in lab titration due to no improvement despite multiple adjustments to her machine and hypoxia shown on her HST and overnight oximetry with APAP. Will need to assess potential need to be changed to bilevel and/or bleed in oxygen use with her machine. She is unable to complete the in lab titration at this time due to being the full-time caregiver for her  and she does not have anyone to help her. At this time will increase pressure on her machine and have her work with her equipment company on mask fit and comfort. Will see her back in 3 months. If she is doing well with her machine at that time will order an overnight oximetry to assess hypoxia. If not, would strongly encourage an in lab titration. Encouraged consistent use of her machine each night, all night. Discussed the importance of treating NAKIA from a physiological standpoint. Instructed not to drive unless had 4 hrs of effective therapy for her NAKIA the night before. Did review the risks of under or untreated NAKIA including, but not limited to, higher risks of motor vehicle accidents, stroke, heart attacks, and death. Sheunderstands and accepts all these risks.

## 2022-12-20 ENCOUNTER — OFFICE VISIT (OUTPATIENT)
Dept: CARDIOLOGY CLINIC | Age: 82
End: 2022-12-20
Payer: MEDICARE

## 2022-12-20 VITALS
BODY MASS INDEX: 32.56 KG/M2 | HEART RATE: 68 BPM | WEIGHT: 178 LBS | SYSTOLIC BLOOD PRESSURE: 130 MMHG | DIASTOLIC BLOOD PRESSURE: 90 MMHG

## 2022-12-20 DIAGNOSIS — Z95.0 PACEMAKER: ICD-10-CM

## 2022-12-20 DIAGNOSIS — I48.0 PAROXYSMAL ATRIAL FIBRILLATION (HCC): Primary | ICD-10-CM

## 2022-12-20 DIAGNOSIS — I49.5 SSS (SICK SINUS SYNDROME) (HCC): ICD-10-CM

## 2022-12-20 DIAGNOSIS — I50.32 CHRONIC DIASTOLIC CONGESTIVE HEART FAILURE (HCC): ICD-10-CM

## 2022-12-20 DIAGNOSIS — I10 ESSENTIAL HYPERTENSION: ICD-10-CM

## 2022-12-20 PROCEDURE — G8427 DOCREV CUR MEDS BY ELIG CLIN: HCPCS | Performed by: INTERNAL MEDICINE

## 2022-12-20 PROCEDURE — 1123F ACP DISCUSS/DSCN MKR DOCD: CPT | Performed by: INTERNAL MEDICINE

## 2022-12-20 PROCEDURE — G8399 PT W/DXA RESULTS DOCUMENT: HCPCS | Performed by: INTERNAL MEDICINE

## 2022-12-20 PROCEDURE — G8417 CALC BMI ABV UP PARAM F/U: HCPCS | Performed by: INTERNAL MEDICINE

## 2022-12-20 PROCEDURE — 1036F TOBACCO NON-USER: CPT | Performed by: INTERNAL MEDICINE

## 2022-12-20 PROCEDURE — G8484 FLU IMMUNIZE NO ADMIN: HCPCS | Performed by: INTERNAL MEDICINE

## 2022-12-20 PROCEDURE — 1090F PRES/ABSN URINE INCON ASSESS: CPT | Performed by: INTERNAL MEDICINE

## 2022-12-20 PROCEDURE — 3074F SYST BP LT 130 MM HG: CPT | Performed by: INTERNAL MEDICINE

## 2022-12-20 PROCEDURE — 99214 OFFICE O/P EST MOD 30 MIN: CPT | Performed by: INTERNAL MEDICINE

## 2022-12-20 PROCEDURE — 3078F DIAST BP <80 MM HG: CPT | Performed by: INTERNAL MEDICINE

## 2022-12-20 ASSESSMENT — ENCOUNTER SYMPTOMS
SHORTNESS OF BREATH: 0
CHOKING: 0
COUGH: 0
CHEST TIGHTNESS: 0

## 2022-12-20 NOTE — PROGRESS NOTES
Subjective:      Patient ID: Frankie Lindsey is a 81 y.o. female. HPI  Follow up afib/sob/HTN/SSS/Pacer. Feeling good. Dx br ca 12.21. s/p  lumpectomy. Not feeling palp/arrhythmia. Some fatigue. Breathing good. No chest pain. No pnd or orthopnea. No tachy/syncope/Palp. No edema. BP good. No syncope. EP following.        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    Atrial fibrillation ([facility]     dr Elayne Middleton and dr Colleen Salazar    Biatrial enlargement     Cancer [facility])     breast - left  - xrt and chemo ([facility])    Diastolic CHF ([facility]     Diverticulosis     Generalized edema     lasix as needed    GERD (gastroesophageal reflux disease)     Hearing loss     Melanoma ([facility]     left scapula    NAKIA (obstructive sleep apnea) 09/08/2021    Other and unspecified hyperlipidemia     Pre-diabetes     Tinnitus     Unspecified gastritis and gastroduodenitis without mention of hemorrhage     aloe vera juice for gerd    Unspecified hypothyroidism      Past Surgical History:   Procedure Laterality Date    BLADDER SUSPENSION  1990s    BREAST LUMPECTOMY Left 02/22/2022    LEFT BREAST LOCALIZED PARTIAL MASTECTOMY, BIOZORB, performed by Adrienne Swan MD at [address]  01/16/2007    Normal    COLONOSCOPY  02/27/2012    Dr. Brandy Bowman    Right Repair-inguinal    HYSTERECTOMY (CERVIX STATUS UNKNOWN)  1975    ovaries left    OTHER SURGICAL HISTORY  04/21/2021    pacemaker    US BREAST NEEDLE BIOPSY LEFT Left 01/21/2022    US BREAST NEEDLE BIOPSY LEFT 1/21/2022 [facility]    US GUIDED NEEDLE LOC OF LEFT BREAST Left 02/18/2022    US GUIDED NEEDLE LOC OF LEFT BREAST 2/18/2022 [facility] ULTRASOUND    WISDOM TOOTH EXTRACTION       Social History     Socioeconomic History    Marital status:      Spouse name: Not on file    Number of children: Not on file    Years of education: Not on file    Highest education level: Not on file   Occupational History    Occupation: [employer]   Tobacco Use    Smoking status: Former     Packs/day: 0.00     Years: 10.00     Pack years: 0.00     Types: Cigarettes     Quit date: 2005     Years since quittin.5    Smokeless tobacco: Never   Vaping Use    Vaping Use: Never used   Substance and Sexual Activity    Alcohol use: Yes     Comment: rare    Drug use: No    Sexual activity: Yes   Other Topics Concern    Not on file   Social History Narrative    Had 1, oldest daughter passed     8 grandkids      Social Determinants of Health     Financial Resource Strain: Low Risk     Difficulty of Paying Living Expenses: Not hard at all   Food Insecurity: No Food Insecurity    Worried About Running Out of Food in the Last Year: Never true    Ran Out of Food in the Last Year: Never true   Transportation Needs: Not on file   Physical Activity: Not on file   Stress: Not on file   Social Connections: Not on file   Intimate Partner Violence: Not on file   Housing Stability: Not on file     FH reviewed        Vitals:    22 1350   BP: (!) 130/90   Pulse: 68           Wt 178    Review of Systems   Constitutional:  Negative for activity change, appetite change and fatigue. Respiratory:  Negative for cough, choking, chest tightness and shortness of breath. Cardiovascular:  Negative for chest pain, palpitations and leg swelling. Denies PND or orthopnea. No tachycardia or syncope. Neurological:  Negative for dizziness, syncope and light-headedness. Psychiatric/Behavioral:  Negative for agitation, behavioral problems and confusion. All other systems reviewed and are negative. Objective:   Physical Exam  Constitutional:       General: She is not in acute distress. Appearance: Normal appearance. She is well-developed. HENT:      Head: Normocephalic and atraumatic.       Right Ear: External ear normal.      Left Ear: External ear normal.   Neck:      Vascular: No JVD. Cardiovascular:      Rate and Rhythm: Normal rate and regular rhythm. Heart sounds: Normal heart sounds. No murmur heard. No gallop. Pulmonary:      Effort: Pulmonary effort is normal. No respiratory distress. Breath sounds: Normal breath sounds. No wheezing or rales. Abdominal:      General: Bowel sounds are normal.      Palpations: Abdomen is soft. Tenderness: There is no abdominal tenderness. Musculoskeletal:         General: Normal range of motion. Cervical back: Normal range of motion. Skin:     General: Skin is warm and dry. Neurological:      General: No focal deficit present. Mental Status: She is alert and oriented to person, place, and time. Psychiatric:         Mood and Affect: Mood normal.         Behavior: Behavior normal.       Assessment:       Diagnosis Orders   1. Paroxysmal atrial fibrillation (HCC)        2. SSS (sick sinus syndrome) ([address]        3. Pacemaker        4. Chronic diastolic congestive heart failure ([address]        5. Essential hypertension                Plan:      CV stable. S/P pacer for SSS   Compensated. On AC. Continue Eliquis 5 mg bid. No bleeding issues. Continue  lopressor 100 mg bid / flecanide 50 mg bid for HTN/afib. Will continue flecanide for afib. Ep following. Reviewed previous records and testing including echo 1/21. Follow up 3 months.            Marlen Best MD

## 2023-01-11 RX ORDER — METOPROLOL TARTRATE 100 MG/1
TABLET ORAL
Qty: 180 TABLET | Refills: 2 | Status: SHIPPED | OUTPATIENT
Start: 2023-01-11

## 2023-01-16 ENCOUNTER — OFFICE VISIT (OUTPATIENT)
Dept: SURGERY | Age: 83
End: 2023-01-16
Payer: MEDICARE

## 2023-01-16 ENCOUNTER — HOSPITAL ENCOUNTER (OUTPATIENT)
Dept: MAMMOGRAPHY | Age: 83
Discharge: HOME | End: 2023-01-16
Payer: MEDICARE

## 2023-01-16 VITALS
HEIGHT: 62 IN | HEART RATE: 78 BPM | BODY MASS INDEX: 32.09 KG/M2 | OXYGEN SATURATION: 99 % | SYSTOLIC BLOOD PRESSURE: 128 MMHG | DIASTOLIC BLOOD PRESSURE: 72 MMHG | WEIGHT: 174.4 LBS | RESPIRATION RATE: 18 BRPM

## 2023-01-16 VITALS — BODY MASS INDEX: 32.01 KG/M2 | WEIGHT: 175 LBS

## 2023-01-16 DIAGNOSIS — Z85.3 HISTORY OF BREAST CANCER: ICD-10-CM

## 2023-01-16 DIAGNOSIS — Z90.12 S/P PARTIAL MASTECTOMY, LEFT: ICD-10-CM

## 2023-01-16 DIAGNOSIS — Z85.3 ENCOUNTER FOR FOLLOW-UP SURVEILLANCE OF BREAST CANCER: ICD-10-CM

## 2023-01-16 DIAGNOSIS — Z08 ENCOUNTER FOR FOLLOW-UP SURVEILLANCE OF BREAST CANCER: ICD-10-CM

## 2023-01-16 DIAGNOSIS — Z12.39 ENCOUNTER FOR SCREENING BREAST EXAMINATION: Primary | ICD-10-CM

## 2023-01-16 PROCEDURE — 1036F TOBACCO NON-USER: CPT | Performed by: NURSE PRACTITIONER

## 2023-01-16 PROCEDURE — G8399 PT W/DXA RESULTS DOCUMENT: HCPCS | Performed by: NURSE PRACTITIONER

## 2023-01-16 PROCEDURE — G8484 FLU IMMUNIZE NO ADMIN: HCPCS | Performed by: NURSE PRACTITIONER

## 2023-01-16 PROCEDURE — G0279 TOMOSYNTHESIS, MAMMO: HCPCS

## 2023-01-16 PROCEDURE — 99213 OFFICE O/P EST LOW 20 MIN: CPT | Performed by: NURSE PRACTITIONER

## 2023-01-16 PROCEDURE — G8417 CALC BMI ABV UP PARAM F/U: HCPCS | Performed by: NURSE PRACTITIONER

## 2023-01-16 PROCEDURE — 1090F PRES/ABSN URINE INCON ASSESS: CPT | Performed by: NURSE PRACTITIONER

## 2023-01-16 PROCEDURE — G8427 DOCREV CUR MEDS BY ELIG CLIN: HCPCS | Performed by: NURSE PRACTITIONER

## 2023-01-16 PROCEDURE — 1123F ACP DISCUSS/DSCN MKR DOCD: CPT | Performed by: NURSE PRACTITIONER

## 2023-01-16 NOTE — PATIENT INSTRUCTIONS
Healthy Lifestyle Recommendations: healthy diet (decrease consumption of red meat, increase fresh fruits and vegetables), decreased alcohol consumption (less than 4 drinks/week), adequate sleep (goal 6-8 hours), routine exercise (goal 150 minutes/week or greater), weight control. Patient Education        Breast Self-Exam: Care Instructions  Your Care Instructions     A breast self-exam is when you check your breasts for lumps or changes. This regular exam helps you learn how your breasts normally look and feel. Most breast problems or changes are not because of cancer. Breast self-exam is not a substitute for a mammogram. Having regular breast exams by your doctor and regular mammograms improve your chances of finding any problems with your breasts. Some women set a time each month to do a step-by-step breast self-exam. Other women like a less formal system. They might look at their breasts as they brush their teeth, or feel their breasts once in a while in the shower. If you notice a change in your breast, tell your doctor. Follow-up care is a key part of your treatment and safety. Be sure to make and go to all appointments, and call your doctor if you are having problems. It's also a good idea to know your test results and keep a list of the medicines you take. How do you do a breast self-exam?  The best time to examine your breasts is usually one week after your menstrual period begins. Your breasts should not be tender then. If you do not have periods, you might do your exam on a day of the month that is easy to remember. To examine your breasts:  Remove all your clothes above the waist and lie down. When you are lying down, your breast tissue spreads evenly over your chest wall, which makes it easier to feel all your breast tissue. Use the pads--not the fingertips--of the 3 middle fingers of your left hand to check your right breast. Move your fingers slowly in small coin-sized circles that overlap.   Use three levels of pressure to feel of all your breast tissue. Use light pressure to feel the tissue close to the skin surface. Use medium pressure to feel a little deeper. Use firm pressure to feel your tissue close to your breastbone and ribs. Use each pressure level to feel your breast tissue before moving on to the next spot. Check your entire breast, moving up and down as if following a strip from the collarbone to the bra line, and from the armpit to the ribs. Repeat until you have covered the entire breast.  Repeat this procedure for your left breast, using the pads of the 3 middle fingers of your right hand. To examine your breasts while in the shower:  Place one arm over your head and lightly soap your breast on that side. Using the pads of your fingers, gently move your hand over your breast (in the strip pattern described above), feeling carefully for any lumps or changes. Repeat for the other breast.  Have your doctor inspect anything you notice to see if you need further testing. Where can you learn more? Go to http://www.woods.com/ and enter P148 to learn more about \"Breast Self-Exam: Care Instructions. \"  Current as of: August 2, 2022               Content Version: 13.5  © 2006-2022 [employer]. Care instructions adapted under license by [facility] ([facility]). If you have questions about a medical condition or this instruction, always ask your healthcare professional. [place] [address] any warranty or liability for your use of this information.

## 2023-01-16 NOTE — PROGRESS NOTES
[facility]  Surgical Breast Oncology      Medical Oncologist: Dr. Rayna Cruz Oncologist: Dr. Chandu Rios       CC: 6 months follow-up, breast cancer f/u      HPI: Elbert Griffith is a 81 y.o. woman here for 6 months follow up for breast check secondary to personal history of left breast cancer. She is s/p left partial mastectomy 2/22/2022 with Dr. Jairo Guerra for 6 mm of grade 2 DCIS, ER positive, negative margins. S/p adjuvant radiation therapy. She started Arimidex on 5/9/2022 ---> Raloxifene. She has no breast related concerns today. She states that she does perform routine self breast evaluations and has not noticed any new abnormalities such as masses, skin changes, color changes,nipple discharge, or changes to the nipple-areolar complex. INTERVAL HX:  On 2/22/2022 she underwent left breast partial mastectomy. Pathology identified 6 mm of grade 2 DCIS. Deep margin was focally closed with additional deep margin at that time was negative. ER positive. [id number]     On 4/15/2022 she completed adjuvant radiation therapy. On 5/9/2022 she initiated Arimidex. On 1/16/2023 she underwent interval breast imaging. Postsurgical changes noted in the left breast.  No new concerning findings suggestive of malignancy. BI-RADS 2.       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    Atrial fibrillation ([facility] [address]     dr Liliane Wilson and dr Derek Delgadillo    Biatrial enlargement     Cancer [facility])     breast - left  - xrt and chemo (AI)    Diastolic CHF ([address]     Diverticulosis     Generalized edema     lasix as needed    GERD (gastroesophageal reflux disease)     Hearing loss     Melanoma ([facility] [address]     left scapula    NAKIA (obstructive sleep apnea) 09/08/2021    Other and unspecified hyperlipidemia     Pre-diabetes     Tinnitus     Unspecified gastritis and gastroduodenitis without mention of hemorrhage     aloe vera juice for gerd Unspecified hypothyroidism        Past Surgical History:   Procedure Laterality Date    BLADDER SUSPENSION  1990s    BREAST LUMPECTOMY Left 2022    LEFT BREAST LOCALIZED PARTIAL MASTECTOMY, BIOZORB, performed by Judd Doty MD at [address]  2007    Normal    COLONOSCOPY  2012    Dr. Paola Martinez    Right Repair-inguinal    HYSTERECTOMY (CERVIX STATUS UNKNOWN)  1975    ovaries left    OTHER SURGICAL HISTORY  2021    pacemaker    US BREAST NEEDLE BIOPSY LEFT Left 2022    US BREAST NEEDLE BIOPSY LEFT 2022 [facility]    US GUIDED NEEDLE LOC OF LEFT BREAST Left 2022    US GUIDED NEEDLE LOC OF LEFT BREAST 2022 [facility]    WISDOM TOOTH EXTRACTION         Family History   Problem Relation Age of Onset    Cancer Brother         prostate    Dementia Brother     Sleep Apnea Son     Heart Attack Child        Allergies as of 2023 - Fully Reviewed 2023   Allergen Reaction Noted    Bactrim [sulfamethoxazole-trimethoprim] Hives 2019    Seasonal Other (See Comments) 2019    Fosamax [alendronate]  2022       Social History     Tobacco Use    Smoking status: Former     Packs/day: 0.00     Years: 10.00     Pack years: 0.00     Types: Cigarettes     Quit date: 2005     Years since quittin.6    Smokeless tobacco: Never   Vaping Use    Vaping Use: Never used   Substance Use Topics    Alcohol use: Yes     Comment: rare    Drug use: No       Current Outpatient Medications on File Prior to Visit   Medication Sig Dispense Refill    metoprolol (LOPRESSOR) 100 MG tablet TAKE 1 TABLET BY MOUTH TWICE A  tablet 2    raloxifene (EVISTA) 60 MG tablet       ELIQUIS 5 MG TABS tablet TAKE 1 TABLET BY MOUTH TWICE A  tablet 3    levothyroxine (SYNTHROID) 25 MCG tablet TAKE 1 TABLET BY MOUTH EVERY DAY 90 tablet 1    flecainide (TAMBOCOR) 50 MG tablet Take 1 tablet by mouth 2 times daily 180 tablet 3    furosemide (LASIX) 20 MG tablet Take 1 tablet by mouth daily as needed (water weight gain.) 60 tablet 3    Handicap Placard MISC by Does not apply route Dx of shortness of breath and A-fib. Effective Jan 13, 2021 until Jan 13, 2026. 1 each 0    Cholecalciferol (VITAMIN D) 2000 UNITS CAPS capsule Take 1 capsule by mouth daily. No current facility-administered medications on file prior to visit. Medications: documentation has been reviewed in the electronic medical record and patient office intake form. REVIEW OF SYSTEMS:  Constitutional: Negative for fever  HENT: Negative for sore throat  Eyes: Negative for redness   Respiratory: Negative for dyspnea, cough  Cardiovascular: Negative for chest pain  Gastrointestinal: Negative for vomiting, diarrhea   Genitourinary: Negative for hematuria   Musculoskeletal: Negative for arthralgias   Skin: Negative for rash  Neurological: Negative for syncope  Hematological: Negative for adenopathy  Psychiatric/Behavorial: Negative for anxiety         PHYSICAL EXAM:  /72   Pulse 78   Resp 18   Ht 5' 2\" (1.575 m)   Wt 174 lb 6.4 oz (79.1 kg)   SpO2 99%   BMI 31.90 kg/m²   Constitutional: She appearswell-nourished. No apparent distress. Breast: The patient was examined in the upright and supine position. Breasts are symmetrically ptotic. Right: No new masses or changes in breast contour. No skin changes of the breast or nipple areolar complex. No nipple inversion or discharge. No erythema, thickening (peau d'orange), or dimpling. Left: Well-healed scar. No new masses or changes in breast contour. No skin changes of the breast or nipple areolar complex. No nipple inversion or discharge. No erythema, thickening (peau d'orange), or dimpling. There is no axillary lymphadenopathy palpated bilaterally.    Head: Normocephalic and atraumatic:   Eyes: EOM are normal. Pupils are equal, round, and reactive to light. Neck: Neck supple. No tracheal deviation present. No obvious mass. Lymphatics: No palpable supraclavicular, cervical, or axillary lymphadenopathy  Skin: No rash noted. No erythema. Neurologic: alert and oriented. Extremities: appear well perfused. ASSESSMENT:  - Screening Breast Examination - no concerning findings on clinical breast exam.  No concerning finding suggestive of malignancy on breast imaging.       - Personal History of Breast Cancer - s/p left partial mastectomy 2/22/2022 with Dr. Linda Newsome for 6 mm of grade 2 DCIS, ER positive, negative margins. S/p adjuvant radiation therapy. - Aromatase inhibitor use - Arimidex--->Raloxifene per medical oncology   - Encounter for follow-up surveillance of breast cancer      PLAN:   Resume annual screening mammogram.  Due 1/2024  Clinical breast exam every 6 months for 2 years, if stable after 2 years, can extend out to annually. Continue endocrine therapy per medical oncology   Signs/symptoms of recurrence were reviewed. She verbalizes understanding that she should notify the office if she identifies any abnormalities on self evaluation. Follow up cancer surveillance discussed   Discussed the importance of breast awareness including the importance and technique of self breast exams  Most recent breast imaging was reviewed, documented above, the results were discussed with the patient, all questions answered  Healthy Lifestyle Recommendations: healthy diet (decrease consumption of red meat, increase fresh fruits and vegetables), decreased alcohol consumption (less than 4 drinks/week), adequate sleep (goal 6-8 hours), routine exercise (goal 150 minutes/week or greater), weight control. Follow up in 6 months           JOSE Mckeon-CNP  [facility])   Surgical Breast Oncology   [phone]    All of the patient's questions were answered at this time however, she was encouraged to call the office with any further inquiries. Approximately 25 minutes of time were spent in preparation, direct patient contact, counseling, care coordination, documentation and activities otherwise related to this encounter.

## 2023-01-18 ENCOUNTER — NURSE ONLY (OUTPATIENT)
Dept: CARDIOLOGY CLINIC | Age: 83
End: 2023-01-18

## 2023-01-18 DIAGNOSIS — Z95.0 PACEMAKER: Primary | ICD-10-CM

## 2023-01-18 DIAGNOSIS — R00.1 BRADYCARDIA: ICD-10-CM

## 2023-01-20 NOTE — PROGRESS NOTES
We received remote transmission from patient's dual chamber pacemaker monitor at home. Transmission shows normal sensing and pacing function. No arrhythmias/ or events. EP physician will review. See interrogation under cardiology tab in the [address] [place] [address] [address] field for more details. Will continue to monitor remotely.      (End of 91-day monitoring period 1/18/23)
Diabetes

## 2023-01-21 DIAGNOSIS — E03.9 ACQUIRED HYPOTHYROIDISM: ICD-10-CM

## 2023-01-23 RX ORDER — LEVOTHYROXINE SODIUM 0.03 MG/1
TABLET ORAL
Qty: 90 TABLET | Refills: 1 | Status: SHIPPED | OUTPATIENT
Start: 2023-01-23

## 2023-03-02 NOTE — PROGRESS NOTES
[address]   Cardiac Electrophysiology Consultation   Date: 3/2/2023  Reason for Consultation: AF  Consult Requesting Physician: No att. providers found     Chief Complaint:   No chief complaint on file. HPI: Girish Cuellar is a 81 y.o. female with PMH significant for GERD, hypothyroidism, AF (dx'ed 1/2021), symptomatic with SOB, rate controlled with diltiazem, Lopressor, started Eliquis, echo showed preserved LVEF with LAE, TBS with 6.8 sec conversion pauses, s/p dual chamber PPM (4/21/21, myself), started on flecainide, planned for DCCV a couple months later, but converted to SR. Interval History: Today, she is here for 6 mo f/u, presenting in [employer]. patient continues to do very well with her current dose of flecainide 50 mg p.o. twice daily and Eliquis 5 mg twice daily. She had very low burden atrial fibrillation and remains asymptomatic. During A-fib, her ventricular rate is under good control. AP 98.6%.  0.2%. PVC 5.3/hr  AT/L/Fib noted with a burden of 0.6%. Last occurring on 1.17.2023. V rates appear controlled    Assessment:  1. Persistent AF, on Eliquis, flecainide  2. TBS, s/p dual chamber PPM  3. HTN, stable on Lopressor  4. Hypothyroidism, on levothyroxine    Plan:  Continue flecainide, metoprolol and Eliquis  No changes in the programming of the pacemaker  Follow-up with EP NP in 6 months      Atrial Fibrillation:    BMI    :   Body mass index is 31.46 kg/m².     Duration   :   1/2021    Symptoms   :   Shortness of breath, easy fatigability, dyspnea on exertion, decline in his functional capacity    Previous DCCV :   none    Previous AAD            :    flecainide started 4/2021    Beta blocker  :   Lopressor    ACE / ARB  :   none    OAC   :   Eliquis    LVEF    :   55-60%    Left atrial size  :   4.4 cm    NAKIA   :   Not tested    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    Atrial fibrillation ([facility]     dr Enrique Dickerson and dr Jason Tinajero    Biatrial enlargement     Cancer [facility])     breast - left  - xrt and chemo (AI)    Diastolic CHF ([facility]     Diverticulosis     Generalized edema     lasix as needed    GERD (gastroesophageal reflux disease)     Hearing loss     Melanoma ([facility]     left scapula    NAKIA (obstructive sleep apnea) 09/08/2021    Other and unspecified hyperlipidemia     Pre-diabetes     Tinnitus     Unspecified gastritis and gastroduodenitis without mention of hemorrhage     aloe vera juice for gerd    Unspecified hypothyroidism         Past Surgical History:   Procedure Laterality Date    BLADDER SUSPENSION  1990s    BREAST LUMPECTOMY Left 02/22/2022    LEFT BREAST LOCALIZED PARTIAL MASTECTOMY, BIOZORB, performed by Mike Humphrey MD at [address]  01/16/2007    Normal    COLONOSCOPY  02/27/2012    Dr. Zan Tolbert    Right Repair-inguinal    HYSTERECTOMY (CERVIX STATUS UNKNOWN)  1975    ovaries left    OTHER SURGICAL HISTORY  04/21/2021    pacemaker    US BREAST BIOPSY W LOC DEVICE 1ST LESION LEFT Left 01/21/2022    US BREAST NEEDLE BIOPSY LEFT 1/21/2022 [facility]    US GUIDED NEEDLE LOC OF LEFT BREAST Left 02/18/2022    US GUIDED NEEDLE LOC OF LEFT BREAST 2/18/2022 [facility]    WISDOM TOOTH EXTRACTION         Allergies: Allergies   Allergen Reactions    Bactrim [Sulfamethoxazole-Trimethoprim] Hives     hives    Seasonal Other (See Comments)     Runny nose, sneezing    Fosamax [Alendronate]      Jaw problems       Medication:   Prior to Admission medications    Medication Sig Start Date End Date Taking?  Authorizing Provider   levothyroxine (SYNTHROID) 25 MCG tablet TAKE 1 TABLET BY MOUTH EVERY DAY 1/23/23   Lakshmi Buitrago MD   metoprolol (LOPRESSOR) 100 MG tablet TAKE 1 TABLET BY MOUTH TWICE A DAY 1/11/23   Malgorzata Vasquez APRN - CNP   raloxifene (EVISTA) 60 MG tablet  9/28/22 Historical Provider, MD JACK 5 MG TABS tablet TAKE 1 TABLET BY MOUTH TWICE A DAY 9/16/22   Cornelius Alvarez APRN - CNP   flecainide (TAMBOCOR) 50 MG tablet Take 1 tablet by mouth 2 times daily 3/14/22   JOSE Cameron - CNP   furosemide (LASIX) 20 MG tablet Take 1 tablet by mouth daily as needed (water weight gain.) 5/5/21   Dori Kolb DO   Handicap Placard MISC by Does not apply route Dx of shortness of breath and A-fib. Effective Jan 13, 2021 until Jan 13, 2026. 1/13/21   Elicynthiademetrius Kolb, DO   Cholecalciferol (VITAMIN D) 2000 UNITS CAPS capsule Take 1 capsule by mouth daily. 5/28/14   Rizwana Potter MD       Social History:   reports that she quit smoking about 17 years ago. Her smoking use included cigarettes. She has never used smokeless tobacco. She reports current alcohol use. She reports that she does not use drugs. Family History:  family history includes Cancer in her brother; Dementia in her brother; Heart Attack in her child; Sleep Apnea in her son. Reviewed. Denies family history of sudden cardiac death, arrhythmia, premature CAD    Review of System:    General ROS: negative for - chills, fever   Psychological ROS: negative for - anxiety or depression  Ophthalmic ROS: negative for - eye pain or loss of vision  ENT ROS: negative for - epistaxis, headaches, nasal discharge, sore throat   Allergy and Immunology ROS: negative for - hives, nasal congestion   Hematological and Lymphatic ROS: negative for - bleeding problems, blood clots, bruising or jaundice  Endocrine ROS: negative for - skin changes, temperature intolerance or unexpected weight changes  Respiratory ROS: negative for - cough, hemoptysis, pleuritic pain, SOB, sputum changes or wheezing  Cardiovascular ROS: Per HPI.    Gastrointestinal ROS: negative for - abdominal pain, blood in stools, diarrhea, hematemesis, melena, nausea/vomiting or swallowing difficulty/pain  Genito-Urinary ROS: negative for - dysuria or incontinence  Musculoskeletal ROS: negative for - joint swelling or muscle pain  Neurological ROS: negative for - confusion, dizziness, gait disturbance, headaches, numbness/tingling, seizures, speech problems, tremors, visual changes or weakness  Dermatological ROS: negative for - rash    Physical Examination:  Vitals:    03/07/23 1135   BP: 104/62   Pulse: 82         Constitutional: Oriented. No distress. Head: Normocephalic and atraumatic. Mouth/Throat: Oropharynx is clear and moist.   Eyes: Conjunctivae normal. EOM are normal.   Neck: Normal range of motion. Neck supple. No rigidity. No JVD present. Cardiovascular: Normal rate, regular rhythm, S1&S2 and intact distal pulses. Pulmonary/Chest: Bilateral respiratory sounds. No wheezes. No rhonchi. Abdominal: Soft. Bowel sounds present. No distension, No tenderness. Musculoskeletal: No tenderness. No edema    Lymphadenopathy: Has no cervical adenopathy. Neurological: Alert and oriented. Cranial nerve appears intact, No Gross deficit   Skin: Skin is warm and dry. No rash noted. Psychiatric: Has a normal mood, affect and behavior     Labs:  Reviewed. ECG: reviewed, . No pathologic Q waves, ventricular pre-excitation, or QT prolongation. Studies:   1. Event monitor:  na    2. Echo 1/4/21:   Summary   Left ventricular cavity size is normal. There is mild concentric left   ventricular hypertrophy (more prominent basal septal hypertrophy). Overall   left ventricular systolic function appears normal with an estimated ejection   fraction of 55-60%. No regional wall motion abnormalities are noted. Diastolic function is indeterminate due to abnormal heart rhythm. Moderate mitral regurgitation is present. The left atrium is severely dilated. Mild aortic regurgitation is present. Moderate tricuspid regurgitation.     3. Stress Test 4/14/21:   Summary    There is normal isotope uptake at stress and rest. There is no evidence of myocardial ischemia or scar. Normal LV size and systolic function. Left ventricular ejection fraction of 73 %. There are no regional wall motion abnormalities. Overall findings represent a low risk scan. The MCOT, echocardiogram, stress test, and coronary angiography/PCI were reviewed by myself and used for my plan of care. - The patient is counseled to follow a low salt diet to assure blood pressure remains controlled for cardiovascular risk factor modification.   - The patient is counseled to avoid excess caffeine, and energy drinks as this may exacerbated ectopy and arrhythmia. - The patient is counseled to get regular exercise 3-5 times per week to control cardiovascular risk factors. - The patient is counseled to lose weigt to control cardiovascular risk factors. -The patient is counseled about the health hazards of smoking including cardiovascular side effects and its impact on morbidity and mortality. Thank you for allowing me to participate in the care of Avril Delaney. All questions and concerns were addressed to the patient/family. Alternatives to my treatment were discussed. Katiana Apple RN, am scribing for and in the presence of Dr. Prachi Castellanos. 03/02/23 9:09 AM  Amadou Barboza RN    I, Ebenezer Sanchez MD, personally performed the services prescribed in this documentation as scribed by Ms. Amadou Barboza RN in my presence and it is both accurate and complete.        Ebenezer Sanchez MD  Cardiac Electrophysiology  [facility]

## 2023-03-07 ENCOUNTER — NURSE ONLY (OUTPATIENT)
Dept: CARDIOLOGY CLINIC | Age: 83
End: 2023-03-07
Payer: MEDICARE

## 2023-03-07 ENCOUNTER — OFFICE VISIT (OUTPATIENT)
Dept: CARDIOLOGY CLINIC | Age: 83
End: 2023-03-07
Payer: MEDICARE

## 2023-03-07 VITALS
WEIGHT: 172 LBS | HEART RATE: 82 BPM | SYSTOLIC BLOOD PRESSURE: 104 MMHG | BODY MASS INDEX: 31.46 KG/M2 | DIASTOLIC BLOOD PRESSURE: 62 MMHG

## 2023-03-07 DIAGNOSIS — I50.32 CHRONIC DIASTOLIC CHF (CONGESTIVE HEART FAILURE) (HCC): ICD-10-CM

## 2023-03-07 DIAGNOSIS — I48.0 PAROXYSMAL ATRIAL FIBRILLATION (HCC): ICD-10-CM

## 2023-03-07 DIAGNOSIS — I48.91 ATRIAL FIBRILLATION WITH RAPID VENTRICULAR RESPONSE (HCC): ICD-10-CM

## 2023-03-07 DIAGNOSIS — Z95.0 PACEMAKER: ICD-10-CM

## 2023-03-07 DIAGNOSIS — R00.1 BRADYCARDIA: ICD-10-CM

## 2023-03-07 DIAGNOSIS — I10 ESSENTIAL HYPERTENSION: ICD-10-CM

## 2023-03-07 DIAGNOSIS — I48.19 PERSISTENT ATRIAL FIBRILLATION (HCC): Primary | ICD-10-CM

## 2023-03-07 DIAGNOSIS — Z95.0 PACEMAKER: Primary | ICD-10-CM

## 2023-03-07 DIAGNOSIS — I49.5 TACHY-BRADY SYNDROME (HCC): ICD-10-CM

## 2023-03-07 PROCEDURE — G8484 FLU IMMUNIZE NO ADMIN: HCPCS | Performed by: INTERNAL MEDICINE

## 2023-03-07 PROCEDURE — G8417 CALC BMI ABV UP PARAM F/U: HCPCS | Performed by: INTERNAL MEDICINE

## 2023-03-07 PROCEDURE — 1090F PRES/ABSN URINE INCON ASSESS: CPT | Performed by: INTERNAL MEDICINE

## 2023-03-07 PROCEDURE — 1123F ACP DISCUSS/DSCN MKR DOCD: CPT | Performed by: INTERNAL MEDICINE

## 2023-03-07 PROCEDURE — 1036F TOBACCO NON-USER: CPT | Performed by: INTERNAL MEDICINE

## 2023-03-07 PROCEDURE — G8427 DOCREV CUR MEDS BY ELIG CLIN: HCPCS | Performed by: INTERNAL MEDICINE

## 2023-03-07 PROCEDURE — 99214 OFFICE O/P EST MOD 30 MIN: CPT | Performed by: INTERNAL MEDICINE

## 2023-03-07 PROCEDURE — 3078F DIAST BP <80 MM HG: CPT | Performed by: INTERNAL MEDICINE

## 2023-03-07 PROCEDURE — 93280 PM DEVICE PROGR EVAL DUAL: CPT | Performed by: INTERNAL MEDICINE

## 2023-03-07 PROCEDURE — G8399 PT W/DXA RESULTS DOCUMENT: HCPCS | Performed by: INTERNAL MEDICINE

## 2023-03-07 PROCEDURE — 3074F SYST BP LT 130 MM HG: CPT | Performed by: INTERNAL MEDICINE

## 2023-03-07 NOTE — PROGRESS NOTES
Patient comes in for programming evaluation on their Medtronic dual chamber pacemaker. Last remote 01.18.2023    All sensing and pacing parameters appear unchanged since last in office check on 08.16.2022.  11.8 years estimated until LUBA/RRT. Underlying, Atrial dependent today AAI 30 bpm  AP 98.6%.  0.2%. PVC 5.3/hr  AT/L/Fib noted with a burden of 0.6%. Last occurring on 1.17.2023. V rates appear controlled. Patient remains on eliquis, flecainide, furosemide, metoprolol. Time and carelink alerts adjusted. Please see interrogation for more detail. Patient will see Dr. Radha Arce today and follow up in 3 months in office or remotely.

## 2023-03-07 NOTE — RESULT ENCOUNTER NOTE
Unremarkable device check.    Continue to monitor    Marifer Craig MD   Cardiac Electrophysiology  [address] [facility]  Office [phone]

## 2023-03-14 RX ORDER — FLECAINIDE ACETATE 50 MG/1
50 TABLET ORAL 2 TIMES DAILY
Qty: 180 TABLET | Refills: 3 | Status: SHIPPED | OUTPATIENT
Start: 2023-03-14

## 2023-03-14 NOTE — TELEPHONE ENCOUNTER
Requested Prescriptions      No prescriptions requested or ordered in this encounter            Last Office Visit: 3/7/2023     Next Office Visit: 3/28/2023

## 2023-03-15 ENCOUNTER — SCHEDULED TELEPHONE ENCOUNTER (OUTPATIENT)
Dept: INTERNAL MEDICINE CLINIC | Age: 83
End: 2023-03-15
Payer: MEDICARE

## 2023-03-15 ENCOUNTER — TELEPHONE (OUTPATIENT)
Dept: INTERNAL MEDICINE CLINIC | Age: 83
End: 2023-03-15

## 2023-03-15 ENCOUNTER — TELEPHONE (OUTPATIENT)
Dept: CARDIOLOGY CLINIC | Age: 83
End: 2023-03-15

## 2023-03-15 DIAGNOSIS — U07.1 COVID-19 VIRUS DETECTED: Primary | ICD-10-CM

## 2023-03-15 PROCEDURE — 99441 PR PHYS/QHP TELEPHONE EVALUATION 5-10 MIN: CPT | Performed by: INTERNAL MEDICINE

## 2023-03-15 NOTE — TELEPHONE ENCOUNTER
Pt called stating that she is covid positive and she wants to know what kind of antibiotic she could be prescribed that will work well with her heart medications. She has no fever and just have a cough and nasal issues. She is concerned because of the flecainide.  Please contact her at [phone]

## 2023-03-15 NOTE — TELEPHONE ENCOUNTER
Patient is calling she wanted to let Dr. Cathleen Russ know that she has tested positive for Covid. Patient stated that she started feeling sxs yesterday. Small cough,  Fatigue   Congestion  Afib (patient always has this due to pacemaker)   SOB ( due to Afib) patient is not concerned about SOB   No Fever  Low appetite   Patient is submitting E Vist.   She is aware that Dr. Cathleen Russ is not in office this week. Please contact.

## 2023-03-15 NOTE — TELEPHONE ENCOUNTER
Would today at 3:15 be okay for a televisit ? Please advise     Patient is calling she wanted to let Dr. Wander Painting know that she has tested positive for Covid. Patient stated that she started feeling sxs yesterday. Small cough,  Fatigue   Congestion  Afib (patient always has this due to pacemaker)   SOB ( due to Afib) patient is not concerned about SOB   No Fever  Low appetite   Patient is submitting E Vist.   She is aware that Dr. Wander Painting is not in office this week. Please contact.

## 2023-03-15 NOTE — PROGRESS NOTES
Telephone Visit  [facility]  Internal Medicine  [facility]      Callie Jennings is a 81 y.o. female evaluated via telephone on 3/15/2023. Consent:  She and/or health care decision maker is aware that that she may receive a bill for this telephone service, depending on her insurance coverage, and has provided verbal consent to proceed: Yes      Documentation:  I communicated with the patient and/or health care decision maker about covid-19 positive . Details of this discussion including any medical advice provided:     Patient seen via telephone visit 2/2 being covid-19 positive at home. Patient notes that symptoms started on Sunday. She notes that her  is currently in the hospital with COVID-19. She notes that her symptoms currently are feeling tired, nasal congestion. She denies any pain or heart palpitations. She notes that she has not had a fever. She does have some shortness of breath on exertion however does not feel short of breath sitting or talking to me on the phone. Of note patient does not sound out of breath speaking to me and she is able to speak in complete sentences without any difficulty. She does note that her hands tend to be cold in her pulse oximeter tends to show low numbers on occasion. However she notes after warming her hands up while on the phone with me and keeping the pulse oximeter on her oxygen levels are in the 96 to 97% range. Heart rate is currently 73. Temperature this morning was 97.1 without medication. She notes that she is currently trying to stay hydrated and rested. Secondary to patient's atrial fibrillation and being on flecainide and Eliquis did not recommend treatment with Paxlovid, it is strongly contraindicated. Therefore, we will continue supportive care with over-the-counter medication including Tylenol and Mucinex. Patient also to rest and stay hydrated. Patient to continue checking oxygen levels at home.   Patient notes that if she becomes short of breath at rest or oxygen levels fall below 94% she should go to the ER for further evaluation. Patient voiced understanding. I affirm this is a Patient Initiated Episode with a Patient who has not had a related appointment within my department in the past 7 days or scheduled within the next 24 hours.     Patient identification was verified at the start of the visit: Yes    Total Time: minutes: 5-10 minutes    Lane Newman DO

## 2023-03-15 NOTE — TELEPHONE ENCOUNTER
Advised pt that she should not take Paxlovid due to interaction with Eliquis. Pt verbalized understanding.

## 2023-03-17 ENCOUNTER — NURSE ONLY (OUTPATIENT)
Dept: CARDIOLOGY CLINIC | Age: 83
End: 2023-03-17

## 2023-03-17 NOTE — PROGRESS NOTES
[facility] remote transmission received from patient's dual chamber pacemaker monitor at home d/t AT/AF >= 1 hr for 1 day. Transmission shows normal sensing and pacing function. Noted AT/AF/L w current event ongoing since 03.16.23 @ 01:16; 13 of 28 pace-terminated episodes. Pt on Eliquis, flecainide, Lopressor. EP physician will review. See interrogation under cardiology tab in the [facility] [address] field for more details. Will continue to monitor remotely.

## 2023-03-28 ENCOUNTER — OFFICE VISIT (OUTPATIENT)
Dept: CARDIOLOGY CLINIC | Age: 83
End: 2023-03-28
Payer: MEDICARE

## 2023-03-28 VITALS
HEART RATE: 76 BPM | BODY MASS INDEX: 31.46 KG/M2 | DIASTOLIC BLOOD PRESSURE: 82 MMHG | WEIGHT: 172 LBS | SYSTOLIC BLOOD PRESSURE: 128 MMHG

## 2023-03-28 DIAGNOSIS — I10 ESSENTIAL HYPERTENSION: ICD-10-CM

## 2023-03-28 DIAGNOSIS — Z95.0 PACEMAKER: ICD-10-CM

## 2023-03-28 DIAGNOSIS — I48.0 PAROXYSMAL ATRIAL FIBRILLATION (HCC): Primary | ICD-10-CM

## 2023-03-28 DIAGNOSIS — I50.32 CHRONIC DIASTOLIC CONGESTIVE HEART FAILURE (HCC): ICD-10-CM

## 2023-03-28 DIAGNOSIS — I49.5 SSS (SICK SINUS SYNDROME) (HCC): ICD-10-CM

## 2023-03-28 PROBLEM — D68.69 SECONDARY HYPERCOAGULABLE STATE (HCC): Status: ACTIVE | Noted: 2023-03-28

## 2023-03-28 PROCEDURE — G8427 DOCREV CUR MEDS BY ELIG CLIN: HCPCS | Performed by: INTERNAL MEDICINE

## 2023-03-28 PROCEDURE — 1090F PRES/ABSN URINE INCON ASSESS: CPT | Performed by: INTERNAL MEDICINE

## 2023-03-28 PROCEDURE — G8484 FLU IMMUNIZE NO ADMIN: HCPCS | Performed by: INTERNAL MEDICINE

## 2023-03-28 PROCEDURE — 3079F DIAST BP 80-89 MM HG: CPT | Performed by: INTERNAL MEDICINE

## 2023-03-28 PROCEDURE — 3074F SYST BP LT 130 MM HG: CPT | Performed by: INTERNAL MEDICINE

## 2023-03-28 PROCEDURE — G8399 PT W/DXA RESULTS DOCUMENT: HCPCS | Performed by: INTERNAL MEDICINE

## 2023-03-28 PROCEDURE — 1123F ACP DISCUSS/DSCN MKR DOCD: CPT | Performed by: INTERNAL MEDICINE

## 2023-03-28 PROCEDURE — 1036F TOBACCO NON-USER: CPT | Performed by: INTERNAL MEDICINE

## 2023-03-28 PROCEDURE — 99214 OFFICE O/P EST MOD 30 MIN: CPT | Performed by: INTERNAL MEDICINE

## 2023-03-28 PROCEDURE — G8417 CALC BMI ABV UP PARAM F/U: HCPCS | Performed by: INTERNAL MEDICINE

## 2023-03-28 ASSESSMENT — ENCOUNTER SYMPTOMS
COUGH: 0
SHORTNESS OF BREATH: 0
CHEST TIGHTNESS: 0
CHOKING: 0

## 2023-03-28 NOTE — PROGRESS NOTES
Subjective:      Patient ID: Mik Hancock is a 81 y.o. female. HPI  Follow up afib/sob/HTN/SSS/Pacer. Feeling good. Dx br ca 12.21. s/p  lumpectomy. Had covid early march. Not feeling palp/arrhythmia. Breathing good. No chest pain. No pnd or orthopnea. No tachy/syncope/Palp. No edema. BP good. No syncope. EP following.        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    Atrial fibrillation ([facility] [address]     [facility]    Biatrial enlargement     Cancer [facility])     breast - left  - xrt and chemo (AI)    Diastolic CHF ([facility] [address]     Diverticulosis     Generalized edema     lasix as needed    GERD (gastroesophageal reflux disease)     Hearing loss     Melanoma ([facility] [address]     left scapula    NAKIA (obstructive sleep apnea) 09/08/2021    Other and unspecified hyperlipidemia     Pre-diabetes     Tinnitus     Unspecified gastritis and gastroduodenitis without mention of hemorrhage     aloe vera juice for gerd    Unspecified hypothyroidism      Past Surgical History:   Procedure Laterality Date    BLADDER SUSPENSION  1990s    BREAST LUMPECTOMY Left 02/22/2022    LEFT BREAST LOCALIZED PARTIAL MASTECTOMY, BIOZORB, performed by John Elder MD at [address]  01/16/2007    Normal    COLONOSCOPY  02/27/2012    Dr. Strickland Daily    Right Repair-inguinal    HYSTERECTOMY (CERVIX STATUS UNKNOWN)  1975    ovaries left    OTHER SURGICAL HISTORY  04/21/2021    pacemaker    US BREAST BIOPSY W LOC DEVICE 1ST LESION LEFT Left 01/21/2022    US BREAST NEEDLE BIOPSY LEFT 1/21/2022 [facility]    US GUIDED NEEDLE LOC OF LEFT BREAST Left 02/18/2022    US GUIDED NEEDLE LOC OF LEFT BREAST 2/18/2022 [facility] ULTRASOUND    WISDOM TOOTH EXTRACTION       Social History     Socioeconomic History    Marital status:      Spouse name: Not on file    Number of

## 2023-04-20 ENCOUNTER — OFFICE VISIT (OUTPATIENT)
Dept: INTERNAL MEDICINE CLINIC | Age: 83
End: 2023-04-20
Payer: MEDICARE

## 2023-04-20 VITALS
HEART RATE: 67 BPM | HEIGHT: 62 IN | SYSTOLIC BLOOD PRESSURE: 138 MMHG | TEMPERATURE: 97.3 F | OXYGEN SATURATION: 95 % | DIASTOLIC BLOOD PRESSURE: 76 MMHG | BODY MASS INDEX: 31.47 KG/M2 | WEIGHT: 171 LBS

## 2023-04-20 DIAGNOSIS — I48.91 ATRIAL FIBRILLATION WITH RAPID VENTRICULAR RESPONSE (HCC): ICD-10-CM

## 2023-04-20 DIAGNOSIS — Z00.00 MEDICARE ANNUAL WELLNESS VISIT, SUBSEQUENT: Primary | ICD-10-CM

## 2023-04-20 DIAGNOSIS — C50.912 PRIMARY BREAST MALIGNANCY, LEFT (HCC): ICD-10-CM

## 2023-04-20 DIAGNOSIS — R73.9 HYPERGLYCEMIA: ICD-10-CM

## 2023-04-20 DIAGNOSIS — E03.9 ACQUIRED HYPOTHYROIDISM: ICD-10-CM

## 2023-04-20 PROCEDURE — G0439 PPPS, SUBSEQ VISIT: HCPCS | Performed by: INTERNAL MEDICINE

## 2023-04-20 PROCEDURE — 1123F ACP DISCUSS/DSCN MKR DOCD: CPT | Performed by: INTERNAL MEDICINE

## 2023-04-20 RX ORDER — FUROSEMIDE 20 MG/1
20 TABLET ORAL DAILY PRN
Qty: 90 TABLET | Refills: 0 | Status: SHIPPED | OUTPATIENT
Start: 2023-04-20

## 2023-04-20 SDOH — ECONOMIC STABILITY: FOOD INSECURITY: WITHIN THE PAST 12 MONTHS, THE FOOD YOU BOUGHT JUST DIDN'T LAST AND YOU DIDN'T HAVE MONEY TO GET MORE.: NEVER TRUE

## 2023-04-20 SDOH — ECONOMIC STABILITY: INCOME INSECURITY: HOW HARD IS IT FOR YOU TO PAY FOR THE VERY BASICS LIKE FOOD, HOUSING, MEDICAL CARE, AND HEATING?: NOT HARD AT ALL

## 2023-04-20 SDOH — ECONOMIC STABILITY: HOUSING INSECURITY
IN THE LAST 12 MONTHS, WAS THERE A TIME WHEN YOU DID NOT HAVE A STEADY PLACE TO SLEEP OR SLEPT IN A SHELTER (INCLUDING NOW)?: NO

## 2023-04-20 SDOH — ECONOMIC STABILITY: FOOD INSECURITY: WITHIN THE PAST 12 MONTHS, YOU WORRIED THAT YOUR FOOD WOULD RUN OUT BEFORE YOU GOT MONEY TO BUY MORE.: NEVER TRUE

## 2023-04-20 ASSESSMENT — LIFESTYLE VARIABLES
HOW MANY STANDARD DRINKS CONTAINING ALCOHOL DO YOU HAVE ON A TYPICAL DAY: 1 OR 2
HOW OFTEN DO YOU HAVE A DRINK CONTAINING ALCOHOL: 2-3 TIMES A WEEK

## 2023-04-20 ASSESSMENT — PATIENT HEALTH QUESTIONNAIRE - PHQ9
2. FEELING DOWN, DEPRESSED OR HOPELESS: 0
1. LITTLE INTEREST OR PLEASURE IN DOING THINGS: 0
SUM OF ALL RESPONSES TO PHQ QUESTIONS 1-9: 0
SUM OF ALL RESPONSES TO PHQ QUESTIONS 1-9: 0
SUM OF ALL RESPONSES TO PHQ9 QUESTIONS 1 & 2: 0
SUM OF ALL RESPONSES TO PHQ QUESTIONS 1-9: 0
SUM OF ALL RESPONSES TO PHQ QUESTIONS 1-9: 0

## 2023-04-24 DIAGNOSIS — E03.9 ACQUIRED HYPOTHYROIDISM: ICD-10-CM

## 2023-04-24 DIAGNOSIS — R73.9 HYPERGLYCEMIA: ICD-10-CM

## 2023-04-24 DIAGNOSIS — I48.91 ATRIAL FIBRILLATION WITH RAPID VENTRICULAR RESPONSE (HCC): ICD-10-CM

## 2023-04-24 LAB
ALBUMIN SERPL-MCNC: 4.2 G/DL (ref 3.4–5)
ALBUMIN/GLOB SERPL: 2.2 {RATIO} (ref 1.1–2.2)
ALP SERPL-CCNC: 99 U/L (ref 40–129)
ALT SERPL-CCNC: 39 U/L (ref 10–40)
ANION GAP SERPL CALCULATED.3IONS-SCNC: 12 MMOL/L (ref 3–16)
AST SERPL-CCNC: 36 U/L (ref 15–37)
BILIRUB SERPL-MCNC: 0.5 MG/DL (ref 0–1)
BUN SERPL-MCNC: 17 MG/DL (ref 7–20)
CALCIUM SERPL-MCNC: 9.5 MG/DL (ref 8.3–10.6)
CHLORIDE SERPL-SCNC: 99 MMOL/L (ref 99–110)
CO2 SERPL-SCNC: 27 MMOL/L (ref 21–32)
CREAT SERPL-MCNC: 0.9 MG/DL (ref 0.6–1.2)
DEPRECATED RDW RBC AUTO: 13.7 % (ref 12.4–15.4)
GFR SERPLBLD CREATININE-BSD FMLA CKD-EPI: >60 ML/MIN/{1.73_M2}
GLUCOSE SERPL-MCNC: 109 MG/DL (ref 70–99)
HCT VFR BLD AUTO: 45.3 % (ref 36–48)
HGB BLD-MCNC: 14.9 G/DL (ref 12–16)
MCH RBC QN AUTO: 32.2 PG (ref 26–34)
MCHC RBC AUTO-ENTMCNC: 32.8 G/DL (ref 31–36)
MCV RBC AUTO: 98.2 FL (ref 80–100)
PLATELET # BLD AUTO: 199 K/UL (ref 135–450)
PMV BLD AUTO: 10 FL (ref 5–10.5)
POTASSIUM SERPL-SCNC: 4.9 MMOL/L (ref 3.5–5.1)
PROT SERPL-MCNC: 6.1 G/DL (ref 6.4–8.2)
RBC # BLD AUTO: 4.61 M/UL (ref 4–5.2)
SODIUM SERPL-SCNC: 138 MMOL/L (ref 136–145)
T4 FREE SERPL-MCNC: 1.3 NG/DL (ref 0.9–1.8)
TSH SERPL DL<=0.005 MIU/L-ACNC: 4.03 UIU/ML (ref 0.27–4.2)
WBC # BLD AUTO: 7.9 K/UL (ref 4–11)

## 2023-04-25 LAB
EST. AVERAGE GLUCOSE BLD GHB EST-MCNC: 111.2 MG/DL
HBA1C MFR BLD: 5.5 %

## 2023-05-03 ENCOUNTER — NURSE ONLY (OUTPATIENT)
Dept: CARDIOLOGY CLINIC | Age: 83
End: 2023-05-03

## 2023-05-03 DIAGNOSIS — R00.1 BRADYCARDIA: Primary | ICD-10-CM

## 2023-05-03 DIAGNOSIS — Z95.0 PACEMAKER: ICD-10-CM

## 2023-05-05 NOTE — PROGRESS NOTES
We received remote transmission from patient's dual chamber pacemaker monitor at home. Transmission shows normal sensing and pacing function. 17.2% AT/ AF burden, 20.0% pace terminated (flecainide, lopressor, eliquis). EP physician will review. See interrogation under cardiology tab in the [address] [place] [address] [address] field for more details. Will continue to monitor remotely.      (End of 91-day monitoring period 5/3/23)

## 2023-06-28 ENCOUNTER — OFFICE VISIT (OUTPATIENT)
Dept: CARDIOLOGY CLINIC | Age: 83
End: 2023-06-28
Payer: MEDICARE

## 2023-06-28 VITALS
WEIGHT: 172 LBS | SYSTOLIC BLOOD PRESSURE: 154 MMHG | DIASTOLIC BLOOD PRESSURE: 70 MMHG | BODY MASS INDEX: 31.46 KG/M2 | HEART RATE: 60 BPM

## 2023-06-28 DIAGNOSIS — I50.32 CHRONIC DIASTOLIC CONGESTIVE HEART FAILURE (HCC): ICD-10-CM

## 2023-06-28 DIAGNOSIS — I10 ESSENTIAL HYPERTENSION: ICD-10-CM

## 2023-06-28 DIAGNOSIS — I48.0 PAROXYSMAL A-FIB (HCC): Primary | ICD-10-CM

## 2023-06-28 DIAGNOSIS — Z95.0 PACEMAKER: ICD-10-CM

## 2023-06-28 DIAGNOSIS — I49.5 SSS (SICK SINUS SYNDROME) (HCC): ICD-10-CM

## 2023-06-28 PROCEDURE — G8417 CALC BMI ABV UP PARAM F/U: HCPCS | Performed by: INTERNAL MEDICINE

## 2023-06-28 PROCEDURE — 1036F TOBACCO NON-USER: CPT | Performed by: INTERNAL MEDICINE

## 2023-06-28 PROCEDURE — 1123F ACP DISCUSS/DSCN MKR DOCD: CPT | Performed by: INTERNAL MEDICINE

## 2023-06-28 PROCEDURE — 3077F SYST BP >= 140 MM HG: CPT | Performed by: INTERNAL MEDICINE

## 2023-06-28 PROCEDURE — G8399 PT W/DXA RESULTS DOCUMENT: HCPCS | Performed by: INTERNAL MEDICINE

## 2023-06-28 PROCEDURE — 1090F PRES/ABSN URINE INCON ASSESS: CPT | Performed by: INTERNAL MEDICINE

## 2023-06-28 PROCEDURE — G8427 DOCREV CUR MEDS BY ELIG CLIN: HCPCS | Performed by: INTERNAL MEDICINE

## 2023-06-28 PROCEDURE — 3078F DIAST BP <80 MM HG: CPT | Performed by: INTERNAL MEDICINE

## 2023-06-28 PROCEDURE — 99214 OFFICE O/P EST MOD 30 MIN: CPT | Performed by: INTERNAL MEDICINE

## 2023-06-28 ASSESSMENT — ENCOUNTER SYMPTOMS
CHOKING: 0
COUGH: 0
SHORTNESS OF BREATH: 0
CHEST TIGHTNESS: 0

## 2023-07-21 DIAGNOSIS — E03.9 ACQUIRED HYPOTHYROIDISM: ICD-10-CM

## 2023-07-21 RX ORDER — LEVOTHYROXINE SODIUM 0.03 MG/1
25 TABLET ORAL DAILY
Qty: 90 TABLET | Refills: 1 | Status: SHIPPED | OUTPATIENT
Start: 2023-07-21

## 2023-07-21 NOTE — TELEPHONE ENCOUNTER
Patient is calling for refill on:   levothyroxine (SYNTHROID) 25 MCG tablet  Last appointment: 4/20/2023  Next appointment: 10/23/2023  Last refill: 6/12/23    Please send to:   [facility]/pharmacy #[id number]- [address] [place] [phone] - F [phone]  [phone]

## 2023-07-31 ENCOUNTER — OFFICE VISIT (OUTPATIENT)
Dept: SURGERY | Age: 83
End: 2023-07-31
Payer: MEDICARE

## 2023-07-31 VITALS
HEART RATE: 89 BPM | OXYGEN SATURATION: 98 % | BODY MASS INDEX: 31.21 KG/M2 | HEIGHT: 62 IN | RESPIRATION RATE: 18 BRPM | WEIGHT: 169.6 LBS

## 2023-07-31 DIAGNOSIS — Z08 ENCOUNTER FOR FOLLOW-UP SURVEILLANCE OF BREAST CANCER: ICD-10-CM

## 2023-07-31 DIAGNOSIS — Z90.12 S/P PARTIAL MASTECTOMY, LEFT: ICD-10-CM

## 2023-07-31 DIAGNOSIS — Z12.31 ENCOUNTER FOR SCREENING MAMMOGRAM FOR BREAST CANCER: ICD-10-CM

## 2023-07-31 DIAGNOSIS — Z85.3 ENCOUNTER FOR FOLLOW-UP SURVEILLANCE OF BREAST CANCER: Primary | ICD-10-CM

## 2023-07-31 DIAGNOSIS — Z85.3 HISTORY OF BREAST CANCER: ICD-10-CM

## 2023-07-31 DIAGNOSIS — Z85.3 ENCOUNTER FOR FOLLOW-UP SURVEILLANCE OF BREAST CANCER: ICD-10-CM

## 2023-07-31 DIAGNOSIS — Z12.31 ENCOUNTER FOR SCREENING MAMMOGRAM FOR MALIGNANT NEOPLASM OF BREAST: ICD-10-CM

## 2023-07-31 DIAGNOSIS — Z12.39 ENCOUNTER FOR SCREENING BREAST EXAMINATION: Primary | ICD-10-CM

## 2023-07-31 DIAGNOSIS — Z08 ENCOUNTER FOR FOLLOW-UP SURVEILLANCE OF BREAST CANCER: Primary | ICD-10-CM

## 2023-07-31 PROCEDURE — G8399 PT W/DXA RESULTS DOCUMENT: HCPCS | Performed by: NURSE PRACTITIONER

## 2023-07-31 PROCEDURE — 1036F TOBACCO NON-USER: CPT | Performed by: NURSE PRACTITIONER

## 2023-07-31 PROCEDURE — 1090F PRES/ABSN URINE INCON ASSESS: CPT | Performed by: NURSE PRACTITIONER

## 2023-07-31 PROCEDURE — G8417 CALC BMI ABV UP PARAM F/U: HCPCS | Performed by: NURSE PRACTITIONER

## 2023-07-31 PROCEDURE — 1123F ACP DISCUSS/DSCN MKR DOCD: CPT | Performed by: NURSE PRACTITIONER

## 2023-07-31 PROCEDURE — G8427 DOCREV CUR MEDS BY ELIG CLIN: HCPCS | Performed by: NURSE PRACTITIONER

## 2023-07-31 PROCEDURE — 99213 OFFICE O/P EST LOW 20 MIN: CPT | Performed by: NURSE PRACTITIONER

## 2023-07-31 NOTE — PROGRESS NOTES
Approximately 25 minutes of time were spent in preparation, direct patient contact, counseling, care coordination, documentation and activities otherwise related to this encounter.

## 2023-08-25 NOTE — PROGRESS NOTES
[facility]   Electrophysiology  Office Visit  Date: 9/11/2023    Chief Complaint   Patient presents with    Atrial Fibrillation    Shortness of Breath    Palpitations    Bradycardia    Tachycardia     Cardiac HX: Sade Wolfe is a 81 y.o. woman with a h/o GERD, hypothyroidism and pAF(1/2021), had p/w progressively worsening SOB, noted to be in AF/RVR, placed on dilt, Eliquis, BB and lasix, EF 55% per echo, then p/w (4/17/2021) lightheadedness, N/V/D, found to be SB with HR's in the 40's, placed on dopamine, given IVF then developed AF/RVR, 6.8 sec conversion pause noted on tele, s/p dual ch MDT PPM (4/21/2021, Dr. Christie Judge), planned for DCCV on 6/22/2021, however patient in NSR in f/u. Interval History/HPI: Patient is here for follow-up for paroxysmal AF, SSS, sinus pauses and PPM management. Patient was originally diagnosed with paroxysmal AF in January 2021 when she presented with progressively worsening shortness of breath and was noted to be in AF/RVR. She was placed on Eliquis 5 mg twice a day, diltiazem and metoprolol. She then presented in April 2021 with lightheadedness, nausea, vomiting and diarrhea and was found to be sinus bradycardic with heart rates in the 40s. She was started on dopamine and given IV fluids and then developed AF/RVR with a 6.8-second conversion pause noted on telemetry. She underwent a dual-chamber MDT PPM on 4/21/2021. She was planned for a DCCV on 6/22/2021 however in follow-up she was in NSR. Today she presents in NSR with PVCs. She remains on flecainide 50 mg twice a day, metoprolol 100 mg twice a day. She is on Eliquis 5 mg twice a day with no issues with bleeding or dark tarry stools. Review of her device today shows 75.9% AP, 6.6% , 233 treated AT/AF episodes and 65 AT/AF episodes for total burden of 23.3%. His episodes of been ongoing since 8/16/2023. Average ventricular rates fairly well controlled.   Patient has been dizzy, lightheaded, shortness

## 2023-09-01 PROCEDURE — 93296 REM INTERROG EVL PM/IDS: CPT | Performed by: INTERNAL MEDICINE

## 2023-09-01 PROCEDURE — 93294 REM INTERROG EVL PM/LDLS PM: CPT | Performed by: INTERNAL MEDICINE

## 2023-09-05 NOTE — TELEPHONE ENCOUNTER
Requested Prescriptions     Pending Prescriptions Disp Refills    metoprolol (LOPRESSOR) 100 MG tablet [Pharmacy Med Name: METOPROLOL TARTRATE 100 MG TAB] 180 tablet 2     Sig: TAKE 1 TABLET BY MOUTH TWICE A DAY            Last Office Visit: 6/28/2023     Next Office Visit: 9/11/2023

## 2023-09-08 RX ORDER — METOPROLOL TARTRATE 100 MG/1
TABLET ORAL
Qty: 180 TABLET | Refills: 2 | Status: SHIPPED | OUTPATIENT
Start: 2023-09-08

## 2023-09-11 ENCOUNTER — OFFICE VISIT (OUTPATIENT)
Dept: CARDIOLOGY CLINIC | Age: 83
End: 2023-09-11
Payer: MEDICARE

## 2023-09-11 ENCOUNTER — NURSE ONLY (OUTPATIENT)
Dept: CARDIOLOGY CLINIC | Age: 83
End: 2023-09-11

## 2023-09-11 VITALS
HEART RATE: 74 BPM | SYSTOLIC BLOOD PRESSURE: 122 MMHG | DIASTOLIC BLOOD PRESSURE: 80 MMHG | WEIGHT: 169.8 LBS | BODY MASS INDEX: 31.06 KG/M2

## 2023-09-11 DIAGNOSIS — Z95.0 PACEMAKER: ICD-10-CM

## 2023-09-11 DIAGNOSIS — I48.0 PAROXYSMAL ATRIAL FIBRILLATION (HCC): Primary | ICD-10-CM

## 2023-09-11 DIAGNOSIS — I50.32 CHRONIC DIASTOLIC CHF (CONGESTIVE HEART FAILURE) (HCC): ICD-10-CM

## 2023-09-11 DIAGNOSIS — Z79.01 ON CONTINUOUS ORAL ANTICOAGULATION: ICD-10-CM

## 2023-09-11 DIAGNOSIS — Z51.81 ENCOUNTER FOR MONITORING FLECAINIDE THERAPY: ICD-10-CM

## 2023-09-11 DIAGNOSIS — R00.1 BRADYCARDIA: ICD-10-CM

## 2023-09-11 DIAGNOSIS — Z95.0 PACEMAKER: Primary | ICD-10-CM

## 2023-09-11 DIAGNOSIS — Z79.899 ENCOUNTER FOR MONITORING FLECAINIDE THERAPY: ICD-10-CM

## 2023-09-11 DIAGNOSIS — I48.91 ATRIAL FIBRILLATION WITH RAPID VENTRICULAR RESPONSE (HCC): ICD-10-CM

## 2023-09-11 DIAGNOSIS — I49.5 TACHY-BRADY SYNDROME (HCC): ICD-10-CM

## 2023-09-11 PROCEDURE — 99215 OFFICE O/P EST HI 40 MIN: CPT | Performed by: NURSE PRACTITIONER

## 2023-09-11 PROCEDURE — G8417 CALC BMI ABV UP PARAM F/U: HCPCS | Performed by: NURSE PRACTITIONER

## 2023-09-11 PROCEDURE — G8399 PT W/DXA RESULTS DOCUMENT: HCPCS | Performed by: NURSE PRACTITIONER

## 2023-09-11 PROCEDURE — G8427 DOCREV CUR MEDS BY ELIG CLIN: HCPCS | Performed by: NURSE PRACTITIONER

## 2023-09-11 PROCEDURE — 1090F PRES/ABSN URINE INCON ASSESS: CPT | Performed by: NURSE PRACTITIONER

## 2023-09-11 PROCEDURE — 1123F ACP DISCUSS/DSCN MKR DOCD: CPT | Performed by: NURSE PRACTITIONER

## 2023-09-11 PROCEDURE — 1036F TOBACCO NON-USER: CPT | Performed by: NURSE PRACTITIONER

## 2023-09-11 PROCEDURE — 93000 ELECTROCARDIOGRAM COMPLETE: CPT | Performed by: NURSE PRACTITIONER

## 2023-09-11 RX ORDER — METOPROLOL TARTRATE 100 MG/1
150 TABLET ORAL 2 TIMES DAILY
Qty: 270 TABLET | Refills: 2 | Status: SHIPPED | OUTPATIENT
Start: 2023-09-11

## 2023-09-26 ENCOUNTER — HOSPITAL ENCOUNTER (OUTPATIENT)
Dept: NON INVASIVE DIAGNOSTICS | Age: 83
Discharge: HOME | End: 2023-09-26
Payer: MEDICARE

## 2023-09-26 PROCEDURE — 93306 TTE W/DOPPLER COMPLETE: CPT

## 2023-10-18 ENCOUNTER — OFFICE VISIT (OUTPATIENT)
Dept: CARDIOLOGY CLINIC | Age: 83
End: 2023-10-18
Payer: MEDICARE

## 2023-10-18 VITALS
SYSTOLIC BLOOD PRESSURE: 134 MMHG | HEART RATE: 74 BPM | DIASTOLIC BLOOD PRESSURE: 74 MMHG | WEIGHT: 168 LBS | BODY MASS INDEX: 30.73 KG/M2

## 2023-10-18 DIAGNOSIS — I10 ESSENTIAL HYPERTENSION: ICD-10-CM

## 2023-10-18 DIAGNOSIS — I48.0 PAROXYSMAL ATRIAL FIBRILLATION (HCC): Primary | ICD-10-CM

## 2023-10-18 DIAGNOSIS — I49.5 SSS (SICK SINUS SYNDROME) (HCC): ICD-10-CM

## 2023-10-18 DIAGNOSIS — I50.32 CHRONIC DIASTOLIC CONGESTIVE HEART FAILURE (HCC): ICD-10-CM

## 2023-10-18 DIAGNOSIS — Z95.0 PACEMAKER: ICD-10-CM

## 2023-10-18 PROCEDURE — 1036F TOBACCO NON-USER: CPT | Performed by: INTERNAL MEDICINE

## 2023-10-18 PROCEDURE — 99214 OFFICE O/P EST MOD 30 MIN: CPT | Performed by: INTERNAL MEDICINE

## 2023-10-18 PROCEDURE — 3075F SYST BP GE 130 - 139MM HG: CPT | Performed by: INTERNAL MEDICINE

## 2023-10-18 PROCEDURE — 3078F DIAST BP <80 MM HG: CPT | Performed by: INTERNAL MEDICINE

## 2023-10-18 PROCEDURE — 1090F PRES/ABSN URINE INCON ASSESS: CPT | Performed by: INTERNAL MEDICINE

## 2023-10-18 PROCEDURE — G8399 PT W/DXA RESULTS DOCUMENT: HCPCS | Performed by: INTERNAL MEDICINE

## 2023-10-18 PROCEDURE — 1123F ACP DISCUSS/DSCN MKR DOCD: CPT | Performed by: INTERNAL MEDICINE

## 2023-10-18 PROCEDURE — G8484 FLU IMMUNIZE NO ADMIN: HCPCS | Performed by: INTERNAL MEDICINE

## 2023-10-18 PROCEDURE — G8427 DOCREV CUR MEDS BY ELIG CLIN: HCPCS | Performed by: INTERNAL MEDICINE

## 2023-10-18 PROCEDURE — G8417 CALC BMI ABV UP PARAM F/U: HCPCS | Performed by: INTERNAL MEDICINE

## 2023-10-18 ASSESSMENT — ENCOUNTER SYMPTOMS
CHEST TIGHTNESS: 0
CHOKING: 0
SHORTNESS OF BREATH: 0
COUGH: 0

## 2023-10-18 NOTE — PROGRESS NOTES
Subjective:      Patient ID: Perry Marcum is a 81 y.o. female. HPI  Follow up afib/sob/HTN/SSS/Pacer. Feeling good. Dx br ca 12.21. s/p  lumpectomy. No palp/arrhythmia. Some sob episodes. Last one 2 nights ago. Lasted all night. Orthopneic. Took a lasix. No edema. Used salt/salty food. Potato chips. No chest pain. No pnd or orthopnea. No tachy/syncope/Palp. No edema. BP good. No syncope. EP following.        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    Atrial fibrillation ([address])     dr [place]iti [place] and dr uHy Ruth    Biatrial enlargement     Cancer [facility])     breast - left  - xrt and chemo (AI)    Diastolic CHF ([address])     Diverticulosis     Generalized edema     lasix as needed    GERD (gastroesophageal reflux disease)     Hearing loss     Melanoma ([address])     left scapula    NAKIA (obstructive sleep apnea) 09/08/2021    Other and unspecified hyperlipidemia     Pre-diabetes     Tinnitus     Unspecified gastritis and gastroduodenitis without mention of hemorrhage     aloe vera juice for gerd    Unspecified hypothyroidism      Past Surgical History:   Procedure Laterality Date    BLADDER SUSPENSION  1990s    BREAST LUMPECTOMY Left 02/22/2022    LEFT BREAST LOCALIZED PARTIAL MASTECTOMY, BIOZORB, performed by Alysa Willis MD at [address] [place] [address]  01/16/2007    Normal    COLONOSCOPY  02/27/2012    Dr. Alonso Ramos    Right Repair-inguinal    HYSTERECTOMY (CERVIX STATUS UNKNOWN)  1975    ovaries left    OTHER SURGICAL HISTORY  04/21/2021    pacemaker    US BREAST BIOPSY W LOC DEVICE 1ST LESION LEFT Left 01/21/2022    US BREAST NEEDLE BIOPSY LEFT 1/21/2022 [facility]    US GUIDED NEEDLE LOC OF LEFT BREAST Left 02/18/2022    US GUIDED NEEDLE LOC OF LEFT BREAST 2/18/2022 [address] [facility] ULTRASOUND    WISDOM TOOTH EXTRACTION       Social History     Socioeconomic

## 2023-10-23 ENCOUNTER — OFFICE VISIT (OUTPATIENT)
Dept: INTERNAL MEDICINE CLINIC | Age: 83
End: 2023-10-23
Payer: MEDICARE

## 2023-10-23 VITALS
HEART RATE: 83 BPM | OXYGEN SATURATION: 96 % | SYSTOLIC BLOOD PRESSURE: 130 MMHG | BODY MASS INDEX: 30.76 KG/M2 | WEIGHT: 168.2 LBS | DIASTOLIC BLOOD PRESSURE: 86 MMHG

## 2023-10-23 DIAGNOSIS — E03.9 ACQUIRED HYPOTHYROIDISM: Primary | ICD-10-CM

## 2023-10-23 DIAGNOSIS — I48.91 ATRIAL FIBRILLATION WITH RAPID VENTRICULAR RESPONSE (HCC): ICD-10-CM

## 2023-10-23 DIAGNOSIS — E78.2 MIXED HYPERLIPIDEMIA: ICD-10-CM

## 2023-10-23 DIAGNOSIS — D68.69 SECONDARY HYPERCOAGULABLE STATE (HCC): ICD-10-CM

## 2023-10-23 DIAGNOSIS — Z23 NEED FOR INFLUENZA VACCINATION: ICD-10-CM

## 2023-10-23 DIAGNOSIS — R73.9 HYPERGLYCEMIA: ICD-10-CM

## 2023-10-23 PROCEDURE — G8427 DOCREV CUR MEDS BY ELIG CLIN: HCPCS | Performed by: INTERNAL MEDICINE

## 2023-10-23 PROCEDURE — G8417 CALC BMI ABV UP PARAM F/U: HCPCS | Performed by: INTERNAL MEDICINE

## 2023-10-23 PROCEDURE — 99214 OFFICE O/P EST MOD 30 MIN: CPT | Performed by: INTERNAL MEDICINE

## 2023-10-23 PROCEDURE — G8484 FLU IMMUNIZE NO ADMIN: HCPCS | Performed by: INTERNAL MEDICINE

## 2023-10-23 PROCEDURE — G0008 ADMIN INFLUENZA VIRUS VAC: HCPCS | Performed by: INTERNAL MEDICINE

## 2023-10-23 PROCEDURE — G8399 PT W/DXA RESULTS DOCUMENT: HCPCS | Performed by: INTERNAL MEDICINE

## 2023-10-23 PROCEDURE — 1036F TOBACCO NON-USER: CPT | Performed by: INTERNAL MEDICINE

## 2023-10-23 PROCEDURE — 1123F ACP DISCUSS/DSCN MKR DOCD: CPT | Performed by: INTERNAL MEDICINE

## 2023-10-23 PROCEDURE — 1090F PRES/ABSN URINE INCON ASSESS: CPT | Performed by: INTERNAL MEDICINE

## 2023-10-23 PROCEDURE — 90694 VACC AIIV4 NO PRSRV 0.5ML IM: CPT | Performed by: INTERNAL MEDICINE

## 2023-10-23 RX ORDER — CALCIUM CARBONATE 500(1250)
500 TABLET ORAL DAILY
COMMUNITY

## 2023-11-29 ENCOUNTER — OFFICE VISIT (OUTPATIENT)
Dept: CARDIOLOGY CLINIC | Age: 83
End: 2023-11-29
Payer: MEDICARE

## 2023-11-29 VITALS
WEIGHT: 167 LBS | BODY MASS INDEX: 30.54 KG/M2 | HEART RATE: 72 BPM | DIASTOLIC BLOOD PRESSURE: 84 MMHG | SYSTOLIC BLOOD PRESSURE: 136 MMHG

## 2023-11-29 DIAGNOSIS — Z95.0 PACEMAKER: ICD-10-CM

## 2023-11-29 DIAGNOSIS — I48.0 PAROXYSMAL ATRIAL FIBRILLATION (HCC): Primary | ICD-10-CM

## 2023-11-29 DIAGNOSIS — I50.32 CHRONIC DIASTOLIC CONGESTIVE HEART FAILURE (HCC): ICD-10-CM

## 2023-11-29 DIAGNOSIS — I49.5 SSS (SICK SINUS SYNDROME) (HCC): ICD-10-CM

## 2023-11-29 DIAGNOSIS — I10 ESSENTIAL HYPERTENSION: ICD-10-CM

## 2023-11-29 PROCEDURE — 1123F ACP DISCUSS/DSCN MKR DOCD: CPT | Performed by: INTERNAL MEDICINE

## 2023-11-29 PROCEDURE — G8399 PT W/DXA RESULTS DOCUMENT: HCPCS | Performed by: INTERNAL MEDICINE

## 2023-11-29 PROCEDURE — 1090F PRES/ABSN URINE INCON ASSESS: CPT | Performed by: INTERNAL MEDICINE

## 2023-11-29 PROCEDURE — G8417 CALC BMI ABV UP PARAM F/U: HCPCS | Performed by: INTERNAL MEDICINE

## 2023-11-29 PROCEDURE — 3075F SYST BP GE 130 - 139MM HG: CPT | Performed by: INTERNAL MEDICINE

## 2023-11-29 PROCEDURE — G8484 FLU IMMUNIZE NO ADMIN: HCPCS | Performed by: INTERNAL MEDICINE

## 2023-11-29 PROCEDURE — 3079F DIAST BP 80-89 MM HG: CPT | Performed by: INTERNAL MEDICINE

## 2023-11-29 PROCEDURE — 1036F TOBACCO NON-USER: CPT | Performed by: INTERNAL MEDICINE

## 2023-11-29 PROCEDURE — 99214 OFFICE O/P EST MOD 30 MIN: CPT | Performed by: INTERNAL MEDICINE

## 2023-11-29 PROCEDURE — G8427 DOCREV CUR MEDS BY ELIG CLIN: HCPCS | Performed by: INTERNAL MEDICINE

## 2023-11-29 ASSESSMENT — ENCOUNTER SYMPTOMS
SHORTNESS OF BREATH: 0
CHOKING: 0
CHEST TIGHTNESS: 0
COUGH: 0

## 2023-11-29 NOTE — PROGRESS NOTES
flecanide 50 mg bid for HTN/afib. Will continue flecanide for afib. Ep following. Reviewed previous records and testing including echo 1/21. Follow up 3 months.            Shalonda Howe MD

## 2023-12-05 RX ORDER — APIXABAN 5 MG/1
TABLET, FILM COATED ORAL
Qty: 180 TABLET | Refills: 3 | Status: SHIPPED | OUTPATIENT
Start: 2023-12-05

## 2023-12-05 NOTE — TELEPHONE ENCOUNTER
Eliana Ryan   Requested Prescriptions     Pending Prescriptions Disp Refills    ELIQUIS 5 MG TABS tablet [Pharmacy Med Name: ELIQUIS 5 MG TABLET] 180 tablet 3     Sig: TAKE 1 TABLET BY MOUTH TWICE A DAY          Last Office Visit: 11/29/2023     Next Office Visit: 12/13/2023

## 2023-12-13 ENCOUNTER — NURSE ONLY (OUTPATIENT)
Dept: CARDIOLOGY CLINIC | Age: 83
End: 2023-12-13

## 2023-12-13 DIAGNOSIS — I50.32 CHRONIC DIASTOLIC CHF (CONGESTIVE HEART FAILURE) (HCC): ICD-10-CM

## 2023-12-13 DIAGNOSIS — I48.0 PAROXYSMAL ATRIAL FIBRILLATION (HCC): ICD-10-CM

## 2023-12-13 DIAGNOSIS — R00.1 BRADYCARDIA: ICD-10-CM

## 2023-12-13 DIAGNOSIS — Z95.0 PACEMAKER: Primary | ICD-10-CM

## 2024-01-22 ENCOUNTER — OFFICE VISIT (OUTPATIENT)
Dept: SURGERY | Age: 84
End: 2024-01-22
Payer: MEDICARE

## 2024-01-22 ENCOUNTER — HOSPITAL ENCOUNTER (OUTPATIENT)
Dept: MAMMOGRAPHY | Age: 84
Discharge: HOME | End: 2024-01-22
Payer: MEDICARE

## 2024-01-22 VITALS — BODY MASS INDEX: 30.73 KG/M2 | WEIGHT: 167 LBS | HEIGHT: 62 IN

## 2024-01-22 VITALS
HEART RATE: 72 BPM | WEIGHT: 168.6 LBS | RESPIRATION RATE: 18 BRPM | HEIGHT: 62 IN | BODY MASS INDEX: 31.03 KG/M2 | OXYGEN SATURATION: 98 %

## 2024-01-22 DIAGNOSIS — Z08 ENCOUNTER FOR FOLLOW-UP SURVEILLANCE OF BREAST CANCER: ICD-10-CM

## 2024-01-22 DIAGNOSIS — Z12.39 ENCOUNTER FOR SCREENING BREAST EXAMINATION: Primary | ICD-10-CM

## 2024-01-22 DIAGNOSIS — Z85.3 ENCOUNTER FOR FOLLOW-UP SURVEILLANCE OF BREAST CANCER: Primary | ICD-10-CM

## 2024-01-22 DIAGNOSIS — Z12.31 ENCOUNTER FOR SCREENING MAMMOGRAM FOR BREAST CANCER: ICD-10-CM

## 2024-01-22 DIAGNOSIS — Z85.3 ENCOUNTER FOR FOLLOW-UP SURVEILLANCE OF BREAST CANCER: ICD-10-CM

## 2024-01-22 DIAGNOSIS — Z12.31 ENCOUNTER FOR SCREENING MAMMOGRAM FOR MALIGNANT NEOPLASM OF BREAST: ICD-10-CM

## 2024-01-22 DIAGNOSIS — Z85.3 HISTORY OF BREAST CANCER: ICD-10-CM

## 2024-01-22 DIAGNOSIS — Z08 ENCOUNTER FOR FOLLOW-UP SURVEILLANCE OF BREAST CANCER: Primary | ICD-10-CM

## 2024-01-22 DIAGNOSIS — Z90.12 S/P PARTIAL MASTECTOMY, LEFT: ICD-10-CM

## 2024-01-22 PROCEDURE — G8484 FLU IMMUNIZE NO ADMIN: HCPCS | Performed by: NURSE PRACTITIONER

## 2024-01-22 PROCEDURE — G8417 CALC BMI ABV UP PARAM F/U: HCPCS | Performed by: NURSE PRACTITIONER

## 2024-01-22 PROCEDURE — 1123F ACP DISCUSS/DSCN MKR DOCD: CPT | Performed by: NURSE PRACTITIONER

## 2024-01-22 PROCEDURE — G8399 PT W/DXA RESULTS DOCUMENT: HCPCS | Performed by: NURSE PRACTITIONER

## 2024-01-22 PROCEDURE — 1090F PRES/ABSN URINE INCON ASSESS: CPT | Performed by: NURSE PRACTITIONER

## 2024-01-22 PROCEDURE — 77063 BREAST TOMOSYNTHESIS BI: CPT

## 2024-01-22 PROCEDURE — G8427 DOCREV CUR MEDS BY ELIG CLIN: HCPCS | Performed by: NURSE PRACTITIONER

## 2024-01-22 PROCEDURE — 1036F TOBACCO NON-USER: CPT | Performed by: NURSE PRACTITIONER

## 2024-01-22 PROCEDURE — 99213 OFFICE O/P EST LOW 20 MIN: CPT | Performed by: NURSE PRACTITIONER

## 2024-01-22 NOTE — PROGRESS NOTES
JOSE Bruner-CNP  [facility]   Surgical Breast Oncology   [phone]    All of the patient's questions were answered at this time however, she was encouraged to call the office with any further inquiries.    Approximately 20 minutes of time were spent in preparation, direct patient contact, counseling, care coordination, documentation and activities otherwise related to this encounter.

## 2024-02-26 DIAGNOSIS — E03.9 ACQUIRED HYPOTHYROIDISM: ICD-10-CM

## 2024-02-26 RX ORDER — LEVOTHYROXINE SODIUM 0.03 MG/1
25 TABLET ORAL DAILY
Qty: 90 TABLET | Refills: 1 | Status: SHIPPED | OUTPATIENT
Start: 2024-02-26

## 2024-02-28 ENCOUNTER — OFFICE VISIT (OUTPATIENT)
Dept: CARDIOLOGY CLINIC | Age: 84
End: 2024-02-28
Payer: MEDICARE

## 2024-02-28 VITALS
HEART RATE: 60 BPM | WEIGHT: 165 LBS | SYSTOLIC BLOOD PRESSURE: 110 MMHG | BODY MASS INDEX: 30.18 KG/M2 | DIASTOLIC BLOOD PRESSURE: 70 MMHG

## 2024-02-28 DIAGNOSIS — I48.0 PAROXYSMAL ATRIAL FIBRILLATION (HCC): Primary | ICD-10-CM

## 2024-02-28 DIAGNOSIS — I49.5 SSS (SICK SINUS SYNDROME) (HCC): ICD-10-CM

## 2024-02-28 DIAGNOSIS — Z95.0 PACEMAKER: ICD-10-CM

## 2024-02-28 DIAGNOSIS — I50.32 CHRONIC DIASTOLIC CONGESTIVE HEART FAILURE (HCC): ICD-10-CM

## 2024-02-28 DIAGNOSIS — I10 ESSENTIAL HYPERTENSION: ICD-10-CM

## 2024-02-28 PROCEDURE — 3078F DIAST BP <80 MM HG: CPT | Performed by: INTERNAL MEDICINE

## 2024-02-28 PROCEDURE — G8428 CUR MEDS NOT DOCUMENT: HCPCS | Performed by: INTERNAL MEDICINE

## 2024-02-28 PROCEDURE — 1090F PRES/ABSN URINE INCON ASSESS: CPT | Performed by: INTERNAL MEDICINE

## 2024-02-28 PROCEDURE — 1123F ACP DISCUSS/DSCN MKR DOCD: CPT | Performed by: INTERNAL MEDICINE

## 2024-02-28 PROCEDURE — 3074F SYST BP LT 130 MM HG: CPT | Performed by: INTERNAL MEDICINE

## 2024-02-28 PROCEDURE — G8399 PT W/DXA RESULTS DOCUMENT: HCPCS | Performed by: INTERNAL MEDICINE

## 2024-02-28 PROCEDURE — G8484 FLU IMMUNIZE NO ADMIN: HCPCS | Performed by: INTERNAL MEDICINE

## 2024-02-28 PROCEDURE — G8417 CALC BMI ABV UP PARAM F/U: HCPCS | Performed by: INTERNAL MEDICINE

## 2024-02-28 PROCEDURE — 1036F TOBACCO NON-USER: CPT | Performed by: INTERNAL MEDICINE

## 2024-02-28 PROCEDURE — 99214 OFFICE O/P EST MOD 30 MIN: CPT | Performed by: INTERNAL MEDICINE

## 2024-02-28 ASSESSMENT — ENCOUNTER SYMPTOMS
CHEST TIGHTNESS: 0
COUGH: 0
SHORTNESS OF BREATH: 0
CHOKING: 0

## 2024-02-28 NOTE — PROGRESS NOTES
The ABCs of the Annual Wellness Visit  Subsequent Medicare Wellness Visit    Subjective    Pj Ball is a 70 y.o. male who presents for a Subsequent Medicare Wellness Visit.    The following portions of the patient's history were reviewed and   updated as appropriate: allergies, current medications, past family history, past medical history, past social history, past surgical history, and problem list.    Compared to one year ago, the patient feels his physical   health is the same.    Compared to one year ago, the patient feels his mental   health is the same.    Recent Hospitalizations:  He was not admitted to the hospital during the last year.       Current Medical Providers:  Patient Care Team:  Presley Lloyd DO as PCP - General (Family Medicine)    Outpatient Medications Prior to Visit   Medication Sig Dispense Refill    baclofen (LIORESAL) 10 MG tablet Take 1 tablet by mouth 2 (Two) Times a Day. 60 tablet 11    budesonide-formoterol (SYMBICORT) 160-4.5 MCG/ACT inhaler Inhale 2 puffs 2 (Two) Times a Day. 30 each 11    clopidogrel (PLAVIX) 75 MG tablet Take 1 tablet by mouth Daily. 90 tablet 3    cyanocobalamin (VITAMIN B-12) 1000 MCG tablet Take 1 tablet by mouth Daily. 30 tablet 11    folic acid (FOLVITE) 400 MCG tablet Take 1 tablet by mouth Daily. 30 tablet 11    hydroCHLOROthiazide (HYDRODIURIL) 12.5 MG tablet Take 1 tablet by mouth Daily. 90 tablet 3    lisinopril (PRINIVIL,ZESTRIL) 20 MG tablet Take 1 tablet by mouth Daily. 90 tablet 3    verapamil (CALAN) 80 MG tablet Take 1 tablet by mouth 2 (Two) Times a Day. 180 tablet 3    traZODone (DESYREL) 150 MG tablet Take 1 tablet by mouth Every Night. 90 tablet 3     No facility-administered medications prior to visit.       No opioid medication identified on active medication list. I have reviewed chart for other potential  high risk medication/s and harmful drug interactions in the elderly.        Aspirin is not on active medication list.  Aspirin use  "is not indicated based on review of current medical condition/s. Risk of harm outweighs potential benefits.  .    Patient Active Problem List   Diagnosis    Benign prostatic hyperplasia    Hypertension    Low back pain    Insomnia    Left middle cerebral artery stroke    Old cardioembolic stroke with hemiparesis    Pulmonary emphysema    Morbid (severe) obesity due to excess calories    Hyperlipidemia    Prostate cancer screening    Hyperglycemia     Advance Care Planning   Advance Care Planning     Advance Directive is not on file.  ACP discussion was held with the patient during this visit. Patient does not have an advance directive, information provided.     Objective    Vitals:    24 1118   BP: 124/84   Pulse: 64   Temp: 98.1 °F (36.7 °C)   SpO2: 99%   Weight: 114 kg (251 lb)   Height: 179.1 cm (70.51\")     Estimated body mass index is 35.5 kg/m² as calculated from the following:    Height as of this encounter: 179.1 cm (70.51\").    Weight as of this encounter: 114 kg (251 lb).           Does the patient have evidence of cognitive impairment? No          HEALTH RISK ASSESSMENT    Smoking Status:  Social History     Tobacco Use   Smoking Status Never   Smokeless Tobacco Never     Alcohol Consumption:  Social History     Substance and Sexual Activity   Alcohol Use No     Fall Risk Screen:    STEADI Fall Risk Assessment was completed, and patient is at LOW risk for falls.Assessment completed on:2024    Depression Screenin/28/2024    11:13 AM   PHQ-2/PHQ-9 Depression Screening   Little Interest or Pleasure in Doing Things 0-->not at all   Feeling Down, Depressed or Hopeless 0-->not at all   PHQ-9: Brief Depression Severity Measure Score 0       Health Habits and Functional and Cognitive Screenin/28/2024    11:15 AM   Functional & Cognitive Status   Do you have difficulty preparing food and eating? No   Do you have difficulty bathing yourself, getting dressed or grooming yourself? No   Do " Cranial Nerves: No cranial nerve deficit. Sensory: No sensory deficit. Coordination: Coordination normal.   Psychiatric:         Behavior: Behavior normal.               This note was generated completely or in part utilizing Dragon dictation speech recognition software. Occasionally, words are mistranscribed and despite editing, the text may contain inaccuracies due to incorrect word recognition. If further clarification is needed please contact the office at [phone]          An electronic signature was used to authenticate this note.     --Carlos Garrett MD you have difficulty using the toilet? No   Do you have difficulty moving around from place to place? No   Do you have trouble with steps or getting out of a bed or a chair? No   Current Diet Well Balanced Diet   Dental Exam Not up to date   Eye Exam Not up to date   Exercise (times per week) 2 times per week   Current Exercises Include Other;Walking   Do you need help using the phone?  No   Are you deaf or do you have serious difficulty hearing?  No   Do you need help to go to places out of walking distance? No   Do you need help shopping? No   Do you need help preparing meals?  No   Do you need help with housework?  No   Do you need help with laundry? No   Do you need help taking your medications? No   Do you need help managing money? No   Do you ever drive or ride in a car without wearing a seat belt? No   Have you felt unusual stress, anger or loneliness in the last month? No   Who do you live with? Alone   If you need help, do you have trouble finding someone available to you? No   Have you been bothered in the last four weeks by sexual problems? No   Do you have difficulty concentrating, remembering or making decisions? No       Age-appropriate Screening Schedule:  Refer to the list below for future screening recommendations based on patient's age, sex and/or medical conditions. Orders for these recommended tests are listed in the plan section. The patient has been provided with a written plan.    Health Maintenance   Topic Date Due    COLORECTAL CANCER SCREENING  01/25/2024    LIPID PANEL  02/09/2024    COVID-19 Vaccine (1) 02/28/2025 (Originally 6/2/1954)    RSV Vaccine - Adults (1 - 1-dose 60+ series) 02/28/2025 (Originally 12/2/2014)    INFLUENZA VACCINE  02/28/2025 (Originally 8/1/2023)    TDAP/TD VACCINES (1 - Tdap) 02/28/2025 (Originally [birth year]2/1972)    ZOSTER VACCINE (1 of 2) 02/28/2025 (Originally 12/2/2004)    ANNUAL WELLNESS VISIT  02/28/2025    BMI FOLLOWUP  02/28/2025    HEPATITIS C SCREENING   Completed    Pneumococcal Vaccine 65+  Completed    AAA SCREEN (ONE-TIME)  Completed                  CMS Preventative Services Quick Reference  Risk Factors Identified During Encounter  Immunizations Discussed/Encouraged: Tdap, Influenza, and Shingrix  Dental Screening Recommended  The above risks/problems have been discussed with the patient.  Pertinent information has been shared with the patient in the After Visit Summary.  An After Visit Summary and PPPS were made available to the patient.    Follow Up:   Next Medicare Wellness visit to be scheduled in 1 year.       Additional E&M Note during same encounter follows:  Patient has multiple medical problems which are significant and separately identifiable that require additional work above and beyond the Medicare Wellness Visit.      Chief Complaint  Medicare Wellness-subsequent    Subjective        Patient has past medical history of insomnia, hypertension, chronic COPD/bronchitis, prediabetes, high cholesterol, history of a stroke, elevated PSA.  He also needs a colon cancer screening today.    Patient's blood pressure is well-controlled today.    Insomnia is uncontrolled.  He reports waking up at night which is interfering with his daytime routine.  He is feeling sleepy and tired.  Has been on trazodone 150 mg.  Has not tried doxepin or mirtazapine.    Patient currently not being treated for cholesterol issues as they have been well-controlled recently.    Prediabetes-has been stable.  Recheck today    COPD stable.  Reports using medication as prescribed.    History of stroke-patient is on Plavix.  No new symptoms.    Elevated PSA-discussed with patient today the elevation in PSA and possible prostate cancer.  Will recheck PSA today.  Will have patient back in 1 or 2 months for prostate exam if needed or may refer to urology depending on results.          Pj Ball is also being seen today for insomnia.    Review of Systems   Respiratory:  Negative for  "shortness of breath.    Cardiovascular:  Negative for chest pain.   Psychiatric/Behavioral:  Positive for sleep disturbance.        Objective   Vital Signs:  /84   Pulse 64   Temp 98.1 °F (36.7 °C)   Ht 179.1 cm (70.51\")   Wt 114 kg (251 lb)   SpO2 99%   BMI 35.50 kg/m²     Physical Exam  Vitals reviewed.   HENT:      Head: Normocephalic.   Cardiovascular:      Rate and Rhythm: Normal rate and regular rhythm.   Pulmonary:      Effort: Pulmonary effort is normal.      Breath sounds: Normal breath sounds.   Abdominal:      General: Bowel sounds are normal.   Musculoskeletal:         General: No swelling.   Skin:     General: Skin is warm.   Neurological:      Mental Status: He is alert. Mental status is at baseline.   Psychiatric:         Mood and Affect: Mood normal.                   Assessment and Plan   Diagnoses and all orders for this visit:    1. Medicare annual wellness visit, subsequent (Primary)    2. Primary insomnia  -     doxepin (SINEquan) 10 MG capsule; Take 1 capsule by mouth Every Night.  Dispense: 30 capsule; Refill: 2    3. Essential hypertension  -     CBC & Differential; Future  -     TSH Rfx On Abnormal To Free T4; Future  -     Comprehensive Metabolic Panel; Future    4. Simple chronic bronchitis    5. Prediabetes    6. Elevated lipoprotein(a)  -     Lipid Panel; Future    7. Left middle cerebral artery stroke    8. Elevated PSA  -     PSA DIAGNOSTIC; Future    9. Encounter for colorectal cancer screening  -     Ambulatory Referral For Screening Colonoscopy      Insomnia uncontrolled-start doxepin.  Stop trazodone.  Can increase doxepin to 20 mg if needed.  Follow-up in 1 or 2 months.  If not working, follow-up sooner.    Hypertension, COPD, prediabetes, high cholesterol, all her chronic conditions which are stable and controlled.  Continue current regimen for all.  No refill sent in today as patient is not due.    Recheck lipids.    Recheck PSA.  Elevation is concerning.  If it goes " much higher, will refer to urology.  Prostate exam at next visit.    Colonoscopy ordered.    Continue Plavix for history of stroke.       Follow Up   Return in about 2 months (around 4/28/2024) for Recheck insomnia, Labs today.  Patient was given instructions and counseling regarding his condition or for health maintenance advice. Please see specific information pulled into the AVS if appropriate.

## 2024-02-28 NOTE — PROGRESS NOTES
Socioeconomic History    Marital status:      Spouse name: Not on file    Number of children: Not on file    Years of education: Not on file    Highest education level: Not on file   Occupational History    Occupation: [employer]   Tobacco Use    Smoking status: Former     Current packs/day: 0.00     Types: Cigarettes     Quit date: 1995     Years since quittin.7    Smokeless tobacco: Never   Vaping Use    Vaping Use: Never used   Substance and Sexual Activity    Alcohol use: Yes     Comment: rare    Drug use: No    Sexual activity: Yes   Other Topics Concern    Not on file   Social History Narrative    Had 3, oldest daughter passed     8 grandkids      Social Determinants of Health     Financial Resource Strain: Low Risk  (2023)    Overall Financial Resource Strain (CARDIA)     Difficulty of Paying Living Expenses: Not hard at all   Food Insecurity: Not on file (2023)   Transportation Needs: Unknown (2023)    PRAPARE - Transportation     Lack of Transportation (Medical): Not on file     Lack of Transportation (Non-Medical): No   Physical Activity: Inactive (2023)    Exercise Vital Sign     Days of Exercise per Week: 0 days     Minutes of Exercise per Session: 0 min   Stress: Not on file   Social Connections: Not on file   Intimate Partner Violence: Not on file   Housing Stability: Unknown (2023)    Housing Stability Vital Sign     Unable to Pay for Housing in the Last Year: Not on file     Number of Places Lived in the Last Year: Not on file     Unstable Housing in the Last Year: No     FH reviewed        Vitals:    24 1409   BP: 110/70   Pulse: 60             Wt 165    Review of Systems   Constitutional:  Negative for activity change, appetite change and fatigue.   Respiratory:  Negative for cough, choking, chest tightness and shortness of breath.    Cardiovascular:  Negative for chest pain, palpitations and leg swelling.        Denies PND or

## 2024-03-01 PROCEDURE — 93296 REM INTERROG EVL PM/IDS: CPT | Performed by: INTERNAL MEDICINE

## 2024-03-01 PROCEDURE — 93294 REM INTERROG EVL PM/LDLS PM: CPT | Performed by: INTERNAL MEDICINE

## 2024-04-02 RX ORDER — FLECAINIDE ACETATE 50 MG/1
50 TABLET ORAL 2 TIMES DAILY
Qty: 180 TABLET | Refills: 3 | Status: SHIPPED | OUTPATIENT
Start: 2024-04-02

## 2024-04-02 NOTE — TELEPHONE ENCOUNTER
Requested Prescriptions     Pending Prescriptions Disp Refills    flecainide (TAMBOCOR) 50 MG tablet 180 tablet 3     Sig: Take 1 tablet by mouth 2 times daily              Last Office Visit: 2/28/2024     Next Office Visit: 5/28/2024         Last Labs: 10.23.2023

## 2024-05-28 ENCOUNTER — OFFICE VISIT (OUTPATIENT)
Dept: CARDIOLOGY CLINIC | Age: 84
End: 2024-05-28
Payer: MEDICARE

## 2024-05-28 VITALS
WEIGHT: 157 LBS | SYSTOLIC BLOOD PRESSURE: 136 MMHG | DIASTOLIC BLOOD PRESSURE: 80 MMHG | HEART RATE: 60 BPM | BODY MASS INDEX: 28.72 KG/M2

## 2024-05-28 DIAGNOSIS — I50.32 CHRONIC DIASTOLIC CONGESTIVE HEART FAILURE (HCC): ICD-10-CM

## 2024-05-28 DIAGNOSIS — I49.5 SSS (SICK SINUS SYNDROME) (HCC): ICD-10-CM

## 2024-05-28 DIAGNOSIS — I10 ESSENTIAL HYPERTENSION: ICD-10-CM

## 2024-05-28 DIAGNOSIS — I48.0 PAROXYSMAL ATRIAL FIBRILLATION (HCC): Primary | ICD-10-CM

## 2024-05-28 DIAGNOSIS — Z95.0 PACEMAKER: ICD-10-CM

## 2024-05-28 PROCEDURE — G8427 DOCREV CUR MEDS BY ELIG CLIN: HCPCS | Performed by: INTERNAL MEDICINE

## 2024-05-28 PROCEDURE — G8399 PT W/DXA RESULTS DOCUMENT: HCPCS | Performed by: INTERNAL MEDICINE

## 2024-05-28 PROCEDURE — 1090F PRES/ABSN URINE INCON ASSESS: CPT | Performed by: INTERNAL MEDICINE

## 2024-05-28 PROCEDURE — 1036F TOBACCO NON-USER: CPT | Performed by: INTERNAL MEDICINE

## 2024-05-28 PROCEDURE — 99214 OFFICE O/P EST MOD 30 MIN: CPT | Performed by: INTERNAL MEDICINE

## 2024-05-28 PROCEDURE — G8417 CALC BMI ABV UP PARAM F/U: HCPCS | Performed by: INTERNAL MEDICINE

## 2024-05-28 PROCEDURE — 3079F DIAST BP 80-89 MM HG: CPT | Performed by: INTERNAL MEDICINE

## 2024-05-28 PROCEDURE — 1123F ACP DISCUSS/DSCN MKR DOCD: CPT | Performed by: INTERNAL MEDICINE

## 2024-05-28 PROCEDURE — 3075F SYST BP GE 130 - 139MM HG: CPT | Performed by: INTERNAL MEDICINE

## 2024-05-28 ASSESSMENT — ENCOUNTER SYMPTOMS
CHEST TIGHTNESS: 0
CHOKING: 0
COUGH: 0
SHORTNESS OF BREATH: 0

## 2024-05-28 NOTE — PROGRESS NOTES
or syncope.    Neurological:  Negative for dizziness, syncope and light-headedness.   Psychiatric/Behavioral:  Negative for agitation, behavioral problems and confusion.    All other systems reviewed and are negative.      Objective:   Physical Exam  Constitutional:       General: She is not in acute distress.     Appearance: Normal appearance. She is well-developed.   HENT:      Head: Normocephalic and atraumatic.      Right Ear: External ear normal.      Left Ear: External ear normal.   Neck:      Vascular: No JVD.   Cardiovascular:      Rate and Rhythm: Normal rate and regular rhythm.      Heart sounds: Normal heart sounds. No murmur heard.     No gallop.   Pulmonary:      Effort: Pulmonary effort is normal. No respiratory distress.      Breath sounds: Normal breath sounds. No wheezing or rales.   Abdominal:      General: Bowel sounds are normal.      Palpations: Abdomen is soft.      Tenderness: There is no abdominal tenderness.   Musculoskeletal:         General: Normal range of motion.      Cervical back: Normal range of motion.   Skin:     General: Skin is warm and dry.   Neurological:      General: No focal deficit present.      Mental Status: She is alert and oriented to person, place, and time.   Psychiatric:         Mood and Affect: Mood normal.         Behavior: Behavior normal.         Assessment:       Diagnosis Orders   1. Paroxysmal atrial fibrillation (HCC)        2. Chronic diastolic congestive heart failure (HCC)        3. SSS (sick sinus syndrome) (HCC)        4. Pacemaker        5. Essential hypertension                  Plan:   Assessment & Plan   CV stable. Feeling good..  No sob.  Compensated.  BP good at home.  On AC. Continue Eliquis 5 mg bid. No bleeding issues. Pacer checks per EP.  Continue  lopressor 100 mg bid / flecanide 50 mg bid for HTN/afib.  Will continue flecanide for afib.  Ep following.  Reviewed previous records and testing including echo 1/21.   Follow up 3 months.

## 2024-06-04 NOTE — PROGRESS NOTES
[facility]   Electrophysiology  Office Visit  Date: 6/13/2024    Chief Complaint   Patient presents with    Atrial Fibrillation    Tachycardia    Bradycardia    Congestive Heart Failure     Cardiac HX: Cynthia Jennings is a 84 y.o. woman with a h/o GERD, hypothyroidism, NAKIA, pAF(1/2021), echo (1/2021) EF 55%, placed on dilt, Eliquis, BB, recurrent AF/RVR w/ 6.8 sec conversion pause, s/p dual ch MDT PPM (4/21/21. Dr. Robertson), in NSR in f/u.    Interval History/HPI: Patient is here for follow-up for paroxysmal AF, SSS, sinus pauses and PPM management.  Patient was originally diagnosed with paroxysmal AF in January 2021 when she presented with progressively worsening shortness of breath and was noted to be in AF/RVR.  She was placed on Eliquis 5 mg along with diltiazem and metoprolol.  She then presented in April 2021 with lightheadedness, nausea, vomiting and diarrhea and was found to be in sinus bradycardia with heart rates in the 40s.  She was placed on dopamine, given IV fluids and then developed AF/RVR with a 6.8-second conversion pause noted on telemetry.  She was implanted with a dual-chamber MDT PPM on 4/21/2021.  She was planned for a cardioversion on 6/22/2021 however in follow-up she was in normal sinus rhythm.  Today she presents in AP, VS, HR 83.  She has not felt any breakthrough of her atrial fibrillation.  She denies heart racing, palpitations or irregular heartbeats.  Her metoprolol was increased to 150 twice a day and she feels that this has helped with her heart racing and palpitations.  Review of her device today shows 99% AP, <0.1% , no AF, other parameters stable. She remains on Eliquis 5 mg twice a day with no issues of bleeding or dark tarry stools. She remains on flecainide 50 mg twice a day.  Her QTc is 422.   Her blood pressure is well-controlled in the office today.  She does have a history of sleep apnea however is not compliant with her CPAP.  Her  passed away in April.

## 2024-06-13 ENCOUNTER — NURSE ONLY (OUTPATIENT)
Dept: CARDIOLOGY CLINIC | Age: 84
End: 2024-06-13

## 2024-06-13 ENCOUNTER — OFFICE VISIT (OUTPATIENT)
Dept: CARDIOLOGY CLINIC | Age: 84
End: 2024-06-13
Payer: MEDICARE

## 2024-06-13 VITALS
DIASTOLIC BLOOD PRESSURE: 78 MMHG | WEIGHT: 157.8 LBS | SYSTOLIC BLOOD PRESSURE: 132 MMHG | BODY MASS INDEX: 28.86 KG/M2 | HEART RATE: 83 BPM

## 2024-06-13 DIAGNOSIS — Z79.01 ON CONTINUOUS ORAL ANTICOAGULATION: ICD-10-CM

## 2024-06-13 DIAGNOSIS — R00.2 PALPITATIONS: ICD-10-CM

## 2024-06-13 DIAGNOSIS — Z79.899 ENCOUNTER FOR MONITORING FLECAINIDE THERAPY: ICD-10-CM

## 2024-06-13 DIAGNOSIS — I50.32 CHRONIC HEART FAILURE WITH PRESERVED EJECTION FRACTION (HCC): ICD-10-CM

## 2024-06-13 DIAGNOSIS — I49.5 SSS (SICK SINUS SYNDROME) (HCC): ICD-10-CM

## 2024-06-13 DIAGNOSIS — I48.0 PAROXYSMAL ATRIAL FIBRILLATION (HCC): Primary | ICD-10-CM

## 2024-06-13 DIAGNOSIS — I49.5 TACHY-BRADY SYNDROME (HCC): ICD-10-CM

## 2024-06-13 DIAGNOSIS — Z95.0 PACEMAKER: ICD-10-CM

## 2024-06-13 DIAGNOSIS — Z51.81 ENCOUNTER FOR MONITORING FLECAINIDE THERAPY: ICD-10-CM

## 2024-06-13 PROCEDURE — 1036F TOBACCO NON-USER: CPT | Performed by: NURSE PRACTITIONER

## 2024-06-13 PROCEDURE — G8427 DOCREV CUR MEDS BY ELIG CLIN: HCPCS | Performed by: NURSE PRACTITIONER

## 2024-06-13 PROCEDURE — G8399 PT W/DXA RESULTS DOCUMENT: HCPCS | Performed by: NURSE PRACTITIONER

## 2024-06-13 PROCEDURE — 99215 OFFICE O/P EST HI 40 MIN: CPT | Performed by: NURSE PRACTITIONER

## 2024-06-13 PROCEDURE — 1090F PRES/ABSN URINE INCON ASSESS: CPT | Performed by: NURSE PRACTITIONER

## 2024-06-13 PROCEDURE — 1123F ACP DISCUSS/DSCN MKR DOCD: CPT | Performed by: NURSE PRACTITIONER

## 2024-06-13 PROCEDURE — 93000 ELECTROCARDIOGRAM COMPLETE: CPT | Performed by: NURSE PRACTITIONER

## 2024-06-13 PROCEDURE — G8417 CALC BMI ABV UP PARAM F/U: HCPCS | Performed by: NURSE PRACTITIONER

## 2024-06-28 NOTE — PROGRESS NOTES
Patient was seen in [facility] Office device clinic via [facility]  Seeing NPFW  VICKI completed by remote team.

## 2024-07-01 RX ORDER — METOPROLOL TARTRATE 100 MG/1
150 TABLET ORAL 2 TIMES DAILY
Qty: 270 TABLET | Refills: 2 | Status: SHIPPED | OUTPATIENT
Start: 2024-07-01

## 2024-07-01 NOTE — TELEPHONE ENCOUNTER
Requested Prescriptions     Pending Prescriptions Disp Refills    metoprolol (LOPRESSOR) 100 MG tablet 270 tablet 2     Sig: Take 1.5 tablets by mouth 2 times daily            Checked Correct Pharmacy: Yes    Any changes since last refill? No     Number: 270    Refills: 2    Last Office Visit: 6/13/2024     Next Office Visit: 12/19/2024     Last Refill: 09/11/2023    Last Labs: 10/23/2023

## 2024-08-05 NOTE — TELEPHONE ENCOUNTER
Caller: Catia Fernandez    Relationship to patient: Emergency Contact    Best call back number: [phone]    Patient is needing: PATIENTS WIFE CALLED STATING THE PATIENT IS SICK AND SHE CANNOT BRIG HIM IN TOMORROW. I ATTEMPTED TO R/S OR CANCEL, BUT WIFE WOULD LIKE TO SPEAK WITH THEE NIETO ABOUT TAKING THE PATIENT TO THE HOSPITAL AND HOW TO GET PAIN PUMP FILLED  PLEASE ADVISE          Pt came into office to completed some test from the [address] [place] [address] on 8/2/22    The pt stated that she called [address] [place] [address] office and voiced her concerns but did not hear back from the MD     So the pt is requesting to have at least 6 tablets of the Cipro ( due to pain on R side, urine not a cloudy, no pain when urinating )

## 2024-10-08 DIAGNOSIS — E03.9 ACQUIRED HYPOTHYROIDISM: ICD-10-CM

## 2024-10-08 RX ORDER — LEVOTHYROXINE SODIUM 25 UG/1
25 TABLET ORAL DAILY
Qty: 90 TABLET | Refills: 1 | Status: SHIPPED | OUTPATIENT
Start: 2024-10-08

## 2024-10-08 NOTE — TELEPHONE ENCOUNTER
Medication:   Requested Prescriptions     Pending Prescriptions Disp Refills    levothyroxine (SYNTHROID) 25 MCG tablet [Pharmacy Med Name: LEVOTHYROXINE 25 MCG TABLET] 90 tablet 1     Sig: TAKE 1 TABLET BY MOUTH EVERY DAY     Last Filled:  02/26/24    Last appt: 10/23/2023   Next appt: 11/5/2024    Last Thyroid:   Lab Results   Component Value Date/Time    TSH 3.64 10/23/2023 02:03 PM    TSH 4.57 10/13/2022 01:51 PM    T4FREE 1.2 10/23/2023 02:03 PM

## 2024-10-22 NOTE — PROGRESS NOTES
is normal. There is asymmetric hypertrophy of   the basal septum. Overall left ventricular systolic function appears normal.   Ejection fraction is visually estimated to be 55-60%. No regional wall   motion abnormalities are noted. Indeterminate diastolic function.   The left atrium is severely dilated. Mild mitral regurgitation is present.   Mild to moderate aortic regurgitation is present. No evidence of aortic   valve stenosis.   Mild tricuspid regurgitation. Systolic pulmonary artery pressure (SPAP)   estimated at 35 mmHg (RA pressure 8 mmHg), consistent with mild pulmonary   hypertension.    Last CMR  (if available):    Last Coronary Artery Calcium Score:       Assessment / Plan:     Chronic diastolic CHF (congestive heart failure) ([facility])   Check BNP if elevated consider Jardiance    Pacemaker-MEDTRONIC   Working properly, follows with EP    Atrial fibrillation (HCC)  Remains in sinus, controlled, continue flecainide and Eliquis    Follow up in 6 months.    I had the opportunity to review the clinical symptoms and presentation of Cynthia L Dick.     Patient's allergies and medications were reviewed and updated. Patient's past medical, surgical, social and family history were reviewed and updated.   Patient's testing including laboratory, ECGs, monitor, imaging (TTE,JONEL,CMR,cath) were reviewed.       Tobacco use was discussed with the patient and educated on the negative effects.I have asked the patient to not utilize these agents.    All questions and concerns were addressed to the patient/family. Alternatives to my treatment were discussed. The note was completed using EMR. Every effort wasmade to ensure accuracy; however, inadvertent computerized transcription errors may be present.    Thank you for allowing me to participate in thecare or Cynthia Groves MD, FACC, [facility]  Advanced Cardiac Imaging  [facility]

## 2024-10-24 ENCOUNTER — OFFICE VISIT (OUTPATIENT)
Dept: CARDIOLOGY CLINIC | Age: 84
End: 2024-10-24
Payer: MEDICARE

## 2024-10-24 VITALS
SYSTOLIC BLOOD PRESSURE: 110 MMHG | BODY MASS INDEX: 28.13 KG/M2 | HEART RATE: 86 BPM | DIASTOLIC BLOOD PRESSURE: 80 MMHG | WEIGHT: 153.8 LBS

## 2024-10-24 DIAGNOSIS — I50.32 CHRONIC DIASTOLIC CHF (CONGESTIVE HEART FAILURE) (HCC): ICD-10-CM

## 2024-10-24 DIAGNOSIS — I48.0 PAROXYSMAL ATRIAL FIBRILLATION (HCC): ICD-10-CM

## 2024-10-24 DIAGNOSIS — I50.32 CHRONIC HEART FAILURE WITH PRESERVED EJECTION FRACTION (HCC): Primary | ICD-10-CM

## 2024-10-24 DIAGNOSIS — Z95.0 PACEMAKER: ICD-10-CM

## 2024-10-24 PROCEDURE — 1159F MED LIST DOCD IN RCRD: CPT | Performed by: INTERNAL MEDICINE

## 2024-10-24 PROCEDURE — 1090F PRES/ABSN URINE INCON ASSESS: CPT | Performed by: INTERNAL MEDICINE

## 2024-10-24 PROCEDURE — G8417 CALC BMI ABV UP PARAM F/U: HCPCS | Performed by: INTERNAL MEDICINE

## 2024-10-24 PROCEDURE — G8427 DOCREV CUR MEDS BY ELIG CLIN: HCPCS | Performed by: INTERNAL MEDICINE

## 2024-10-24 PROCEDURE — 99214 OFFICE O/P EST MOD 30 MIN: CPT | Performed by: INTERNAL MEDICINE

## 2024-10-24 PROCEDURE — 1123F ACP DISCUSS/DSCN MKR DOCD: CPT | Performed by: INTERNAL MEDICINE

## 2024-10-24 PROCEDURE — G8399 PT W/DXA RESULTS DOCUMENT: HCPCS | Performed by: INTERNAL MEDICINE

## 2024-10-24 PROCEDURE — 1036F TOBACCO NON-USER: CPT | Performed by: INTERNAL MEDICINE

## 2024-10-24 PROCEDURE — G8484 FLU IMMUNIZE NO ADMIN: HCPCS | Performed by: INTERNAL MEDICINE

## 2024-10-24 ASSESSMENT — ENCOUNTER SYMPTOMS
WHEEZING: 0
EYE DISCHARGE: 0
COUGH: 0
COLOR CHANGE: 0
SHORTNESS OF BREATH: 0
VOMITING: 0
ABDOMINAL DISTENTION: 0
BLOOD IN STOOL: 0
CHEST TIGHTNESS: 0
ABDOMINAL PAIN: 0
BACK PAIN: 0
FACIAL SWELLING: 0

## 2024-10-30 ENCOUNTER — OFFICE VISIT (OUTPATIENT)
Dept: INTERNAL MEDICINE CLINIC | Age: 84
End: 2024-10-30

## 2024-10-30 VITALS
SYSTOLIC BLOOD PRESSURE: 110 MMHG | HEART RATE: 78 BPM | WEIGHT: 151 LBS | OXYGEN SATURATION: 92 % | DIASTOLIC BLOOD PRESSURE: 78 MMHG | HEIGHT: 62 IN | BODY MASS INDEX: 27.79 KG/M2

## 2024-10-30 DIAGNOSIS — I50.32 CHRONIC HEART FAILURE WITH PRESERVED EJECTION FRACTION (HCC): ICD-10-CM

## 2024-10-30 DIAGNOSIS — Z23 FLU VACCINE NEED: ICD-10-CM

## 2024-10-30 DIAGNOSIS — Z00.00 MEDICARE ANNUAL WELLNESS VISIT, SUBSEQUENT: Primary | ICD-10-CM

## 2024-10-30 SDOH — ECONOMIC STABILITY: FOOD INSECURITY: WITHIN THE PAST 12 MONTHS, YOU WORRIED THAT YOUR FOOD WOULD RUN OUT BEFORE YOU GOT MONEY TO BUY MORE.: NEVER TRUE

## 2024-10-30 SDOH — ECONOMIC STABILITY: INCOME INSECURITY: HOW HARD IS IT FOR YOU TO PAY FOR THE VERY BASICS LIKE FOOD, HOUSING, MEDICAL CARE, AND HEATING?: NOT HARD AT ALL

## 2024-10-30 SDOH — ECONOMIC STABILITY: FOOD INSECURITY: WITHIN THE PAST 12 MONTHS, THE FOOD YOU BOUGHT JUST DIDN'T LAST AND YOU DIDN'T HAVE MONEY TO GET MORE.: NEVER TRUE

## 2024-10-30 ASSESSMENT — PATIENT HEALTH QUESTIONNAIRE - PHQ9
1. LITTLE INTEREST OR PLEASURE IN DOING THINGS: NOT AT ALL
2. FEELING DOWN, DEPRESSED OR HOPELESS: SEVERAL DAYS
SUM OF ALL RESPONSES TO PHQ QUESTIONS 1-9: 1
SUM OF ALL RESPONSES TO PHQ9 QUESTIONS 1 & 2: 1
SUM OF ALL RESPONSES TO PHQ QUESTIONS 1-9: 1

## 2024-10-30 NOTE — PROGRESS NOTES
Medicare Annual Wellness Visit    Cynthia Jennings is here for Medicare AWV (Subsequent flu vacc today )    Assessment & Plan   Medicare annual wellness visit, subsequent  Flu vaccine need  -     Influenza, FLUAD Trivalent, (age 65 y+), IM, Preservative Free, 0.5mL    Recommendations for Preventive Services Due: see orders and patient instructions/AVS.  Recommended screening schedule for the next 5-10 years is provided to the patient in written form: see Patient Instructions/AVS.     Return in 1 year (on 10/30/2025) for Medicare AWV.     Subjective       Patient's complete Health Risk Assessment and screening values have been reviewed and are found in Flowsheets. The following problems were reviewed today and where indicated follow up appointments were made and/or referrals ordered.    No Positive Risk Factors identified today.                                    Objective   Vitals:    10/30/24 1349   BP: 110/78   Pulse: 78   SpO2: 92%   Weight: 68.5 kg (151 lb)   Height: 1.562 m (5' 1.5\")      Body mass index is 28.07 kg/m².                    Allergies   Allergen Reactions    Bactrim [Sulfamethoxazole-Trimethoprim] Hives     hives    Seasonal Other (See Comments)     Runny nose, sneezing    Fosamax [Alendronate]      Jaw problems     Prior to Visit Medications    Medication Sig Taking? Authorizing Provider   levothyroxine (SYNTHROID) 25 MCG tablet TAKE 1 TABLET BY MOUTH EVERY DAY Yes Remedios Cottrell MD   metoprolol (LOPRESSOR) 100 MG tablet Take 1.5 tablets by mouth 2 times daily Yes Candi Clements APRN - CNP   flecainide (TAMBOCOR) 50 MG tablet Take 1 tablet by mouth 2 times daily Yes Candi Clements APRN - CNP   ELIQUIS 5 MG TABS tablet TAKE 1 TABLET BY MOUTH TWICE A DAY Yes Naila Dietrich APRN - CNP   calcium carbonate (OSCAL) 500 MG TABS tablet Take 1 tablet by mouth daily Yes Provider, MD Amanda   furosemide (LASIX) 20 MG tablet TAKE 1 TABLET BY MOUTH DAILY AS NEEDED (WATER WEIGHT GAIN.) Yes

## 2024-10-31 LAB — NT-PROBNP SERPL-MCNC: 1236 PG/ML (ref 0–449)

## 2024-11-25 NOTE — TELEPHONE ENCOUNTER
Requested Prescriptions     Pending Prescriptions Disp Refills    apixaban (ELIQUIS) 5 MG TABS tablet [Pharmacy Med Name: ELIQUIS 5 MG TABLET] 180 tablet 3     Sig: TAKE 1 TABLET BY MOUTH TWICE A DAY            Checked Correct Pharmacy: Yes    Any changes since last refill? No     Number: 180    Refills: 3    Last Office Visit: 10/24/2024     Next Office Visit: 12/19/2024       Last Labs: 10.30.2024

## 2024-11-26 RX ORDER — APIXABAN 5 MG/1
TABLET, FILM COATED ORAL
Qty: 180 TABLET | Refills: 3 | Status: SHIPPED | OUTPATIENT
Start: 2024-11-26

## 2024-12-03 NOTE — PROGRESS NOTES
never used smokeless tobacco. She reports current alcohol use. She reports that she does not use drugs.        Family History:  family history includes Dementia in her brother; Heart Attack in her child; Prostate Cancer (age of onset: 64) in her brother; Sleep Apnea in her son.   Reviewed. Denies family history of sudden cardiac death, arrhythmia, premature CAD    Review of System:    General ROS: negative for - chills, fever   Psychological ROS: negative for - anxiety or depression  Ophthalmic ROS: negative for - eye pain or loss of vision  ENT ROS: negative for - epistaxis, headaches, nasal discharge, sore throat   Allergy and Immunology ROS: negative for - hives, nasal congestion   Hematological and Lymphatic ROS: negative for - bleeding problems, blood clots, bruising or jaundice  Endocrine ROS: negative for - skin changes, temperature intolerance or unexpected weight changes  Respiratory ROS: negative for - cough, hemoptysis, pleuritic pain, SOB, sputum changes or wheezing  Cardiovascular ROS: Per HPI.   Gastrointestinal ROS: negative for - abdominal pain, blood in stools, diarrhea, hematemesis, melena, nausea/vomiting or swallowing difficulty/pain  Genito-Urinary ROS: negative for - dysuria or incontinence  Musculoskeletal ROS: negative for - joint swelling or muscle pain  Neurological ROS: negative for - confusion, dizziness, gait disturbance, headaches, numbness/tingling, seizures, speech problems, tremors, visual changes or weakness  Dermatological ROS: negative for - rash    Physical Examination:  Vitals:    12/19/24 1424   BP: (!) 110/90   Pulse: 74             Constitutional: Oriented. No distress.   Head: Normocephalic and atraumatic.   Mouth/Throat: Oropharynx is clear and moist.   Eyes: Conjunctivae normal. EOM are normal.   Neck: Normal range of motion. Neck supple. No rigidity.  No JVD present.   Cardiovascular: Normal rate, regular rhythm, S1&S2 and intact distal pulses.   Pulmonary/Chest:

## 2024-12-19 ENCOUNTER — NURSE ONLY (OUTPATIENT)
Dept: CARDIOLOGY CLINIC | Age: 84
End: 2024-12-19

## 2024-12-19 ENCOUNTER — OFFICE VISIT (OUTPATIENT)
Dept: CARDIOLOGY CLINIC | Age: 84
End: 2024-12-19
Payer: MEDICARE

## 2024-12-19 VITALS
DIASTOLIC BLOOD PRESSURE: 90 MMHG | SYSTOLIC BLOOD PRESSURE: 110 MMHG | HEART RATE: 74 BPM | BODY MASS INDEX: 28.78 KG/M2 | WEIGHT: 154.8 LBS

## 2024-12-19 DIAGNOSIS — Z79.899 ENCOUNTER FOR MONITORING FLECAINIDE THERAPY: ICD-10-CM

## 2024-12-19 DIAGNOSIS — I49.5 TACHY-BRADY SYNDROME (HCC): ICD-10-CM

## 2024-12-19 DIAGNOSIS — I50.32 CHRONIC DIASTOLIC CHF (CONGESTIVE HEART FAILURE) (HCC): ICD-10-CM

## 2024-12-19 DIAGNOSIS — Z51.81 ENCOUNTER FOR MONITORING FLECAINIDE THERAPY: ICD-10-CM

## 2024-12-19 DIAGNOSIS — G47.33 OSA (OBSTRUCTIVE SLEEP APNEA): ICD-10-CM

## 2024-12-19 DIAGNOSIS — Z95.0 PACEMAKER: Primary | ICD-10-CM

## 2024-12-19 DIAGNOSIS — I10 ESSENTIAL HYPERTENSION: ICD-10-CM

## 2024-12-19 DIAGNOSIS — I48.19 PERSISTENT ATRIAL FIBRILLATION (HCC): Primary | ICD-10-CM

## 2024-12-19 DIAGNOSIS — I48.91 ATRIAL FIBRILLATION WITH RAPID VENTRICULAR RESPONSE (HCC): ICD-10-CM

## 2024-12-19 DIAGNOSIS — Z79.01 ON CONTINUOUS ORAL ANTICOAGULATION: ICD-10-CM

## 2024-12-19 DIAGNOSIS — Z95.0 PACEMAKER: ICD-10-CM

## 2024-12-19 DIAGNOSIS — R00.1 BRADYCARDIA: ICD-10-CM

## 2024-12-19 PROCEDURE — 3080F DIAST BP >= 90 MM HG: CPT | Performed by: INTERNAL MEDICINE

## 2024-12-19 PROCEDURE — G8417 CALC BMI ABV UP PARAM F/U: HCPCS | Performed by: INTERNAL MEDICINE

## 2024-12-19 PROCEDURE — 99214 OFFICE O/P EST MOD 30 MIN: CPT | Performed by: INTERNAL MEDICINE

## 2024-12-19 PROCEDURE — G8482 FLU IMMUNIZE ORDER/ADMIN: HCPCS | Performed by: INTERNAL MEDICINE

## 2024-12-19 PROCEDURE — 1036F TOBACCO NON-USER: CPT | Performed by: INTERNAL MEDICINE

## 2024-12-19 PROCEDURE — 1090F PRES/ABSN URINE INCON ASSESS: CPT | Performed by: INTERNAL MEDICINE

## 2024-12-19 PROCEDURE — 3074F SYST BP LT 130 MM HG: CPT | Performed by: INTERNAL MEDICINE

## 2024-12-19 PROCEDURE — 1159F MED LIST DOCD IN RCRD: CPT | Performed by: INTERNAL MEDICINE

## 2024-12-19 PROCEDURE — G8427 DOCREV CUR MEDS BY ELIG CLIN: HCPCS | Performed by: INTERNAL MEDICINE

## 2024-12-19 PROCEDURE — 1123F ACP DISCUSS/DSCN MKR DOCD: CPT | Performed by: INTERNAL MEDICINE

## 2024-12-19 PROCEDURE — G8399 PT W/DXA RESULTS DOCUMENT: HCPCS | Performed by: INTERNAL MEDICINE

## 2024-12-19 PROCEDURE — 93000 ELECTROCARDIOGRAM COMPLETE: CPT | Performed by: INTERNAL MEDICINE

## 2024-12-19 RX ORDER — METOPROLOL TARTRATE 100 MG/1
100 TABLET ORAL 2 TIMES DAILY
Qty: 180 TABLET | Refills: 3 | Status: SHIPPED | OUTPATIENT
Start: 2024-12-19

## 2024-12-31 ENCOUNTER — HOSPITAL ENCOUNTER (OUTPATIENT)
Dept: GENERAL RADIOLOGY | Age: 84
Discharge: HOME | End: 2024-12-31
Attending: INTERNAL MEDICINE
Payer: MEDICARE

## 2024-12-31 DIAGNOSIS — Z79.811 USE OF AROMATASE INHIBITORS: ICD-10-CM

## 2024-12-31 DIAGNOSIS — Z17.0 MALIGNANT NEOPLASM OF LOWER-OUTER QUADRANT OF LEFT BREAST OF FEMALE, ESTROGEN RECEPTOR POSITIVE (HCC): ICD-10-CM

## 2024-12-31 DIAGNOSIS — C50.512 MALIGNANT NEOPLASM OF LOWER-OUTER QUADRANT OF LEFT BREAST OF FEMALE, ESTROGEN RECEPTOR POSITIVE (HCC): ICD-10-CM

## 2024-12-31 PROCEDURE — 77080 DXA BONE DENSITY AXIAL: CPT

## 2025-01-08 ENCOUNTER — OFFICE VISIT (OUTPATIENT)
Dept: INTERNAL MEDICINE CLINIC | Age: 85
End: 2025-01-08
Payer: MEDICARE

## 2025-01-08 VITALS
BODY MASS INDEX: 28.44 KG/M2 | DIASTOLIC BLOOD PRESSURE: 90 MMHG | HEART RATE: 77 BPM | OXYGEN SATURATION: 95 % | SYSTOLIC BLOOD PRESSURE: 136 MMHG | WEIGHT: 153 LBS

## 2025-01-08 DIAGNOSIS — M81.8 OTHER OSTEOPOROSIS WITHOUT CURRENT PATHOLOGICAL FRACTURE: ICD-10-CM

## 2025-01-08 DIAGNOSIS — D68.69 SECONDARY HYPERCOAGULABLE STATE (HCC): ICD-10-CM

## 2025-01-08 DIAGNOSIS — I48.91 ATRIAL FIBRILLATION WITH RAPID VENTRICULAR RESPONSE (HCC): ICD-10-CM

## 2025-01-08 DIAGNOSIS — E55.9 VITAMIN D DEFICIENCY: ICD-10-CM

## 2025-01-08 DIAGNOSIS — C50.512 MALIGNANT NEOPLASM OF LOWER-OUTER QUADRANT OF LEFT BREAST OF FEMALE, ESTROGEN RECEPTOR POSITIVE (HCC): ICD-10-CM

## 2025-01-08 DIAGNOSIS — E78.5 HYPERLIPIDEMIA, UNSPECIFIED HYPERLIPIDEMIA TYPE: ICD-10-CM

## 2025-01-08 DIAGNOSIS — R73.03 PRE-DIABETES: ICD-10-CM

## 2025-01-08 DIAGNOSIS — D75.1 POLYCYTHEMIA: ICD-10-CM

## 2025-01-08 DIAGNOSIS — I10 ESSENTIAL HYPERTENSION: ICD-10-CM

## 2025-01-08 DIAGNOSIS — I50.32 CHRONIC DIASTOLIC CHF (CONGESTIVE HEART FAILURE) (HCC): ICD-10-CM

## 2025-01-08 DIAGNOSIS — E03.9 ACQUIRED HYPOTHYROIDISM: Primary | ICD-10-CM

## 2025-01-08 DIAGNOSIS — E03.9 ACQUIRED HYPOTHYROIDISM: ICD-10-CM

## 2025-01-08 DIAGNOSIS — Z17.0 MALIGNANT NEOPLASM OF LOWER-OUTER QUADRANT OF LEFT BREAST OF FEMALE, ESTROGEN RECEPTOR POSITIVE (HCC): ICD-10-CM

## 2025-01-08 LAB
25(OH)D3 SERPL-MCNC: 73.9 NG/ML
ALBUMIN SERPL-MCNC: 4.1 G/DL (ref 3.4–5)
ALBUMIN/GLOB SERPL: 1.8 {RATIO} (ref 1.1–2.2)
ALP SERPL-CCNC: 97 U/L (ref 40–129)
ALT SERPL-CCNC: 25 U/L (ref 10–40)
ANION GAP SERPL CALCULATED.3IONS-SCNC: 11 MMOL/L (ref 3–16)
AST SERPL-CCNC: 31 U/L (ref 15–37)
BILIRUB SERPL-MCNC: 0.6 MG/DL (ref 0–1)
BUN SERPL-MCNC: 17 MG/DL (ref 7–20)
CALCIUM SERPL-MCNC: 9.6 MG/DL (ref 8.3–10.6)
CHLORIDE SERPL-SCNC: 102 MMOL/L (ref 99–110)
CHOLEST SERPL-MCNC: 235 MG/DL (ref 0–199)
CO2 SERPL-SCNC: 24 MMOL/L (ref 21–32)
CREAT SERPL-MCNC: 1 MG/DL (ref 0.6–1.2)
DEPRECATED RDW RBC AUTO: 13.9 % (ref 12.4–15.4)
GFR SERPLBLD CREATININE-BSD FMLA CKD-EPI: 56 ML/MIN/{1.73_M2}
GLUCOSE SERPL-MCNC: 88 MG/DL (ref 70–99)
HCT VFR BLD AUTO: 49.2 % (ref 36–48)
HDLC SERPL-MCNC: 74 MG/DL (ref 40–60)
HGB BLD-MCNC: 16.2 G/DL (ref 12–16)
LDLC SERPL CALC-MCNC: 144 MG/DL
MCH RBC QN AUTO: 32.5 PG (ref 26–34)
MCHC RBC AUTO-ENTMCNC: 33 G/DL (ref 31–36)
MCV RBC AUTO: 98.7 FL (ref 80–100)
NT-PROBNP SERPL-MCNC: 706 PG/ML (ref 0–449)
PLATELET # BLD AUTO: 201 K/UL (ref 135–450)
PMV BLD AUTO: 9.9 FL (ref 5–10.5)
POTASSIUM SERPL-SCNC: 4.8 MMOL/L (ref 3.5–5.1)
PROT SERPL-MCNC: 6.4 G/DL (ref 6.4–8.2)
RBC # BLD AUTO: 4.99 M/UL (ref 4–5.2)
SODIUM SERPL-SCNC: 137 MMOL/L (ref 136–145)
TRIGL SERPL-MCNC: 87 MG/DL (ref 0–150)
TSH SERPL DL<=0.005 MIU/L-ACNC: 2.87 UIU/ML (ref 0.27–4.2)
VLDLC SERPL CALC-MCNC: 17 MG/DL
WBC # BLD AUTO: 10.2 K/UL (ref 4–11)

## 2025-01-08 PROCEDURE — 1123F ACP DISCUSS/DSCN MKR DOCD: CPT | Performed by: INTERNAL MEDICINE

## 2025-01-08 PROCEDURE — G8417 CALC BMI ABV UP PARAM F/U: HCPCS | Performed by: INTERNAL MEDICINE

## 2025-01-08 PROCEDURE — 3075F SYST BP GE 130 - 139MM HG: CPT | Performed by: INTERNAL MEDICINE

## 2025-01-08 PROCEDURE — 1036F TOBACCO NON-USER: CPT | Performed by: INTERNAL MEDICINE

## 2025-01-08 PROCEDURE — G8399 PT W/DXA RESULTS DOCUMENT: HCPCS | Performed by: INTERNAL MEDICINE

## 2025-01-08 PROCEDURE — G8427 DOCREV CUR MEDS BY ELIG CLIN: HCPCS | Performed by: INTERNAL MEDICINE

## 2025-01-08 PROCEDURE — G2211 COMPLEX E/M VISIT ADD ON: HCPCS | Performed by: INTERNAL MEDICINE

## 2025-01-08 PROCEDURE — 1159F MED LIST DOCD IN RCRD: CPT | Performed by: INTERNAL MEDICINE

## 2025-01-08 PROCEDURE — 1090F PRES/ABSN URINE INCON ASSESS: CPT | Performed by: INTERNAL MEDICINE

## 2025-01-08 PROCEDURE — 99214 OFFICE O/P EST MOD 30 MIN: CPT | Performed by: INTERNAL MEDICINE

## 2025-01-08 PROCEDURE — 3080F DIAST BP >= 90 MM HG: CPT | Performed by: INTERNAL MEDICINE

## 2025-01-08 PROCEDURE — 1160F RVW MEDS BY RX/DR IN RCRD: CPT | Performed by: INTERNAL MEDICINE

## 2025-01-08 RX ORDER — MULTIVIT WITH MINERALS/LUTEIN
1000 TABLET ORAL DAILY
COMMUNITY

## 2025-01-08 RX ORDER — METOPROLOL TARTRATE 100 MG/1
150 TABLET ORAL 2 TIMES DAILY
Qty: 270 TABLET | Refills: 0 | Status: SHIPPED
Start: 2025-01-08 | End: 2025-04-08

## 2025-01-08 SDOH — ECONOMIC STABILITY: FOOD INSECURITY: WITHIN THE PAST 12 MONTHS, YOU WORRIED THAT YOUR FOOD WOULD RUN OUT BEFORE YOU GOT MONEY TO BUY MORE.: NEVER TRUE

## 2025-01-08 SDOH — ECONOMIC STABILITY: FOOD INSECURITY: WITHIN THE PAST 12 MONTHS, THE FOOD YOU BOUGHT JUST DIDN'T LAST AND YOU DIDN'T HAVE MONEY TO GET MORE.: NEVER TRUE

## 2025-01-08 ASSESSMENT — PATIENT HEALTH QUESTIONNAIRE - PHQ9
5. POOR APPETITE OR OVEREATING: NOT AT ALL
8. MOVING OR SPEAKING SO SLOWLY THAT OTHER PEOPLE COULD HAVE NOTICED. OR THE OPPOSITE, BEING SO FIGETY OR RESTLESS THAT YOU HAVE BEEN MOVING AROUND A LOT MORE THAN USUAL: NOT AT ALL
6. FEELING BAD ABOUT YOURSELF - OR THAT YOU ARE A FAILURE OR HAVE LET YOURSELF OR YOUR FAMILY DOWN: NOT AT ALL
SUM OF ALL RESPONSES TO PHQ9 QUESTIONS 1 & 2: 0
SUM OF ALL RESPONSES TO PHQ QUESTIONS 1-9: 0
10. IF YOU CHECKED OFF ANY PROBLEMS, HOW DIFFICULT HAVE THESE PROBLEMS MADE IT FOR YOU TO DO YOUR WORK, TAKE CARE OF THINGS AT HOME, OR GET ALONG WITH OTHER PEOPLE: NOT DIFFICULT AT ALL
9. THOUGHTS THAT YOU WOULD BE BETTER OFF DEAD, OR OF HURTING YOURSELF: NOT AT ALL
SUM OF ALL RESPONSES TO PHQ QUESTIONS 1-9: 0
1. LITTLE INTEREST OR PLEASURE IN DOING THINGS: NOT AT ALL
7. TROUBLE CONCENTRATING ON THINGS, SUCH AS READING THE NEWSPAPER OR WATCHING TELEVISION: NOT AT ALL
SUM OF ALL RESPONSES TO PHQ QUESTIONS 1-9: 0
3. TROUBLE FALLING OR STAYING ASLEEP: NOT AT ALL
2. FEELING DOWN, DEPRESSED OR HOPELESS: NOT AT ALL
SUM OF ALL RESPONSES TO PHQ QUESTIONS 1-9: 0
4. FEELING TIRED OR HAVING LITTLE ENERGY: NOT AT ALL

## 2025-01-08 NOTE — PROGRESS NOTES
Cynthia Jennings (:  1940) is a 84 y.o. female, here for evaluation of the following chief complaint(s):    Follow-up (Last appt 10/30/2024 for AWV, Return in 1 year (on 10/30/2025) for Medicare AWV.)      ASSESSMENT/PLAN:  1. Acquired hypothyroidism  Stable on levothyroxine  -     TSH; Future  2. Secondary hypercoagulable state (HCC)  Stable on Eliquis  3. Atrial fibrillation with rapid ventricular response (HCC)  Stable on Eliquis, flecainide, metoprolol  -     CBC; Future  -     Brain Natriuretic Peptide; Future  4. Pre-diabetes  -     Hemoglobin A1C; Future  5. Essential hypertension  Fair control on Lasix and metoprolol  -     Comprehensive Metabolic Panel; Future  6. Hyperlipidemia, unspecified hyperlipidemia type  Has previously declined statin  -     Lipid Panel; Future  7. Vitamin D deficiency  -     Vitamin D 25 Hydroxy; Future  8. Malignant neoplasm of lower-outer quadrant of left breast of female, estrogen receptor positive (HCC)  Stable on Evista  9. Chronic diastolic CHF (congestive heart failure) (HCC)  Stable on Jardiance and Lasix  -     Brain Natriuretic Peptide; Future  10. Other osteoporosis without current pathological fracture  -     Ann Waite MD, Endocrinology, [employer]  11. Polycythemia  -     CBC; Future    Return in about 6 months (around 2025).    SUBJECTIVE/OBJECTIVE:  HPI  Patient states that since starting Jardiance her shortness of breath is improved as is her leg swelling.    Review of Systems  Weight stable    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    Alveolitis of jaw     Atrial fibrillation (HCC)     dr castillo and dr taylor    Biatrial enlargement     Breast cancer (HCC)     Cancer (HCC)     breast - left  - xrt and chemo ([facility])    Diastolic CHF (HCC)     Diverticulosis     Generalized edema     lasix as needed    GERD (gastroesophageal reflux disease)

## 2025-01-08 NOTE — PATIENT INSTRUCTIONS
Tdap, rsv and shingrix    Labs    Increase metoprolol back to 150 mg 2x/day  If shortness of breath returns, then go back to 100 mg 2x/day

## 2025-01-09 ENCOUNTER — TELEPHONE (OUTPATIENT)
Dept: ENDOCRINOLOGY | Age: 85
End: 2025-01-09

## 2025-01-09 LAB
EST. AVERAGE GLUCOSE BLD GHB EST-MCNC: 108.3 MG/DL
HBA1C MFR BLD: 5.4 %

## 2025-01-30 ENCOUNTER — OFFICE VISIT (OUTPATIENT)
Dept: SURGERY | Age: 85
End: 2025-01-30
Payer: MEDICARE

## 2025-01-30 ENCOUNTER — HOSPITAL ENCOUNTER (OUTPATIENT)
Dept: MAMMOGRAPHY | Age: 85
Discharge: HOME | End: 2025-01-30
Payer: MEDICARE

## 2025-01-30 VITALS — HEIGHT: 61 IN | BODY MASS INDEX: 28.89 KG/M2 | WEIGHT: 153 LBS

## 2025-01-30 VITALS
WEIGHT: 149.4 LBS | BODY MASS INDEX: 28.21 KG/M2 | HEART RATE: 81 BPM | OXYGEN SATURATION: 96 % | HEIGHT: 61 IN | RESPIRATION RATE: 16 BRPM

## 2025-01-30 DIAGNOSIS — Z85.3 HISTORY OF BREAST CANCER: ICD-10-CM

## 2025-01-30 DIAGNOSIS — Z08 ENCOUNTER FOR FOLLOW-UP SURVEILLANCE OF BREAST CANCER: ICD-10-CM

## 2025-01-30 DIAGNOSIS — Z85.3 ENCOUNTER FOR FOLLOW-UP SURVEILLANCE OF BREAST CANCER: ICD-10-CM

## 2025-01-30 DIAGNOSIS — Z12.31 ENCOUNTER FOR SCREENING MAMMOGRAM FOR MALIGNANT NEOPLASM OF BREAST: ICD-10-CM

## 2025-01-30 DIAGNOSIS — Z08 ENCOUNTER FOR FOLLOW-UP SURVEILLANCE OF BREAST CANCER: Primary | ICD-10-CM

## 2025-01-30 DIAGNOSIS — Z12.39 ENCOUNTER FOR SCREENING BREAST EXAMINATION: Primary | ICD-10-CM

## 2025-01-30 DIAGNOSIS — Z12.31 ENCOUNTER FOR SCREENING MAMMOGRAM FOR BREAST CANCER: ICD-10-CM

## 2025-01-30 DIAGNOSIS — Z90.12 S/P PARTIAL MASTECTOMY, LEFT: ICD-10-CM

## 2025-01-30 DIAGNOSIS — Z85.3 ENCOUNTER FOR FOLLOW-UP SURVEILLANCE OF BREAST CANCER: Primary | ICD-10-CM

## 2025-01-30 PROCEDURE — 77063 BREAST TOMOSYNTHESIS BI: CPT

## 2025-01-30 PROCEDURE — G8417 CALC BMI ABV UP PARAM F/U: HCPCS | Performed by: NURSE PRACTITIONER

## 2025-01-30 PROCEDURE — 1160F RVW MEDS BY RX/DR IN RCRD: CPT | Performed by: NURSE PRACTITIONER

## 2025-01-30 PROCEDURE — 1090F PRES/ABSN URINE INCON ASSESS: CPT | Performed by: NURSE PRACTITIONER

## 2025-01-30 PROCEDURE — 1159F MED LIST DOCD IN RCRD: CPT | Performed by: NURSE PRACTITIONER

## 2025-01-30 PROCEDURE — 1123F ACP DISCUSS/DSCN MKR DOCD: CPT | Performed by: NURSE PRACTITIONER

## 2025-01-30 PROCEDURE — 1126F AMNT PAIN NOTED NONE PRSNT: CPT | Performed by: NURSE PRACTITIONER

## 2025-01-30 PROCEDURE — 1036F TOBACCO NON-USER: CPT | Performed by: NURSE PRACTITIONER

## 2025-01-30 PROCEDURE — G8399 PT W/DXA RESULTS DOCUMENT: HCPCS | Performed by: NURSE PRACTITIONER

## 2025-01-30 PROCEDURE — 99213 OFFICE O/P EST LOW 20 MIN: CPT | Performed by: NURSE PRACTITIONER

## 2025-01-30 PROCEDURE — G8427 DOCREV CUR MEDS BY ELIG CLIN: HCPCS | Performed by: NURSE PRACTITIONER

## 2025-01-30 NOTE — PROGRESS NOTES
[facility]  Surgical Breast Oncology      Medical Oncologist: Dr. Serra   Radiation Oncologist: Dr. Bridges       CC: 6 months follow-up, breast cancer f/u      HPI: Cynthia Jennings is a 84 y.o. woman here for 6 months follow up for breast check secondary to personal history of left breast cancer.  She is s/p left partial mastectomy 2/22/2022 with Dr. Morris for 6 mm of grade 2 DCIS, ER positive, negative margins.  S/p adjuvant radiation therapy.  She started Arimidex on 5/9/2022 ---> Raloxifene.    She has no breast related concerns today.  She states that she does perform routine self breast evaluations and has not noticed any new abnormalities such as masses, skin changes, color changes,nipple discharge, or changes to the nipple-areolar complex.      Unfortunately, her  passed away in April 2024 from dementia.     INTERVAL HX:  On 2/22/2022 she underwent left breast partial mastectomy.  Pathology identified 6 mm of grade 2 DCIS.  Deep margin was focally closed with additional deep margin at that time was negative.  ER positive. [id number]     On 4/15/2022 she completed adjuvant radiation therapy.    On 5/9/2022 she initiated Arimidex.    On 1/16/2023 she underwent interval breast imaging.  Postsurgical changes noted in the left breast.  No new concerning findings suggestive of malignancy.  BI-RADS 2.    On 1/22/2024 she underwent bilateral screening mammography.  Postsurgical changes and BioZorb device in left breast.  No new concerning findings suggest malignancy.  BI-RADS 2.    Bilateral screening mammogram 1/30/2025:  Stable postsurgical changes and biozorb device lateral left breast.  No new concerning findings suggestive of malignancy.  BI-RADS2.      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    Alveolitis of jaw     Atrial fibrillation (HCC)     dr castillo and dr taylor    Biatrial enlargement

## 2025-02-20 ENCOUNTER — OFFICE VISIT (OUTPATIENT)
Dept: ENDOCRINOLOGY | Age: 85
End: 2025-02-20
Payer: MEDICARE

## 2025-02-20 VITALS
WEIGHT: 152.4 LBS | RESPIRATION RATE: 16 BRPM | SYSTOLIC BLOOD PRESSURE: 132 MMHG | DIASTOLIC BLOOD PRESSURE: 80 MMHG | BODY MASS INDEX: 28.8 KG/M2 | OXYGEN SATURATION: 91 % | HEART RATE: 80 BPM

## 2025-02-20 DIAGNOSIS — M81.0 AGE-RELATED OSTEOPOROSIS WITHOUT CURRENT PATHOLOGICAL FRACTURE: Primary | ICD-10-CM

## 2025-02-20 PROCEDURE — G8399 PT W/DXA RESULTS DOCUMENT: HCPCS | Performed by: HOSPITALIST

## 2025-02-20 PROCEDURE — 1123F ACP DISCUSS/DSCN MKR DOCD: CPT | Performed by: HOSPITALIST

## 2025-02-20 PROCEDURE — 1159F MED LIST DOCD IN RCRD: CPT | Performed by: HOSPITALIST

## 2025-02-20 PROCEDURE — G8427 DOCREV CUR MEDS BY ELIG CLIN: HCPCS | Performed by: HOSPITALIST

## 2025-02-20 PROCEDURE — 99204 OFFICE O/P NEW MOD 45 MIN: CPT | Performed by: HOSPITALIST

## 2025-02-20 PROCEDURE — 1160F RVW MEDS BY RX/DR IN RCRD: CPT | Performed by: HOSPITALIST

## 2025-02-20 PROCEDURE — G8417 CALC BMI ABV UP PARAM F/U: HCPCS | Performed by: HOSPITALIST

## 2025-02-20 PROCEDURE — 1090F PRES/ABSN URINE INCON ASSESS: CPT | Performed by: HOSPITALIST

## 2025-02-20 PROCEDURE — 1036F TOBACCO NON-USER: CPT | Performed by: HOSPITALIST

## 2025-02-20 NOTE — PATIENT INSTRUCTIONS
-Continue raloxifene    Continue to take adequate calcium and vitamin D as advised    In general please avoid falls, use night lights at night.     -Follow up in 6 months

## 2025-02-20 NOTE — PROGRESS NOTES
Subjective:      Cynthia Jennings, 84 y.o. female, who is here for osteoporosis evaluation.   Patient preferred name is Cynthia gomez initially diagnosed with osteoporosis    Other past medical history includes HFpEF, paroxysmal A-fib on flecainide and Eliquis, sick sinus syndrome status post dual-chamber MDT PPM, NAKIA, hypertension, hyperlipidemia, hypothyroidism, GERD.  She also has a history of left breast cancer diagnosed in 2020.  S/p partial mastectomy in 2022 and also radiation.  Final pathology showed only DCIS and no invasive cancer.  Estrogen receptor positive.  Anastrozole was started in May 2022.  DXA scan in 2022 showed osteoporosis with a T-score of -3 in the lumbar spine and hence anastrozole was stopped.  Tamoxifen was considered but given interaction with flecainide this was not started.  She is currently on raloxifene 60 mg daily since 2022. She has to be on it for 5 years, so 2 more years left. Follows with [facility].      History of fractures:    Denies      Date Spine L1-L4  BMD  g/cm2 T-score    L- Femoral Neck  BMD  g/cm2 T-score    L- Total Hip  BMD  g/cm2 T-score    R- Femoral Neck  BMD  g/cm2 T-score      5/29/2014  -2.7     -1.7     -1.3     -1.5      8/1/2022 0.718 -3.0    0.567 -2.5    0.765 -1.5           12/31/2024 0.715 -3.0    0.548 -2.7    0.696 -2.0                                                                                                           Risk factors: she fell after her shoe caught in a carpet in Jan/2025.  Denies any personal or family history of kidney stones.  Steroid use: Denies use of steroid inhalers, history of steroid joint injections or recent prednisone use  Denies current smoking  Occasional alcohol use  Denies history of rheumatoid arthritis, gastric bypass or celiac disease  Denies family history of osteoporosis or hip fractures in parents  She had hysterectomy at age 36 due to heavy bleeding.    Diet: Regards to calcium intake she eats cottage cheese and

## 2025-03-20 DIAGNOSIS — E03.9 ACQUIRED HYPOTHYROIDISM: ICD-10-CM

## 2025-03-20 RX ORDER — FLECAINIDE ACETATE 50 MG/1
50 TABLET ORAL 2 TIMES DAILY
Qty: 180 TABLET | Refills: 3 | Status: SHIPPED | OUTPATIENT
Start: 2025-03-20

## 2025-03-20 RX ORDER — LEVOTHYROXINE SODIUM 25 UG/1
25 TABLET ORAL DAILY
Qty: 90 TABLET | Refills: 1 | Status: SHIPPED | OUTPATIENT
Start: 2025-03-20

## 2025-03-20 NOTE — TELEPHONE ENCOUNTER
Requested Prescriptions     Pending Prescriptions Disp Refills    flecainide (TAMBOCOR) 50 MG tablet 180 tablet 3     Sig: Take 1 tablet by mouth 2 times daily            Checked Correct Pharmacy: Yes  Any changes since last refill? No     Number: 180  Refills: 3      Last EK2024      Last OV: 2024 Provider: MAURA  Next OV: 2025 Provider: LASHELL        Last Labs: 10/23/2023     BMP:   Lab Results   Component Value Date/Time     2025 02:36 PM    K 4.8 2025 02:36 PM    K 4.0 2021 04:38 AM     2025 02:36 PM    CO2 24 2025 02:36 PM    BUN 17 2025 02:36 PM    CREATININE 1.0 2025 02:36 PM    GLUCOSE 88 2025 02:36 PM    GLUCOSE 104 2011 10:52 AM    CALCIUM 9.6 2025 02:36 PM    LABGLOM 56 2025 02:36 PM    LABGLOM 50 10/23/2023 02:03 PM

## 2025-04-07 ENCOUNTER — TELEPHONE (OUTPATIENT)
Dept: INTERNAL MEDICINE CLINIC | Age: 85
End: 2025-04-07

## 2025-04-07 NOTE — TELEPHONE ENCOUNTER
----- Message from Rae CASTANEDA sent at 4/7/2025 11:41 AM EDT -----  Regarding: ECC Appointment Request  E[facility] Appointment Request    Patient needs appointment for ECC Appointment Type: New to Provider.    Patient Requested Dates(s): Any available appointment  Patient Requested Time: After lunch  Provider Name: Salvatore RADHA YehudaDO    Reason for Appointment Request: New Patient - No appointments available during search  --------------------------------------------------------------------------------------------------------------------------    Relationship to Patient: Self     Call Back Information: OK to leave message on voicemail  Preferred Call Back Number: Phone [phone]

## 2025-04-28 ASSESSMENT — ENCOUNTER SYMPTOMS
CHEST TIGHTNESS: 0
BLOOD IN STOOL: 0
VOMITING: 0
WHEEZING: 0
EYE DISCHARGE: 0
COLOR CHANGE: 0
SHORTNESS OF BREATH: 0
BACK PAIN: 0
COUGH: 0
ABDOMINAL PAIN: 0
ABDOMINAL DISTENTION: 0
FACIAL SWELLING: 0

## 2025-04-28 NOTE — PROGRESS NOTES
[facility]     Outpatient Cardiology         Patient Name:  Cynthia Jennings  Requesting Physician: No admitting provider for patient encounter.  Primary Care Physician: Remedios Cottrell MD    Reason for Consultation/Chief Complaint:   Chief Complaint   Patient presents with    Follow-up    Atrial Fibrillation         History of Present Illness:    HPI     Cynthia Norman a 85 y.o. female with PMH of HFpEF, sleep apnea, GERD, hypothyroidism, NAKIA, pAF, SSS, s/p dual ch MDT PPM (4/21/21. Dr. Robertson).   Here for follow up for HFpEF.      HFpEF, Last EF 55-60% (9/26/23) on jardiance 10 mg daily, lopressor 150 mg BID, lasix 20 mg as needed,   pAF, on flecainide 50 mg BID, eliquis 5 mg BID, controlled no issues with Eliquis  SSS, s/p PPM, working properly, follows with EP      PMH  Past Medical History:   Diagnosis Date    Age related osteoporosis     Age-related osteoporosis without current pathological fracture 02/20/2025    Allergic rhinitis     seasonal    Alveolitis of jaw     from fosamax    Alveolitis of jaw     Atrial fibrillation (HCC)     dr castillo and dr robertson    Biatrial enlargement     Breast cancer (HCC)     Cancer (HCC)     breast - left  - xrt and chemo (AI)    Diastolic CHF (HCC)     Diverticulosis     Generalized edema     lasix as needed    GERD (gastroesophageal reflux disease)     Hearing loss     History of therapeutic radiation     Melanoma (HCC)     left scapula    NAKIA (obstructive sleep apnea) 09/08/2021    Other and unspecified hyperlipidemia     Pre-diabetes     Primary breast malignancy, left (HCC)     Tinnitus     Unspecified gastritis and gastroduodenitis without mention of hemorrhage     aloe vera juice for gerd    Unspecified hypothyroidism        PSH  Past Surgical History:   Procedure Laterality Date    BLADDER SUSPENSION  1990s    BREAST LUMPECTOMY Left 02/22/2022    LEFT BREAST LOCALIZED PARTIAL MASTECTOMY, BIOZORB, performed by Randa Morris MD

## 2025-04-30 ENCOUNTER — OFFICE VISIT (OUTPATIENT)
Dept: CARDIOLOGY CLINIC | Age: 85
End: 2025-04-30
Payer: MEDICARE

## 2025-04-30 VITALS
WEIGHT: 149.6 LBS | BODY MASS INDEX: 28.25 KG/M2 | HEART RATE: 80 BPM | OXYGEN SATURATION: 95 % | SYSTOLIC BLOOD PRESSURE: 132 MMHG | DIASTOLIC BLOOD PRESSURE: 82 MMHG | HEIGHT: 61 IN

## 2025-04-30 DIAGNOSIS — I48.0 PAROXYSMAL ATRIAL FIBRILLATION (HCC): Primary | ICD-10-CM

## 2025-04-30 DIAGNOSIS — Z95.0 PACEMAKER: ICD-10-CM

## 2025-04-30 DIAGNOSIS — I50.32 CHRONIC DIASTOLIC CHF (CONGESTIVE HEART FAILURE) (HCC): ICD-10-CM

## 2025-04-30 PROCEDURE — 1159F MED LIST DOCD IN RCRD: CPT | Performed by: INTERNAL MEDICINE

## 2025-04-30 PROCEDURE — 99214 OFFICE O/P EST MOD 30 MIN: CPT | Performed by: INTERNAL MEDICINE

## 2025-04-30 PROCEDURE — G8417 CALC BMI ABV UP PARAM F/U: HCPCS | Performed by: INTERNAL MEDICINE

## 2025-04-30 PROCEDURE — G8427 DOCREV CUR MEDS BY ELIG CLIN: HCPCS | Performed by: INTERNAL MEDICINE

## 2025-04-30 PROCEDURE — 1123F ACP DISCUSS/DSCN MKR DOCD: CPT | Performed by: INTERNAL MEDICINE

## 2025-04-30 PROCEDURE — 1036F TOBACCO NON-USER: CPT | Performed by: INTERNAL MEDICINE

## 2025-04-30 PROCEDURE — G2211 COMPLEX E/M VISIT ADD ON: HCPCS | Performed by: INTERNAL MEDICINE

## 2025-04-30 PROCEDURE — G8399 PT W/DXA RESULTS DOCUMENT: HCPCS | Performed by: INTERNAL MEDICINE

## 2025-04-30 PROCEDURE — 1090F PRES/ABSN URINE INCON ASSESS: CPT | Performed by: INTERNAL MEDICINE

## 2025-04-30 NOTE — ASSESSMENT & PLAN NOTE
Previously started on Jardiance, unclear why it was discontinued.  She is feeling good for now.  Will continue to monitor from symptoms, resume Jardiance if needed.  Lasix as needed.

## 2025-06-24 ENCOUNTER — CLINICAL SUPPORT (OUTPATIENT)
Dept: CARDIOLOGY CLINIC | Age: 85
End: 2025-06-24

## 2025-06-24 ENCOUNTER — OFFICE VISIT (OUTPATIENT)
Dept: CARDIOLOGY CLINIC | Age: 85
End: 2025-06-24
Payer: MEDICARE

## 2025-06-24 VITALS
WEIGHT: 145 LBS | HEART RATE: 66 BPM | SYSTOLIC BLOOD PRESSURE: 120 MMHG | BODY MASS INDEX: 27.4 KG/M2 | DIASTOLIC BLOOD PRESSURE: 80 MMHG

## 2025-06-24 DIAGNOSIS — Z95.0 PACEMAKER: ICD-10-CM

## 2025-06-24 DIAGNOSIS — I95.89 OTHER SPECIFIED HYPOTENSION: ICD-10-CM

## 2025-06-24 DIAGNOSIS — R00.1 BRADYCARDIA: ICD-10-CM

## 2025-06-24 DIAGNOSIS — R06.02 SOB (SHORTNESS OF BREATH): ICD-10-CM

## 2025-06-24 DIAGNOSIS — I48.0 PAROXYSMAL ATRIAL FIBRILLATION (HCC): Primary | ICD-10-CM

## 2025-06-24 DIAGNOSIS — G47.33 OSA (OBSTRUCTIVE SLEEP APNEA): ICD-10-CM

## 2025-06-24 PROCEDURE — 1036F TOBACCO NON-USER: CPT | Performed by: NURSE PRACTITIONER

## 2025-06-24 PROCEDURE — G8399 PT W/DXA RESULTS DOCUMENT: HCPCS | Performed by: NURSE PRACTITIONER

## 2025-06-24 PROCEDURE — 93000 ELECTROCARDIOGRAM COMPLETE: CPT | Performed by: NURSE PRACTITIONER

## 2025-06-24 PROCEDURE — 1160F RVW MEDS BY RX/DR IN RCRD: CPT | Performed by: NURSE PRACTITIONER

## 2025-06-24 PROCEDURE — 1159F MED LIST DOCD IN RCRD: CPT | Performed by: NURSE PRACTITIONER

## 2025-06-24 PROCEDURE — G8427 DOCREV CUR MEDS BY ELIG CLIN: HCPCS | Performed by: NURSE PRACTITIONER

## 2025-06-24 PROCEDURE — G8417 CALC BMI ABV UP PARAM F/U: HCPCS | Performed by: NURSE PRACTITIONER

## 2025-06-24 PROCEDURE — 1123F ACP DISCUSS/DSCN MKR DOCD: CPT | Performed by: NURSE PRACTITIONER

## 2025-06-24 PROCEDURE — 99215 OFFICE O/P EST HI 40 MIN: CPT | Performed by: NURSE PRACTITIONER

## 2025-06-24 PROCEDURE — 1090F PRES/ABSN URINE INCON ASSESS: CPT | Performed by: NURSE PRACTITIONER

## 2025-06-24 NOTE — PATIENT INSTRUCTIONS
- Follow up in 6 months with an interrogation with NAYELI Flowers.  - Appointment with Dr. Groves in 11/2025.

## 2025-06-24 NOTE — PROGRESS NOTES
(gastroesophageal reflux disease)     Hearing loss     History of therapeutic radiation     Melanoma (HCC)     left scapula    NAKIA (obstructive sleep apnea) 2021    Other and unspecified hyperlipidemia     Pre-diabetes     Primary breast malignancy, left (HCC)     Tinnitus     Unspecified gastritis and gastroduodenitis without mention of hemorrhage     aloe vera juice for gerd    Unspecified hypothyroidism      Social History:    Social History     Tobacco Use   Smoking Status Former    Current packs/day: 0.00    Types: Cigarettes    Quit date: 1995    Years since quittin.0   Smokeless Tobacco Never     Current Medications:  Current Outpatient Medications   Medication Sig Dispense Refill    levothyroxine (SYNTHROID) 25 MCG tablet TAKE 1 TABLET BY MOUTH EVERY DAY 90 tablet 1    flecainide (TAMBOCOR) 50 MG tablet Take 1 tablet by mouth 2 times daily 180 tablet 3    Ascorbic Acid (VITAMIN C) 1000 MG tablet Take 1 tablet by mouth daily      ELIQUIS 5 MG TABS tablet TAKE 1 TABLET BY MOUTH TWICE A  tablet 3    calcium carbonate (OSCAL) 500 MG TABS tablet Take 1 tablet by mouth daily      furosemide (LASIX) 20 MG tablet TAKE 1 TABLET BY MOUTH DAILY AS NEEDED (WATER WEIGHT GAIN.) 90 tablet 0    raloxifene (EVISTA) 60 MG tablet       Handicap Placard MISC by Does not apply route Dx of shortness of breath and A-fib.  Effective 2021 until 2026. 1 each 0    Cholecalciferol (VITAMIN D) 2000 UNITS CAPS capsule Take 1 capsule by mouth daily      metoprolol (LOPRESSOR) 100 MG tablet Take 1.5 tablets by mouth 2 times daily (Patient taking differently: Take 1 tablet by mouth 2 times daily 100mg BID) 270 tablet 0    empagliflozin (JARDIANCE) 10 MG tablet Take 1 tablet by mouth daily (Patient not taking: Reported on 2025) 30 tablet 0     No current facility-administered medications for this visit.     REVIEW OF SYSTEMS:    CONSTITUTIONAL: No major weight gain or loss, night sweats, fever,

## 2025-07-08 ENCOUNTER — OFFICE VISIT (OUTPATIENT)
Dept: INTERNAL MEDICINE CLINIC | Age: 85
End: 2025-07-08

## 2025-07-08 VITALS
OXYGEN SATURATION: 97 % | DIASTOLIC BLOOD PRESSURE: 70 MMHG | WEIGHT: 149 LBS | HEART RATE: 91 BPM | TEMPERATURE: 97.3 F | BODY MASS INDEX: 28.15 KG/M2 | SYSTOLIC BLOOD PRESSURE: 125 MMHG

## 2025-07-08 DIAGNOSIS — I48.91 ATRIAL FIBRILLATION, UNSPECIFIED TYPE (HCC): Primary | ICD-10-CM

## 2025-07-08 DIAGNOSIS — D68.69 SECONDARY HYPERCOAGULABLE STATE: ICD-10-CM

## 2025-07-08 DIAGNOSIS — Z95.0 PACEMAKER: ICD-10-CM

## 2025-07-08 DIAGNOSIS — R06.02 SOB (SHORTNESS OF BREATH): ICD-10-CM

## 2025-07-08 DIAGNOSIS — R73.03 PRE-DIABETES: ICD-10-CM

## 2025-07-08 DIAGNOSIS — I10 ESSENTIAL HYPERTENSION: ICD-10-CM

## 2025-07-08 DIAGNOSIS — E55.9 VITAMIN D DEFICIENCY: ICD-10-CM

## 2025-07-08 DIAGNOSIS — E78.5 HYPERLIPIDEMIA, UNSPECIFIED HYPERLIPIDEMIA TYPE: ICD-10-CM

## 2025-07-08 DIAGNOSIS — R00.1 BRADYCARDIA: ICD-10-CM

## 2025-07-08 DIAGNOSIS — G47.33 OSA (OBSTRUCTIVE SLEEP APNEA): ICD-10-CM

## 2025-07-08 DIAGNOSIS — I95.89 OTHER SPECIFIED HYPOTENSION: ICD-10-CM

## 2025-07-08 DIAGNOSIS — E03.9 ACQUIRED HYPOTHYROIDISM: ICD-10-CM

## 2025-07-08 DIAGNOSIS — I48.0 PAROXYSMAL ATRIAL FIBRILLATION (HCC): ICD-10-CM

## 2025-07-08 PROBLEM — J96.01 ACUTE RESPIRATORY FAILURE WITH HYPOXIA AND HYPERCAPNIA (HCC): Status: RESOLVED | Noted: 2021-04-18 | Resolved: 2025-07-08

## 2025-07-08 PROBLEM — J96.02 ACUTE RESPIRATORY FAILURE WITH HYPOXIA AND HYPERCAPNIA (HCC): Status: RESOLVED | Noted: 2021-04-18 | Resolved: 2025-07-08

## 2025-07-08 RX ORDER — METOPROLOL TARTRATE 100 MG/1
100 TABLET ORAL 2 TIMES DAILY
Qty: 180 TABLET | Refills: 1 | Status: SHIPPED | OUTPATIENT
Start: 2025-07-08 | End: 2026-01-04

## 2025-07-08 ASSESSMENT — ENCOUNTER SYMPTOMS
NAUSEA: 0
SORE THROAT: 0
BLOOD IN STOOL: 0
COUGH: 0
VOMITING: 0
SHORTNESS OF BREATH: 0
ABDOMINAL PAIN: 0

## 2025-07-08 NOTE — PROGRESS NOTES
abdominal pain, blood in stool, nausea and vomiting.   Neurological:  Negative for dizziness and weakness.          Objective   Physical Exam  Constitutional:       Appearance: Normal appearance.   HENT:      Head: Normocephalic and atraumatic.   Eyes:      General: No scleral icterus.     Conjunctiva/sclera: Conjunctivae normal.   Cardiovascular:      Rate and Rhythm: Normal rate and regular rhythm.      Pulses: Normal pulses.      Heart sounds: Normal heart sounds.   Pulmonary:      Effort: Pulmonary effort is normal.      Breath sounds: Normal breath sounds.   Musculoskeletal:         General: No swelling.   Skin:     General: Skin is warm and dry.   Neurological:      Mental Status: She is alert and oriented to person, place, and time. Mental status is at baseline.   Psychiatric:         Mood and Affect: Mood normal.         Behavior: Behavior normal.          During this visit I have spent 40 minutes reviewing previous notes, test results, medications and to perform face to face encounter with the patient obtaining history, performing physical exam and discussing the diagnosis, lab results, medications and importance of compliance with the treatment plan as well as to complete documentation in electronic health records.      An electronic signature was used to authenticate this note.    --Amado Hensley MD

## 2025-07-09 LAB
25(OH)D3 SERPL-MCNC: 72.3 NG/ML
ALBUMIN SERPL-MCNC: 4 G/DL (ref 3.4–5)
ALBUMIN/GLOB SERPL: 2.2 {RATIO} (ref 1.1–2.2)
ALP SERPL-CCNC: 77 U/L (ref 40–129)
ALT SERPL-CCNC: 26 U/L (ref 10–40)
ANION GAP SERPL CALCULATED.3IONS-SCNC: 10 MMOL/L (ref 3–16)
AST SERPL-CCNC: 37 U/L (ref 15–37)
BILIRUB SERPL-MCNC: 0.6 MG/DL (ref 0–1)
BUN SERPL-MCNC: 16 MG/DL (ref 7–20)
CALCIUM SERPL-MCNC: 9.1 MG/DL (ref 8.3–10.6)
CHLORIDE SERPL-SCNC: 105 MMOL/L (ref 99–110)
CHOLEST SERPL-MCNC: 199 MG/DL (ref 0–199)
CO2 SERPL-SCNC: 23 MMOL/L (ref 21–32)
CREAT SERPL-MCNC: 0.9 MG/DL (ref 0.6–1.2)
DEPRECATED RDW RBC AUTO: 14.3 % (ref 12.4–15.4)
EST. AVERAGE GLUCOSE BLD GHB EST-MCNC: 105.4 MG/DL
GFR SERPLBLD CREATININE-BSD FMLA CKD-EPI: 63 ML/MIN/{1.73_M2}
GLUCOSE SERPL-MCNC: 91 MG/DL (ref 70–99)
HBA1C MFR BLD: 5.3 %
HCT VFR BLD AUTO: 45.3 % (ref 36–48)
HDLC SERPL-MCNC: 71 MG/DL (ref 40–60)
HGB BLD-MCNC: 14.9 G/DL (ref 12–16)
LDLC SERPL CALC-MCNC: 109 MG/DL
MAGNESIUM SERPL-MCNC: 1.92 MG/DL (ref 1.8–2.4)
MCH RBC QN AUTO: 32.7 PG (ref 26–34)
MCHC RBC AUTO-ENTMCNC: 32.9 G/DL (ref 31–36)
MCV RBC AUTO: 99.5 FL (ref 80–100)
PLATELET # BLD AUTO: 182 K/UL (ref 135–450)
PMV BLD AUTO: 10.2 FL (ref 5–10.5)
POTASSIUM SERPL-SCNC: 5.1 MMOL/L (ref 3.5–5.1)
PROT SERPL-MCNC: 5.8 G/DL (ref 6.4–8.2)
RBC # BLD AUTO: 4.56 M/UL (ref 4–5.2)
SODIUM SERPL-SCNC: 138 MMOL/L (ref 136–145)
TRIGL SERPL-MCNC: 93 MG/DL (ref 0–150)
TSH SERPL DL<=0.005 MIU/L-ACNC: 3.28 UIU/ML (ref 0.27–4.2)
VLDLC SERPL CALC-MCNC: 19 MG/DL
WBC # BLD AUTO: 9.8 K/UL (ref 4–11)

## (undated) DEVICE — ADHESIVE SKIN CLSR 0.7ML TOP DERMBND ADV

## (undated) DEVICE — GLOVE SURG SZ 6.5 L11.2IN FNGR THK9.8MIL STRW LTX POLYMER

## (undated) DEVICE — Device

## (undated) DEVICE — SPONGE LAP W18XL18IN WHT COT 4 PLY FLD STRUNG RADPQ DISP ST

## (undated) DEVICE — SUTURE VCRL + SZ 3-0 L18IN ABSRB UD SH 1/2 CIR TAPERCUT NDL VCP864D

## (undated) DEVICE — GAUZE,SPONGE,4"X4",8PLY,STRL,LF,10/TRAY: Brand: MEDLINE

## (undated) DEVICE — BLADE ES ELASTOMERIC COAT INSUL DURABLE BEND UPTO 90DEG

## (undated) DEVICE — MASC TURNOVER KIT: Brand: MEDLINE INDUSTRIES, INC.

## (undated) DEVICE — 3M™ IOBAN™ 2 ANTIMICROBIAL INCISE DRAPE 6650EZ: Brand: IOBAN™ 2

## (undated) DEVICE — SPECIMEN ORIENTATION CHARMS, SIX DISTINCTLY SHAPED STERILE 10MM CHARMS: Brand: MARGINMAP

## (undated) DEVICE — NEEDLE HYPO 22GA L1.5IN BLK POLYPR HUB S STL REG BVL STR

## (undated) DEVICE — MAJOR SET UP PK

## (undated) DEVICE — SUTURE MCRYL + SZ 4-0 L27IN ABSRB UD L19MM PS-2 3/8 CIR MCP426H

## (undated) DEVICE — BLANKET WRM W40.2XL55.9IN IORT LO BODY + MISTRAL AIR

## (undated) DEVICE — PROBE SET W/ DRP

## (undated) DEVICE — SOLUTION IRRIG 500ML 0.9% SOD CHL USP POUR PLAS BTL

## (undated) DEVICE — INTENDED FOR TISSUE SEPARATION, AND OTHER PROCEDURES THAT REQUIRE A SHARP SURGICAL BLADE TO PUNCTURE OR CUT.: Brand: BARD-PARKER ® STAINLESS STEEL BLADES

## (undated) DEVICE — PENCIL ES ULT VAC W TELSCP NOSE EZ CLN BLDE 10FT

## (undated) DEVICE — MEDICINE CUP, GRADUATED, STER: Brand: MEDLINE

## (undated) DEVICE — TOWEL,OR,DSP,ST,BLUE,STD,4/PK,20PK/CS: Brand: MEDLINE

## (undated) DEVICE — SUTURE MCRYL + SZ 3-0 L27IN ABSRB UD L26MM SH 1/2 CIR MCP416H

## (undated) DEVICE — DRAPE,CHEST,FENES,15X10,STERIL: Brand: MEDLINE

## (undated) DEVICE — PACK PROCEDURE SURG EXTREMITY MFFOP CUST

## (undated) DEVICE — STAPLER SKIN H3.9MM WIRE DIA0.58MM CRWN 6.9MM 35 STPL ROT

## (undated) DEVICE — APPLICATOR MEDICATED 26 CC SOLUTION HI LT ORNG CHLORAPREP

## (undated) DEVICE — GLOVE SURG SZ 7 L12IN THK7.5MIL DK GRN LTX FREE MSG6570] MEDLINE INDUSTRIES INC]

## (undated) DEVICE — YANKAUER,BULB TIP,W/O VENT,RIGID,STERILE: Brand: MEDLINE

## (undated) DEVICE — PROVE COVER: Brand: UNBRANDED